# Patient Record
Sex: FEMALE | Race: WHITE | NOT HISPANIC OR LATINO | Employment: OTHER | URBAN - METROPOLITAN AREA
[De-identification: names, ages, dates, MRNs, and addresses within clinical notes are randomized per-mention and may not be internally consistent; named-entity substitution may affect disease eponyms.]

---

## 2017-01-02 ENCOUNTER — TRANSCRIBE ORDERS (OUTPATIENT)
Dept: LAB | Facility: HOSPITAL | Age: 82
End: 2017-01-02

## 2017-01-02 ENCOUNTER — GENERIC CONVERSION - ENCOUNTER (OUTPATIENT)
Dept: OTHER | Facility: OTHER | Age: 82
End: 2017-01-02

## 2017-01-02 ENCOUNTER — APPOINTMENT (OUTPATIENT)
Dept: LAB | Facility: HOSPITAL | Age: 82
End: 2017-01-02
Payer: MEDICARE

## 2017-01-02 DIAGNOSIS — I10 ESSENTIAL (PRIMARY) HYPERTENSION: ICD-10-CM

## 2017-01-02 DIAGNOSIS — G47.00 INSOMNIA: ICD-10-CM

## 2017-01-02 DIAGNOSIS — R73.01 IMPAIRED FASTING GLUCOSE: ICD-10-CM

## 2017-01-02 DIAGNOSIS — E78.5 HYPERLIPIDEMIA: ICD-10-CM

## 2017-01-02 LAB
ALBUMIN SERPL BCP-MCNC: 3.8 G/DL (ref 3.5–5)
ALP SERPL-CCNC: 75 U/L (ref 46–116)
ALT SERPL W P-5'-P-CCNC: 15 U/L (ref 12–78)
ANION GAP SERPL CALCULATED.3IONS-SCNC: 9 MMOL/L (ref 4–13)
AST SERPL W P-5'-P-CCNC: 8 U/L (ref 5–45)
BILIRUB SERPL-MCNC: 0.71 MG/DL (ref 0.2–1)
BUN SERPL-MCNC: 15 MG/DL (ref 5–25)
CALCIUM SERPL-MCNC: 8.9 MG/DL (ref 8.3–10.1)
CHLORIDE SERPL-SCNC: 106 MMOL/L (ref 100–108)
CHOLEST SERPL-MCNC: 147 MG/DL (ref 50–200)
CO2 SERPL-SCNC: 27 MMOL/L (ref 21–32)
CREAT SERPL-MCNC: 1.25 MG/DL (ref 0.6–1.3)
EST. AVERAGE GLUCOSE BLD GHB EST-MCNC: 126 MG/DL
GFR SERPL CREATININE-BSD FRML MDRD: 40.5 ML/MIN/1.73SQ M
GLUCOSE SERPL-MCNC: 102 MG/DL (ref 65–140)
HBA1C MFR BLD: 6 % (ref 4.2–6.3)
HDLC SERPL-MCNC: 63 MG/DL (ref 40–60)
LDLC SERPL CALC-MCNC: 58 MG/DL (ref 0–100)
POTASSIUM SERPL-SCNC: 3.6 MMOL/L (ref 3.5–5.3)
PROT SERPL-MCNC: 7.9 G/DL (ref 6.4–8.2)
SODIUM SERPL-SCNC: 142 MMOL/L (ref 136–145)
TRIGL SERPL-MCNC: 129 MG/DL

## 2017-01-02 PROCEDURE — 36415 COLL VENOUS BLD VENIPUNCTURE: CPT

## 2017-01-02 PROCEDURE — 83036 HEMOGLOBIN GLYCOSYLATED A1C: CPT

## 2017-01-02 PROCEDURE — 80061 LIPID PANEL: CPT

## 2017-01-02 PROCEDURE — 80053 COMPREHEN METABOLIC PANEL: CPT

## 2017-01-05 ENCOUNTER — ALLSCRIPTS OFFICE VISIT (OUTPATIENT)
Dept: OTHER | Facility: OTHER | Age: 82
End: 2017-01-05

## 2017-01-30 ENCOUNTER — ALLSCRIPTS OFFICE VISIT (OUTPATIENT)
Dept: OTHER | Facility: OTHER | Age: 82
End: 2017-01-30

## 2017-05-30 DIAGNOSIS — I65.29 OCCLUSION AND STENOSIS OF UNSPECIFIED CAROTID ARTERY: ICD-10-CM

## 2017-06-23 ENCOUNTER — TRANSCRIBE ORDERS (OUTPATIENT)
Dept: LAB | Facility: HOSPITAL | Age: 82
End: 2017-06-23

## 2017-06-25 ENCOUNTER — APPOINTMENT (OUTPATIENT)
Dept: LAB | Facility: HOSPITAL | Age: 82
End: 2017-06-25
Payer: MEDICARE

## 2017-06-25 DIAGNOSIS — E78.5 HYPERLIPIDEMIA: ICD-10-CM

## 2017-06-25 DIAGNOSIS — I10 ESSENTIAL (PRIMARY) HYPERTENSION: ICD-10-CM

## 2017-06-25 DIAGNOSIS — R73.01 IMPAIRED FASTING GLUCOSE: ICD-10-CM

## 2017-06-25 DIAGNOSIS — G47.00 INSOMNIA: ICD-10-CM

## 2017-06-25 PROCEDURE — 80061 LIPID PANEL: CPT

## 2017-06-25 PROCEDURE — 80053 COMPREHEN METABOLIC PANEL: CPT

## 2017-06-25 PROCEDURE — 85025 COMPLETE CBC W/AUTO DIFF WBC: CPT

## 2017-06-25 PROCEDURE — 36415 COLL VENOUS BLD VENIPUNCTURE: CPT

## 2017-06-25 PROCEDURE — 83036 HEMOGLOBIN GLYCOSYLATED A1C: CPT

## 2017-06-25 PROCEDURE — 84443 ASSAY THYROID STIM HORMONE: CPT

## 2017-06-26 ENCOUNTER — GENERIC CONVERSION - ENCOUNTER (OUTPATIENT)
Dept: OTHER | Facility: OTHER | Age: 82
End: 2017-06-26

## 2017-07-05 ENCOUNTER — ALLSCRIPTS OFFICE VISIT (OUTPATIENT)
Dept: OTHER | Facility: OTHER | Age: 82
End: 2017-07-05

## 2017-12-25 DIAGNOSIS — E78.5 HYPERLIPIDEMIA: ICD-10-CM

## 2017-12-25 DIAGNOSIS — R73.01 IMPAIRED FASTING GLUCOSE: ICD-10-CM

## 2017-12-25 DIAGNOSIS — I10 ESSENTIAL (PRIMARY) HYPERTENSION: ICD-10-CM

## 2017-12-25 DIAGNOSIS — G47.00 INSOMNIA: ICD-10-CM

## 2018-01-02 ENCOUNTER — TRANSCRIBE ORDERS (OUTPATIENT)
Dept: LAB | Facility: HOSPITAL | Age: 83
End: 2018-01-02

## 2018-01-02 ENCOUNTER — APPOINTMENT (OUTPATIENT)
Dept: LAB | Facility: HOSPITAL | Age: 83
End: 2018-01-02
Payer: MEDICARE

## 2018-01-02 DIAGNOSIS — E78.5 HYPERLIPIDEMIA: ICD-10-CM

## 2018-01-02 DIAGNOSIS — R73.01 IMPAIRED FASTING GLUCOSE: ICD-10-CM

## 2018-01-02 DIAGNOSIS — I10 ESSENTIAL (PRIMARY) HYPERTENSION: ICD-10-CM

## 2018-01-02 DIAGNOSIS — G47.00 INSOMNIA: ICD-10-CM

## 2018-01-02 LAB
ALBUMIN SERPL BCP-MCNC: 3.5 G/DL (ref 3.5–5)
ALP SERPL-CCNC: 72 U/L (ref 46–116)
ALT SERPL W P-5'-P-CCNC: 10 U/L (ref 12–78)
ANION GAP SERPL CALCULATED.3IONS-SCNC: 9 MMOL/L (ref 4–13)
AST SERPL W P-5'-P-CCNC: 8 U/L (ref 5–45)
BILIRUB SERPL-MCNC: 0.68 MG/DL (ref 0.2–1)
BUN SERPL-MCNC: 13 MG/DL (ref 5–25)
CALCIUM SERPL-MCNC: 9 MG/DL (ref 8.3–10.1)
CHLORIDE SERPL-SCNC: 106 MMOL/L (ref 100–108)
CO2 SERPL-SCNC: 27 MMOL/L (ref 21–32)
CREAT SERPL-MCNC: 1.16 MG/DL (ref 0.6–1.3)
EST. AVERAGE GLUCOSE BLD GHB EST-MCNC: 131 MG/DL
GFR SERPL CREATININE-BSD FRML MDRD: 42 ML/MIN/1.73SQ M
GLUCOSE P FAST SERPL-MCNC: 122 MG/DL (ref 65–99)
HBA1C MFR BLD: 6.2 % (ref 4.2–6.3)
POTASSIUM SERPL-SCNC: 3.7 MMOL/L (ref 3.5–5.3)
PROT SERPL-MCNC: 8 G/DL (ref 6.4–8.2)
SODIUM SERPL-SCNC: 142 MMOL/L (ref 136–145)

## 2018-01-02 PROCEDURE — 80053 COMPREHEN METABOLIC PANEL: CPT

## 2018-01-02 PROCEDURE — 36415 COLL VENOUS BLD VENIPUNCTURE: CPT

## 2018-01-02 PROCEDURE — 83036 HEMOGLOBIN GLYCOSYLATED A1C: CPT

## 2018-01-10 ENCOUNTER — ALLSCRIPTS OFFICE VISIT (OUTPATIENT)
Dept: OTHER | Facility: OTHER | Age: 83
End: 2018-01-10

## 2018-01-11 NOTE — RESULT NOTES
Verified Results  (1) CBC/PLT/DIFF 25Jun2017 08:24AM [facility] Order Number: [id number]     Test Name Result Flag Reference   WBC COUNT 5 84 Thousand/uL  4 31-10 16   RBC COUNT 4 56 Million/uL  3 81-5 12   HEMOGLOBIN 13 9 g/dL  11 5-15 4   HEMATOCRIT 42 1 %  34 8-46  1   MCV 92 fL  82-98   MCH 30 5 pg  26 8-34 3   MCHC 33 0 g/dL  31 4-37 4   RDW 12 6 %  11 6-15 1   MPV 9 7 fL  8 9-12 7   PLATELET COUNT 404 Thousands/uL  149-390   nRBC AUTOMATED 0 /100 WBCs     NEUTROPHILS RELATIVE PERCENT 57 %  43-75   LYMPHOCYTES RELATIVE PERCENT 28 %  14-44   MONOCYTES RELATIVE PERCENT 12 %  4-12   EOSINOPHILS RELATIVE PERCENT 2 %  0-6   BASOPHILS RELATIVE PERCENT 1 %  0-1   NEUTROPHILS ABSOLUTE COUNT 3 34 Thousands/? ??L  1 85-7 62   LYMPHOCYTES ABSOLUTE COUNT 1 63 Thousands/? ??L  0 60-4 47   MONOCYTES ABSOLUTE COUNT 0 67 Thousand/? ??L  0 17-1 22   EOSINOPHILS ABSOLUTE COUNT 0 12 Thousand/? ??L  0 00-0 61   BASOPHILS ABSOLUTE COUNT 0 05 Thousands/? ??L  0 00-0 10   - Patient Instructions: This bloodwork is non-fasting  Please drink two glasses of water morning of bloodwork       (1) COMPREHENSIVE METABOLIC PANEL [id number] 58:49NY [facility] Order Number: [id number]     Test Name Result Flag Reference   SODIUM 134 mmol/L L 136-145   POTASSIUM 4 0 mmol/L  3 5-5 3   CHLORIDE 101 mmol/L  100-108   CARBON DIOXIDE 26 mmol/L  21-32   ANION GAP (CALC) 7 mmol/L  4-13   BLOOD UREA NITROGEN 21 mg/dL  5-25   CREATININE 1 17 mg/dL  0 60-1 30   Standardized to IDMS reference method   CALCIUM 9 2 mg/dL  8 3-10 1   BILI, TOTAL 0 62 mg/dL  0 20-1 00   ALK PHOSPHATAS 69 U/L     ALT (SGPT) 12 U/L  12-78   AST(SGOT) 10 U/L  5-45   ALBUMIN 3 6 g/dL  3 5-5 0   TOTAL PROTEIN 7 5 g/dL  6 4-8 2   eGFR Non-African American 43 8 ml/min/1 73sq m     National Kidney Disease Education Program recommendations are as follows:  GFR calculation is accurate only with a steady state creatinine  Chronic Kidney disease less than 60 ml/min/1 73 sq  meters  Kidney failure less than 15 ml/min/1 73 sq  meters  GLUCOSE FASTING 102 mg/dL H 65-99     (1) HEMOGLOBIN A1C 25Jun2017 08:24AM Toribio Britton    Order Number: [id number]     Test Name Result Flag Reference   HEMOGLOBIN A1C 6 0 %  4 2-6 3   EST  AVG  GLUCOSE 126 mg/dl       (1) TSH WITH FT4 REFLEX 25Jun2017 08:24AM [facility]ib[facility] Britton    Order Number: [id number]     Test Name Result Flag Reference   TSH 1 670 uIU/mL  0 358-3 740   Patients undergoing fluorescein dye angiography may retain small amounts of fluorescein in the body for 48-72 hours post procedure  Samples containing fluorescein can produce falsely depressed TSH values  If the patient had this procedure,a specimen should be resubmitted post fluorescein clearance  The recommended reference ranges for TSH during pregnancy are as follows:  First trimester 0 1 to 2 5 uIU/mL  Second trimester  0 2 to 3 0 uIU/mL  Third trimester 0 3 to 3 0 uIU/m     (1) LIPID PANEL FASTING W DIRECT LDL REFLEX 25Jun2017 08:24AM Toribio Britton    Order Number: [id number]     Test Name Result Flag Reference   CHOLESTEROL 209 mg/dL H    LDL CHOLESTEROL CALCULATED 136 mg/dL H 0-100   - Patient Instructions:  This is a fasting blood test  Water, black tea or black coffee only after 9:00pm the night before test   Drink 2 glasses of water the morning of test       Triglyceride:         Normal              <150 mg/dl       Borderline High    150-199 mg/dl       High               200-499 mg/dl       Very High          >499 mg/dl  Cholesterol:         Desirable        <200 mg/dl      Borderline High  200-239 mg/dl      High             >239 mg/dl  HDL Cholesterol:        High    >59 mg/dL      Low     <41 mg/dL  LDL Cholesterol:        Optimal          <100 mg/dl        Near Optimal     100-129 mg/dl        Above Optimal          Borderline High   130-159 mg/dl          High              160-189 mg/dl          Very High >189 mg/dl  LDL CALCULATED:    This screening LDL is a calculated result  It does not have the accuracy of the Direct Measured LDL in the monitoring of patients with hyperlipidemia and/or statin therapy  Direct Measure LDL (BBV879) must be ordered separately in these patients  TRIGLYCERIDES 93 mg/dL  <=150   Specimen collection should occur prior to N-Acetylcysteine or Metamizole administration due to the potential for falsely depressed results  HDL,DIRECT 54 mg/dL  40-60   Specimen collection should occur prior to Metamizole administration due to the potential for falsely depressed results

## 2018-01-11 NOTE — RESULT NOTES
Message   DW pt on 1/5/2017 OV  Verified Results  (1) COMPREHENSIVE METABOLIC PANEL [id number] 65:55UK Evan Falcon   TW Order Number: [id number]     Test Name Result Flag Reference   GLUCOSE,RANDM 102 mg/dL     If the patient is fasting, the ADA then defines impaired fasting glucose as > 100 mg/dL and diabetes as > or equal to 123 mg/dL  SODIUM 142 mmol/L  136-145   POTASSIUM 3 6 mmol/L  3 5-5 3   CHLORIDE 106 mmol/L  100-108   CARBON DIOXIDE 27 mmol/L  21-32   ANION GAP (CALC) 9 mmol/L  4-13   BLOOD UREA NITROGEN 15 mg/dL  5-25   CREATININE 1 25 mg/dL  0 60-1 30   Standardized to IDMS reference method   CALCIUM 8 9 mg/dL  8 3-10 1   BILI, TOTAL 0 71 mg/dL  0 20-1 00   ALK PHOSPHATAS 75 U/L     ALT (SGPT) 15 U/L  12-78   AST(SGOT) 8 U/L  5-45   ALBUMIN 3 8 g/dL  3 5-5 0   TOTAL PROTEIN 7 9 g/dL  6 4-8 2   eGFR Non-African American 40 5 ml/min/1 73sq [facility] [employer] Disease Education Program recommendations are as follows:  GFR calculation is accurate only with a steady state creatinine  Chronic Kidney disease less than 60 ml/min/1 73 sq  meters  Kidney failure less than 15 ml/min/1 73 sq  meters  (1) HEMOGLOBIN A1C 02Jan2017 07:46AM Evan Falcon   TW Order Number: [id number]     Test Name Result Flag Reference   HEMOGLOBIN A1C 6 0 %  4 2-6 3   EST  AVG   GLUCOSE 126 mg/dl       (1) LIPID PANEL FASTING W DIRECT LDL REFLEX [id number] 07:46AM Evan Falcon   TW Order Number: [id number]     Test Name Result Flag Reference   CHOLESTEROL 147 mg/dL     LDL CHOLESTEROL CALCULATED 58 mg/dL  0-100   Triglyceride:         Normal              <150 mg/dl       Borderline High    150-199 mg/dl       High               200-499 mg/dl       Very High          >499 mg/dl  Cholesterol:         Desirable        <200 mg/dl      Borderline High  200-239 mg/dl      High             >239 mg/dl  HDL Cholesterol:        High    >59 mg/dL      Low     <41 mg/dL  LDL Cholesterol: Optimal          <100 mg/dl        Near Optimal     100-129 mg/dl        Above Optimal          Borderline High   130-159 mg/dl          High              160-189 mg/dl          Very High        >189 mg/dl  LDL CALCULATED:    This screening LDL is a calculated result  It does not have the accuracy of the Direct Measured LDL in the monitoring of patients with hyperlipidemia and/or statin therapy  Direct Measure LDL (GOV852) must be ordered separately in these patients  TRIGLYCERIDES 129 mg/dL  <=150   Specimen collection should occur prior to N-Acetylcysteine or Metamizole administration due to the potential for falsely depressed results  HDL,DIRECT 63 mg/dL H 40-60   Specimen collection should occur prior to Metamizole administration due to the potential for falsely depressed results

## 2018-01-13 VITALS
BODY MASS INDEX: 21.82 KG/M2 | WEIGHT: 111.13 LBS | DIASTOLIC BLOOD PRESSURE: 74 MMHG | RESPIRATION RATE: 18 BRPM | HEIGHT: 60 IN | SYSTOLIC BLOOD PRESSURE: 116 MMHG | HEART RATE: 82 BPM

## 2018-01-13 VITALS
TEMPERATURE: 97.8 F | SYSTOLIC BLOOD PRESSURE: 136 MMHG | HEIGHT: 60 IN | HEART RATE: 80 BPM | WEIGHT: 106.4 LBS | BODY MASS INDEX: 20.89 KG/M2 | DIASTOLIC BLOOD PRESSURE: 58 MMHG | RESPIRATION RATE: 12 BRPM

## 2018-01-14 VITALS
RESPIRATION RATE: 16 BRPM | HEART RATE: 76 BPM | SYSTOLIC BLOOD PRESSURE: 108 MMHG | TEMPERATURE: 97.8 F | BODY MASS INDEX: 21.1 KG/M2 | WEIGHT: 107.5 LBS | DIASTOLIC BLOOD PRESSURE: 60 MMHG | HEIGHT: 60 IN

## 2018-01-22 VITALS
BODY MASS INDEX: 21.6 KG/M2 | RESPIRATION RATE: 16 BRPM | HEART RATE: 60 BPM | SYSTOLIC BLOOD PRESSURE: 110 MMHG | WEIGHT: 110 LBS | DIASTOLIC BLOOD PRESSURE: 60 MMHG | TEMPERATURE: 97.5 F | HEIGHT: 60 IN

## 2018-01-29 DIAGNOSIS — G47.00 INSOMNIA, UNSPECIFIED TYPE: Primary | ICD-10-CM

## 2018-01-29 RX ORDER — TEMAZEPAM 15 MG/1
CAPSULE ORAL
Qty: 60 CAPSULE | Refills: 0 | Status: SHIPPED | OUTPATIENT
Start: 2018-01-29 | End: 2018-07-11 | Stop reason: SDUPTHER

## 2018-04-11 ENCOUNTER — TELEPHONE (OUTPATIENT)
Dept: UROLOGY | Facility: CLINIC | Age: 83
End: 2018-04-11

## 2018-04-25 NOTE — TELEPHONE ENCOUNTER
I called pt again to try and make a f/u appt per Dr Wendy Ibrahim  There was no answer so i did l/m asking pt to call our office back

## 2018-07-01 DIAGNOSIS — R73.01 IMPAIRED FASTING GLUCOSE: ICD-10-CM

## 2018-07-01 DIAGNOSIS — G47.00 INSOMNIA: ICD-10-CM

## 2018-07-01 DIAGNOSIS — I65.29 OCCLUSION AND STENOSIS OF UNSPECIFIED CAROTID ARTERY: ICD-10-CM

## 2018-07-01 DIAGNOSIS — I10 ESSENTIAL (PRIMARY) HYPERTENSION: ICD-10-CM

## 2018-07-01 DIAGNOSIS — E78.5 HYPERLIPIDEMIA: ICD-10-CM

## 2018-07-02 ENCOUNTER — APPOINTMENT (OUTPATIENT)
Dept: LAB | Facility: HOSPITAL | Age: 83
End: 2018-07-02
Payer: MEDICARE

## 2018-07-02 DIAGNOSIS — G47.00 INSOMNIA: ICD-10-CM

## 2018-07-02 DIAGNOSIS — E78.5 HYPERLIPIDEMIA: ICD-10-CM

## 2018-07-02 DIAGNOSIS — R73.01 IMPAIRED FASTING GLUCOSE: ICD-10-CM

## 2018-07-02 DIAGNOSIS — I10 ESSENTIAL (PRIMARY) HYPERTENSION: ICD-10-CM

## 2018-07-02 DIAGNOSIS — I65.29 OCCLUSION AND STENOSIS OF UNSPECIFIED CAROTID ARTERY: ICD-10-CM

## 2018-07-02 LAB
ALBUMIN SERPL BCP-MCNC: 3.6 G/DL (ref 3.5–5)
ALP SERPL-CCNC: 74 U/L (ref 46–116)
ALT SERPL W P-5'-P-CCNC: 13 U/L (ref 12–78)
ANION GAP SERPL CALCULATED.3IONS-SCNC: 7 MMOL/L (ref 4–13)
AST SERPL W P-5'-P-CCNC: 12 U/L (ref 5–45)
BASOPHILS # BLD AUTO: 0.05 THOUSANDS/ΜL (ref 0–0.1)
BASOPHILS NFR BLD AUTO: 1 % (ref 0–1)
BILIRUB SERPL-MCNC: 0.55 MG/DL (ref 0.2–1)
BUN SERPL-MCNC: 14 MG/DL (ref 5–25)
CALCIUM SERPL-MCNC: 8.9 MG/DL (ref 8.3–10.1)
CHLORIDE SERPL-SCNC: 104 MMOL/L (ref 100–108)
CHOLEST SERPL-MCNC: 218 MG/DL (ref 50–200)
CO2 SERPL-SCNC: 25 MMOL/L (ref 21–32)
CREAT SERPL-MCNC: 1.27 MG/DL (ref 0.6–1.3)
EOSINOPHIL # BLD AUTO: 0.15 THOUSAND/ΜL (ref 0–0.61)
EOSINOPHIL NFR BLD AUTO: 2 % (ref 0–6)
ERYTHROCYTE [DISTWIDTH] IN BLOOD BY AUTOMATED COUNT: 12.1 % (ref 11.6–15.1)
EST. AVERAGE GLUCOSE BLD GHB EST-MCNC: 137 MG/DL
GFR SERPL CREATININE-BSD FRML MDRD: 38 ML/MIN/1.73SQ M
GLUCOSE SERPL-MCNC: 128 MG/DL (ref 65–140)
HBA1C MFR BLD: 6.4 % (ref 4.2–6.3)
HCT VFR BLD AUTO: 44.2 % (ref 34.8–46.1)
HDLC SERPL-MCNC: 52 MG/DL (ref 40–60)
HGB BLD-MCNC: 14.5 G/DL (ref 11.5–15.4)
IMM GRANULOCYTES # BLD AUTO: 0.03 THOUSAND/UL (ref 0–0.2)
IMM GRANULOCYTES NFR BLD AUTO: 0 % (ref 0–2)
LDLC SERPL CALC-MCNC: 138 MG/DL (ref 0–100)
LYMPHOCYTES # BLD AUTO: 1.72 THOUSANDS/ΜL (ref 0.6–4.47)
LYMPHOCYTES NFR BLD AUTO: 21 % (ref 14–44)
MCH RBC QN AUTO: 30.9 PG (ref 26.8–34.3)
MCHC RBC AUTO-ENTMCNC: 32.8 G/DL (ref 31.4–37.4)
MCV RBC AUTO: 94 FL (ref 82–98)
MONOCYTES # BLD AUTO: 0.72 THOUSAND/ΜL (ref 0.17–1.22)
MONOCYTES NFR BLD AUTO: 9 % (ref 4–12)
NEUTROPHILS # BLD AUTO: 5.38 THOUSANDS/ΜL (ref 1.85–7.62)
NEUTS SEG NFR BLD AUTO: 67 % (ref 43–75)
NRBC BLD AUTO-RTO: 0 /100 WBCS
PLATELET # BLD AUTO: 341 THOUSANDS/UL (ref 149–390)
PMV BLD AUTO: 9.6 FL (ref 8.9–12.7)
POTASSIUM SERPL-SCNC: 3.6 MMOL/L (ref 3.5–5.3)
PROT SERPL-MCNC: 7.8 G/DL (ref 6.4–8.2)
RBC # BLD AUTO: 4.7 MILLION/UL (ref 3.81–5.12)
SODIUM SERPL-SCNC: 136 MMOL/L (ref 136–145)
TRIGL SERPL-MCNC: 138 MG/DL
TSH SERPL DL<=0.05 MIU/L-ACNC: 1.4 UIU/ML (ref 0.36–3.74)
WBC # BLD AUTO: 8.05 THOUSAND/UL (ref 4.31–10.16)

## 2018-07-02 PROCEDURE — 36415 COLL VENOUS BLD VENIPUNCTURE: CPT

## 2018-07-02 PROCEDURE — 84443 ASSAY THYROID STIM HORMONE: CPT

## 2018-07-02 PROCEDURE — 80053 COMPREHEN METABOLIC PANEL: CPT

## 2018-07-02 PROCEDURE — 83036 HEMOGLOBIN GLYCOSYLATED A1C: CPT

## 2018-07-02 PROCEDURE — 85025 COMPLETE CBC W/AUTO DIFF WBC: CPT

## 2018-07-02 PROCEDURE — 80061 LIPID PANEL: CPT

## 2018-07-07 RX ORDER — AMLODIPINE BESYLATE 5 MG/1
TABLET ORAL
COMMUNITY
Start: 2014-05-06 | End: 2018-07-11 | Stop reason: SDUPTHER

## 2018-07-07 RX ORDER — METOPROLOL TARTRATE 50 MG/1
TABLET, FILM COATED ORAL
COMMUNITY
Start: 2011-12-22 | End: 2018-07-11 | Stop reason: SDUPTHER

## 2018-07-11 ENCOUNTER — OFFICE VISIT (OUTPATIENT)
Dept: FAMILY MEDICINE CLINIC | Facility: CLINIC | Age: 83
End: 2018-07-11
Payer: MEDICARE

## 2018-07-11 VITALS
TEMPERATURE: 97.7 F | BODY MASS INDEX: 21.44 KG/M2 | WEIGHT: 109.2 LBS | DIASTOLIC BLOOD PRESSURE: 78 MMHG | RESPIRATION RATE: 16 BRPM | OXYGEN SATURATION: 94 % | HEART RATE: 60 BPM | HEIGHT: 60 IN | SYSTOLIC BLOOD PRESSURE: 120 MMHG

## 2018-07-11 DIAGNOSIS — R73.01 IMPAIRED FASTING GLUCOSE: ICD-10-CM

## 2018-07-11 DIAGNOSIS — I10 ESSENTIAL HYPERTENSION: ICD-10-CM

## 2018-07-11 DIAGNOSIS — Z00.00 MEDICARE ANNUAL WELLNESS VISIT, SUBSEQUENT: Primary | ICD-10-CM

## 2018-07-11 DIAGNOSIS — G47.00 INSOMNIA, UNSPECIFIED TYPE: ICD-10-CM

## 2018-07-11 DIAGNOSIS — E78.5 HYPERLIPIDEMIA, UNSPECIFIED HYPERLIPIDEMIA TYPE: ICD-10-CM

## 2018-07-11 PROCEDURE — G0439 PPPS, SUBSEQ VISIT: HCPCS | Performed by: FAMILY MEDICINE

## 2018-07-11 PROCEDURE — 99214 OFFICE O/P EST MOD 30 MIN: CPT | Performed by: FAMILY MEDICINE

## 2018-07-11 RX ORDER — METOPROLOL TARTRATE 50 MG/1
25 TABLET, FILM COATED ORAL EVERY 12 HOURS SCHEDULED
Qty: 90 TABLET | Refills: 3 | Status: SHIPPED | OUTPATIENT
Start: 2018-07-11 | End: 2018-09-19 | Stop reason: SDUPTHER

## 2018-07-11 RX ORDER — AMLODIPINE BESYLATE 5 MG/1
5 TABLET ORAL 2 TIMES DAILY
Qty: 180 TABLET | Refills: 3 | Status: SHIPPED | OUTPATIENT
Start: 2018-07-11 | End: 2018-09-19 | Stop reason: SDUPTHER

## 2018-07-11 RX ORDER — TEMAZEPAM 15 MG/1
15-30 CAPSULE ORAL
Qty: 60 CAPSULE | Refills: 2 | Status: SHIPPED | OUTPATIENT
Start: 2018-07-11 | End: 2019-01-18 | Stop reason: SDUPTHER

## 2018-07-11 NOTE — PROGRESS NOTES
Chief Complaint   Patient presents with    Medicare Wellness Visit     AWV    Follow-up     6 month follow up     Assessment/Plan:  Reviewed lab in 7/2018  Lipid 218/138/52/138 slightly elevated  CBC ok  CMP ok  hgA1C 6 4 elevated    HTN---controlled  Continue amlodipine and metoprolol  IFG---low carb diet  Hyperlipidemia---low fat diet  Insomnia---Give script of temazepam  SE educated pt  Refused flu shot  Refused pneumovax or PCV13  Refused Dexa scan  Fall precautions  RTO in 6 months with labs  Diagnoses and all orders for this visit:    Medicare annual wellness visit, subsequent    Essential hypertension  -     amLODIPine (NORVASC) 5 mg tablet; Take 1 tablet (5 mg total) by mouth 2 (two) times a day for 90 days  -     Comprehensive metabolic panel; Future  -     metoprolol tartrate (LOPRESSOR) 50 mg tablet; Take 0 5 tablets (25 mg total) by mouth every 12 (twelve) hours for 90 days    Impaired fasting glucose  -     Hemoglobin A1C; Future    Hyperlipidemia, unspecified hyperlipidemia type    Insomnia, unspecified type  -     temazepam (RESTORIL) 15 mg capsule; Take 1-2 capsules (15-30 mg total) by mouth daily at bedtime as needed for sleep          Subjective:      Patient ID: Jac Villanueva is a 80 y o  female  HPI    Pt is here by herself  Hyperlipidemia--- Pt feels leg pain got better after she stopped atorvastatin  Pt does not want to take statin any more  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  No symptoms now  She is on baby ASA daily  Sometimes she had bruise on her legs  FU vascular before  Refused to follow any more, because she does not want to do any more tests  Insomnia---on temazepam 15mg-30mg qhs which helped  Need refill today  HTN---on amlodipine 5mg QD and metoprolol 50mg QD  Does not check BP at home  Today /78 good  Pt denies dizziness, lightheaded, SOB, cP or headache       She lives in a senior apartment by herself  Does all ADL's  Still drive  She sees her son everyday who has bussiness one block away  Walks everyday  Denies recent falls  Denies depression  Has living will per pt  Almita Quinn, younger son  She prefers DNR  The following portions of the patient's history were reviewed and updated as appropriate: allergies, current medications, past family history, past medical history, past social history, past surgical history and problem list     Review of Systems   Constitutional: Negative for appetite change, chills and fever  HENT: Negative for congestion, ear pain, sinus pain and sore throat  Eyes: Negative for discharge and itching  Respiratory: Negative for apnea, cough, chest tightness, shortness of breath and wheezing  Cardiovascular: Negative for chest pain, palpitations and leg swelling  Gastrointestinal: Negative for abdominal pain, anal bleeding, constipation, diarrhea, nausea and vomiting  Endocrine: Negative for cold intolerance, heat intolerance and polyuria  Genitourinary: Negative for difficulty urinating and dysuria  Musculoskeletal: Negative for arthralgias, back pain and myalgias  Skin: Negative for rash  Neurological: Negative for dizziness and headaches  Psychiatric/Behavioral: Negative for agitation  Objective:      Vitals:    07/11/18 0857   BP: 120/78   Pulse: 60   Resp: 16   Temp: 97 7 °F (36 5 °C)   SpO2: 94%              Physical Exam   Constitutional: She appears well-developed  No distress  HENT:   Head: Normocephalic  Right Ear: External ear normal    Left Ear: External ear normal    Nose: Nose normal    Mouth/Throat: Oropharynx is clear and moist    Eyes: Conjunctivae are normal  Pupils are equal, round, and reactive to light  Right eye exhibits no discharge  Left eye exhibits no discharge  Neck: Normal range of motion  No thyromegaly present  Cardiovascular: Normal rate, regular rhythm and normal heart sounds    Exam reveals no gallop and no friction rub  No murmur heard  Pulmonary/Chest: Effort normal and breath sounds normal  No respiratory distress  She has no wheezes  She has no rales  She exhibits no tenderness  Abdominal: Soft  Bowel sounds are normal    Musculoskeletal: Normal range of motion  Lymphadenopathy:     She has no cervical adenopathy  Neurological: She is alert  Psychiatric: She has a normal mood and affect

## 2018-07-11 NOTE — PROGRESS NOTES
Chief Complaint   Patient presents with    Medicare Wellness Visit     AWV    Follow-up     6 month follow up     Assessment and Plan:  Problem List Items Addressed This Visit        Endocrine    Impaired fasting glucose    Relevant Orders    Hemoglobin A1C       Cardiovascular and Mediastinum    Hypertension    Relevant Medications    amLODIPine (NORVASC) 5 mg tablet    metoprolol tartrate (LOPRESSOR) 50 mg tablet    Other Relevant Orders    Comprehensive metabolic panel       Other    Hyperlipidemia    Insomnia    Relevant Medications    temazepam (RESTORIL) 15 mg capsule      Other Visit Diagnoses     Medicare annual wellness visit, subsequent    -  Primary        MMSE 25/30 today  POA---younger son Vinh Thomas will---DNR per pt  Health Maintenance Due   Topic Date Due    DTaP,Tdap,and Td Vaccines (1 - Tdap) 11/09/1950    Urinary Incontinence Screening  11/09/1994    GLAUCOMA SCREENING 65 + YR  11/09/1996         HPI:  Jose Wallace is a 80 y o  female here for her Subsequent Wellness Visit      Patient Active Problem List   Diagnosis    Hyperlipidemia    Hypertension    Impaired fasting glucose    Insomnia    Occlusion and stenosis of carotid artery    Peripheral vascular disease ([facility] [address]     Past Medical History:   Diagnosis Date    Allergic rhinitis     resolved 12/29/14    Bladder cancer ([facility] [address] [facility]     last assessed 6/13/13    Colon cancer ([facility] [address] )     last assessed 12/19/12    Diverticulitis     of colon    Hypertension     Lyme disease     last assessed 12/19/12    Microscopic hematuria     Osteoarthritis     resolved 12/29/14    TIA (transient ischemic attack)      Past Surgical History:   Procedure Laterality Date    CATARACT EXTRACTION      CHOLECYSTECTOMY      COLECTOMY      partial - sigmoid    CYSTOSCOPY Left     w/insertion of ureteral stent    MOUTH SURGERY      REVISION TOTAL HIP ARTHROPLASTY Left 2015    TONSILLECTOMY       Family History   Problem Relation Age of Onset    Cancer Sister     Heart disease Sister     Other Mother         respiratory failure    Gallbladder disease Father      History   Smoking Status    Former Smoker   Smokeless Tobacco    Never Used     History   Alcohol Use No      History   Drug Use No         Current Outpatient Prescriptions   Medication Sig Dispense Refill    amLODIPine (NORVASC) 5 mg tablet Take 1 tablet (5 mg total) by mouth 2 (two) times a day for 90 days 180 tablet 3    aspirin 81 MG tablet Take 1 tablet by mouth daily      metoprolol tartrate (LOPRESSOR) 50 mg tablet Take 0 5 tablets (25 mg total) by mouth every 12 (twelve) hours for 90 days 90 tablet 3    temazepam (RESTORIL) 15 mg capsule Take 1-2 capsules (15-30 mg total) by mouth daily at bedtime as needed for sleep 60 capsule 2     No current facility-administered medications for this visit        Allergies   Allergen Reactions    Codeine Sulfate Hives    Naproxen      Immunization History   Administered Date(s) Administered    Pneumococcal Polysaccharide PPV23 12/29/2014       Patient Care Team:  Fang Zaman MD as PCP - General  MD Reza Reilly MD Nathanel Couch, MD Carmina Caffey, PA-C    Medicare Screening Tests and Risk Assessments:  AWV Clinical     ISAR:   Previous hospitalizations?:  No       Once in a Lifetime Medicare Screening:   EKG performed?:  Yes        Medicare Screening Tests and Risk Assessment:   AAA Risk Assessment    None Indicated:  Yes    Osteoporosis Risk Assessment     Female:  Yes    PMHX of fractures:  Yes   HIV Risk Assessment    None indicated:  Yes        Drug and Alcohol Use:   Tobacco use    Cigarettes:  former smoker    Quit date:  1/1/01   Smokeless:  never used smokeless tobacco    Tobacco use duration    Tobacco Cessation Readiness    Alcohol use    Alcohol use:  rare use    Alcohol Treatment Readiness   Illicit Drug Use    Drug use:  never        Diet & Exercise:   Diet   How many servings a day of the following:   Fruits and Vegetables:  1-2 Meat:  1-2   Whole Grains:  0 Simple Carbs:  0   Dairy:  1 Soda:  0   Coffee:  1 Tea:  2   Exercise        Cognitive Impairment Screening:   Depression screening preformed:  Yes Depression screen score:  0    PHQ-9 Depression scale score:  1   Depression screening results:  no significant symptoms   Cognitive Impairment Screening        Functional Ability/Level of Safety:   Hearing    Hearing difficulties:  No Bilateral:  normal   Left:  normal Right:  normal   Hearing aid:  No    Hearing Impairment Assessment    Hearing status:  No impairment   Current Activities    Status:  limited ADL's, limited driving, no social activities   Help needed with the folllowing:    ADL    Fall Risk   Have you fallen in the last 12 months?:  No    How many times?:  0    Injury History       Home Safety:   Home Safety Risk Factors       Advanced Directives:   Advanced Directives     Durable POA for healthcare:  Yes   Patient's End of Life Decisions        Urinary Incontinence:       Glaucoma:            Provider Screening     Preventative Screening/Counseling:   Cardiovascular Screening/Counseling:   (Labs Q5 years, EKG optional one-time)   General:  Risks and Benefits Discussed, Screening Current           Diabetes Screening/Counseling:   (2 tests/year if Pre-Diabetes or 1 test/year if no Diabetes)   General:  Risks and Benefits Discussed, Screening Current           Colorectal Cancer Screening/Counseling:   (FOBT Q1 yr; Flex Sig Q4 yrs or Q10 yrs after Screening Colonoscopy; Screening Colonoscpy Q2 yrs High Risk or Q10 yrs Low Risk; Barium Enema Q2 yrs High Risk or Q4 yrs Low Risk)         Prostate Cancer Screening/Counseling:   (Annual)          Breast Cancer Screening/Counseling:   (Baseline Age 28 - 43; Annual Age 36+)         Cervical Cancer Screening/Counseling:   (Annual for High Risk or Childbearing Age with Abnormal Pap in Last 3 yrs;  Every 2 all others)         Osteoporosis Screening/Counseling:   (Every 2 Yrs if at risk or more if medically necessary)   General:  Risks and Benefits Discussed, Patient Declines           AAA Screening/Counseling:   (Once per Lifetime with risk factors)          Glaucoma Screening/Counseling:   (Annual)         HIV Screening/Counseling:   (Voluntary;  Once annually for high risk OR 3 times for Pregnancy at diagnosis of IUP; 3rd trimester; and at Labor         Hepatitis C Screening:             Immunizations:   Influenza (annual):  Risks & Benefits Discussed, Patient Declines   Pneumococcal (Once in a Lifetime):  Risks & Benefits Discussed, Patient Declines       Other Preventative Couseling (Non-Medicare Wellness Visit Required):       Referrals (Non-Medicare Wellness Visit Required):       Medical Equipment/Suppliers:             Physical Exam

## 2018-09-19 DIAGNOSIS — I10 ESSENTIAL HYPERTENSION: ICD-10-CM

## 2018-09-19 RX ORDER — AMLODIPINE BESYLATE 5 MG/1
TABLET ORAL
Qty: 60 TABLET | Refills: 5 | Status: SHIPPED | OUTPATIENT
Start: 2018-09-19 | End: 2019-02-20 | Stop reason: SDUPTHER

## 2018-09-19 RX ORDER — METOPROLOL TARTRATE 50 MG/1
TABLET, FILM COATED ORAL
Qty: 30 TABLET | Refills: 5 | Status: SHIPPED | OUTPATIENT
Start: 2018-09-19 | End: 2019-02-20 | Stop reason: SDUPTHER

## 2019-01-11 ENCOUNTER — APPOINTMENT (OUTPATIENT)
Dept: LAB | Facility: HOSPITAL | Age: 84
End: 2019-01-11
Payer: MEDICARE

## 2019-01-11 DIAGNOSIS — I10 ESSENTIAL HYPERTENSION: ICD-10-CM

## 2019-01-11 DIAGNOSIS — R73.01 IMPAIRED FASTING GLUCOSE: ICD-10-CM

## 2019-01-11 LAB
ALBUMIN SERPL BCP-MCNC: 3.5 G/DL (ref 3.5–5)
ALP SERPL-CCNC: 82 U/L (ref 46–116)
ALT SERPL W P-5'-P-CCNC: 11 U/L (ref 12–78)
ANION GAP SERPL CALCULATED.3IONS-SCNC: 8 MMOL/L (ref 4–13)
AST SERPL W P-5'-P-CCNC: 13 U/L (ref 5–45)
BILIRUB SERPL-MCNC: 0.51 MG/DL (ref 0.2–1)
BUN SERPL-MCNC: 17 MG/DL (ref 5–25)
CALCIUM SERPL-MCNC: 8.8 MG/DL (ref 8.3–10.1)
CHLORIDE SERPL-SCNC: 102 MMOL/L (ref 100–108)
CO2 SERPL-SCNC: 25 MMOL/L (ref 21–32)
CREAT SERPL-MCNC: 1.21 MG/DL (ref 0.6–1.3)
EST. AVERAGE GLUCOSE BLD GHB EST-MCNC: 140 MG/DL
GFR SERPL CREATININE-BSD FRML MDRD: 40 ML/MIN/1.73SQ M
GLUCOSE P FAST SERPL-MCNC: 114 MG/DL (ref 65–99)
HBA1C MFR BLD: 6.5 % (ref 4.2–6.3)
POTASSIUM SERPL-SCNC: 4 MMOL/L (ref 3.5–5.3)
PROT SERPL-MCNC: 7.7 G/DL (ref 6.4–8.2)
SODIUM SERPL-SCNC: 135 MMOL/L (ref 136–145)

## 2019-01-11 PROCEDURE — 83036 HEMOGLOBIN GLYCOSYLATED A1C: CPT

## 2019-01-11 PROCEDURE — 80053 COMPREHEN METABOLIC PANEL: CPT

## 2019-01-11 PROCEDURE — 36415 COLL VENOUS BLD VENIPUNCTURE: CPT

## 2019-01-18 DIAGNOSIS — G47.00 INSOMNIA, UNSPECIFIED TYPE: ICD-10-CM

## 2019-01-18 RX ORDER — TEMAZEPAM 15 MG/1
15-30 CAPSULE ORAL
Qty: 60 CAPSULE | Refills: 2 | Status: SHIPPED | OUTPATIENT
Start: 2019-01-18 | End: 2019-07-02 | Stop reason: SDUPTHER

## 2019-01-18 NOTE — TELEPHONE ENCOUNTER
Requesting    Temazepam 15 mg capsules, Qty 60, Refills 2        Take 1-2 capsules by mouth daily at bedtime as needed        for sleep            Send to [facility]

## 2019-02-19 NOTE — PROGRESS NOTES
Checked PDMP last filled 01/23/2019 TEMAZEPAM 15 MG CAPSULE #60 for 30 days, 01/19/2019 TEMAZEPAM 15 MG CAPSULE #60 for 30 days  Labs completed

## 2019-02-20 ENCOUNTER — OFFICE VISIT (OUTPATIENT)
Dept: FAMILY MEDICINE CLINIC | Facility: CLINIC | Age: 84
End: 2019-02-20
Payer: MEDICARE

## 2019-02-20 VITALS
HEART RATE: 72 BPM | WEIGHT: 103.6 LBS | TEMPERATURE: 97 F | OXYGEN SATURATION: 93 % | DIASTOLIC BLOOD PRESSURE: 76 MMHG | BODY MASS INDEX: 20.34 KG/M2 | RESPIRATION RATE: 16 BRPM | SYSTOLIC BLOOD PRESSURE: 132 MMHG | HEIGHT: 60 IN

## 2019-02-20 DIAGNOSIS — E78.5 HYPERLIPIDEMIA, UNSPECIFIED HYPERLIPIDEMIA TYPE: ICD-10-CM

## 2019-02-20 DIAGNOSIS — I65.22 STENOSIS OF LEFT CAROTID ARTERY: ICD-10-CM

## 2019-02-20 DIAGNOSIS — R73.01 IMPAIRED FASTING GLUCOSE: ICD-10-CM

## 2019-02-20 DIAGNOSIS — I65.23 BILATERAL CAROTID ARTERY STENOSIS: Primary | ICD-10-CM

## 2019-02-20 DIAGNOSIS — I10 ESSENTIAL HYPERTENSION: Primary | ICD-10-CM

## 2019-02-20 DIAGNOSIS — G47.00 INSOMNIA, UNSPECIFIED TYPE: ICD-10-CM

## 2019-02-20 PROCEDURE — 99214 OFFICE O/P EST MOD 30 MIN: CPT | Performed by: FAMILY MEDICINE

## 2019-02-20 RX ORDER — AMLODIPINE BESYLATE 5 MG/1
5 TABLET ORAL 2 TIMES DAILY
Qty: 60 TABLET | Refills: 5 | Status: SHIPPED | OUTPATIENT
Start: 2019-02-20 | End: 2019-08-28 | Stop reason: SDUPTHER

## 2019-02-20 RX ORDER — TEMAZEPAM 15 MG/1
15-30 CAPSULE ORAL
Qty: 60 CAPSULE | Refills: 2 | Status: CANCELLED | OUTPATIENT
Start: 2019-02-20

## 2019-02-20 RX ORDER — METOPROLOL TARTRATE 50 MG/1
25 TABLET, FILM COATED ORAL 2 TIMES DAILY
Qty: 30 TABLET | Refills: 5 | Status: SHIPPED | OUTPATIENT
Start: 2019-02-20 | End: 2019-08-28 | Stop reason: SDUPTHER

## 2019-02-20 NOTE — PROGRESS NOTES
Chief Complaint   Patient presents with    Follow-up     6 month follow up  Health Maintenance   Topic Date Due    DTaP,Tdap,and Td Vaccines (1 - Tdap) 11/09/1950    Urinary Incontinence Screening  11/09/1994    Pneumococcal PPSV23/PCV13 65+ Years / High and Highest Risk (2 of 2 - PCV13) 12/29/2015    INFLUENZA VACCINE  07/01/2018    Fall Risk  07/11/2019    Depression Screening PHQ  07/11/2019    Medicare Annual Wellness Visit (AWV)  07/11/2019    BMI: Adult  07/11/2019    HEPATITIS B VACCINES  Aged Out     Assessment/Plan:  Reviewed lab in 1/2019  hgA1C 6 5 elevated  CMP ok Cr 1 21, GFR 40 stable    HTN---controlled  Continue amlodipine and metoprolol  IFG---advised pt to follow low carb diet  Insomnia---Educated pt about side effects of temazepam  Use as needed  Pt understood  PDMP checked and she refilled temazepam on 1/19/2019 and 1/23/2019  I advised pt no refills today  Hyperlipidemia---Not on any statin  Low fat diet       Refused flu shot  Refused pneumovax or PCV13  Refused Dexa scan  Fall precautions  RTO in 6 months with labs  Diagnoses and all orders for this visit:    Essential hypertension  -     amLODIPine (NORVASC) 5 mg tablet; Take 1 tablet (5 mg total) by mouth 2 (two) times a day  -     metoprolol tartrate (LOPRESSOR) 50 mg tablet; Take 0 5 tablets (25 mg total) by mouth 2 (two) times a day  -     CBC; Future  -     Comprehensive metabolic panel; Future    Impaired fasting glucose  -     Hemoglobin A1C; Future    Insomnia, unspecified type  -     TSH, 3rd generation with Free T4 reflex; Future    Hyperlipidemia, unspecified hyperlipidemia type    Stenosis of left carotid artery    Other orders  -     Cancel: TDAP VACCINE GREATER THAN OR EQUAL TO 6YO IM; Future  -     Cancel: PREFERRED: influenza vaccine, 4228-5892, high-dose, PF 0 5 mL, for patients 65 yr+ (FLUZONE HIGH-DOSE); Future  -     Cancel: temazepam (RESTORIL) 15 mg capsule;  Take 1-2 capsules (15-30 mg total) by mouth daily at bedtime as needed for sleep          Subjective:      Patient ID: Del Abbasi is a 80 y o  female  HPI    Pt is here by herself  HTN---on amlodipine 5mg QD and metoprolol 50mg QD  Does not check BP at home  Today /76 good  Pt denies dizziness, lightheaded, SOB, cP or headache  IFG---pt states she likes candy and ice cream      Hyperlipidemia--- Pt feels leg pain got better after she stopped atorvastatin  Pt does not want to take statin any more  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  No symptoms now  She is on baby ASA daily  Sometimes she had bruise on her legs  FU vascular before  Refused to follow any more, because she does not want to do any more tests       Insomnia---on temazepam 15mg-30mg qhs which helped  Need refill today        She lives in a senior apartment by herself  Does all ADL's  Still drive  She sees her son everyday who has bussiness one block away  Walks everyday  Denies recent falls  Denies depression  Has living will per pt  Court Monk, younger son  She prefers DNR  The following portions of the patient's history were reviewed and updated as appropriate: allergies, current medications, past family history, past medical history, past social history, past surgical history and problem list     Review of Systems   Constitutional: Negative for appetite change, chills and fever  HENT: Negative for congestion, ear pain, sinus pain and sore throat  Eyes: Negative for discharge and itching  Respiratory: Negative for apnea, cough, chest tightness, shortness of breath and wheezing  Cardiovascular: Negative for chest pain, palpitations and leg swelling  Gastrointestinal: Negative for abdominal pain, anal bleeding, constipation, diarrhea, nausea and vomiting  Endocrine: Negative for cold intolerance, heat intolerance and polyuria     Genitourinary: Negative for difficulty urinating and dysuria  Musculoskeletal: Negative for arthralgias, back pain and myalgias  Skin: Negative for rash  Neurological: Negative for dizziness and headaches  Psychiatric/Behavioral: Negative for agitation  Objective:      /76 (BP Location: Left arm, Patient Position: Sitting, Cuff Size: Standard)   Pulse 72   Temp (!) 97 °F (36 1 °C) (Tympanic)   Resp 16   Ht 5' (1 524 m)   Wt 47 kg (103 lb 9 6 oz)   SpO2 93%   BMI 20 23 kg/m²          Physical Exam   Constitutional: She appears well-developed  No distress  HENT:   Head: Normocephalic  Right Ear: External ear normal    Left Ear: External ear normal    Nose: Nose normal    Mouth/Throat: Oropharynx is clear and moist    Eyes: Pupils are equal, round, and reactive to light  Conjunctivae are normal  Right eye exhibits no discharge  Left eye exhibits no discharge  Neck: Normal range of motion  No thyromegaly present  Cardiovascular: Normal rate, regular rhythm and normal heart sounds  Exam reveals no gallop and no friction rub  No murmur heard  Pulmonary/Chest: Effort normal and breath sounds normal  No respiratory distress  She has no wheezes  She has no rales  She exhibits no tenderness  Abdominal: Soft  Bowel sounds are normal    Musculoskeletal: Normal range of motion  Lymphadenopathy:     She has no cervical adenopathy  Neurological: She is alert  Psychiatric: She has a normal mood and affect

## 2019-02-22 ENCOUNTER — TELEPHONE (OUTPATIENT)
Dept: VASCULAR SURGERY | Facility: CLINIC | Age: 84
End: 2019-02-22

## 2019-02-27 NOTE — TELEPHONE ENCOUNTER
S/w patient she states she does not wish to have doppler done, she does not want a call back she will call us if she feels she needs to have a doppler

## 2019-07-02 DIAGNOSIS — G47.00 INSOMNIA, UNSPECIFIED TYPE: ICD-10-CM

## 2019-07-02 RX ORDER — TEMAZEPAM 15 MG/1
15-30 CAPSULE ORAL
Qty: 60 CAPSULE | Refills: 2 | Status: SHIPPED | OUTPATIENT
Start: 2019-07-02 | End: 2019-10-18 | Stop reason: SDUPTHER

## 2019-08-20 ENCOUNTER — APPOINTMENT (OUTPATIENT)
Dept: LAB | Facility: HOSPITAL | Age: 84
End: 2019-08-20
Payer: MEDICARE

## 2019-08-20 DIAGNOSIS — R73.01 IMPAIRED FASTING GLUCOSE: ICD-10-CM

## 2019-08-20 DIAGNOSIS — I10 ESSENTIAL HYPERTENSION: ICD-10-CM

## 2019-08-20 DIAGNOSIS — G47.00 INSOMNIA, UNSPECIFIED TYPE: ICD-10-CM

## 2019-08-20 LAB
ALBUMIN SERPL BCP-MCNC: 3.4 G/DL (ref 3.5–5)
ALP SERPL-CCNC: 70 U/L (ref 46–116)
ALT SERPL W P-5'-P-CCNC: 10 U/L (ref 12–78)
ANION GAP SERPL CALCULATED.3IONS-SCNC: 7 MMOL/L (ref 4–13)
AST SERPL W P-5'-P-CCNC: 10 U/L (ref 5–45)
BILIRUB SERPL-MCNC: 0.52 MG/DL (ref 0.2–1)
BUN SERPL-MCNC: 11 MG/DL (ref 5–25)
CALCIUM SERPL-MCNC: 8.7 MG/DL (ref 8.3–10.1)
CHLORIDE SERPL-SCNC: 104 MMOL/L (ref 100–108)
CO2 SERPL-SCNC: 27 MMOL/L (ref 21–32)
CREAT SERPL-MCNC: 1.18 MG/DL (ref 0.6–1.3)
ERYTHROCYTE [DISTWIDTH] IN BLOOD BY AUTOMATED COUNT: 12.4 % (ref 11.6–15.1)
EST. AVERAGE GLUCOSE BLD GHB EST-MCNC: 123 MG/DL
GFR SERPL CREATININE-BSD FRML MDRD: 41 ML/MIN/1.73SQ M
GLUCOSE P FAST SERPL-MCNC: 117 MG/DL (ref 65–99)
HBA1C MFR BLD: 5.9 % (ref 4.2–6.3)
HCT VFR BLD AUTO: 41.6 % (ref 34.8–46.1)
HGB BLD-MCNC: 13.5 G/DL (ref 11.5–15.4)
MCH RBC QN AUTO: 31.1 PG (ref 26.8–34.3)
MCHC RBC AUTO-ENTMCNC: 32.5 G/DL (ref 31.4–37.4)
MCV RBC AUTO: 96 FL (ref 82–98)
PLATELET # BLD AUTO: 236 THOUSANDS/UL (ref 149–390)
PMV BLD AUTO: 10 FL (ref 8.9–12.7)
POTASSIUM SERPL-SCNC: 3.7 MMOL/L (ref 3.5–5.3)
PROT SERPL-MCNC: 7.4 G/DL (ref 6.4–8.2)
RBC # BLD AUTO: 4.34 MILLION/UL (ref 3.81–5.12)
SODIUM SERPL-SCNC: 138 MMOL/L (ref 136–145)
TSH SERPL DL<=0.05 MIU/L-ACNC: 1.62 UIU/ML (ref 0.36–3.74)
WBC # BLD AUTO: 6.72 THOUSAND/UL (ref 4.31–10.16)

## 2019-08-20 PROCEDURE — 84443 ASSAY THYROID STIM HORMONE: CPT

## 2019-08-20 PROCEDURE — 36415 COLL VENOUS BLD VENIPUNCTURE: CPT

## 2019-08-20 PROCEDURE — 85027 COMPLETE CBC AUTOMATED: CPT

## 2019-08-20 PROCEDURE — 83036 HEMOGLOBIN GLYCOSYLATED A1C: CPT

## 2019-08-20 PROCEDURE — 80053 COMPREHEN METABOLIC PANEL: CPT

## 2019-08-28 ENCOUNTER — OFFICE VISIT (OUTPATIENT)
Dept: FAMILY MEDICINE CLINIC | Facility: CLINIC | Age: 84
End: 2019-08-28
Payer: MEDICARE

## 2019-08-28 VITALS
WEIGHT: 97.8 LBS | TEMPERATURE: 98.2 F | SYSTOLIC BLOOD PRESSURE: 110 MMHG | DIASTOLIC BLOOD PRESSURE: 60 MMHG | RESPIRATION RATE: 14 BRPM | HEART RATE: 60 BPM | HEIGHT: 59 IN | BODY MASS INDEX: 19.72 KG/M2

## 2019-08-28 DIAGNOSIS — G47.00 INSOMNIA, UNSPECIFIED TYPE: ICD-10-CM

## 2019-08-28 DIAGNOSIS — R73.01 IMPAIRED FASTING GLUCOSE: ICD-10-CM

## 2019-08-28 DIAGNOSIS — T14.8XXA BRUISE: ICD-10-CM

## 2019-08-28 DIAGNOSIS — Z00.00 MEDICARE ANNUAL WELLNESS VISIT, SUBSEQUENT: ICD-10-CM

## 2019-08-28 DIAGNOSIS — M79.604 PAIN IN BOTH LOWER EXTREMITIES: Primary | ICD-10-CM

## 2019-08-28 DIAGNOSIS — M79.605 PAIN IN BOTH LOWER EXTREMITIES: Primary | ICD-10-CM

## 2019-08-28 DIAGNOSIS — I10 ESSENTIAL HYPERTENSION: ICD-10-CM

## 2019-08-28 PROCEDURE — 99214 OFFICE O/P EST MOD 30 MIN: CPT | Performed by: FAMILY MEDICINE

## 2019-08-28 PROCEDURE — G0439 PPPS, SUBSEQ VISIT: HCPCS | Performed by: FAMILY MEDICINE

## 2019-08-28 RX ORDER — METOPROLOL TARTRATE 50 MG/1
25 TABLET, FILM COATED ORAL 2 TIMES DAILY
Qty: 30 TABLET | Refills: 5 | Status: SHIPPED | OUTPATIENT
Start: 2019-08-28 | End: 2020-05-26 | Stop reason: SDUPTHER

## 2019-08-28 RX ORDER — AMLODIPINE BESYLATE 5 MG/1
5 TABLET ORAL 2 TIMES DAILY
Qty: 60 TABLET | Refills: 5 | Status: SHIPPED | OUTPATIENT
Start: 2019-08-28 | End: 2020-04-17 | Stop reason: SDUPTHER

## 2019-08-28 NOTE — PROGRESS NOTES
Chief Complaint   Patient presents with    Medicare Wellness Visit     AWV & 6 month follow up      Health Maintenance   Topic Date Due    DTaP,Tdap,and Td Vaccines (1 - Tdap) 11/09/1950    Pneumococcal Vaccine: 65+ Years (2 of 2 - PCV13) 12/29/2015    INFLUENZA VACCINE  07/01/2019    Medicare Annual Wellness Visit (AWV)  07/11/2019    BMI: Adult  02/20/2020    Fall Risk  08/28/2020    Depression Screening PHQ  08/28/2020    Urinary Incontinence Screening  08/28/2020    Pneumococcal Vaccine: Pediatrics (0 to 5 Years) and At-Risk Patients (6 to 59 Years)  Aged Out    HEPATITIS B VACCINES  Aged Out     Assessment/Plan:    Reviewed lab in 8/2019  CBC ok  CMP ok  TSH normal  hgA1C 5 9 elevated     Refused flu shot  Refused pneumovax or PCV13  Refused Dexa scan  Fall precautions  RTO in 6 months with labs  Has living will per pt  Darwin Urias, younger son  She prefers DNR  Diagnoses and all orders for this visit:    Pain in both lower extremities  -     VAS lower limb venous duplex study, complete bilateral; Future    Bruise  -     US extremity soft tissue; Future    Essential hypertension  -     metoprolol tartrate (LOPRESSOR) 50 mg tablet; Take 0 5 tablets (25 mg total) by mouth 2 (two) times a day  -     amLODIPine (NORVASC) 5 mg tablet; Take 1 tablet (5 mg total) by mouth 2 (two) times a day  -     Hemoglobin A1C; Future    Impaired fasting glucose  -     Comprehensive metabolic panel; Future    Insomnia, unspecified type  Comments:  Continue temazepam, side effects educated pt  Medicare annual wellness visit, subsequent          Subjective:      Patient ID: Lashanda Guerra is a 80 y o  female  HPI    Pt is here by herself  C/o b/l leg pain and bruise for 6 months  Worse recently  Pain come and go  Worse while sleeping  Had bruise in the back of lower legs  Bottom feet feels numbness occasionally     Pt is on ASA 81mg daily for years because of hx of left carotid endarterectomy  Denies fever, Sob, CP, n/v/abd pain  HTN---on amlodipine 5mg bid and metoprolol 25mg bid  Does not check BP at home  Pt denies dizziness, lightheaded, SOB, cP or headache       IFG---pt states she likes candy and ice cream       Hyperlipidemia---low fat diet  Statin made her leg pain worse       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  No symptoms now  She is on baby ASA daily  Sometimes she had bruise on her legs  FU vascular before, not any more  Refused to do more test of carotid artery       Insomnia---on temazepam 15mg-30mg qhs which helped       She lives in a senior apartment by herself  Does all ADL's  Still drive  She sees her son everyday who has bussiness one block away  Walks everyday  Denies recent falls  Denies depression  The following portions of the patient's history were reviewed and updated as appropriate: allergies, current medications, past family history, past medical history, past social history, past surgical history and problem list     Review of Systems   Constitutional: Negative for appetite change, chills and fever  HENT: Negative for congestion, ear pain, sinus pain and sore throat  Eyes: Negative for discharge and itching  Respiratory: Negative for apnea, cough, chest tightness, shortness of breath and wheezing  Cardiovascular: Negative for chest pain, palpitations and leg swelling  Gastrointestinal: Negative for abdominal pain, anal bleeding, constipation, diarrhea, nausea and vomiting  Endocrine: Negative for cold intolerance, heat intolerance and polyuria  Genitourinary: Negative for difficulty urinating and dysuria  Musculoskeletal: Positive for myalgias  Negative for arthralgias and back pain  Skin: Negative for rash  Neurological: Negative for dizziness and headaches  Psychiatric/Behavioral: Negative for agitation           Objective:      /60   Pulse 60   Temp 98 2 °F (36 8 °C) (Tympanic)   Resp 14   Ht 4' 11" (1 499 m)   Wt 44 4 kg (97 lb 12 8 oz)   BMI 19 75 kg/m²          Physical Exam   Constitutional: She appears well-developed  No distress  HENT:   Head: Normocephalic  Right Ear: External ear normal    Left Ear: External ear normal    Nose: Nose normal    Mouth/Throat: Oropharynx is clear and moist    Eyes: Pupils are equal, round, and reactive to light  Conjunctivae are normal  Right eye exhibits no discharge  Left eye exhibits no discharge  Neck: Normal range of motion  No thyromegaly present  Cardiovascular: Normal rate, regular rhythm and normal heart sounds  Exam reveals no gallop and no friction rub  No murmur heard  Pulmonary/Chest: Effort normal and breath sounds normal  No respiratory distress  She has no wheezes  She has no rales  She exhibits no tenderness  Abdominal: Soft  Bowel sounds are normal    Musculoskeletal: Normal range of motion  She exhibits tenderness  She exhibits no edema  B/l back of lower leg bruise  Mild tenderness   Lymphadenopathy:     She has no cervical adenopathy  Neurological: She is alert  Psychiatric: She has a normal mood and affect

## 2019-08-28 NOTE — PROGRESS NOTES
Assessment and Plan:     Problem List Items Addressed This Visit        Endocrine    Impaired fasting glucose    Relevant Orders    Comprehensive metabolic panel       Cardiovascular and Mediastinum    Hypertension    Relevant Medications    metoprolol tartrate (LOPRESSOR) 50 mg tablet    amLODIPine (NORVASC) 5 mg tablet    Other Relevant Orders    Hemoglobin A1C       Other    Insomnia      Other Visit Diagnoses     Pain in both lower extremities    -  Primary    Relevant Orders    VAS lower limb venous duplex study, complete bilateral    Bruise        Relevant Orders    US extremity soft tissue    Medicare annual wellness visit, subsequent              Mini-cog 2/5 today        History of Present Illness:     Patient presents for Medicare Annual Wellness visit    Patient Care Team:  Petar Garber MD as PCP - General  MD Teddy Gaming MD [facility], MD Benjamine Parsons, PA-C     Problem List:     Patient Active Problem List   Diagnosis    Hyperlipidemia    Hypertension    Impaired fasting glucose    Insomnia    Stenosis of left carotid artery    Peripheral vascular disease [facility])      Past Medical and Surgical History:     Past Medical History:   Diagnosis Date    Allergic rhinitis     resolved 12/29/14    Bladder cancer ([facility] [address] )     last assessed 6/13/13    Colon cancer ([facility] [address] )     last assessed 12/19/12    Diverticulitis     of colon    Hypertension     Lyme disease     last assessed 12/19/12    Microscopic hematuria     Osteoarthritis     resolved 12/29/14    TIA (transient ischemic attack)      Past Surgical History:   Procedure Laterality Date    CATARACT EXTRACTION      CHOLECYSTECTOMY      COLECTOMY      partial - sigmoid    CYSTOSCOPY Left     w/insertion of ureteral stent    MOUTH SURGERY      REVISION TOTAL HIP ARTHROPLASTY Left 2015    TONSILLECTOMY        Family History:     Family History   Problem Relation Age of Onset    Cancer Sister     Heart disease Sister    Daniele Other Mother         respiratory failure    Gallbladder disease Father       Social History:     Social History     Tobacco Use   Smoking Status Former Smoker   Smokeless Tobacco Never Used     Social History     Substance and Sexual Activity   Alcohol Use No     Social History     Substance and Sexual Activity   Drug Use No      Medications and Allergies:     Current Outpatient Medications   Medication Sig Dispense Refill    amLODIPine (NORVASC) 5 mg tablet Take 1 tablet (5 mg total) by mouth 2 (two) times a day 60 tablet 5    aspirin 81 MG tablet Take 1 tablet by mouth daily      metoprolol tartrate (LOPRESSOR) 50 mg tablet Take 0 5 tablets (25 mg total) by mouth 2 (two) times a day 30 tablet 5    temazepam (RESTORIL) 15 mg capsule Take 1-2 capsules (15-30 mg total) by mouth daily at bedtime as needed for sleep 60 capsule 2     No current facility-administered medications for this visit  Allergies   Allergen Reactions    Codeine Sulfate Hives    Naproxen       Immunizations:     Immunization History   Administered Date(s) Administered    Pneumococcal Polysaccharide PPV23 12/29/2014      Medicare Screening Tests and Risk Assessments:         Health Risk Assessment:  Patient rates overall health as very good  Patient feels that their physical health rating is Same  Eyesight was rated as Same  Hearing was rated as Same  Patient feels that their emotional and mental health rating is Same  Pain experienced by patient in the last 7 days has been None  Emotional/Mental Health:  Patient has not been feeling nervous/anxious  PHQ-9 Depression Screening:    Frequency of the following problems over the past two weeks:      1  Little interest or pleasure in doing things: 0 - not at all      2  Feeling down, depressed, or hopeless: 0 - not at all  PHQ-2 Score: 0          Broken Bones/Falls:     Fall Risk Assessment:    In the past year, patient has experienced: No history of falling in past year          Bladder/Bowel:  Patient has leaked urine accidently in the last six months  Patient reports no loss of bowel control  Immunizations:  Patient has not had a flu vaccination within the last year  Patient has not received a pneumonia shot  Patient has not received a shingles shot  Patient has not received tetanus/diphtheria shot  Home Safety:  Patient does not have trouble with stairs inside or outside of their home  Patient currently reports that there are no safety hazards present in home, working smoke alarms,     Preventative Screenings:   No breast cancer screening performed, no colon cancer screen completed, cholesterol screen completed, no glaucoma eye exam completed    Nutrition:  Current diet: Regular, No Added Salt and Low Cholesterol with servings of the following:    Medications:  Patient is currently taking over-the-counter supplements  Patient is able to manage medications  Lifestyle Choices:  Patient reports no tobacco use  Patient has smoked or used tobacco in the past   Patient has stopped her tobacco use  Tobacco use quit date: 1/1/01  Patient reports no alcohol use  Patient drives a vehicle  Patient wears seat belt  Current level of exercise of physical activity described by patient as: walking daily  Activities of Daily Living:  Can get out of bed by his or her self, able to dress self, able to make own meals, able to do own shopping, able to bathe self, can do own laundry/housekeeping, can manage own money, pay bills and track expenses    Previous Hospitalizations:  No hospitalization or ED visit in past 12 months        Advanced Directives:  Patient has decided on a power of   Patient has spoken to designated power of   Patient has completed advanced directive          Preventative Screening/Counseling:      Cardiovascular:      General: Risks and Benefits Discussed      Counseling: Healthy Diet          Diabetes:      General: Risks and Benefits Discussed and Screening Current          Colorectal Cancer:      General: Risks and Benefits Discussed and Screening Not Indicated          Breast Cancer:      General: Risks and Benefits Discussed and Screening Not Indicated          Cervical Cancer:      General: Risks and Benefits Discussed and Screening Not Indicated          Osteoporosis:      General: Risks and Benefits Discussed and Patient Declines          Advanced Directives:   Patient has living will for healthcare, has durable POA for healthcare, patient has an advanced directive  End of life assessment reviewed with patient  Provider agrees with end of life decisions        Immunizations:      Influenza: Risks & Benefits Discussed and Patient Declines      Pneumococcal: Risks & Benefits Discussed and Patient Declines      Shingrix: Risks & Benefits Discussed and Patient Declines      TD: Risks & Benefits Discussed and Patient Declines

## 2019-09-04 ENCOUNTER — HOSPITAL ENCOUNTER (OUTPATIENT)
Dept: NON INVASIVE DIAGNOSTICS | Facility: HOSPITAL | Age: 84
Discharge: HOME | End: 2019-09-04
Payer: MEDICARE

## 2019-09-04 DIAGNOSIS — M79.604 PAIN IN BOTH LOWER EXTREMITIES: ICD-10-CM

## 2019-09-04 DIAGNOSIS — M79.605 PAIN IN BOTH LOWER EXTREMITIES: ICD-10-CM

## 2019-09-04 PROCEDURE — 93970 EXTREMITY STUDY: CPT

## 2019-09-04 PROCEDURE — 93970 EXTREMITY STUDY: CPT | Performed by: SURGERY

## 2019-10-18 DIAGNOSIS — G47.00 INSOMNIA, UNSPECIFIED TYPE: ICD-10-CM

## 2019-10-18 RX ORDER — TEMAZEPAM 15 MG/1
15-30 CAPSULE ORAL
Qty: 60 CAPSULE | Refills: 2 | Status: SHIPPED | OUTPATIENT
Start: 2019-10-18 | End: 2020-03-26

## 2019-10-18 NOTE — TELEPHONE ENCOUNTER
Patient is requesting a refill of temazepam (RESTORIL) 15 mg capsule, take 1-2 capsules daily, 60 capsules to [facility]  Patient can be contacted at [phone] with any questions

## 2019-10-18 NOTE — TELEPHONE ENCOUNTER
Checked PDMP last filled   Filled ID Written Drug QTY Days Prescriber Rx #   09/11/2019 1 07/02/2019 TEMAZEPAM 15 MG CAPSULE 60 0 30 FA [facility] [id number]   Pharmacy* Refills Daily Dose Pymt Type   FOUNT (0484) 1 Private Pay PA

## 2020-02-28 ENCOUNTER — APPOINTMENT (OUTPATIENT)
Dept: LAB | Facility: HOSPITAL | Age: 85
End: 2020-02-28
Payer: MEDICARE

## 2020-02-28 DIAGNOSIS — I10 ESSENTIAL HYPERTENSION: ICD-10-CM

## 2020-02-28 DIAGNOSIS — R73.01 IMPAIRED FASTING GLUCOSE: ICD-10-CM

## 2020-02-28 LAB
ALBUMIN SERPL BCP-MCNC: 3.6 G/DL (ref 3.5–5)
ALP SERPL-CCNC: 74 U/L (ref 46–116)
ALT SERPL W P-5'-P-CCNC: 12 U/L (ref 12–78)
ANION GAP SERPL CALCULATED.3IONS-SCNC: 6 MMOL/L (ref 4–13)
AST SERPL W P-5'-P-CCNC: 10 U/L (ref 5–45)
BILIRUB SERPL-MCNC: 0.6 MG/DL (ref 0.2–1)
BUN SERPL-MCNC: 17 MG/DL (ref 5–25)
CALCIUM SERPL-MCNC: 8.8 MG/DL (ref 8.3–10.1)
CHLORIDE SERPL-SCNC: 108 MMOL/L (ref 100–108)
CO2 SERPL-SCNC: 27 MMOL/L (ref 21–32)
CREAT SERPL-MCNC: 1.13 MG/DL (ref 0.6–1.3)
EST. AVERAGE GLUCOSE BLD GHB EST-MCNC: 123 MG/DL
GFR SERPL CREATININE-BSD FRML MDRD: 43 ML/MIN/1.73SQ M
GLUCOSE P FAST SERPL-MCNC: 98 MG/DL (ref 65–99)
HBA1C MFR BLD: 5.9 %
POTASSIUM SERPL-SCNC: 4 MMOL/L (ref 3.5–5.3)
PROT SERPL-MCNC: 7.7 G/DL (ref 6.4–8.2)
SODIUM SERPL-SCNC: 141 MMOL/L (ref 136–145)

## 2020-02-28 PROCEDURE — 83036 HEMOGLOBIN GLYCOSYLATED A1C: CPT

## 2020-02-28 PROCEDURE — 36415 COLL VENOUS BLD VENIPUNCTURE: CPT

## 2020-02-28 PROCEDURE — 80053 COMPREHEN METABOLIC PANEL: CPT

## 2020-03-03 ENCOUNTER — OFFICE VISIT (OUTPATIENT)
Dept: FAMILY MEDICINE CLINIC | Facility: CLINIC | Age: 85
End: 2020-03-03
Payer: MEDICARE

## 2020-03-03 VITALS
TEMPERATURE: 98.1 F | DIASTOLIC BLOOD PRESSURE: 62 MMHG | OXYGEN SATURATION: 94 % | WEIGHT: 99.4 LBS | RESPIRATION RATE: 20 BRPM | SYSTOLIC BLOOD PRESSURE: 116 MMHG | HEIGHT: 59 IN | BODY MASS INDEX: 20.04 KG/M2 | HEART RATE: 56 BPM

## 2020-03-03 DIAGNOSIS — I10 ESSENTIAL HYPERTENSION: ICD-10-CM

## 2020-03-03 DIAGNOSIS — R23.8 BRUISES EASILY: ICD-10-CM

## 2020-03-03 DIAGNOSIS — G47.00 INSOMNIA, UNSPECIFIED TYPE: Primary | ICD-10-CM

## 2020-03-03 DIAGNOSIS — R73.01 IMPAIRED FASTING GLUCOSE: ICD-10-CM

## 2020-03-03 DIAGNOSIS — I65.22 STENOSIS OF LEFT CAROTID ARTERY: ICD-10-CM

## 2020-03-03 PROBLEM — R23.3 BRUISES EASILY: Status: ACTIVE | Noted: 2020-03-03

## 2020-03-03 PROCEDURE — 1160F RVW MEDS BY RX/DR IN RCRD: CPT | Performed by: FAMILY MEDICINE

## 2020-03-03 PROCEDURE — 1036F TOBACCO NON-USER: CPT | Performed by: FAMILY MEDICINE

## 2020-03-03 PROCEDURE — 99214 OFFICE O/P EST MOD 30 MIN: CPT | Performed by: FAMILY MEDICINE

## 2020-03-03 PROCEDURE — 3074F SYST BP LT 130 MM HG: CPT | Performed by: FAMILY MEDICINE

## 2020-03-03 PROCEDURE — 4040F PNEUMOC VAC/ADMIN/RCVD: CPT | Performed by: FAMILY MEDICINE

## 2020-03-03 PROCEDURE — 3078F DIAST BP <80 MM HG: CPT | Performed by: FAMILY MEDICINE

## 2020-03-03 PROCEDURE — 3008F BODY MASS INDEX DOCD: CPT | Performed by: FAMILY MEDICINE

## 2020-03-03 NOTE — PROGRESS NOTES
Chief Complaint   Patient presents with    Follow-up     6 months and review labs  Health Maintenance   Topic Date Due    DTaP,Tdap,and Td Vaccines (1 - Tdap) 11[birth year]1940    Pneumococcal Vaccine: 65+ Years (2 of 2 - PCV13) 12/29/2015    Influenza Vaccine  07/01/2019    Depression Screening PHQ  08/28/2020    Medicare Annual Wellness Visit (AWV)  08/28/2020    Fall Risk  03/03/2021    BMI: Adult  03/03/2021    Pneumococcal Vaccine: Pediatrics (0 to 5 Years) and At-Risk Patients (6 to 59 Years)  Aged Out    HIB Vaccine  Aged Out    Hepatitis B Vaccine  Aged Out    IPV Vaccine  Aged Out    Hepatitis A Vaccine  Aged Out    Meningococcal ACWY Vaccine  Aged Out    HPV Vaccine  Aged Out     Assessment/Plan:    Impaired fasting glucose  2/2020 hgA1C 5 9 stable  Low carb diet  Hypertension  Controlled  DASH diet  Continue metoprolol 25mg bid and amlodipine 5mg bid  Stenosis of left carotid artery  Refused to follow vascular surgeon or do carotic doppler  Continue ASA 81mg QD  SE educated pt  Insomnia  Continue temazepam 15-30mg qhs  SE educated pt  PDMP checked and it was appropriate  Bruises easily  Educated pt about SE of ASA 81mg daily  Will check CBC next time  Reviewed lab in 2/2020  CMP ok  hgA1C 5 9 ok    Refused flu shot  Refused pneumovax or PCV13  Refused Dexa scan  Fall precautions  RTO in 6 months with labs         Diagnoses and all orders for this visit:    Insomnia, unspecified type  -     CBC; Future  -     Comprehensive metabolic panel; Future  -     Hemoglobin A1C With EAG; Future  -     TSH, 3rd generation with Free T4 reflex; Future    Impaired fasting glucose  -     CBC; Future  -     Comprehensive metabolic panel; Future  -     Hemoglobin A1C With EAG; Future  -     TSH, 3rd generation with Free T4 reflex; Future    Essential hypertension  -     CBC; Future  -     Comprehensive metabolic panel; Future  -     Hemoglobin A1C With EAG;  Future  -     TSH, 3rd generation with Free T4 reflex; Future    Bruises easily  -     CBC; Future  -     Comprehensive metabolic panel; Future  -     Hemoglobin A1C With EAG; Future  -     TSH, 3rd generation with Free T4 reflex; Future    Stenosis of left carotid artery          Subjective:      Patient ID: Prabhjot Long is a 80 y o  female  HPI    Pt is here by herself       HTN---on amlodipine 5mg bid and metoprolol 25mg bid  Does not check BP at home  BP is good today  Pt denies dizziness, lightheaded, SOB, cP or headache       IFG---pt states she likes candy and ice cream  2/2020 HgA1C 5 9 stable       Hyperlipidemia---low fat diet  Statin made her leg pain worse  No more statins       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  No symptoms now  She is on baby ASA daily  She had bruise on her legs, worse in right lower leg  FU vascular before, not any more  Refused to do more test of carotid artery       Insomnia---She is on temazepam 15mg qhs  If she woke up in the middle of night and cannot go back to sleep, she takes another 15mg        She lives in a senior apartment by herself  Does all ADL's  Still drive  She sees her son everyday who has bussiness one block away  Walks everyday  Denies recent falls  Denies depression  The following portions of the patient's history were reviewed and updated as appropriate: allergies, current medications, past family history, past medical history, past social history, past surgical history and problem list     Review of Systems   Constitutional: Negative for appetite change, chills and fever  HENT: Negative for congestion, ear pain, sinus pain and sore throat  Eyes: Negative for discharge and itching  Respiratory: Negative for apnea, cough, chest tightness, shortness of breath and wheezing  Cardiovascular: Negative for chest pain, palpitations and leg swelling     Gastrointestinal: Negative for abdominal pain, anal bleeding, constipation, diarrhea, nausea and vomiting  Endocrine: Negative for cold intolerance, heat intolerance and polyuria  Genitourinary: Negative for difficulty urinating and dysuria  Musculoskeletal: Negative for arthralgias, back pain and myalgias  Skin: Positive for color change  Negative for rash  Neurological: Negative for dizziness and headaches  Psychiatric/Behavioral: Negative for agitation  Objective:      /62 (BP Location: Left arm, Patient Position: Sitting, Cuff Size: Adult)   Pulse 56   Temp 98 1 °F (36 7 °C) (Tympanic)   Resp 20   Ht 4' 11" (1 499 m)   Wt 45 1 kg (99 lb 6 4 oz)   SpO2 94%   BMI 20 08 kg/m²          Physical Exam   Constitutional: She appears well-developed  No distress  HENT:   Head: Normocephalic  Neck: Normal range of motion  No thyromegaly present  Cardiovascular: Normal rate, regular rhythm and normal heart sounds  Exam reveals no gallop and no friction rub  No murmur heard  Pulmonary/Chest: Effort normal and breath sounds normal  No respiratory distress  She has no wheezes  She has no rales  She exhibits no tenderness  Abdominal: Soft  Bowel sounds are normal  She exhibits no distension and no mass  There is no tenderness  There is no rebound and no guarding  Musculoskeletal: Normal range of motion  She exhibits no edema, tenderness or deformity  Lymphadenopathy:     She has no cervical adenopathy  Neurological: She is alert  Skin:   B/l lower leg bruise, right lower leg worse  No active bleeding  Psychiatric: She has a normal mood and affect

## 2020-03-26 DIAGNOSIS — G47.00 INSOMNIA, UNSPECIFIED TYPE: ICD-10-CM

## 2020-03-26 RX ORDER — TEMAZEPAM 15 MG/1
CAPSULE ORAL
Qty: 60 CAPSULE | Refills: 0 | Status: SHIPPED | OUTPATIENT
Start: 2020-03-26 | End: 2020-04-17 | Stop reason: SDUPTHER

## 2020-04-17 DIAGNOSIS — I10 ESSENTIAL HYPERTENSION: ICD-10-CM

## 2020-04-17 DIAGNOSIS — G47.00 INSOMNIA, UNSPECIFIED TYPE: ICD-10-CM

## 2020-04-17 RX ORDER — TEMAZEPAM 15 MG/1
30 CAPSULE ORAL
Qty: 60 CAPSULE | Refills: 0 | Status: SHIPPED | OUTPATIENT
Start: 2020-04-17 | End: 2020-05-26 | Stop reason: SDUPTHER

## 2020-04-17 RX ORDER — AMLODIPINE BESYLATE 5 MG/1
5 TABLET ORAL 2 TIMES DAILY
Qty: 60 TABLET | Refills: 5 | Status: SHIPPED | OUTPATIENT
Start: 2020-04-17 | End: 2020-11-05 | Stop reason: SDUPTHER

## 2020-05-26 DIAGNOSIS — G47.00 INSOMNIA, UNSPECIFIED TYPE: ICD-10-CM

## 2020-05-26 DIAGNOSIS — I10 ESSENTIAL HYPERTENSION: ICD-10-CM

## 2020-05-26 RX ORDER — TEMAZEPAM 15 MG/1
30 CAPSULE ORAL
Qty: 60 CAPSULE | Refills: 0 | Status: SHIPPED | OUTPATIENT
Start: 2020-05-26 | End: 2020-06-29 | Stop reason: SDUPTHER

## 2020-05-26 RX ORDER — METOPROLOL TARTRATE 50 MG/1
25 TABLET, FILM COATED ORAL 2 TIMES DAILY
Qty: 30 TABLET | Refills: 5 | Status: SHIPPED | OUTPATIENT
Start: 2020-05-26 | End: 2020-06-29 | Stop reason: SDUPTHER

## 2020-06-29 DIAGNOSIS — I10 ESSENTIAL HYPERTENSION: ICD-10-CM

## 2020-06-29 DIAGNOSIS — G47.00 INSOMNIA, UNSPECIFIED TYPE: ICD-10-CM

## 2020-06-29 RX ORDER — TEMAZEPAM 15 MG/1
30 CAPSULE ORAL
Qty: 60 CAPSULE | Refills: 0 | Status: SHIPPED | OUTPATIENT
Start: 2020-06-29 | End: 2020-09-08 | Stop reason: SDUPTHER

## 2020-06-29 RX ORDER — METOPROLOL TARTRATE 50 MG/1
25 TABLET, FILM COATED ORAL 2 TIMES DAILY
Qty: 30 TABLET | Refills: 5 | Status: SHIPPED | OUTPATIENT
Start: 2020-06-29 | End: 2021-01-08 | Stop reason: SDUPTHER

## 2020-08-20 ENCOUNTER — APPOINTMENT (OUTPATIENT)
Dept: LAB | Facility: HOSPITAL | Age: 85
End: 2020-08-20
Payer: MEDICARE

## 2020-08-20 DIAGNOSIS — I10 ESSENTIAL HYPERTENSION: ICD-10-CM

## 2020-08-20 DIAGNOSIS — G47.00 INSOMNIA, UNSPECIFIED TYPE: ICD-10-CM

## 2020-08-20 DIAGNOSIS — R23.8 BRUISES EASILY: ICD-10-CM

## 2020-08-20 DIAGNOSIS — R73.01 IMPAIRED FASTING GLUCOSE: ICD-10-CM

## 2020-08-20 LAB
ALBUMIN SERPL BCP-MCNC: 3.5 G/DL (ref 3.5–5)
ALP SERPL-CCNC: 73 U/L (ref 46–116)
ALT SERPL W P-5'-P-CCNC: 12 U/L (ref 12–78)
ANION GAP SERPL CALCULATED.3IONS-SCNC: 5 MMOL/L (ref 4–13)
AST SERPL W P-5'-P-CCNC: 16 U/L (ref 5–45)
BILIRUB SERPL-MCNC: 0.53 MG/DL (ref 0.2–1)
BUN SERPL-MCNC: 16 MG/DL (ref 5–25)
CALCIUM SERPL-MCNC: 8.8 MG/DL (ref 8.3–10.1)
CHLORIDE SERPL-SCNC: 107 MMOL/L (ref 100–108)
CO2 SERPL-SCNC: 27 MMOL/L (ref 21–32)
CREAT SERPL-MCNC: 1.32 MG/DL (ref 0.6–1.3)
ERYTHROCYTE [DISTWIDTH] IN BLOOD BY AUTOMATED COUNT: 12.6 % (ref 11.6–15.1)
EST. AVERAGE GLUCOSE BLD GHB EST-MCNC: 123 MG/DL
GFR SERPL CREATININE-BSD FRML MDRD: 36 ML/MIN/1.73SQ M
GLUCOSE P FAST SERPL-MCNC: 116 MG/DL (ref 65–99)
HBA1C MFR BLD: 5.9 %
HCT VFR BLD AUTO: 43.6 % (ref 34.8–46.1)
HGB BLD-MCNC: 14.6 G/DL (ref 11.5–15.4)
MCH RBC QN AUTO: 31.9 PG (ref 26.8–34.3)
MCHC RBC AUTO-ENTMCNC: 33.5 G/DL (ref 31.4–37.4)
MCV RBC AUTO: 95 FL (ref 82–98)
PLATELET # BLD AUTO: 257 THOUSANDS/UL (ref 149–390)
PMV BLD AUTO: 9.3 FL (ref 8.9–12.7)
POTASSIUM SERPL-SCNC: 4 MMOL/L (ref 3.5–5.3)
PROT SERPL-MCNC: 7.9 G/DL (ref 6.4–8.2)
RBC # BLD AUTO: 4.57 MILLION/UL (ref 3.81–5.12)
SODIUM SERPL-SCNC: 139 MMOL/L (ref 136–145)
TSH SERPL DL<=0.05 MIU/L-ACNC: 1.91 UIU/ML (ref 0.36–3.74)
WBC # BLD AUTO: 7.25 THOUSAND/UL (ref 4.31–10.16)

## 2020-08-20 PROCEDURE — 84443 ASSAY THYROID STIM HORMONE: CPT

## 2020-08-20 PROCEDURE — 80053 COMPREHEN METABOLIC PANEL: CPT

## 2020-08-20 PROCEDURE — 36415 COLL VENOUS BLD VENIPUNCTURE: CPT

## 2020-08-20 PROCEDURE — 85027 COMPLETE CBC AUTOMATED: CPT

## 2020-08-20 PROCEDURE — 83036 HEMOGLOBIN GLYCOSYLATED A1C: CPT

## 2020-09-08 ENCOUNTER — OFFICE VISIT (OUTPATIENT)
Dept: FAMILY MEDICINE CLINIC | Facility: CLINIC | Age: 85
End: 2020-09-08
Payer: MEDICARE

## 2020-09-08 VITALS
TEMPERATURE: 98.6 F | BODY MASS INDEX: 20.16 KG/M2 | SYSTOLIC BLOOD PRESSURE: 110 MMHG | WEIGHT: 100 LBS | HEART RATE: 60 BPM | DIASTOLIC BLOOD PRESSURE: 60 MMHG | RESPIRATION RATE: 16 BRPM | HEIGHT: 59 IN

## 2020-09-08 DIAGNOSIS — Z00.00 MEDICARE ANNUAL WELLNESS VISIT, SUBSEQUENT: ICD-10-CM

## 2020-09-08 DIAGNOSIS — M79.604 PAIN IN BOTH LOWER EXTREMITIES: Primary | ICD-10-CM

## 2020-09-08 DIAGNOSIS — G47.00 INSOMNIA, UNSPECIFIED TYPE: ICD-10-CM

## 2020-09-08 DIAGNOSIS — I10 ESSENTIAL HYPERTENSION: ICD-10-CM

## 2020-09-08 DIAGNOSIS — R73.01 IMPAIRED FASTING GLUCOSE: ICD-10-CM

## 2020-09-08 DIAGNOSIS — M79.605 PAIN IN BOTH LOWER EXTREMITIES: Primary | ICD-10-CM

## 2020-09-08 PROCEDURE — G0439 PPPS, SUBSEQ VISIT: HCPCS | Performed by: FAMILY MEDICINE

## 2020-09-08 PROCEDURE — 99214 OFFICE O/P EST MOD 30 MIN: CPT | Performed by: FAMILY MEDICINE

## 2020-09-08 RX ORDER — TEMAZEPAM 15 MG/1
30 CAPSULE ORAL
Qty: 60 CAPSULE | Refills: 0 | Status: SHIPPED | OUTPATIENT
Start: 2020-09-08 | End: 2020-11-06 | Stop reason: SDUPTHER

## 2020-09-08 NOTE — PATIENT INSTRUCTIONS
Medicare Preventive Visit Patient Instructions  Thank you for completing your Welcome to Medicare Visit or Medicare Annual Wellness Visit today  Your next wellness visit will be due in one year (9/8/2021)  The screening/preventive services that you may require over the next 5-10 years are detailed below  Some tests may not apply to you based off risk factors and/or age  Screening tests ordered at today's visit but not completed yet may show as past due  Also, please note that scanned in results may not display below  Preventive Screenings:  Service Recommendations Previous Testing/Comments   Colorectal Cancer Screening  * Colonoscopy    * Fecal Occult Blood Test (FOBT)/Fecal Immunochemical Test (FIT)  * Fecal DNA/Cologuard Test  * Flexible Sigmoidoscopy Age: 54-65 years old   Colonoscopy: every 10 years (may be performed more frequently if at higher risk)  OR  FOBT/FIT: every 1 year  OR  Cologuard: every 3 years  OR  Sigmoidoscopy: every 5 years  Screening may be recommended earlier than age 48 if at higher risk for colorectal cancer  Also, an individualized decision between you and your healthcare provider will decide whether screening between the ages of 74-80 would be appropriate  Colonoscopy: Not on file  FOBT/FIT: Not on file  Cologuard: Not on file  Sigmoidoscopy: Not on file         Breast Cancer Screening Age: 36 years old  Frequency: every 1-2 years  Not required if history of left and right mastectomy Mammogram: Not on file       Cervical Cancer Screening Between the ages of 21-29, pap smear recommended once every 3 years  Between the ages of 33-67, can perform pap smear with HPV co-testing every 5 years     Recommendations may differ for women with a history of total hysterectomy, cervical cancer, or abnormal pap smears in past  Pap Smear: Not on file       Hepatitis C Screening Once for adults born between [facility]  More frequently in patients at high risk for Hepatitis C Hep C Antibody: Not on file       Diabetes Screening 1-2 times per year if you're at risk for diabetes or have pre-diabetes Fasting glucose: 116 mg/dL   A1C: 5 9 %       Cholesterol Screening Once every 5 years if you don't have a lipid disorder  May order more often based on risk factors  Lipid panel: 07/02/2018         Other Preventive Screenings Covered by Medicare:  1  Abdominal Aortic Aneurysm (AAA) Screening: covered once if your at risk  You're considered to be at risk if you have a family history of AAA  2  Lung Cancer Screening: covers low dose CT scan once per year if you meet all of the following conditions: (1) Age 50-69; (2) No signs or symptoms of lung cancer; (3) Current smoker or have quit smoking within the last 15 years; (4) You have a tobacco smoking history of at least 30 pack years (packs per day multiplied by number of years you smoked); (5) You get a written order from a healthcare provider  3  Glaucoma Screening: covered annually if you're considered high risk: (1) You have diabetes OR (2) Family history of glaucoma OR (3)  aged 48 and older OR (3)  American aged 72 and older  3  Osteoporosis Screening: covered every 2 years if you meet one of the following conditions: (1) You're estrogen deficient and at risk for osteoporosis based off medical history and other findings; (2) Have a vertebral abnormality; (3) On glucocorticoid therapy for more than 3 months; (4) Have primary hyperparathyroidism; (5) On osteoporosis medications and need to assess response to drug therapy  · Last bone density test (DXA Scan): Not on file  5  HIV Screening: covered annually if you're between the age of 12-76  Also covered annually if you are younger than 13 and older than 72 with risk factors for HIV infection  For pregnant patients, it is covered up to 3 times per pregnancy      Immunizations:  Immunization Recommendations   Influenza Vaccine Annual influenza vaccination during flu season is recommended for all persons aged >= 6 months who do not have contraindications   Pneumococcal Vaccine (Prevnar and Pneumovax)  * Prevnar = PCV13  * Pneumovax = PPSV23   Adults 25-60 years old: 1-3 doses may be recommended based on certain risk factors  Adults 72 years old: Prevnar (PCV13) vaccine recommended followed by Pneumovax (PPSV23) vaccine  If already received PPSV23 since turning 65, then PCV13 recommended at least one year after PPSV23 dose  Hepatitis B Vaccine 3 dose series if at intermediate or high risk (ex: diabetes, end stage renal disease, liver disease)   Tetanus (Td) Vaccine - COST NOT COVERED BY MEDICARE PART B Following completion of primary series, a booster dose should be given every 10 years to maintain immunity against tetanus  Td may also be given as tetanus wound prophylaxis  Tdap Vaccine - COST NOT COVERED BY MEDICARE PART B Recommended at least once for all adults  For pregnant patients, recommended with each pregnancy  Shingles Vaccine (Shingrix) - COST NOT COVERED BY MEDICARE PART B  2 shot series recommended in those aged 48 and above     Health Maintenance Due:  There are no preventive care reminders to display for this patient  Immunizations Due:      Topic Date Due    DTaP,Tdap,and Td Vaccines (1 - Tdap) 11/09/1950    Influenza Vaccine  07/01/2020     Advance Directives   What are advance directives? Advance directives are legal documents that state your wishes and plans for medical care  These plans are made ahead of time in case you lose your ability to make decisions for yourself  Advance directives can apply to any medical decision, such as the treatments you want, and if you want to donate organs  What are the types of advance directives? There are many types of advance directives, and each state has rules about how to use them  You may choose a combination of any of the following:  · Living will: This is a written record of the treatment you want   You can also choose which treatments you do not want, which to limit, and which to stop at a certain time  This includes surgery, medicine, IV fluid, and tube feedings  · Durable power of  for healthcare [facility]): This is a written record that states who you want to make healthcare choices for you when you are unable to make them for yourself  This person, called a proxy, is usually a family member or a friend  You may choose more than 1 proxy  · Do not resuscitate (DNR) order:  A DNR order is used in case your heart stops beating or you stop breathing  It is a request not to have certain forms of treatment, such as CPR  A DNR order may be included in other types of advance directives  · Medical directive: This covers the care that you want if you are in a coma, near death, or unable to make decisions for yourself  You can list the treatments you want for each condition  Treatment may include pain medicine, surgery, blood transfusions, dialysis, IV or tube feedings, and a ventilator (breathing machine)  · Values history: This document has questions about your views, beliefs, and how you feel and think about life  This information can help others choose the care that you would choose  Why are advance directives important? An advance directive helps you control your care  Although spoken wishes may be used, it is better to have your wishes written down  Spoken wishes can be misunderstood, or not followed  Treatments may be given even if you do not want them  An advance directive may make it easier for your family to make difficult choices about your care  © Copyright [employer] 2018 Information is for End User's use only and may not be sold, redistributed or otherwise used for commercial purposes   All illustrations and images included in CareNotes® are the copyrighted property of [employer]  or [employer]

## 2020-09-08 NOTE — PROGRESS NOTES
Assessment and Plan:     Problem List Items Addressed This Visit        Endocrine    Impaired fasting glucose     8/2020 hgA1C 5 9 stable  Low carb diet  Relevant Orders    Comprehensive metabolic panel    Hemoglobin A1C With EAG       Cardiovascular and Mediastinum    Hypertension     Controlled  DASH diet  Continue amlodipine 5mg QD and metoprolol 25mg bid  Relevant Orders    Comprehensive metabolic panel    Hemoglobin A1C With EAG       Other    Insomnia     Continue temazepam 15-30mg qhs  SE educated pt  PDMP checked and it was appropriate  Relevant Medications    temazepam (RESTORIL) 15 mg capsule    Pain in both lower extremities - Primary     Will do doppler to r/o DVT  Relevant Orders    VAS lower limb venous duplex study, complete bilateral      Other Visit Diagnoses     Medicare annual wellness visit, subsequent               Preventive health issues were discussed with patient, and age appropriate screening tests were ordered as noted in patient's After Visit Summary  Personalized health advice and appropriate referrals for health education or preventive services given if needed, as noted in patient's After Visit Summary       History of Present Illness:     Patient presents for Medicare Annual Wellness visit    Patient Care Team:  Hermilo Hall MD as PCP - General  MD Raquel Silva MD Dorsie Guys, MD Earlyne Pass, PA-C     Problem List:     Patient Active Problem List   Diagnosis    Hyperlipidemia    Hypertension    Impaired fasting glucose    Insomnia    Stenosis of left carotid artery    [facility]    Bruises easily    Pain in both lower extremities      Past Medical and Surgical History:     Past Medical History:   Diagnosis Date    Allergic rhinitis     resolved 12/29/14    Bladder cancer ([facility] [address] )     last assessed 6/13/13    Colon cancer ([facility] [address] )     last assessed 12/19/12    Diverticulitis     of colon    Hypertension     Lyme disease     last assessed 12/19/12    Microscopic hematuria     Osteoarthritis     resolved 12/29/14    TIA (transient ischemic attack)      Past Surgical History:   Procedure Laterality Date    CATARACT EXTRACTION      CHOLECYSTECTOMY      COLECTOMY      partial - sigmoid    CYSTOSCOPY Left     w/insertion of ureteral stent    MOUTH SURGERY      REVISION TOTAL HIP ARTHROPLASTY Left 2015    TONSILLECTOMY        Family History:     Family History   Problem Relation Age of Onset    Cancer Sister     Heart disease Sister     Other Mother         respiratory failure    Gallbladder disease Father       Social History:        Social History     Socioeconomic History    Marital status:       Spouse name: None    Number of children: None    Years of education: None    Highest education level: None   Occupational History    Occupation: Retired   Social Needs    Financial resource strain: None    Food insecurity     Worry: None     Inability: None    Transportation needs     Medical: None     Non-medical: None   Tobacco Use    Smoking status: Former Smoker    Smokeless tobacco: Never Used   Substance and Sexual Activity    Alcohol use: No    Drug use: No    Sexual activity: None   Lifestyle    Physical activity     Days per week: None     Minutes per session: None    Stress: None   Relationships    Social connections     Talks on phone: None     Gets together: None     Attends [employer] service: None     Active member of club or organization: None     Attends meetings of clubs or organizations: None     Relationship status: None    Intimate partner violence     Fear of current or ex partner: None     Emotionally abused: None     Physically abused: None     Forced sexual activity: None   Other Topics Concern    None   Social History Narrative    None      Medications and Allergies:     Current Outpatient Medications   Medication Sig Dispense Refill    amLODIPine (NORVASC) 5 mg tablet Take 1 tablet (5 mg total) by mouth 2 (two) times a day 60 tablet 5    aspirin 81 MG tablet Take 1 tablet by mouth daily      metoprolol tartrate (LOPRESSOR) 50 mg tablet Take 0 5 tablets (25 mg total) by mouth 2 (two) times a day 30 tablet 5    temazepam (RESTORIL) 15 mg capsule Take 2 capsules (30 mg total) by mouth daily at bedtime as needed for sleep 60 capsule 0     No current facility-administered medications for this visit  Allergies   Allergen Reactions    Codeine Sulfate Hives    Naproxen       Immunizations:     Immunization History   Administered Date(s) Administered    Pneumococcal Polysaccharide PPV23 12/29/2014      Health Maintenance: There are no preventive care reminders to display for this patient  Topic Date Due    DTaP,Tdap,and Td Vaccines (1 - Tdap) 11/09/1950    Influenza Vaccine  07/01/2020      Medicare Health Risk Assessment:     /60   Pulse 60   Temp 98 6 °F (37 °C) (Tympanic)   Resp 16   Ht 4' 11" (1 499 m)   Wt 45 4 kg (100 lb)   BMI 20 20 kg/m²      William Godoy is here for her Subsequent Wellness visit  Health Risk Assessment:   Patient rates overall health as good  Patient feels that their physical health rating is same  Eyesight was rated as same  Hearing was rated as same  Patient feels that their emotional and mental health rating is same  Pain experienced in the last 7 days has been none  Depression Screening:   PHQ-2 Score: 0      Fall Risk Screening: In the past year, patient has experienced: no history of falling in past year      Urinary Incontinence Screening:   Patient has leaked urine accidently in the last six months  Just started this summer, happens about 3 times a day patient is unaware of it happening at the time    Home Safety:  Patient does not have trouble with stairs inside or outside of their home  Patient has working smoke alarms and has no working carbon monoxide detector   Home safety hazards include: none      Nutrition:   Current diet is Regular and No Added Salt  Medications:   Patient is currently taking over-the-counter supplements  OTC medications include: see medication list  Patient is able to manage medications  Activities of Daily Living (ADLs)/Instrumental Activities of Daily Living (IADLs):   Walk and transfer into and out of bed and chair?: Yes  Dress and groom yourself?: Yes    Bathe or shower yourself?: Yes    Feed yourself? Yes  Do your laundry/housekeeping?: Yes  Manage your money, pay your bills and track your expenses?: Yes  Make your own meals?: Yes    Do your own shopping?: Yes    Previous Hospitalizations:   Any hospitalizations or ED visits within the last 12 months?: No      Advance Care Planning:   Living will: No    Durable POA for healthcare:  Yes    Advanced directive: No    Advanced directive counseling given: Yes    Five wishes given: Yes      Comments: Pt states son knows her wishes    Cognitive Screening:   Provider or family/friend/caregiver concerned regarding cognition?: No    PREVENTIVE SCREENINGS      Cardiovascular Screening:    General: Screening Not Indicated and History Lipid Disorder      Diabetes Screening:     General: Screening Current      Colorectal Cancer Screening:     General: Screening Not Indicated      Breast Cancer Screening:     General: Screening Not Indicated      Cervical Cancer Screening:    General: Screening Not Indicated      Osteoporosis Screening:    General: Risks and Benefits Discussed and Patient Declines      Lung Cancer Screening:     General: Screening Not Indicated      Regina Morelos MD

## 2020-09-08 NOTE — PROGRESS NOTES
Chief Complaint   Patient presents with   [facility] Wellness Visit     AWV and routine follow up, discoloration in skin on legs and sometimes it hurts     Health Maintenance   Topic Date Due    DTaP,Tdap,and Td Vaccines (1 - Tdap) 11/09/1950    Influenza Vaccine  07/01/2020    Medicare Annual Wellness Visit (AWV)  08/28/2020    Fall Risk  09/08/2021    Depression Screening PHQ  09/08/2021    BMI: Adult  09/08/2021    Pneumococcal Vaccine: 65+ Years  Completed    HIB Vaccine  Aged Out    Hepatitis B Vaccine  Aged Out    IPV Vaccine  Aged Out    Hepatitis A Vaccine  Aged Out    Meningococcal ACWY Vaccine  Aged Out    HPV Vaccine  Aged Out     Assessment/Plan:    Insomnia  Continue temazepam 15-30mg qhs  SE educated pt  PDMP checked and it was appropriate  Impaired fasting glucose  8/2020 hgA1C 5 9 stable  Low carb diet  Hypertension  Controlled  DASH diet  Continue amlodipine 5mg QD and metoprolol 25mg bid  Pain in both lower extremities  Will do doppler to r/o DVT  Reviewed lab in 8/2020  hgA1C 5 9 stable  CBC normal  CMP ok  TSH normal    Refused flu shot  Refused pneumovax or PCV13  Refused Dexa scan  Fall precautions  RTO in 6 months with labs      POA---her younger son Saniya Ibrahim  No living will per pt  She states she never thought about it  Give advance directive package  Diagnoses and all orders for this visit:    Pain in both lower extremities  -     VAS lower limb venous duplex study, complete bilateral; Future    Insomnia, unspecified type  -     temazepam (RESTORIL) 15 mg capsule; Take 2 capsules (30 mg total) by mouth daily at bedtime as needed for sleep    Impaired fasting glucose  -     Comprehensive metabolic panel; Future  -     Hemoglobin A1C With EAG; Future    Essential hypertension  -     Comprehensive metabolic panel; Future  -     Hemoglobin A1C With EAG;  Future    Medicare annual wellness visit, subsequent          Subjective:      Patient ID: Pastora Martinez Abby Du is a 80 y o  female  HPI    Pt is here by herself    C/o b/l legs pain for several weeks  Muscle pain per pt  Denies hx of DVT  C/o b/l legs color change for years  Looks darker than before  Bruise easily  She is on ASA 81mg QD       HTN---on amlodipine 5mg bid and metoprolol 25mg bid  Does not check BP at home  BP is good today  Pt denies dizziness, lightheaded, SOB, cP or headache       IFG---pt states she likes candy and ice cream  2/2020 HgA1C 5 9 stable       Hyperlipidemia---low fat diet  Statin made her leg pain worse  No more statins       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  No symptoms now  She is on baby ASA daily  She had bruise on her legs, worse in right lower leg  FU vascular before, not any more  Refused to do more test of carotid artery       Insomnia---She is on temazepam 15mg qhs  If she woke up in the middle of night and cannot go back to sleep, she takes another 15mg        She lives in a senior apartment by herself  Does all ADL's  Still drive  She sees her son everyday who has bussiness one block away  Walks everyday  Denies recent falls  Denies depression        The following portions of the patient's history were reviewed and updated as appropriate: allergies, current medications, past family history, past medical history, past social history, past surgical history and problem list     Review of Systems   Constitutional: Negative for appetite change, chills and fever  HENT: Negative for congestion, ear pain, sinus pain and sore throat  Eyes: Negative for discharge and itching  Respiratory: Negative for apnea, cough, chest tightness, shortness of breath and wheezing  Cardiovascular: Negative for chest pain, palpitations and leg swelling  Gastrointestinal: Negative for abdominal pain, anal bleeding, constipation, diarrhea, nausea and vomiting  Endocrine: Negative for cold intolerance, heat intolerance and polyuria  Genitourinary: Negative for difficulty urinating and dysuria  Musculoskeletal: Positive for myalgias  Negative for arthralgias and back pain  Skin: Positive for color change  Negative for rash  Neurological: Negative for dizziness and headaches  Psychiatric/Behavioral: Negative for agitation  Objective:      /60   Pulse 60   Temp 98 6 °F (37 °C) (Tympanic)   Resp 16   Ht 4' 11" (1 499 m)   Wt 45 4 kg (100 lb)   BMI 20 20 kg/m²          Physical Exam  Constitutional:       General: She is not in acute distress  Appearance: She is well-developed  HENT:      Head: Normocephalic  Eyes:      General:         Right eye: No discharge  Left eye: No discharge  Conjunctiva/sclera: Conjunctivae normal    Neck:      Musculoskeletal: Normal range of motion  No muscular tenderness  Thyroid: No thyromegaly  Cardiovascular:      Rate and Rhythm: Normal rate and regular rhythm  Heart sounds: Normal heart sounds  No murmur  No friction rub  No gallop  Pulmonary:      Effort: Pulmonary effort is normal  No respiratory distress  Breath sounds: Normal breath sounds  No wheezing or rales  Chest:      Chest wall: No tenderness  Abdominal:      General: Bowel sounds are normal  There is no distension  Palpations: Abdomen is soft  There is no mass  Tenderness: There is no abdominal tenderness  There is no guarding or rebound  Musculoskeletal: Normal range of motion  General: Tenderness present  No swelling or deformity  Comments: B/l front legs tenderness, venous dermatitis   Lymphadenopathy:      Cervical: No cervical adenopathy  Neurological:      Mental Status: She is alert

## 2020-11-05 DIAGNOSIS — I10 ESSENTIAL HYPERTENSION: ICD-10-CM

## 2020-11-06 DIAGNOSIS — G47.00 INSOMNIA, UNSPECIFIED TYPE: ICD-10-CM

## 2020-11-06 RX ORDER — TEMAZEPAM 15 MG/1
30 CAPSULE ORAL
Qty: 60 CAPSULE | Refills: 0 | Status: SHIPPED | OUTPATIENT
Start: 2020-11-06 | End: 2021-01-06 | Stop reason: SDUPTHER

## 2020-11-06 RX ORDER — AMLODIPINE BESYLATE 5 MG/1
5 TABLET ORAL 2 TIMES DAILY
Qty: 60 TABLET | Refills: 5 | Status: SHIPPED | OUTPATIENT
Start: 2020-11-06 | End: 2020-12-02 | Stop reason: SDUPTHER

## 2020-12-02 DIAGNOSIS — I10 ESSENTIAL HYPERTENSION: ICD-10-CM

## 2020-12-02 RX ORDER — AMLODIPINE BESYLATE 5 MG/1
5 TABLET ORAL 2 TIMES DAILY
Qty: 60 TABLET | Refills: 5 | Status: SHIPPED | OUTPATIENT
Start: 2020-12-02 | End: 2021-06-11 | Stop reason: SDUPTHER

## 2021-01-06 DIAGNOSIS — G47.00 INSOMNIA, UNSPECIFIED TYPE: ICD-10-CM

## 2021-01-07 RX ORDER — TEMAZEPAM 15 MG/1
30 CAPSULE ORAL
Qty: 60 CAPSULE | Refills: 2 | Status: SHIPPED | OUTPATIENT
Start: 2021-01-07 | End: 2021-06-11 | Stop reason: SDUPTHER

## 2021-01-08 DIAGNOSIS — I10 ESSENTIAL HYPERTENSION: ICD-10-CM

## 2021-01-08 RX ORDER — METOPROLOL TARTRATE 50 MG/1
25 TABLET, FILM COATED ORAL 2 TIMES DAILY
Qty: 30 TABLET | Refills: 5 | Status: SHIPPED | OUTPATIENT
Start: 2021-01-08 | End: 2021-07-14 | Stop reason: SDUPTHER

## 2021-02-25 ENCOUNTER — LAB (OUTPATIENT)
Dept: LAB | Facility: HOSPITAL | Age: 86
End: 2021-02-25
Payer: MEDICARE

## 2021-02-25 DIAGNOSIS — R73.01 IMPAIRED FASTING GLUCOSE: ICD-10-CM

## 2021-02-25 DIAGNOSIS — I10 ESSENTIAL HYPERTENSION: ICD-10-CM

## 2021-02-25 LAB
ALBUMIN SERPL BCP-MCNC: 3.7 G/DL (ref 3.5–5)
ALP SERPL-CCNC: 94 U/L (ref 46–116)
ALT SERPL W P-5'-P-CCNC: 12 U/L (ref 12–78)
ANION GAP SERPL CALCULATED.3IONS-SCNC: 7 MMOL/L (ref 4–13)
AST SERPL W P-5'-P-CCNC: 18 U/L (ref 5–45)
BILIRUB SERPL-MCNC: 0.53 MG/DL (ref 0.2–1)
BUN SERPL-MCNC: 19 MG/DL (ref 5–25)
CALCIUM SERPL-MCNC: 9.4 MG/DL (ref 8.3–10.1)
CHLORIDE SERPL-SCNC: 108 MMOL/L (ref 100–108)
CO2 SERPL-SCNC: 26 MMOL/L (ref 21–32)
CREAT SERPL-MCNC: 1.47 MG/DL (ref 0.6–1.3)
EST. AVERAGE GLUCOSE BLD GHB EST-MCNC: 123 MG/DL
GFR SERPL CREATININE-BSD FRML MDRD: 31 ML/MIN/1.73SQ M
GLUCOSE SERPL-MCNC: 166 MG/DL (ref 65–140)
HBA1C MFR BLD: 5.9 %
POTASSIUM SERPL-SCNC: 4 MMOL/L (ref 3.5–5.3)
PROT SERPL-MCNC: 8 G/DL (ref 6.4–8.2)
SODIUM SERPL-SCNC: 141 MMOL/L (ref 136–145)

## 2021-02-25 PROCEDURE — 83036 HEMOGLOBIN GLYCOSYLATED A1C: CPT

## 2021-02-25 PROCEDURE — 36415 COLL VENOUS BLD VENIPUNCTURE: CPT

## 2021-02-25 PROCEDURE — 80053 COMPREHEN METABOLIC PANEL: CPT

## 2021-03-10 ENCOUNTER — OFFICE VISIT (OUTPATIENT)
Dept: FAMILY MEDICINE CLINIC | Facility: CLINIC | Age: 86
End: 2021-03-10
Payer: MEDICARE

## 2021-03-10 VITALS
HEART RATE: 80 BPM | SYSTOLIC BLOOD PRESSURE: 120 MMHG | OXYGEN SATURATION: 93 % | TEMPERATURE: 98.2 F | BODY MASS INDEX: 20.16 KG/M2 | HEIGHT: 59 IN | WEIGHT: 100 LBS | DIASTOLIC BLOOD PRESSURE: 70 MMHG | RESPIRATION RATE: 16 BRPM

## 2021-03-10 DIAGNOSIS — G47.00 INSOMNIA, UNSPECIFIED TYPE: ICD-10-CM

## 2021-03-10 DIAGNOSIS — R73.01 IMPAIRED FASTING GLUCOSE: ICD-10-CM

## 2021-03-10 DIAGNOSIS — I10 ESSENTIAL HYPERTENSION: Primary | ICD-10-CM

## 2021-03-10 DIAGNOSIS — I65.22 STENOSIS OF LEFT CAROTID ARTERY: ICD-10-CM

## 2021-03-10 PROCEDURE — 99214 OFFICE O/P EST MOD 30 MIN: CPT | Performed by: FAMILY MEDICINE

## 2021-03-10 NOTE — PROGRESS NOTES
Chief Complaint   Patient presents with    Follow-up     6 month follow up     Health Maintenance   Topic Date Due    COVID-19 Vaccine (1 of 2) 11/09/1945    DTaP,Tdap,and Td Vaccines (1 - Tdap) 11[birth year]/1950    Influenza Vaccine (1) 09/01/2020    Fall Risk  09/08/2021    Medicare Annual Wellness Visit (AWV)  09/08/2021    Depression Screening PHQ  03/10/2022    BMI: Adult  03/10/2022    Pneumococcal Vaccine: 65+ Years  Completed    HIB Vaccine  Aged Out    Hepatitis B Vaccine  Aged Out    IPV Vaccine  Aged Out    Hepatitis A Vaccine  Aged Out    Meningococcal ACWY Vaccine  Aged Out    HPV Vaccine  Aged Out              Assessment/Plan:    Hypertension  Controlled  DASH diet  Continue amlodipine 5mg bid and metoprolol 25mg bid  Impaired fasting glucose  2/2021 hgA1C 5 9 stable  Low carb diet  Stenosis of left carotid artery  Continue ASA 81mg QD  Insomnia  Continue temazepam 30mg qhs  SE educated pt  Reviewed lab in 2/2021  hgA1C 5 9 stable  CMP ok    Refused flu shot  Refused pneumovax or PCV13  Will get Covid19 vaccine  Fall precautions  RTO in 6 months with labs         Diagnoses and all orders for this visit:    Essential hypertension  -     CBC; Future  -     Comprehensive metabolic panel; Future  -     Hemoglobin A1C; Future  -     TSH, 3rd generation with Free T4 reflex; Future    Impaired fasting glucose  -     CBC; Future  -     Comprehensive metabolic panel; Future  -     Hemoglobin A1C; Future  -     TSH, 3rd generation with Free T4 reflex; Future    Stenosis of left carotid artery  -     CBC; Future  -     Comprehensive metabolic panel; Future  -     Hemoglobin A1C; Future  -     TSH, 3rd generation with Free T4 reflex; Future    Insomnia, unspecified type          Subjective:      Patient ID: Emelyn Serna is a 80 y o  female  HPI    Pt is here by herself       HTN---on amlodipine 5mg bid and metoprolol 25mg bid   Does not check BP at home  BP is good today  Pt denies dizziness, lightheaded, SOB, cP or headache       IFG---pt states she likes ice cream       Hyperlipidemia---low fat diet  Statin made her leg pain worse  No more statins       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  No symptoms now  She is on baby ASA daily  She had bruise on her legs, worse in right lower leg  FU vascular before, not any more  Refused to do more test of carotid artery       Insomnia---She is on temazepam 15mg qhs  If she woke up in the middle of night and cannot go back to sleep, she takes another 15mg        She lives in a senior apartment by herself  Does all ADL's  Still drive  She sees her younger son regularly  Walks everyday  Denies recent falls  Denies depression           The following portions of the patient's history were reviewed and updated as appropriate: allergies, current medications, past family history, past medical history, past social history, past surgical history and problem list     Review of Systems   Constitutional: Negative for appetite change, chills and fever  HENT: Negative for congestion, ear pain, sinus pain and sore throat  Eyes: Negative for discharge and itching  Respiratory: Negative for apnea, cough, chest tightness, shortness of breath and wheezing  Cardiovascular: Negative for chest pain, palpitations and leg swelling  Gastrointestinal: Negative for abdominal pain, anal bleeding, constipation, diarrhea, nausea and vomiting  Endocrine: Negative for cold intolerance, heat intolerance and polyuria  Genitourinary: Negative for difficulty urinating and dysuria  Musculoskeletal: Negative for arthralgias, back pain and myalgias  Skin: Negative for rash  Neurological: Negative for dizziness and headaches  Psychiatric/Behavioral: Negative for agitation           Objective:      /70 (BP Location: Left arm, Patient Position: Sitting, Cuff Size: Adult)   Pulse 80   Temp 98 2 °F (36 8 °C) (Tympanic)   Resp 16   Ht 4' 11" (1 499 m)   Wt 45 4 kg (100 lb)   SpO2 93%   BMI 20 20 kg/m²          Physical Exam  Constitutional:       General: She is not in acute distress  Appearance: She is well-developed  HENT:      Head: Normocephalic  Eyes:      General:         Right eye: No discharge  Left eye: No discharge  Conjunctiva/sclera: Conjunctivae normal    Neck:      Musculoskeletal: Normal range of motion  Thyroid: No thyromegaly  Cardiovascular:      Rate and Rhythm: Normal rate and regular rhythm  Heart sounds: Normal heart sounds  No murmur  No friction rub  No gallop  Pulmonary:      Effort: Pulmonary effort is normal  No respiratory distress  Breath sounds: Normal breath sounds  No wheezing or rales  Chest:      Chest wall: No tenderness  Abdominal:      General: Bowel sounds are normal  There is no distension  Palpations: Abdomen is soft  There is no mass  Tenderness: There is no abdominal tenderness  There is no guarding or rebound  Musculoskeletal: Normal range of motion  General: No tenderness or deformity  Lymphadenopathy:      Cervical: No cervical adenopathy  Skin:     Findings: Bruising present  Comments: Old bruising on legs   Neurological:      Mental Status: She is alert

## 2021-05-04 ENCOUNTER — IMMUNIZATIONS (OUTPATIENT)
Dept: FAMILY MEDICINE CLINIC | Facility: HOSPITAL | Age: 86
End: 2021-05-04

## 2021-05-04 DIAGNOSIS — Z23 ENCOUNTER FOR IMMUNIZATION: Primary | ICD-10-CM

## 2021-05-04 PROCEDURE — 0001A SARS-COV-2 / COVID-19 MRNA VACCINE (PFIZER-BIONTECH) 30 MCG: CPT

## 2021-05-04 PROCEDURE — 91300 SARS-COV-2 / COVID-19 MRNA VACCINE (PFIZER-BIONTECH) 30 MCG: CPT

## 2021-05-11 ENCOUNTER — TELEPHONE (OUTPATIENT)
Dept: FAMILY MEDICINE CLINIC | Facility: CLINIC | Age: 86
End: 2021-05-11

## 2021-05-11 NOTE — TELEPHONE ENCOUNTER
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

## 2021-05-23 ENCOUNTER — IMMUNIZATIONS (OUTPATIENT)
Dept: FAMILY MEDICINE CLINIC | Facility: HOSPITAL | Age: 86
End: 2021-05-23

## 2021-05-23 DIAGNOSIS — Z23 ENCOUNTER FOR IMMUNIZATION: Primary | ICD-10-CM

## 2021-05-23 PROCEDURE — 91300 SARS-COV-2 / COVID-19 MRNA VACCINE (PFIZER-BIONTECH) 30 MCG: CPT

## 2021-05-23 PROCEDURE — 0002A SARS-COV-2 / COVID-19 MRNA VACCINE (PFIZER-BIONTECH) 30 MCG: CPT

## 2021-06-01 ENCOUNTER — TELEPHONE (OUTPATIENT)
Dept: FAMILY MEDICINE CLINIC | Facility: CLINIC | Age: 86
End: 2021-06-01

## 2021-06-01 ENCOUNTER — OFFICE VISIT (OUTPATIENT)
Dept: FAMILY MEDICINE CLINIC | Facility: CLINIC | Age: 86
End: 2021-06-01
Payer: MEDICARE

## 2021-06-01 VITALS
SYSTOLIC BLOOD PRESSURE: 126 MMHG | HEIGHT: 59 IN | BODY MASS INDEX: 19.6 KG/M2 | WEIGHT: 97.2 LBS | RESPIRATION RATE: 12 BRPM | TEMPERATURE: 97 F | OXYGEN SATURATION: 93 % | HEART RATE: 56 BPM | DIASTOLIC BLOOD PRESSURE: 74 MMHG

## 2021-06-01 DIAGNOSIS — Z01.818 PREOP EXAMINATION: Primary | ICD-10-CM

## 2021-06-01 DIAGNOSIS — H26.9 CATARACT OF RIGHT EYE, UNSPECIFIED CATARACT TYPE: ICD-10-CM

## 2021-06-01 DIAGNOSIS — I10 ESSENTIAL HYPERTENSION: ICD-10-CM

## 2021-06-01 PROCEDURE — 99214 OFFICE O/P EST MOD 30 MIN: CPT | Performed by: FAMILY MEDICINE

## 2021-06-01 RX ORDER — LOTEPREDNOL ETABONATE 3.8 MG/G
GEL OPHTHALMIC
COMMUNITY
Start: 2021-05-28 | End: 2021-09-14 | Stop reason: ALTCHOICE

## 2021-06-01 RX ORDER — BESIFLOXACIN 6 MG/ML
SUSPENSION OPHTHALMIC
COMMUNITY
Start: 2021-05-28 | End: 2021-09-14 | Stop reason: ALTCHOICE

## 2021-06-01 NOTE — TELEPHONE ENCOUNTER
[employer] phoned to request we fax the completed H&P form after patient's appointment today-also they will need office notes stating patient is cleared for surgery  Fax number is [phone]

## 2021-06-01 NOTE — PROGRESS NOTES
Chief Complaint   Patient presents with    Pre-op Clearance     Cataract surgery right eye scheduled on 06/03/21 with Dr Nate Rockwell at [facility]  Subjective: Phyllis Jones is a 80 y o  female who presents to the office today for a preoperative consultation at the request of surgeon Dr Nate Rockwell who plans on performing right cataract on 6/3/2021  This consultation is requested for the specific conditions prompting preoperative evaluation (i e  because of potential affect on operative risk): preop  Planned anesthesia: monitor  The patient has the following known anesthesia issues: no problem  Patients bleeding risk: no recent abnormal bleeding  Patient does not have objections to receiving blood products if needed  HTN---on amlodipine 5mg bid and metoprolol 25mg bid  Does not check BP at home  BP is good today  Pt denies dizziness, lightheaded, SOB, cP or headache       IFG---pt states she likes ice cream  2/2021 hgA1C 5 9     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  No symptoms now  She is on baby ASA daily  She had bruise on her legs, worse in right lower leg  FU vascular before, not any more  Refused to do more test of carotid artery       Insomnia---She is on temazepam 15mg qhs  If she woke up in the middle of night and cannot go back to sleep, she takes another 15mg        The following portions of the patient's history were reviewed and updated as appropriate: allergies, current medications, past family history, past medical history, past social history, past surgical history and problem list     Review of Systems  Pertinent items are noted in HPI       Objective:     /74 (BP Location: Left arm, Patient Position: Sitting, Cuff Size: Adult)   Pulse 56   Temp (!) 97 °F (36 1 °C) (Temporal)   Resp 12   Ht 4' 10 5" (1 486 m)   Wt 44 1 kg (97 lb 3 2 oz)   SpO2 93%   BMI 19 97 kg/m²     General Appearance:    Alert, cooperative, no distress, appears stated age   Head:    Normocephalic, without obvious abnormality, atraumatic   Eyes:    PERRL, conjunctiva/corneas clear               Neck:   Supple, symmetrical, trachea midline, no adenopathy;     thyroid:  no enlargement/tenderness/nodules; no carotid    bruit or JVD       Lungs:     Clear to auscultation bilaterally, respirations unlabored        Heart:    Regular rate and rhythm, S1 and S2 normal, no murmur, rub   or gallop       Abdomen:     Soft, non-tender, bowel sounds active all four quadrants,     no masses, no organomegaly           Extremities:   Extremities normal, atraumatic, no cyanosis or edema                       Predictors of intubation difficulty:   Morbid obesity? no       Assessment:     80 y o  female with planned surgery as above  Known risk factors for perioperative complications: None    Difficulty with intubation is not anticipated  Cardiac Risk Estimation: Low    Current medications which may produce withdrawal symptoms if withheld perioperatively: no    Plan:     1  Preoperative workup as follows none  2  Change in medication regimen before surgery: Stop ASA now  3  Prophylaxis for cardiac events with perioperative beta-blockers: not indicated  4  Invasive hemodynamic monitoring perioperatively: not indicated    5  Deep vein thrombosis prophylaxis postoperatively:regimen to be chosen by surgical team

## 2021-06-01 NOTE — LETTER
June 1, 2021     Jac Pro MD  [address]  [address] [place], [place] [id number]    Patient: Greg Arredondo   YOB: 1929   Date of Visit: 6/1/2021       Dear Dr Amarjit Singh:    Thank you for referring Hayley Burris to me for evaluation  Below are my notes for this consultation  If you have questions, please do not hesitate to call me  I look forward to following your patient along with you  Sincerely,        Jose Antonio Lagos MD        CC: No Recipients  Jose Antonio Lagos MD  6/1/2021  4:18 PM  Sign when Signing Visit  Chief Complaint   Patient presents with    Pre-op Clearance     Cataract surgery right eye scheduled on 06/03/21 with Dr Amarjit Singh at [facility]  Subjective: Greg Arredondo is a 80 y o  female who presents to the office today for a preoperative consultation at the request of surgeon Dr Amarjit Singh who plans on performing right cataract on 6/3/2021  This consultation is requested for the specific conditions prompting preoperative evaluation (i e  because of potential affect on operative risk): preop  Planned anesthesia: monitor  The patient has the following known anesthesia issues: no problem  Patients bleeding risk: no recent abnormal bleeding  Patient does not have objections to receiving blood products if needed  HTN---on amlodipine 5mg bid and metoprolol 25mg bid  Does not check BP at home  BP is good today  Pt denies dizziness, lightheaded, SOB, cP or headache       IFG---pt states she likes ice cream  2/2021 hgA1C 5 9     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  No symptoms now  She is on baby ASA daily  She had bruise on her legs, worse in right lower leg  FU vascular before, not any more  Refused to do more test of carotid artery       Insomnia---She is on temazepam 15mg qhs   If she woke up in the middle of night and cannot go back to sleep, she takes another 15mg        The following portions of the patient's history were reviewed and updated as appropriate: allergies, current medications, past family history, past medical history, past social history, past surgical history and problem list     Review of Systems  Pertinent items are noted in HPI  Objective:     /74 (BP Location: Left arm, Patient Position: Sitting, Cuff Size: Adult)   Pulse 56   Temp (!) 97 °F (36 1 °C) (Temporal)   Resp 12   Ht 4' 10 5" (1 486 m)   Wt 44 1 kg (97 lb 3 2 oz)   SpO2 93%   BMI 19 97 kg/m²     General Appearance:    Alert, cooperative, no distress, appears stated age   Head:    Normocephalic, without obvious abnormality, atraumatic   Eyes:    PERRL, conjunctiva/corneas clear               Neck:   Supple, symmetrical, trachea midline, no adenopathy;     thyroid:  no enlargement/tenderness/nodules; no carotid    bruit or JVD       Lungs:     Clear to auscultation bilaterally, respirations unlabored        Heart:    Regular rate and rhythm, S1 and S2 normal, no murmur, rub   or gallop       Abdomen:     Soft, non-tender, bowel sounds active all four quadrants,     no masses, no organomegaly           Extremities:   Extremities normal, atraumatic, no cyanosis or edema                       Predictors of intubation difficulty:   Morbid obesity? no       Assessment:     80 y o  female with planned surgery as above  Known risk factors for perioperative complications: None    Difficulty with intubation is not anticipated  Cardiac Risk Estimation: Low    Current medications which may produce withdrawal symptoms if withheld perioperatively: no    Plan:     1  Preoperative workup as follows none  2  Change in medication regimen before surgery: Stop ASA now  3  Prophylaxis for cardiac events with perioperative beta-blockers: not indicated  4  Invasive hemodynamic monitoring perioperatively: not indicated    5  Deep vein thrombosis prophylaxis postoperatively:regimen to be chosen by surgical team

## 2021-06-11 DIAGNOSIS — I10 ESSENTIAL HYPERTENSION: ICD-10-CM

## 2021-06-11 DIAGNOSIS — G47.00 INSOMNIA, UNSPECIFIED TYPE: ICD-10-CM

## 2021-06-11 RX ORDER — TEMAZEPAM 15 MG/1
30 CAPSULE ORAL
Qty: 60 CAPSULE | Refills: 2 | Status: SHIPPED | OUTPATIENT
Start: 2021-06-11 | End: 2021-09-14 | Stop reason: SDUPTHER

## 2021-06-11 RX ORDER — AMLODIPINE BESYLATE 5 MG/1
5 TABLET ORAL 2 TIMES DAILY
Qty: 60 TABLET | Refills: 5 | Status: SHIPPED | OUTPATIENT
Start: 2021-06-11 | End: 2021-12-14

## 2021-07-14 DIAGNOSIS — I10 ESSENTIAL HYPERTENSION: ICD-10-CM

## 2021-07-14 RX ORDER — METOPROLOL TARTRATE 50 MG/1
25 TABLET, FILM COATED ORAL 2 TIMES DAILY
Qty: 30 TABLET | Refills: 5 | Status: SHIPPED | OUTPATIENT
Start: 2021-07-14 | End: 2022-01-14 | Stop reason: SDUPTHER

## 2021-08-27 ENCOUNTER — APPOINTMENT (OUTPATIENT)
Dept: LAB | Facility: HOSPITAL | Age: 86
End: 2021-08-27
Payer: MEDICARE

## 2021-08-27 DIAGNOSIS — I10 ESSENTIAL HYPERTENSION: ICD-10-CM

## 2021-08-27 DIAGNOSIS — I65.22 STENOSIS OF LEFT CAROTID ARTERY: ICD-10-CM

## 2021-08-27 DIAGNOSIS — R73.01 IMPAIRED FASTING GLUCOSE: ICD-10-CM

## 2021-08-27 LAB
ALBUMIN SERPL BCP-MCNC: 3.2 G/DL (ref 3.5–5)
ALP SERPL-CCNC: 73 U/L (ref 46–116)
ALT SERPL W P-5'-P-CCNC: 11 U/L (ref 12–78)
ANION GAP SERPL CALCULATED.3IONS-SCNC: 5 MMOL/L (ref 4–13)
AST SERPL W P-5'-P-CCNC: 16 U/L (ref 5–45)
BILIRUB SERPL-MCNC: 0.76 MG/DL (ref 0.2–1)
BUN SERPL-MCNC: 17 MG/DL (ref 5–25)
CALCIUM ALBUM COR SERPL-MCNC: 9.5 MG/DL (ref 8.3–10.1)
CALCIUM SERPL-MCNC: 8.9 MG/DL (ref 8.3–10.1)
CHLORIDE SERPL-SCNC: 106 MMOL/L (ref 100–108)
CO2 SERPL-SCNC: 26 MMOL/L (ref 21–32)
CREAT SERPL-MCNC: 1.37 MG/DL (ref 0.6–1.3)
ERYTHROCYTE [DISTWIDTH] IN BLOOD BY AUTOMATED COUNT: 12.4 % (ref 11.6–15.1)
EST. AVERAGE GLUCOSE BLD GHB EST-MCNC: 128 MG/DL
GFR SERPL CREATININE-BSD FRML MDRD: 34 ML/MIN/1.73SQ M
GLUCOSE SERPL-MCNC: 113 MG/DL (ref 65–140)
HBA1C MFR BLD: 6.1 %
HCT VFR BLD AUTO: 41.6 % (ref 34.8–46.1)
HGB BLD-MCNC: 13.9 G/DL (ref 11.5–15.4)
MCH RBC QN AUTO: 31.7 PG (ref 26.8–34.3)
MCHC RBC AUTO-ENTMCNC: 33.4 G/DL (ref 31.4–37.4)
MCV RBC AUTO: 95 FL (ref 82–98)
PLATELET # BLD AUTO: 246 THOUSANDS/UL (ref 149–390)
PMV BLD AUTO: 9.4 FL (ref 8.9–12.7)
POTASSIUM SERPL-SCNC: 4.3 MMOL/L (ref 3.5–5.3)
PROT SERPL-MCNC: 7.4 G/DL (ref 6.4–8.2)
RBC # BLD AUTO: 4.38 MILLION/UL (ref 3.81–5.12)
SODIUM SERPL-SCNC: 137 MMOL/L (ref 136–145)
TSH SERPL DL<=0.05 MIU/L-ACNC: 1.59 UIU/ML (ref 0.36–3.74)
WBC # BLD AUTO: 6.26 THOUSAND/UL (ref 4.31–10.16)

## 2021-08-27 PROCEDURE — 84443 ASSAY THYROID STIM HORMONE: CPT

## 2021-08-27 PROCEDURE — 83036 HEMOGLOBIN GLYCOSYLATED A1C: CPT

## 2021-08-27 PROCEDURE — 80053 COMPREHEN METABOLIC PANEL: CPT

## 2021-08-27 PROCEDURE — 85027 COMPLETE CBC AUTOMATED: CPT

## 2021-08-27 PROCEDURE — 36415 COLL VENOUS BLD VENIPUNCTURE: CPT

## 2021-09-14 ENCOUNTER — OFFICE VISIT (OUTPATIENT)
Dept: FAMILY MEDICINE CLINIC | Facility: CLINIC | Age: 86
End: 2021-09-14
Payer: MEDICARE

## 2021-09-14 VITALS
DIASTOLIC BLOOD PRESSURE: 58 MMHG | SYSTOLIC BLOOD PRESSURE: 126 MMHG | WEIGHT: 93.8 LBS | TEMPERATURE: 98.5 F | HEART RATE: 64 BPM | BODY MASS INDEX: 18.91 KG/M2 | RESPIRATION RATE: 20 BRPM | OXYGEN SATURATION: 96 % | HEIGHT: 59 IN

## 2021-09-14 DIAGNOSIS — R73.01 IMPAIRED FASTING GLUCOSE: ICD-10-CM

## 2021-09-14 DIAGNOSIS — I10 ESSENTIAL HYPERTENSION: Primary | ICD-10-CM

## 2021-09-14 DIAGNOSIS — I65.22 STENOSIS OF LEFT CAROTID ARTERY: ICD-10-CM

## 2021-09-14 DIAGNOSIS — Z00.00 MEDICARE ANNUAL WELLNESS VISIT, SUBSEQUENT: ICD-10-CM

## 2021-09-14 DIAGNOSIS — G47.00 INSOMNIA, UNSPECIFIED TYPE: ICD-10-CM

## 2021-09-14 DIAGNOSIS — E78.5 HYPERLIPIDEMIA, UNSPECIFIED HYPERLIPIDEMIA TYPE: ICD-10-CM

## 2021-09-14 PROCEDURE — G0439 PPPS, SUBSEQ VISIT: HCPCS | Performed by: FAMILY MEDICINE

## 2021-09-14 PROCEDURE — 99214 OFFICE O/P EST MOD 30 MIN: CPT | Performed by: FAMILY MEDICINE

## 2021-09-14 PROCEDURE — 1123F ACP DISCUSS/DSCN MKR DOCD: CPT | Performed by: FAMILY MEDICINE

## 2021-09-14 RX ORDER — TEMAZEPAM 15 MG/1
30 CAPSULE ORAL
Qty: 60 CAPSULE | Refills: 2 | Status: SHIPPED | OUTPATIENT
Start: 2021-09-14 | End: 2022-03-15 | Stop reason: SDUPTHER

## 2021-09-14 NOTE — PROGRESS NOTES
Chief Complaint   Patient presents with   [facility] Wellness Visit     Subsequent   Follow-up     6 months and review labs  Health Maintenance   Topic Date Due    DTaP,Tdap,and Td Vaccines (1 - Tdap) Never done    Influenza Vaccine (1) 09/01/2021    Medicare Annual Wellness Visit (AWV)  09/08/2021    Fall Risk  03/10/2022    BMI: Adult  06/01/2022    Depression Screening  09/14/2022    Pneumococcal Vaccine: 65+ Years  Completed    COVID-19 Vaccine  Completed    HIB Vaccine  Aged Out    Hepatitis B Vaccine  Aged Out    IPV Vaccine  Aged Out    Hepatitis A Vaccine  Aged Out    Meningococcal ACWY Vaccine  Aged Out    HPV Vaccine  Aged Out      Assessment and Plan:     Problem List Items Addressed This Visit        Endocrine    Impaired fasting glucose     8/2021 hgA1C 6 1 stable  Low carb diet  Relevant Orders    Comprehensive metabolic panel    Hemoglobin A1C       Cardiovascular and Mediastinum    Hypertension - Primary     Controlled  DASH diet  Continue amlodipine 5mg bid and metoprolol 25mg bid  Relevant Orders    Comprehensive metabolic panel    Hemoglobin A1C    Stenosis of left carotid artery     Continue ASA 81mg QD  Other    Hyperlipidemia     Low fat diet  Insomnia     Continue temazepam 30mg qhs prn  SE educated pt  Relevant Medications    temazepam (RESTORIL) 15 mg capsule      Other Visit Diagnoses     Medicare annual wellness visit, subsequent              Depression Screening and Follow-up Plan:   Patient was screened for depression during today's encounter  They screened negative with a PHQ-2 score of 0  Preventive health issues were discussed with patient, and age appropriate screening tests were ordered as noted in patient's After Visit Summary  Personalized health advice and appropriate referrals for health education or preventive services given if needed, as noted in patient's After Visit Summary       History of Present Illness:     Patient presents for Medicare Annual Wellness visit    Patient Care Team:  Holley Chiang MD as PCP - General  MD Tiago Case MD (Inactive)  MD Selena Rivas PA-C     Problem List:     Patient Active Problem List   Diagnosis    Hyperlipidemia    Hypertension    Impaired fasting glucose    Insomnia    Stenosis of left carotid artery    Peripheral vascular disease ([facility] )    Bruises easily      Past Medical and Surgical History:     Past Medical History:   Diagnosis Date    Allergic rhinitis     resolved 12/29/14    Bladder cancer ([facility] )     last assessed 6/13/13    Colon cancer ([facility] )     last assessed 12/19/12    Diverticulitis     of colon    Hypertension     Lyme disease     last assessed 12/19/12    Microscopic hematuria     Osteoarthritis     resolved 12/29/14    TIA (transient ischemic attack)      Past Surgical History:   Procedure Laterality Date    CATARACT EXTRACTION      CHOLECYSTECTOMY      COLECTOMY      partial - sigmoid    CYSTOSCOPY Left     w/insertion of ureteral stent    MOUTH SURGERY      REVISION TOTAL HIP ARTHROPLASTY Left 2015    TONSILLECTOMY        Family History:     Family History   Problem Relation Age of Onset    Cancer Sister     Heart disease Sister     Other Mother         respiratory failure    Gallbladder disease Father       Social History:     Social History     Socioeconomic History    Marital status:       Spouse name: None    Number of children: None    Years of education: None    Highest education level: None   Occupational History    Occupation: Retired   Tobacco Use    Smoking status: Former Smoker    Smokeless tobacco: Never Used   Substance and Sexual Activity    Alcohol use: No    Drug use: No    Sexual activity: None   Other Topics Concern    None   Social History Narrative    None     Social Determinants of Health     Financial Resource Strain:     Difficulty of Paying Living Expenses:    Food Insecurity:     Worried About Running Out of Food in the Last Year:     [address] in the Last Year:    Transportation Needs:     Lack of Transportation (Medical):  Lack of Transportation (Non-Medical):    Physical Activity:     Days of Exercise per Week:     Minutes of Exercise per Session:    Stress:     Feeling of Stress :    Social Connections:     Frequency of Communication with Friends and Family:     Frequency of Social Gatherings with Friends and Family:     Attends Mosque Services:     Active Member of Clubs or Organizations:     Attends Club or Organization Meetings:     Marital Status:    Intimate Partner Violence:     Fear of Current or Ex-Partner:     Emotionally Abused:     Physically Abused:     Sexually Abused:       Medications and Allergies:     Current Outpatient Medications   Medication Sig Dispense Refill    amLODIPine (NORVASC) 5 mg tablet Take 1 tablet (5 mg total) by mouth 2 (two) times a day 60 tablet 5    aspirin 81 MG tablet Take 1 tablet by mouth daily      metoprolol tartrate (LOPRESSOR) 50 mg tablet Take 0 5 tablets (25 mg total) by mouth 2 (two) times a day 30 tablet 5    temazepam (RESTORIL) 15 mg capsule Take 2 capsules (30 mg total) by mouth daily at bedtime as needed for sleep 60 capsule 2     No current facility-administered medications for this visit  Allergies   Allergen Reactions    Codeine Sulfate Hives    Naproxen       Immunizations:     Immunization History   Administered Date(s) Administered    Pneumococcal Polysaccharide PPV23 12/29/2014    SARS-CoV-2 / COVID-19 mRNA IM (Pfizer-BioNTech) 05/04/2021, 05/23/2021      Health Maintenance: There are no preventive care reminders to display for this patient        Topic Date Due    DTaP,Tdap,and Td Vaccines (1 - Tdap) Never done    Influenza Vaccine (1) 09/01/2021      Medicare Health Risk Assessment:     /58 (BP Location: Left arm, Patient Position: Sitting, Cuff Size: Adult)   Pulse 64   Temp 98 5 °F (36 9 °C) (Tympanic)   Resp 20   Ht 4' 11" (1 499 m)   Wt 42 5 kg (93 lb 12 8 oz)   SpO2 96%   BMI 18 95 kg/m²      Moose Colbert is here for her Subsequent Wellness visit  Health Risk Assessment:   Patient rates overall health as very good  Patient feels that their physical health rating is same  Patient is satisfied with their life  Eyesight was rated as same  Hearing was rated as same  Patient feels that their emotional and mental health rating is same  Patients states they are never, rarely angry  Patient states they are sometimes unusually tired/fatigued  Patient states that she has experienced no weight loss or gain in last 6 months  Depression Screening:   PHQ-2 Score: 0      Fall Risk Screening: In the past year, patient has experienced: no history of falling in past year      Urinary Incontinence Screening:   Patient has leaked urine accidently in the last six months  Home Safety:  Patient does not have trouble with stairs inside or outside of their home  Patient has no working smoke alarms and has no working carbon monoxide detector  Home safety hazards include: none  Medications:   Patient is not currently taking any over-the-counter supplements  Patient is able to manage medications  Activities of Daily Living (ADLs)/Instrumental Activities of Daily Living (IADLs):   Walk and transfer into and out of bed and chair?: Yes  Dress and groom yourself?: Yes    Bathe or shower yourself?: Yes    Feed yourself? Yes  Do your laundry/housekeeping?: Yes  Manage your money, pay your bills and track your expenses?: Yes  Make your own meals?: Yes    Do your own shopping?: Yes    Previous Hospitalizations:   Any hospitalizations or ED visits within the last 12 months?: No      Advance Care Planning:   Living will: Yes    Durable POA for healthcare:  Yes    Advanced directive: Yes    End of Life Decisions reviewed with patient: Yes    Provider agrees with end of life decisions: Yes      Cognitive Screening:   Provider or family/friend/caregiver concerned regarding cognition?: No    PREVENTIVE SCREENINGS      Cardiovascular Screening:    General: Screening Not Indicated and History Lipid Disorder      Diabetes Screening:     General: Screening Current      Colorectal Cancer Screening:     General: Screening Not Indicated      Breast Cancer Screening:     General: Screening Not Indicated      Cervical Cancer Screening:    General: Screening Not Indicated      Lung Cancer Screening:     General: Screening Not Indicated    Screening, Brief Intervention, and Referral to Treatment (SBIRT)    Screening  Typical number of drinks in a day: 0    Single Item Drug Screening:  How often have you used an illegal drug (including marijuana) or a prescription medication for non-medical reasons in the past year? never    Single Item Drug Screen Score: 0  Interpretation: Negative screen for possible drug use disorder      Juan R Livingston MD

## 2021-09-14 NOTE — PROGRESS NOTES
Assessment/Plan:    Hypertension  Controlled  DASH diet  Continue amlodipine 5mg bid and metoprolol 25mg bid  Insomnia  Continue temazepam 30mg qhs prn  SE educated pt  Impaired fasting glucose  8/2021 hgA1C 6 1 stable  Low carb diet  Hyperlipidemia  Low fat diet  Stenosis of left carotid artery  Continue ASA 81mg QD  Reviewed lab in 8/2021  TSH normal  CBC normal  CMP ok  HgA1C 6 1 stable    Refused flu shot  Refused pneumovax or PCV13  Got Covid19 vaccines  Denies SE  Fall precautions  RTO in 6 months with labs      Mini-Cog 4/5 today  POA----sons  Living will---DNR per pt            Diagnoses and all orders for this visit:    Essential hypertension  -     Comprehensive metabolic panel; Future  -     Hemoglobin A1C; Future    Insomnia, unspecified type    Impaired fasting glucose  -     Comprehensive metabolic panel; Future  -     Hemoglobin A1C; Future    Hyperlipidemia, unspecified hyperlipidemia type    Stenosis of left carotid artery    Medicare annual wellness visit, subsequent          Subjective:      Patient ID: Denise Martinez is a 80 y o  female  HPI    Pt is here by herself       HTN---She is on amlodipine 5mg bid and metoprolol 25mg bid  Does not check BP at home  BP is good today  Pt denies dizziness, lightheaded, SOB, cP or headache       IFG---pt states she likes ice cream       Hyperlipidemia---low fat diet  Statin made her leg pain worse  No more statins       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  No symptoms now  She is on baby ASA daily  She had bruise on her legs  FU vascular before, not any more  Refused to do more test of carotid artery       Insomnia---She is on temazepam 15mg qhs  If she woke up in the middle of night and cannot go back to sleep, she takes another 15mg        She lives in a senior apartment by herself  Does all ADL's  Still drive  She sees her younger son Emily Louis almost everyday  Denies recent falls     Denies depression        The following portions of the patient's history were reviewed and updated as appropriate: allergies, current medications, past family history, past medical history, past social history, past surgical history and problem list     Review of Systems   Constitutional: Negative for appetite change, chills and fever  HENT: Negative for congestion, ear pain, sinus pain and sore throat  Eyes: Negative for discharge and itching  Respiratory: Negative for apnea, cough, chest tightness, shortness of breath and wheezing  Cardiovascular: Negative for chest pain, palpitations and leg swelling  Gastrointestinal: Negative for abdominal pain, anal bleeding, constipation, diarrhea, nausea and vomiting  Endocrine: Negative for cold intolerance, heat intolerance and polyuria  Genitourinary: Negative for difficulty urinating and dysuria  Musculoskeletal: Negative for arthralgias, back pain and myalgias  Skin: Negative for rash  Neurological: Negative for dizziness and headaches  Psychiatric/Behavioral: Negative for agitation  Objective:      /58 (BP Location: Left arm, Patient Position: Sitting, Cuff Size: Adult)   Pulse 64   Temp 98 5 °F (36 9 °C) (Tympanic)   Resp 20   Ht 4' 11" (1 499 m)   Wt 42 5 kg (93 lb 12 8 oz)   SpO2 96%   BMI 18 95 kg/m²          Physical Exam  Constitutional:       General: She is not in acute distress  Appearance: She is well-developed  HENT:      Head: Normocephalic  Eyes:      General:         Right eye: No discharge  Left eye: No discharge  Conjunctiva/sclera: Conjunctivae normal    Neck:      Thyroid: No thyromegaly  Cardiovascular:      Rate and Rhythm: Normal rate and regular rhythm  Heart sounds: Normal heart sounds  No murmur heard  No friction rub  No gallop  Pulmonary:      Effort: Pulmonary effort is normal  No respiratory distress  Breath sounds: Normal breath sounds  No wheezing or rales  Chest:      Chest wall: No tenderness  Abdominal:      General: Bowel sounds are normal  There is no distension  Palpations: Abdomen is soft  There is no mass  Tenderness: There is no abdominal tenderness  There is no guarding or rebound  Musculoskeletal:         General: No tenderness or deformity  Normal range of motion  Cervical back: Normal range of motion  Lymphadenopathy:      Cervical: No cervical adenopathy  Neurological:      Mental Status: She is alert

## 2021-09-14 NOTE — PATIENT INSTRUCTIONS
Medicare Preventive Visit Patient Instructions  Thank you for completing your Welcome to Medicare Visit or Medicare Annual Wellness Visit today  Your next wellness visit will be due in one year (9/15/2022)  The screening/preventive services that you may require over the next 5-10 years are detailed below  Some tests may not apply to you based off risk factors and/or age  Screening tests ordered at today's visit but not completed yet may show as past due  Also, please note that scanned in results may not display below  Preventive Screenings:  Service Recommendations Previous Testing/Comments   Colorectal Cancer Screening  * Colonoscopy    * Fecal Occult Blood Test (FOBT)/Fecal Immunochemical Test (FIT)  * Fecal DNA/Cologuard Test  * Flexible Sigmoidoscopy Age: 54-65 years old   Colonoscopy: every 10 years (may be performed more frequently if at higher risk)  OR  FOBT/FIT: every 1 year  OR  Cologuard: every 3 years  OR  Sigmoidoscopy: every 5 years  Screening may be recommended earlier than age 48 if at higher risk for colorectal cancer  Also, an individualized decision between you and your healthcare provider will decide whether screening between the ages of 74-80 would be appropriate  Colonoscopy: Not on file  FOBT/FIT: Not on file  Cologuard: Not on file  Sigmoidoscopy: Not on file          Breast Cancer Screening Age: 36 years old  Frequency: every 1-2 years  Not required if history of left and right mastectomy Mammogram: Not on file        Cervical Cancer Screening Between the ages of 21-29, pap smear recommended once every 3 years  Between the ages of 33-67, can perform pap smear with HPV co-testing every 5 years     Recommendations may differ for women with a history of total hysterectomy, cervical cancer, or abnormal pap smears in past  Pap Smear: Not on file        Hepatitis C Screening Once for adults born between [facility]  More frequently in patients at high risk for Hepatitis C Hep C Antibody: Please let mom know that though sl elevated would not intervene as light level now would be 17+  Will still plan on f/u on Thursday Not on file        Diabetes Screening 1-2 times per year if you're at risk for diabetes or have pre-diabetes Fasting glucose: 116 mg/dL   A1C: 6 1 %        Cholesterol Screening Once every 5 years if you don't have a lipid disorder  May order more often based on risk factors  Lipid panel: 07/02/2018          Other Preventive Screenings Covered by Medicare:  1  Abdominal Aortic Aneurysm (AAA) Screening: covered once if your at risk  You're considered to be at risk if you have a family history of AAA  2  Lung Cancer Screening: covers low dose CT scan once per year if you meet all of the following conditions: (1) Age 50-69; (2) No signs or symptoms of lung cancer; (3) Current smoker or have quit smoking within the last 15 years; (4) You have a tobacco smoking history of at least 30 pack years (packs per day multiplied by number of years you smoked); (5) You get a written order from a healthcare provider  3  Glaucoma Screening: covered annually if you're considered high risk: (1) You have diabetes OR (2) Family history of glaucoma OR (3)  aged 48 and older OR (3)  American aged 72 and older  3  Osteoporosis Screening: covered every 2 years if you meet one of the following conditions: (1) You're estrogen deficient and at risk for osteoporosis based off medical history and other findings; (2) Have a vertebral abnormality; (3) On glucocorticoid therapy for more than 3 months; (4) Have primary hyperparathyroidism; (5) On osteoporosis medications and need to assess response to drug therapy  · Last bone density test (DXA Scan): Not on file  5  HIV Screening: covered annually if you're between the age of 12-76  Also covered annually if you are younger than 13 and older than 72 with risk factors for HIV infection  For pregnant patients, it is covered up to 3 times per pregnancy      Immunizations:  Immunization Recommendations   Influenza Vaccine Annual influenza vaccination during flu season is recommended for all persons aged >= 6 months who do not have contraindications   Pneumococcal Vaccine (Prevnar and Pneumovax)  * Prevnar = PCV13  * Pneumovax = PPSV23   Adults 25-60 years old: 1-3 doses may be recommended based on certain risk factors  Adults 72 years old: Prevnar (PCV13) vaccine recommended followed by Pneumovax (PPSV23) vaccine  If already received PPSV23 since turning 65, then PCV13 recommended at least one year after PPSV23 dose  Hepatitis B Vaccine 3 dose series if at intermediate or high risk (ex: diabetes, end stage renal disease, liver disease)   Tetanus (Td) Vaccine - COST NOT COVERED BY MEDICARE PART B Following completion of primary series, a booster dose should be given every 10 years to maintain immunity against tetanus  Td may also be given as tetanus wound prophylaxis  Tdap Vaccine - COST NOT COVERED BY MEDICARE PART B Recommended at least once for all adults  For pregnant patients, recommended with each pregnancy  Shingles Vaccine (Shingrix) - COST NOT COVERED BY MEDICARE PART B  2 shot series recommended in those aged 48 and above     Health Maintenance Due:  There are no preventive care reminders to display for this patient  Immunizations Due:      Topic Date Due    DTaP,Tdap,and Td Vaccines (1 - Tdap) Never done    Influenza Vaccine (1) 09/01/2021     Advance Directives   What are advance directives? Advance directives are legal documents that state your wishes and plans for medical care  These plans are made ahead of time in case you lose your ability to make decisions for yourself  Advance directives can apply to any medical decision, such as the treatments you want, and if you want to donate organs  What are the types of advance directives? There are many types of advance directives, and each state has rules about how to use them  You may choose a combination of any of the following:  · Living will: This is a written record of the treatment you want   You can also choose which treatments you do not want, which to limit, and which to stop at a certain time  This includes surgery, medicine, IV fluid, and tube feedings  · Durable power of  for healthcare [facility]): This is a written record that states who you want to make healthcare choices for you when you are unable to make them for yourself  This person, called a proxy, is usually a family member or a friend  You may choose more than 1 proxy  · Do not resuscitate (DNR) order:  A DNR order is used in case your heart stops beating or you stop breathing  It is a request not to have certain forms of treatment, such as CPR  A DNR order may be included in other types of advance directives  · Medical directive: This covers the care that you want if you are in a coma, near death, or unable to make decisions for yourself  You can list the treatments you want for each condition  Treatment may include pain medicine, surgery, blood transfusions, dialysis, IV or tube feedings, and a ventilator (breathing machine)  · Values history: This document has questions about your views, beliefs, and how you feel and think about life  This information can help others choose the care that you would choose  Why are advance directives important? An advance directive helps you control your care  Although spoken wishes may be used, it is better to have your wishes written down  Spoken wishes can be misunderstood, or not followed  Treatments may be given even if you do not want them  An advance directive may make it easier for your family to make difficult choices about your care  © Copyright [employer] 2018 Information is for End User's use only and may not be sold, redistributed or otherwise used for commercial purposes   All illustrations and images included in CareNotes® are the copyrighted property of [employer]  or [employer]

## 2021-10-12 ENCOUNTER — PREPPED CHART (OUTPATIENT)
Dept: URBAN - METROPOLITAN AREA CLINIC 6 | Facility: CLINIC | Age: 86
End: 2021-10-12

## 2021-10-13 ENCOUNTER — 3 MONTH FOLLOW UP (OUTPATIENT)
Dept: URBAN - METROPOLITAN AREA CLINIC 6 | Facility: CLINIC | Age: 86
End: 2021-10-13

## 2021-10-13 DIAGNOSIS — H35.3131: ICD-10-CM

## 2021-10-13 DIAGNOSIS — Z96.1: ICD-10-CM

## 2021-10-13 PROCEDURE — G8428 CUR MEDS NOT DOCUMENT: HCPCS

## 2021-10-13 PROCEDURE — 92014 COMPRE OPH EXAM EST PT 1/>: CPT

## 2021-10-13 PROCEDURE — 2019F DILATED MACUL EXAM DONE: CPT

## 2021-10-13 ASSESSMENT — TONOMETRY
OD_IOP_MMHG: 9
OS_IOP_MMHG: 11

## 2021-10-13 ASSESSMENT — VISUAL ACUITY
OS_CC: 20/70
OD_CC: 20/70

## 2021-12-21 DIAGNOSIS — I10 ESSENTIAL HYPERTENSION: ICD-10-CM

## 2021-12-21 RX ORDER — AMLODIPINE BESYLATE 5 MG/1
5 TABLET ORAL 2 TIMES DAILY
Qty: 60 TABLET | Refills: 2 | Status: SHIPPED | OUTPATIENT
Start: 2021-12-21 | End: 2022-03-15 | Stop reason: SDUPTHER

## 2022-01-14 DIAGNOSIS — I10 ESSENTIAL HYPERTENSION: ICD-10-CM

## 2022-01-14 RX ORDER — METOPROLOL TARTRATE 50 MG/1
25 TABLET, FILM COATED ORAL 2 TIMES DAILY
Qty: 30 TABLET | Refills: 5 | Status: SHIPPED | OUTPATIENT
Start: 2022-01-14 | End: 2022-03-15 | Stop reason: SDUPTHER

## 2022-03-03 ENCOUNTER — APPOINTMENT (OUTPATIENT)
Dept: LAB | Facility: HOSPITAL | Age: 87
End: 2022-03-03
Payer: COMMERCIAL

## 2022-03-03 DIAGNOSIS — I10 ESSENTIAL HYPERTENSION: ICD-10-CM

## 2022-03-03 DIAGNOSIS — R73.01 IMPAIRED FASTING GLUCOSE: ICD-10-CM

## 2022-03-03 LAB
ALBUMIN SERPL BCP-MCNC: 3.6 G/DL (ref 3.5–5)
ALP SERPL-CCNC: 89 U/L (ref 46–116)
ALT SERPL W P-5'-P-CCNC: 12 U/L (ref 12–78)
ANION GAP SERPL CALCULATED.3IONS-SCNC: 6 MMOL/L (ref 4–13)
AST SERPL W P-5'-P-CCNC: 16 U/L (ref 5–45)
BILIRUB SERPL-MCNC: 0.58 MG/DL (ref 0.2–1)
BUN SERPL-MCNC: 15 MG/DL (ref 5–25)
CALCIUM SERPL-MCNC: 9 MG/DL (ref 8.3–10.1)
CHLORIDE SERPL-SCNC: 106 MMOL/L (ref 100–108)
CO2 SERPL-SCNC: 25 MMOL/L (ref 21–32)
CREAT SERPL-MCNC: 1.34 MG/DL (ref 0.6–1.3)
EST. AVERAGE GLUCOSE BLD GHB EST-MCNC: 123 MG/DL
GFR SERPL CREATININE-BSD FRML MDRD: 34 ML/MIN/1.73SQ M
GLUCOSE P FAST SERPL-MCNC: 119 MG/DL (ref 65–99)
HBA1C MFR BLD: 5.9 %
POTASSIUM SERPL-SCNC: 3.9 MMOL/L (ref 3.5–5.3)
PROT SERPL-MCNC: 8.5 G/DL (ref 6.4–8.2)
SODIUM SERPL-SCNC: 137 MMOL/L (ref 136–145)

## 2022-03-03 PROCEDURE — 83036 HEMOGLOBIN GLYCOSYLATED A1C: CPT

## 2022-03-03 PROCEDURE — 80053 COMPREHEN METABOLIC PANEL: CPT

## 2022-03-03 PROCEDURE — 36415 COLL VENOUS BLD VENIPUNCTURE: CPT

## 2022-03-15 ENCOUNTER — OFFICE VISIT (OUTPATIENT)
Dept: FAMILY MEDICINE CLINIC | Facility: CLINIC | Age: 87
End: 2022-03-15
Payer: COMMERCIAL

## 2022-03-15 VITALS
DIASTOLIC BLOOD PRESSURE: 80 MMHG | TEMPERATURE: 97.6 F | SYSTOLIC BLOOD PRESSURE: 142 MMHG | RESPIRATION RATE: 20 BRPM | WEIGHT: 94.6 LBS | HEIGHT: 59 IN | HEART RATE: 72 BPM | OXYGEN SATURATION: 96 % | BODY MASS INDEX: 19.07 KG/M2

## 2022-03-15 DIAGNOSIS — G47.00 INSOMNIA, UNSPECIFIED TYPE: ICD-10-CM

## 2022-03-15 DIAGNOSIS — R73.01 IMPAIRED FASTING GLUCOSE: ICD-10-CM

## 2022-03-15 DIAGNOSIS — R68.89 COLD INTOLERANCE: ICD-10-CM

## 2022-03-15 DIAGNOSIS — I65.22 STENOSIS OF LEFT CAROTID ARTERY: ICD-10-CM

## 2022-03-15 DIAGNOSIS — I10 ESSENTIAL HYPERTENSION: ICD-10-CM

## 2022-03-15 DIAGNOSIS — I10 PRIMARY HYPERTENSION: Primary | ICD-10-CM

## 2022-03-15 DIAGNOSIS — N18.32 STAGE 3B CHRONIC KIDNEY DISEASE (HCC): ICD-10-CM

## 2022-03-15 PROCEDURE — 99214 OFFICE O/P EST MOD 30 MIN: CPT | Performed by: FAMILY MEDICINE

## 2022-03-15 RX ORDER — AMLODIPINE BESYLATE 5 MG/1
5 TABLET ORAL 2 TIMES DAILY
Qty: 60 TABLET | Refills: 6 | Status: SHIPPED | OUTPATIENT
Start: 2022-03-15

## 2022-03-15 RX ORDER — TEMAZEPAM 15 MG/1
30 CAPSULE ORAL
Qty: 60 CAPSULE | Refills: 2 | Status: SHIPPED | OUTPATIENT
Start: 2022-03-15

## 2022-03-15 RX ORDER — METOPROLOL TARTRATE 50 MG/1
25 TABLET, FILM COATED ORAL 2 TIMES DAILY
Qty: 30 TABLET | Refills: 6 | Status: SHIPPED | OUTPATIENT
Start: 2022-03-15

## 2022-03-15 NOTE — PROGRESS NOTES
Chief Complaint   Patient presents with    Follow-up     6 months and review labs  Health Maintenance   Topic Date Due    DTaP,Tdap,and Td Vaccines (1 - Tdap) Never done    Influenza Vaccine (1) Never done    COVID-19 Vaccine (3 - Booster for Pfizer series) 10/23/2021    Depression Screening  09/14/2022    Fall Risk  09/14/2022    Medicare Annual Wellness Visit (AWV)  09/14/2022    BMI: Adult  03/15/2023    Pneumococcal Vaccine: 65+ Years  Completed    HIB Vaccine  Aged Out    Hepatitis B Vaccine  Aged Out    IPV Vaccine  Aged Out    Hepatitis A Vaccine  Aged Out    Meningococcal ACWY Vaccine  Aged Out    HPV Vaccine  Aged Out           Assessment/Plan:    Hypertension  Controlled  DASH diet  Continue amlodipine 5mg bid and metoprolol 25mg bid  Impaired fasting glucose  3/2022 hgA1C 5 9 stable  Low carb diet  Stage 3b chronic kidney disease [facility])  Lab Results   Component Value Date    EGFR 34 03/03/2022    EGFR 34 08/27/2021    EGFR 31 02/25/2021    CREATININE 1 34 (H) 03/03/2022    CREATININE 1 37 (H) 08/27/2021    CREATININE 1 47 (H) 02/25/2021     Stable  Keep hydrated  Avoid motrin/ibuprofen/aleve NSAIDs nephrotoxic agents  Stenosis of left carotid artery  Continue ASA 81mg daily  SE educated pt  Insomnia  Continue temazepam 15-30mg qhs prn  SE educated pt  Reviewed lab in 3/2022  hgA1C 5 9 stable  CMP ok    Refused flu shot  Got covid19 shots  Need booster  Refused pneumovax or PCV13    Fall precautions  RTO in 6 months with labs         Diagnoses and all orders for this visit:    Primary hypertension  -     CBC; Future  -     Comprehensive metabolic panel; Future  -     Hemoglobin A1C; Future  -     TSH, 3rd generation with Free T4 reflex; Future    Essential hypertension  -     amLODIPine (NORVASC) 5 mg tablet; Take 1 tablet (5 mg total) by mouth 2 (two) times a day  -     metoprolol tartrate (LOPRESSOR) 50 mg tablet;  Take 0 5 tablets (25 mg total) by mouth 2 (two) times a day    Insomnia, unspecified type  -     CBC; Future  -     Comprehensive metabolic panel; Future  -     Hemoglobin A1C; Future  -     TSH, 3rd generation with Free T4 reflex; Future  -     temazepam (RESTORIL) 15 mg capsule; Take 2 capsules (30 mg total) by mouth daily at bedtime as needed for sleep    Impaired fasting glucose  -     CBC; Future  -     Comprehensive metabolic panel; Future  -     Hemoglobin A1C; Future  -     TSH, 3rd generation with Free T4 reflex; Future    Stage 3b chronic kidney disease (HCC)    Cold intolerance  -     CBC; Future  -     Comprehensive metabolic panel; Future  -     Hemoglobin A1C; Future  -     TSH, 3rd generation with Free T4 reflex; Future    Stenosis of left carotid artery          Subjective:      Patient ID: Elyse Schlatter is a 80 y o  female  HPI    Pt is here by herself       HTN---She is on amlodipine 5mg bid and metoprolol 25mg bid  Does not check BP at home  BP is good today  Pt denies dizziness, lightheaded, SOB, cP or headache       Insomnia---She is on temazepam 15mg qhs  She went to bed at 8pm  If she woke up in the middle of night and cannot go back to sleep, she takes another 15mg  She got up at 630am       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  No symptoms now  She is on baby ASA daily  She had bruise on her legs  FU vascular before, not any more  Refused to do more test of carotid artery      Hyperlipidemia---low fat diet  Statin made her leg pain worse  No more statins      IFG---She likes sweets  Always feels cold         She lives in a senior apartment by herself  Does all ADL's  Clean and laundry by herself  Still drive  She sees her younger son Srini every week  Denies recent falls     Denies depression        The following portions of the patient's history were reviewed and updated as appropriate: allergies, current medications, past family history, past medical history, past social history, past surgical history and problem list     Review of Systems   Constitutional: Negative for appetite change, chills and fever  HENT: Negative for congestion, ear pain, sinus pain and sore throat  Eyes: Negative for discharge and itching  Respiratory: Negative for apnea, cough, chest tightness, shortness of breath and wheezing  Cardiovascular: Negative for chest pain, palpitations and leg swelling  Gastrointestinal: Negative for abdominal pain, anal bleeding, constipation, diarrhea, nausea and vomiting  Endocrine: Negative for cold intolerance, heat intolerance and polyuria  Genitourinary: Negative for difficulty urinating and dysuria  Musculoskeletal: Negative for arthralgias, back pain and myalgias  Skin: Negative for rash  Neurological: Negative for dizziness and headaches  Psychiatric/Behavioral: Negative for agitation  Objective:      /80 (BP Location: Left arm, Patient Position: Sitting, Cuff Size: Adult)   Pulse 72   Temp 97 6 °F (36 4 °C) (Tympanic)   Resp 20   Ht 4' 10 5" (1 486 m)   Wt 42 9 kg (94 lb 9 6 oz)   SpO2 96%   BMI 19 43 kg/m²          Physical Exam  Constitutional:       General: She is not in acute distress  Appearance: She is well-developed  HENT:      Head: Normocephalic  Eyes:      General:         Right eye: No discharge  Left eye: No discharge  Conjunctiva/sclera: Conjunctivae normal    Neck:      Thyroid: No thyromegaly  Cardiovascular:      Rate and Rhythm: Normal rate and regular rhythm  Heart sounds: Normal heart sounds  No murmur heard  No friction rub  No gallop  Pulmonary:      Effort: Pulmonary effort is normal  No respiratory distress  Breath sounds: Normal breath sounds  No wheezing or rales  Chest:      Chest wall: No tenderness  Abdominal:      General: Bowel sounds are normal  There is no distension  Palpations: Abdomen is soft  There is no mass  Tenderness:  There is no abdominal tenderness  There is no guarding or rebound  Musculoskeletal:         General: Normal range of motion  Cervical back: Normal range of motion  Right lower leg: No edema  Left lower leg: No edema  Lymphadenopathy:      Cervical: No cervical adenopathy  Skin:     Findings: Bruising present  Neurological:      Mental Status: She is alert

## 2022-03-15 NOTE — ASSESSMENT & PLAN NOTE
Lab Results   Component Value Date    EGFR 34 03/03/2022    EGFR 34 08/27/2021    EGFR 31 02/25/2021    CREATININE 1 34 (H) 03/03/2022    CREATININE 1 37 (H) 08/27/2021    CREATININE 1 47 (H) 02/25/2021     Stable  Keep hydrated  Avoid motrin/ibuprofen/aleve NSAIDs nephrotoxic agents

## 2022-08-17 DIAGNOSIS — G47.00 INSOMNIA, UNSPECIFIED TYPE: ICD-10-CM

## 2022-08-17 RX ORDER — TEMAZEPAM 15 MG/1
30 CAPSULE ORAL
Qty: 60 CAPSULE | Refills: 2 | Status: SHIPPED | OUTPATIENT
Start: 2022-08-17 | End: 2022-09-21 | Stop reason: SDUPTHER

## 2022-09-15 ENCOUNTER — APPOINTMENT (OUTPATIENT)
Dept: LAB | Facility: CLINIC | Age: 87
End: 2022-09-15
Payer: COMMERCIAL

## 2022-09-15 DIAGNOSIS — R68.89 COLD INTOLERANCE: ICD-10-CM

## 2022-09-15 DIAGNOSIS — G47.00 INSOMNIA, UNSPECIFIED TYPE: ICD-10-CM

## 2022-09-15 DIAGNOSIS — R73.01 IMPAIRED FASTING GLUCOSE: ICD-10-CM

## 2022-09-15 DIAGNOSIS — I10 PRIMARY HYPERTENSION: ICD-10-CM

## 2022-09-15 LAB
ALBUMIN SERPL BCP-MCNC: 3.1 G/DL (ref 3.5–5)
ALP SERPL-CCNC: 76 U/L (ref 46–116)
ALT SERPL W P-5'-P-CCNC: 12 U/L (ref 12–78)
ANION GAP SERPL CALCULATED.3IONS-SCNC: 5 MMOL/L (ref 4–13)
AST SERPL W P-5'-P-CCNC: 14 U/L (ref 5–45)
BILIRUB SERPL-MCNC: 0.52 MG/DL (ref 0.2–1)
BUN SERPL-MCNC: 17 MG/DL (ref 5–25)
CALCIUM ALBUM COR SERPL-MCNC: 9.4 MG/DL (ref 8.3–10.1)
CALCIUM SERPL-MCNC: 8.7 MG/DL (ref 8.3–10.1)
CHLORIDE SERPL-SCNC: 104 MMOL/L (ref 96–108)
CO2 SERPL-SCNC: 25 MMOL/L (ref 21–32)
CREAT SERPL-MCNC: 1.45 MG/DL (ref 0.6–1.3)
ERYTHROCYTE [DISTWIDTH] IN BLOOD BY AUTOMATED COUNT: 12.3 % (ref 11.6–15.1)
EST. AVERAGE GLUCOSE BLD GHB EST-MCNC: 126 MG/DL
GFR SERPL CREATININE-BSD FRML MDRD: 31 ML/MIN/1.73SQ M
GLUCOSE P FAST SERPL-MCNC: 169 MG/DL (ref 65–99)
HBA1C MFR BLD: 6 %
HCT VFR BLD AUTO: 42 % (ref 34.8–46.1)
HGB BLD-MCNC: 13.4 G/DL (ref 11.5–15.4)
MCH RBC QN AUTO: 30.6 PG (ref 26.8–34.3)
MCHC RBC AUTO-ENTMCNC: 31.9 G/DL (ref 31.4–37.4)
MCV RBC AUTO: 96 FL (ref 82–98)
PLATELET # BLD AUTO: 326 THOUSANDS/UL (ref 149–390)
PMV BLD AUTO: 9 FL (ref 8.9–12.7)
POTASSIUM SERPL-SCNC: 3.9 MMOL/L (ref 3.5–5.3)
PROT SERPL-MCNC: 7.5 G/DL (ref 6.4–8.4)
RBC # BLD AUTO: 4.38 MILLION/UL (ref 3.81–5.12)
SODIUM SERPL-SCNC: 134 MMOL/L (ref 135–147)
TSH SERPL DL<=0.05 MIU/L-ACNC: 1.45 UIU/ML (ref 0.45–4.5)
WBC # BLD AUTO: 7.81 THOUSAND/UL (ref 4.31–10.16)

## 2022-09-15 PROCEDURE — 80053 COMPREHEN METABOLIC PANEL: CPT

## 2022-09-15 PROCEDURE — 36415 COLL VENOUS BLD VENIPUNCTURE: CPT

## 2022-09-15 PROCEDURE — 83036 HEMOGLOBIN GLYCOSYLATED A1C: CPT

## 2022-09-15 PROCEDURE — 84443 ASSAY THYROID STIM HORMONE: CPT

## 2022-09-15 PROCEDURE — 85027 COMPLETE CBC AUTOMATED: CPT

## 2022-09-21 ENCOUNTER — OFFICE VISIT (OUTPATIENT)
Dept: FAMILY MEDICINE CLINIC | Facility: CLINIC | Age: 87
End: 2022-09-21
Payer: COMMERCIAL

## 2022-09-21 VITALS
DIASTOLIC BLOOD PRESSURE: 62 MMHG | HEIGHT: 59 IN | BODY MASS INDEX: 18.95 KG/M2 | SYSTOLIC BLOOD PRESSURE: 110 MMHG | TEMPERATURE: 99 F | RESPIRATION RATE: 16 BRPM | WEIGHT: 94 LBS | HEART RATE: 60 BPM

## 2022-09-21 DIAGNOSIS — I65.22 STENOSIS OF LEFT CAROTID ARTERY: ICD-10-CM

## 2022-09-21 DIAGNOSIS — Z00.00 MEDICARE ANNUAL WELLNESS VISIT, SUBSEQUENT: ICD-10-CM

## 2022-09-21 DIAGNOSIS — N18.32 STAGE 3B CHRONIC KIDNEY DISEASE (HCC): ICD-10-CM

## 2022-09-21 DIAGNOSIS — G47.00 INSOMNIA, UNSPECIFIED TYPE: ICD-10-CM

## 2022-09-21 DIAGNOSIS — I10 ESSENTIAL HYPERTENSION: Primary | ICD-10-CM

## 2022-09-21 DIAGNOSIS — R73.01 IMPAIRED FASTING GLUCOSE: ICD-10-CM

## 2022-09-21 PROCEDURE — G0439 PPPS, SUBSEQ VISIT: HCPCS | Performed by: FAMILY MEDICINE

## 2022-09-21 PROCEDURE — 99214 OFFICE O/P EST MOD 30 MIN: CPT | Performed by: FAMILY MEDICINE

## 2022-09-21 RX ORDER — METOPROLOL TARTRATE 50 MG/1
25 TABLET, FILM COATED ORAL 2 TIMES DAILY
Qty: 90 TABLET | Refills: 1 | Status: SHIPPED | OUTPATIENT
Start: 2022-09-21

## 2022-09-21 RX ORDER — AMLODIPINE BESYLATE 5 MG/1
5 TABLET ORAL 2 TIMES DAILY
Qty: 180 TABLET | Refills: 1 | Status: SHIPPED | OUTPATIENT
Start: 2022-09-21

## 2022-09-21 RX ORDER — TEMAZEPAM 15 MG/1
30 CAPSULE ORAL
Qty: 60 CAPSULE | Refills: 2 | Status: SHIPPED | OUTPATIENT
Start: 2022-09-21

## 2022-09-21 NOTE — PATIENT INSTRUCTIONS
Medicare Preventive Visit Patient Instructions  Thank you for completing your Welcome to Medicare Visit or Medicare Annual Wellness Visit today  Your next wellness visit will be due in one year (9/22/2023)  The screening/preventive services that you may require over the next 5-10 years are detailed below  Some tests may not apply to you based off risk factors and/or age  Screening tests ordered at today's visit but not completed yet may show as past due  Also, please note that scanned in results may not display below  Preventive Screenings:  Service Recommendations Previous Testing/Comments   Colorectal Cancer Screening  * Colonoscopy    * Fecal Occult Blood Test (FOBT)/Fecal Immunochemical Test (FIT)  * Fecal DNA/Cologuard Test  * Flexible Sigmoidoscopy Age: 39-70 years old   Colonoscopy: every 10 years (may be performed more frequently if at higher risk)  OR  FOBT/FIT: every 1 year  OR  Cologuard: every 3 years  OR  Sigmoidoscopy: every 5 years  Screening may be recommended earlier than age 39 if at higher risk for colorectal cancer  Also, an individualized decision between you and your healthcare provider will decide whether screening between the ages of 74-80 would be appropriate  Colonoscopy: Not on file  FOBT/FIT: Not on file  Cologuard: Not on file  Sigmoidoscopy: Not on file          Breast Cancer Screening Age: 36 years old  Frequency: every 1-2 years  Not required if history of left and right mastectomy Mammogram: Not on file        Cervical Cancer Screening Between the ages of 21-29, pap smear recommended once every 3 years  Between the ages of 33-67, can perform pap smear with HPV co-testing every 5 years     Recommendations may differ for women with a history of total hysterectomy, cervical cancer, or abnormal pap smears in past  Pap Smear: Not on file        Hepatitis C Screening Once for adults born between [facility]  More frequently in patients at high risk for Hepatitis C Hep C Antibody: Not on file        Diabetes Screening 1-2 times per year if you're at risk for diabetes or have pre-diabetes Fasting glucose: 169 mg/dL (9/15/2022)  A1C: 6 0 % (9/15/2022)      Cholesterol Screening Once every 5 years if you don't have a lipid disorder  May order more often based on risk factors  Lipid panel: 07/02/2018          Other Preventive Screenings Covered by Medicare:  1  Abdominal Aortic Aneurysm (AAA) Screening: covered once if your at risk  You're considered to be at risk if you have a family history of AAA  2  Lung Cancer Screening: covers low dose CT scan once per year if you meet all of the following conditions: (1) Age 50-69; (2) No signs or symptoms of lung cancer; (3) Current smoker or have quit smoking within the last 15 years; (4) You have a tobacco smoking history of at least 20 pack years (packs per day multiplied by number of years you smoked); (5) You get a written order from a healthcare provider  3  Glaucoma Screening: covered annually if you're considered high risk: (1) You have diabetes OR (2) Family history of glaucoma OR (3)  aged 48 and older OR (3)  American aged 72 and older  3  Osteoporosis Screening: covered every 2 years if you meet one of the following conditions: (1) You're estrogen deficient and at risk for osteoporosis based off medical history and other findings; (2) Have a vertebral abnormality; (3) On glucocorticoid therapy for more than 3 months; (4) Have primary hyperparathyroidism; (5) On osteoporosis medications and need to assess response to drug therapy  · Last bone density test (DXA Scan): Not on file  5  HIV Screening: covered annually if you're between the age of 12-76  Also covered annually if you are younger than 13 and older than 72 with risk factors for HIV infection  For pregnant patients, it is covered up to 3 times per pregnancy      Immunizations:  Immunization Recommendations   Influenza Vaccine Annual influenza vaccination during flu season is recommended for all persons aged >= 6 months who do not have contraindications   Pneumococcal Vaccine   * Pneumococcal conjugate vaccine = PCV13 (Prevnar 13), PCV15 (Vaxneuvance), PCV20 (Prevnar 20)  * Pneumococcal polysaccharide vaccine = PPSV23 (Pneumovax) Adults 25-60 years old: 1-3 doses may be recommended based on certain risk factors  Adults 72 years old: 1-2 doses may be recommended based off what pneumonia vaccine you previously received   Hepatitis B Vaccine 3 dose series if at intermediate or high risk (ex: diabetes, end stage renal disease, liver disease)   Tetanus (Td) Vaccine - COST NOT COVERED BY MEDICARE PART B Following completion of primary series, a booster dose should be given every 10 years to maintain immunity against tetanus  Td may also be given as tetanus wound prophylaxis  Tdap Vaccine - COST NOT COVERED BY MEDICARE PART B Recommended at least once for all adults  For pregnant patients, recommended with each pregnancy  Shingles Vaccine (Shingrix) - COST NOT COVERED BY MEDICARE PART B  2 shot series recommended in those aged 48 and above     Health Maintenance Due:  There are no preventive care reminders to display for this patient  Immunizations Due:      Topic Date Due    Pneumococcal Vaccine: 65+ Years (2 - PCV) 12/29/2015    COVID-19 Vaccine (3 - Booster for Pfizer series) 10/23/2021    Influenza Vaccine (1) 09/01/2022     Advance Directives   What are advance directives? Advance directives are legal documents that state your wishes and plans for medical care  These plans are made ahead of time in case you lose your ability to make decisions for yourself  Advance directives can apply to any medical decision, such as the treatments you want, and if you want to donate organs  What are the types of advance directives? There are many types of advance directives, and each state has rules about how to use them   You may choose a combination of any of the following:  · Living will: This is a written record of the treatment you want  You can also choose which treatments you do not want, which to limit, and which to stop at a certain time  This includes surgery, medicine, IV fluid, and tube feedings  · Durable power of  for healthcare [facility]): This is a written record that states who you want to make healthcare choices for you when you are unable to make them for yourself  This person, called a proxy, is usually a family member or a friend  You may choose more than 1 proxy  · Do not resuscitate (DNR) order:  A DNR order is used in case your heart stops beating or you stop breathing  It is a request not to have certain forms of treatment, such as CPR  A DNR order may be included in other types of advance directives  · Medical directive: This covers the care that you want if you are in a coma, near death, or unable to make decisions for yourself  You can list the treatments you want for each condition  Treatment may include pain medicine, surgery, blood transfusions, dialysis, IV or tube feedings, and a ventilator (breathing machine)  · Values history: This document has questions about your views, beliefs, and how you feel and think about life  This information can help others choose the care that you would choose  Why are advance directives important? An advance directive helps you control your care  Although spoken wishes may be used, it is better to have your wishes written down  Spoken wishes can be misunderstood, or not followed  Treatments may be given even if you do not want them  An advance directive may make it easier for your family to make difficult choices about your care  © Copyright [employer] 2018 Information is for End User's use only and may not be sold, redistributed or otherwise used for commercial purposes   All illustrations and images included in CareNotes® are the copyrighted property of A [employer] M [employer]  or [employer]

## 2022-09-21 NOTE — PROGRESS NOTES
Chief Complaint   Patient presents with   Isaac Andersen Wellness Visit     Health Maintenance   Topic Date Due    Pneumococcal Vaccine: 65+ Years (2 - PCV) 12/29/2015    COVID-19 Vaccine (3 - Booster for Liu Hussein series) 10/23/2021    Medicare Annual Wellness Visit (AWV)  09/14/2022    Influenza Vaccine (1) 09/01/2022    Fall Risk  09/21/2023    Depression Screening  09/21/2023    Urinary Incontinence Screening  09/21/2023    BMI: Adult  09/21/2023    HIB Vaccine  Aged Out    Hepatitis B Vaccine  Aged Out    IPV Vaccine  Aged Out    Hepatitis A Vaccine  Aged Out    Meningococcal ACWY Vaccine  Aged Out    HPV Vaccine  Aged Out        Assessment and Plan:     Problem List Items Addressed This Visit        Endocrine    Impaired fasting glucose     9/2022 hgA1C 6 0 stable  Low carb diet  Relevant Orders    Comprehensive metabolic panel    Hemoglobin A1C       Cardiovascular and Mediastinum    Stenosis of left carotid artery     Continue ASA 81mg daily  Genitourinary    Stage 3b chronic kidney disease [facility])     Lab Results   Component Value Date    EGFR 31 09/15/2022    EGFR 34 03/03/2022    EGFR 34 08/27/2021    CREATININE 1 45 (H) 09/15/2022    CREATININE 1 34 (H) 03/03/2022    CREATININE 1 37 (H) 08/27/2021     Keep hydrated  Avoid motrin/ibuprofen/aleve NSAIDs nephrotoxic agents  Relevant Orders    Comprehensive metabolic panel    Hemoglobin A1C       Other    Insomnia     Continue temazepam 15-30mg qhs prn  SE educated pt  PDMP checked and it was appropriate            Relevant Medications    temazepam (RESTORIL) 15 mg capsule      Other Visit Diagnoses     Essential hypertension    -  Primary    Relevant Medications    amLODIPine (NORVASC) 5 mg tablet    metoprolol tartrate (LOPRESSOR) 50 mg tablet    Other Relevant Orders    Comprehensive metabolic panel    Hemoglobin A1C    Medicare annual wellness visit, subsequent              Depression Screening and Follow-up Plan: Patient was screened for depression during today's encounter  They screened negative with a PHQ-2 score of 0  Falls Plan of Care: balance, strength, and gait training instructions were provided  Reviewed lab in 9/2022  TSH normal  CBC normal  CMP ok  HgA1C 6 0 stable    Refused flu shot  Got covid19 shots  No booster  Refused PCV20  Fall precautions  RTO in 6 months with labs      Mini-cog 4/5 today  POA---son Srini  Living will---DNR if end of life  Preventive health issues were discussed with patient, and age appropriate screening tests were ordered as noted in patient's After Visit Summary  Personalized health advice and appropriate referrals for health education or preventive services given if needed, as noted in patient's After Visit Summary  History of Present Illness:     Patient presents for a Medicare Wellness Visit    HPI     Pt is here by herself       HTN---She is on amlodipine 5mg bid and metoprolol 25mg bid  Does not check BP at home  BP is good today  Pt denies dizziness, lightheaded, SOB, cP or headache       Insomnia---She is on temazepam 15mg qhs  She went to bed at 8pm  If she woke up in the middle of night and cannot go back to sleep, she takes another 15mg  She got up at 630am       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  No symptoms now  She is on baby ASA daily  She had bruise on her legs  [facility] vascular before, not any more  Refused to do more test of carotid artery       Hyperlipidemia---low fat diet  Statin made her leg pain worse  No more statins      IFG---She likes sweets, dessert with lunch everyday         She lives in a senior apartment by herself  Does all ADL's  Clean and laundry by herself  Still drive  Her younger son Srini saw her several times per week  Denies recent falls     Denies depression        Patient Care Team:  Darylene Doles, MD as PCP - Yovani Carrington MD as PCP - [address] [facility] ([facility])  [facility] MD Patricio Shin MD (Inactive)  MD Andre Cole PA-C     Review of Systems:     Review of Systems   Constitutional: Negative for appetite change, chills and fever  HENT: Negative for congestion, ear pain, sinus pain and sore throat  Eyes: Negative for discharge and itching  Respiratory: Negative for apnea, cough, chest tightness, shortness of breath and wheezing  Cardiovascular: Negative for chest pain, palpitations and leg swelling  Gastrointestinal: Negative for abdominal pain, anal bleeding, constipation, diarrhea, nausea and vomiting  Endocrine: Negative for cold intolerance, heat intolerance and polyuria  Genitourinary: Negative for difficulty urinating and dysuria  Musculoskeletal: Negative for arthralgias, back pain and myalgias  Skin: Negative for rash  Neurological: Negative for dizziness and headaches  Psychiatric/Behavioral: Positive for sleep disturbance  Negative for agitation, self-injury and suicidal ideas  The patient is not nervous/anxious           Problem List:     Patient Active Problem List   Diagnosis    Hyperlipidemia    Hypertension    Impaired fasting glucose    Insomnia    Stenosis of left carotid artery    Peripheral vascular disease [facility]    Bruises easily    Stage 3b chronic kidney disease ([facility] )      Past Medical and Surgical History:     Past Medical History:   Diagnosis Date    Allergic rhinitis     resolved 12/29/14    Bladder cancer ([facility] )     last assessed 6/13/13    Colon cancer ([facility] )     last assessed 12/19/12    Diverticulitis     of colon    Hypertension     Lyme disease     last assessed 12/19/12    Microscopic hematuria     Osteoarthritis     resolved 12/29/14    TIA (transient ischemic attack)      Past Surgical History:   Procedure Laterality Date    CATARACT EXTRACTION      CHOLECYSTECTOMY      COLECTOMY      partial - sigmoid    CYSTOSCOPY Left     w/insertion of ureteral stent    MOUTH SURGERY      REVISION TOTAL HIP ARTHROPLASTY Left 2015    TONSILLECTOMY        Family History:     Family History   Problem Relation Age of Onset    Cancer Sister     Heart disease Sister     Other Mother         respiratory failure    Gallbladder disease Father       Social History:     Social History     Socioeconomic History    Marital status:      Spouse name: None    Number of children: None    Years of education: None    Highest education level: None   Occupational History    Occupation: Retired   Tobacco Use    Smoking status: Former Smoker    Smokeless tobacco: Never Used   Substance and Sexual Activity    Alcohol use: No    Drug use: No    Sexual activity: None   Other Topics Concern    None   Social History Narrative    None     Social Determinants of Health     Financial Resource Strain: Low Risk     Difficulty of Paying Living Expenses: Not hard at all   Food Insecurity: Not on file   Transportation Needs: No Transportation Needs    Lack of Transportation (Medical): No    Lack of Transportation (Non-Medical): No   Physical Activity: Not on file   Stress: Not on file   Social Connections: Not on file   Intimate Partner Violence: Not on file   Housing Stability: Not on file      Medications and Allergies:     Current Outpatient Medications   Medication Sig Dispense Refill    amLODIPine (NORVASC) 5 mg tablet Take 1 tablet (5 mg total) by mouth 2 (two) times a day 180 tablet 1    aspirin 81 MG tablet Take 1 tablet by mouth daily      metoprolol tartrate (LOPRESSOR) 50 mg tablet Take 0 5 tablets (25 mg total) by mouth 2 (two) times a day 90 tablet 1    temazepam (RESTORIL) 15 mg capsule Take 2 capsules (30 mg total) by mouth daily at bedtime as needed for sleep 60 capsule 2     No current facility-administered medications for this visit       Allergies   Allergen Reactions    Codeine Sulfate Hives    Naproxen       Immunizations:     Immunization History   Administered Date(s) Administered  COVID-19 PFIZER VACCINE 0 3 ML IM 05/04/2021, 05/23/2021    Pneumococcal Polysaccharide PPV23 12/29/2014      Health Maintenance: There are no preventive care reminders to display for this patient  Topic Date Due    Pneumococcal Vaccine: 65+ Years (2 - PCV) 12/29/2015    COVID-19 Vaccine (3 - Booster for Pfizer series) 10/23/2021    Influenza Vaccine (1) 09/01/2022      Medicare Screening Tests and Risk Assessments: Nadege Pathak is here for her Subsequent Wellness visit  Health Risk Assessment:   Patient rates overall health as good  Patient feels that their physical health rating is same  Patient is satisfied with their life  Eyesight was rated as slightly worse  Hearing was rated as same  Patient feels that their emotional and mental health rating is same  Patients states they are never, rarely angry  Patient states they are often unusually tired/fatigued  Pain experienced in the last 7 days has been none  Patient states that she has experienced no weight loss or gain in last 6 months  Depression Screening:   PHQ-2 Score: 0      Fall Risk Screening: In the past year, patient has experienced: history of falling in past year    Number of falls: 1  Injured during fall?: Yes    Feels unsteady when standing or walking?: Yes    Worried about falling?: Yes      Urinary Incontinence Screening:   Patient has not leaked urine accidently in the last six months  Home Safety:  Patient does not have trouble with stairs inside or outside of their home  Patient has no working smoke alarms and has no working carbon monoxide detector  Home safety hazards include: none  Nutrition:   Current diet is Regular  Medications:   Patient is currently taking over-the-counter supplements  OTC medications include: see medication list  Patient is able to manage medications       Activities of Daily Living (ADLs)/Instrumental Activities of Daily Living (IADLs):   Walk and transfer into and out of bed and chair?: Yes  Dress and groom yourself?: Yes    Bathe or shower yourself?: Yes    Feed yourself? Yes  Do your laundry/housekeeping?: Yes  Manage your money, pay your bills and track your expenses?: Yes  Make your own meals?: Yes    Do your own shopping?: Yes    Previous Hospitalizations:   Any hospitalizations or ED visits within the last 12 months?: No      Advance Care Planning:   Living will: Yes    Durable POA for healthcare:  Yes    Advanced directive: Yes    End of Life Decisions reviewed with patient: Yes    Provider agrees with end of life decisions: Yes      Cognitive Screening:   Provider or family/friend/caregiver concerned regarding cognition?: No    PREVENTIVE SCREENINGS      Cardiovascular Screening:    General: Screening Not Indicated and History Lipid Disorder      Diabetes Screening:     General: Screening Current      Colorectal Cancer Screening:     General: Screening Not Indicated      Breast Cancer Screening:     General: Screening Not Indicated      Cervical Cancer Screening:    General: Screening Not Indicated      Lung Cancer Screening:     General: Screening Not Indicated    Screening, Brief Intervention, and Referral to Treatment (SBIRT)    Screening      AUDIT-C Screenin) How often did you have a drink containing alcohol in the past year? never  2) How many drinks did you have on a typical day when you were drinking in the past year? 0  3) How often did you have 6 or more drinks on one occasion in the past year? never    AUDIT-C Score: 0  Interpretation: Score 0-2 (female): Negative screen for alcohol misuse    Single Item Drug Screening:  How often have you used an illegal drug (including marijuana) or a prescription medication for non-medical reasons in the past year? never    Single Item Drug Screen Score: 0  Interpretation: Negative screen for possible drug use disorder    No exam data present     Physical Exam:     /62   Pulse 60   Temp 99 °F (37 2 °C) (Tympanic)   Resp 16   Ht 4' 10 5" (1 486 m)   Wt 42 6 kg (94 lb)   BMI 19 31 kg/m²     Physical Exam  Vitals reviewed  Constitutional:       Appearance: Normal appearance  HENT:      Head: Normocephalic and atraumatic  Eyes:      General:         Right eye: No discharge  Left eye: No discharge  Conjunctiva/sclera: Conjunctivae normal    Neck:      Vascular: No carotid bruit  Cardiovascular:      Rate and Rhythm: Normal rate and regular rhythm  Heart sounds: Normal heart sounds  No murmur heard  No friction rub  No gallop  Pulmonary:      Effort: Pulmonary effort is normal  No respiratory distress  Breath sounds: Normal breath sounds  No wheezing or rales  Abdominal:      General: Bowel sounds are normal  There is no distension  Palpations: Abdomen is soft  Tenderness: There is no abdominal tenderness  Musculoskeletal:         General: No swelling, tenderness or deformity  Normal range of motion  Cervical back: Normal range of motion and neck supple  No muscular tenderness  Right lower leg: No edema  Left lower leg: No edema  Lymphadenopathy:      Cervical: No cervical adenopathy  Skin:     Findings: Bruising present  Neurological:      Mental Status: She is alert     Psychiatric:         Mood and Affect: Mood normal           Kiki Cruz MD

## 2022-09-21 NOTE — ASSESSMENT & PLAN NOTE
Lab Results   Component Value Date    EGFR 31 09/15/2022    EGFR 34 03/03/2022    EGFR 34 08/27/2021    CREATININE 1 45 (H) 09/15/2022    CREATININE 1 34 (H) 03/03/2022    CREATININE 1 37 (H) 08/27/2021     Keep hydrated  Avoid motrin/ibuprofen/aleve NSAIDs nephrotoxic agents

## 2022-10-25 ENCOUNTER — ESTABLISHED COMPREHENSIVE EXAM (OUTPATIENT)
Dept: URBAN - METROPOLITAN AREA CLINIC 6 | Facility: CLINIC | Age: 87
End: 2022-10-25

## 2022-10-25 DIAGNOSIS — Z96.1: ICD-10-CM

## 2022-10-25 DIAGNOSIS — H35.3131: ICD-10-CM

## 2022-10-25 PROCEDURE — 92014 COMPRE OPH EXAM EST PT 1/>: CPT

## 2022-10-25 ASSESSMENT — TONOMETRY
OS_IOP_MMHG: 13
OD_IOP_MMHG: 10

## 2022-10-25 ASSESSMENT — VISUAL ACUITY
OD_CC: 20/80
OS_CC: 20/80

## 2022-11-15 DIAGNOSIS — I10 ESSENTIAL HYPERTENSION: ICD-10-CM

## 2022-11-15 RX ORDER — AMLODIPINE BESYLATE 5 MG/1
5 TABLET ORAL 2 TIMES DAILY
Qty: 180 TABLET | Refills: 1 | Status: SHIPPED | OUTPATIENT
Start: 2022-11-15

## 2022-11-15 RX ORDER — METOPROLOL TARTRATE 50 MG/1
25 TABLET, FILM COATED ORAL 2 TIMES DAILY
Qty: 90 TABLET | Refills: 1 | Status: SHIPPED | OUTPATIENT
Start: 2022-11-15

## 2022-12-28 DIAGNOSIS — G47.00 INSOMNIA, UNSPECIFIED TYPE: ICD-10-CM

## 2022-12-28 RX ORDER — TEMAZEPAM 15 MG/1
30 CAPSULE ORAL
Qty: 60 CAPSULE | Refills: 2 | Status: SHIPPED | OUTPATIENT
Start: 2022-12-28

## 2023-01-13 DIAGNOSIS — G47.00 INSOMNIA, UNSPECIFIED TYPE: ICD-10-CM

## 2023-01-13 RX ORDER — TEMAZEPAM 15 MG/1
30 CAPSULE ORAL
Qty: 60 CAPSULE | Refills: 2 | OUTPATIENT
Start: 2023-01-13

## 2023-03-10 ENCOUNTER — LAB (OUTPATIENT)
Dept: LAB | Facility: CLINIC | Age: 88
End: 2023-03-10

## 2023-03-10 DIAGNOSIS — R73.01 IMPAIRED FASTING GLUCOSE: ICD-10-CM

## 2023-03-10 DIAGNOSIS — I10 ESSENTIAL HYPERTENSION: ICD-10-CM

## 2023-03-10 DIAGNOSIS — N18.32 STAGE 3B CHRONIC KIDNEY DISEASE (HCC): ICD-10-CM

## 2023-03-10 LAB
ALBUMIN SERPL BCP-MCNC: 3.6 G/DL (ref 3.5–5)
ALP SERPL-CCNC: 85 U/L (ref 46–116)
ALT SERPL W P-5'-P-CCNC: 13 U/L (ref 12–78)
ANION GAP SERPL CALCULATED.3IONS-SCNC: 7 MMOL/L (ref 4–13)
AST SERPL W P-5'-P-CCNC: 25 U/L (ref 5–45)
BILIRUB SERPL-MCNC: 0.47 MG/DL (ref 0.2–1)
BUN SERPL-MCNC: 18 MG/DL (ref 5–25)
CALCIUM SERPL-MCNC: 9.5 MG/DL (ref 8.3–10.1)
CHLORIDE SERPL-SCNC: 107 MMOL/L (ref 96–108)
CO2 SERPL-SCNC: 26 MMOL/L (ref 21–32)
CREAT SERPL-MCNC: 1.32 MG/DL (ref 0.6–1.3)
EST. AVERAGE GLUCOSE BLD GHB EST-MCNC: 126 MG/DL
GFR SERPL CREATININE-BSD FRML MDRD: 34 ML/MIN/1.73SQ M
GLUCOSE P FAST SERPL-MCNC: 112 MG/DL (ref 65–99)
HBA1C MFR BLD: 6 %
POTASSIUM SERPL-SCNC: 3.9 MMOL/L (ref 3.5–5.3)
PROT SERPL-MCNC: 7.9 G/DL (ref 6.4–8.4)
SODIUM SERPL-SCNC: 140 MMOL/L (ref 135–147)

## 2023-03-16 ENCOUNTER — RA CDI HCC (OUTPATIENT)
Dept: OTHER | Facility: HOSPITAL | Age: 88
End: 2023-03-16

## 2023-03-16 NOTE — PROGRESS NOTES
[facility]  coding opportunities       Chart reviewed, no opportunity found:   [address]        Patients Insurance     Medicare Insurance: [employer] Advantage

## 2023-03-22 ENCOUNTER — OFFICE VISIT (OUTPATIENT)
Dept: FAMILY MEDICINE CLINIC | Facility: CLINIC | Age: 88
End: 2023-03-22

## 2023-03-22 VITALS
WEIGHT: 95.13 LBS | HEIGHT: 58 IN | HEART RATE: 66 BPM | DIASTOLIC BLOOD PRESSURE: 55 MMHG | SYSTOLIC BLOOD PRESSURE: 124 MMHG | RESPIRATION RATE: 18 BRPM | OXYGEN SATURATION: 95 % | TEMPERATURE: 98.8 F | BODY MASS INDEX: 19.97 KG/M2

## 2023-03-22 DIAGNOSIS — I73.9 PERIPHERAL VASCULAR DISEASE (HCC): ICD-10-CM

## 2023-03-22 DIAGNOSIS — G47.00 INSOMNIA, UNSPECIFIED TYPE: ICD-10-CM

## 2023-03-22 DIAGNOSIS — I10 ESSENTIAL HYPERTENSION: ICD-10-CM

## 2023-03-22 DIAGNOSIS — N18.32 STAGE 3B CHRONIC KIDNEY DISEASE (HCC): ICD-10-CM

## 2023-03-22 DIAGNOSIS — R73.01 IMPAIRED FASTING GLUCOSE: ICD-10-CM

## 2023-03-22 DIAGNOSIS — I10 PRIMARY HYPERTENSION: Primary | ICD-10-CM

## 2023-03-22 RX ORDER — AMLODIPINE BESYLATE 5 MG/1
5 TABLET ORAL 2 TIMES DAILY
Qty: 180 TABLET | Refills: 1 | Status: SHIPPED | OUTPATIENT
Start: 2023-03-22

## 2023-03-22 RX ORDER — METOPROLOL TARTRATE 50 MG/1
25 TABLET, FILM COATED ORAL 2 TIMES DAILY
Qty: 90 TABLET | Refills: 1 | Status: SHIPPED | OUTPATIENT
Start: 2023-03-22

## 2023-03-22 NOTE — PROGRESS NOTES
Name: Josefa Mckee      : 1929      MRN: [id number]  Encounter Provider: Mordechai Nyhan, MD  Encounter Date: 3/22/2023   Encounter department: [address] [place] [address]  Primary hypertension  Assessment & Plan:  Controlled  DASH diet  Continue amlodipine 5mg bid and metoprolol 25mg bid  Orders:  -     CBC; Future; Expected date: 2023  -     Comprehensive metabolic panel; Future; Expected date: 2023  -     Hemoglobin A1C; Future; Expected date: 2023    2  Essential hypertension  -     amLODIPine (NORVASC) 5 mg tablet; Take 1 tablet (5 mg total) by mouth 2 (two) times a day  -     metoprolol tartrate (LOPRESSOR) 50 mg tablet; Take 0 5 tablets (25 mg total) by mouth 2 (two) times a day    3  Insomnia, unspecified type  Assessment & Plan:  Continue temazepam 15-30mg qhs prn  SE educated pt  Orders:  -     CBC; Future; Expected date: 2023  -     Comprehensive metabolic panel; Future; Expected date: 2023  -     Hemoglobin A1C; Future; Expected date: 2023    4  Impaired fasting glucose  Assessment & Plan:  3/2023 hgA1C 6 0 stable  Low carb diet  Orders:  -     CBC; Future; Expected date: 2023  -     Comprehensive metabolic panel; Future; Expected date: 2023  -     Hemoglobin A1C; Future; Expected date: 2023    5  Stage 3b chronic kidney disease [facility])  Assessment & Plan:  Lab Results   Component Value Date    EGFR 34 03/10/2023    EGFR 31 09/15/2022    EGFR 34 2022    CREATININE 1 32 (H) 03/10/2023    CREATININE 1 45 (H) 09/15/2022    CREATININE 1 34 (H) 2022     Stable  Keep hydrated  Avoid motrin/ibuprofen/aleve NSAIDs nephrotoxic agents  Orders:  -     CBC; Future; Expected date: 2023  -     Comprehensive metabolic panel; Future; Expected date: 2023  -     Hemoglobin A1C; Future; Expected date: 2023    6  Peripheral vascular disease (HCC)  Assessment & Plan:  Continue ASA 81mg QD  Depression Screening and Follow-up Plan: Patient was screened for depression during today's encounter  They screened negative with a PHQ-2 score of 0  Reviewed lab in 3/2023  HgA1C 6 0  CMP ok, GFR 34 stable    Refused flu shot    Got covid19 shots and booster    Refused PCV20  Fall precautions  RTO in 6 months with labs        Subjective      HPI     Pt is here by herself       HTN---She is on amlodipine 5mg bid and metoprolol 25mg bid  Does not check BP at home  BP is good today    Pt denies dizziness, lightheaded, SOB, cP or headache       Insomnia---She is on temazepam 15mg qhs  She went to bed at 6pm watching TV, went to sleep about 8pm  If she woke up in the middle of night and cannot go back to sleep, she takes another 15mg  She got up at 6am       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  No symptoms now  She is on baby ASA daily  Pt states her bruise on her legs is much better now, but her legs skin are dark  FU vascular before, not any more       Hyperlipidemia---low fat diet  Statin made her leg pain worse  No more statins      IFG---She likes sweets and potatos         She lives in a senior apartment by herself  Does all ADL's  Clean and laundry by herself  Microwave food  Still drive  FU eye doc yearly  Her younger son Srini saw her several times per week    Denies recent falls  Denies depression         Review of Systems   Constitutional: Negative for appetite change, chills and fever  HENT: Negative for congestion, ear pain, sinus pain and sore throat  Eyes: Negative for discharge and itching  Respiratory: Negative for apnea, cough, chest tightness, shortness of breath and wheezing  Cardiovascular: Negative for chest pain, palpitations and leg swelling  Gastrointestinal: Negative for abdominal pain, anal bleeding, constipation, diarrhea, nausea and vomiting  Endocrine: Negative for cold intolerance, heat intolerance and polyuria  Genitourinary: Negative for difficulty urinating and dysuria  Musculoskeletal: Negative for arthralgias, back pain and myalgias  Skin: Negative for rash  Neurological: Negative for dizziness and headaches  Psychiatric/Behavioral: Negative for agitation  Current Outpatient Medications on File Prior to Visit   Medication Sig   • aspirin 81 MG tablet Take 1 tablet by mouth daily   • temazepam (RESTORIL) 15 mg capsule Take 2 capsules (30 mg total) by mouth daily at bedtime as needed for sleep   • [DISCONTINUED] amLODIPine (NORVASC) 5 mg tablet Take 1 tablet (5 mg total) by mouth 2 (two) times a day   • [DISCONTINUED] metoprolol tartrate (LOPRESSOR) 50 mg tablet Take 0 5 tablets (25 mg total) by mouth 2 (two) times a day       Objective     /55   Pulse 66   Temp 98 8 °F (37 1 °C)   Resp 18   Ht 4' 10" (1 473 m)   Wt 43 1 kg (95 lb 2 oz)   SpO2 95%   BMI 19 88 kg/m²     Physical Exam  Constitutional:       General: She is not in acute distress  Appearance: She is well-developed  HENT:      Head: Normocephalic  Eyes:      General:         Right eye: No discharge  Left eye: No discharge  Conjunctiva/sclera: Conjunctivae normal    Neck:      Thyroid: No thyromegaly  Cardiovascular:      Rate and Rhythm: Normal rate and regular rhythm  Heart sounds: Normal heart sounds  No murmur heard  No friction rub  No gallop  Pulmonary:      Effort: Pulmonary effort is normal  No respiratory distress  Breath sounds: Normal breath sounds  No wheezing or rales  Chest:      Chest wall: No tenderness  Abdominal:      General: Bowel sounds are normal  There is no distension  Palpations: Abdomen is soft  There is no mass  Tenderness: There is no abdominal tenderness  There is no guarding or rebound  Musculoskeletal:         General: No tenderness or deformity  Normal range of motion  Cervical back: Normal range of motion     Lymphadenopathy:      Cervical: No cervical adenopathy  Skin:     Comments: Dark skin on legs   Neurological:      Mental Status: She is alert         Raffaele Gale MD

## 2023-03-22 NOTE — ASSESSMENT & PLAN NOTE
Lab Results   Component Value Date    EGFR 34 03/10/2023    EGFR 31 09/15/2022    EGFR 34 03/03/2022    CREATININE 1 32 (H) 03/10/2023    CREATININE 1 45 (H) 09/15/2022    CREATININE 1 34 (H) 03/03/2022     Stable  Keep hydrated  Avoid motrin/ibuprofen/aleve NSAIDs nephrotoxic agents

## 2023-05-16 DIAGNOSIS — G47.00 INSOMNIA, UNSPECIFIED TYPE: ICD-10-CM

## 2023-05-16 DIAGNOSIS — I10 ESSENTIAL HYPERTENSION: ICD-10-CM

## 2023-05-16 RX ORDER — AMLODIPINE BESYLATE 5 MG/1
5 TABLET ORAL 2 TIMES DAILY
Qty: 180 TABLET | Refills: 1 | Status: SHIPPED | OUTPATIENT
Start: 2023-05-16

## 2023-05-16 RX ORDER — METOPROLOL TARTRATE 50 MG/1
TABLET, FILM COATED ORAL
Qty: 30 TABLET | Refills: 0 | Status: SHIPPED | OUTPATIENT
Start: 2023-05-16

## 2023-05-17 RX ORDER — TEMAZEPAM 15 MG/1
30 CAPSULE ORAL
Qty: 60 CAPSULE | Refills: 0 | Status: SHIPPED | OUTPATIENT
Start: 2023-05-17

## 2023-05-17 NOTE — TELEPHONE ENCOUNTER
Medication:Restoril 15mgs  Medication failed [facility] protocol  Please forward to your office staff for further review as this medication was reviewed by a [facility] RN

## 2023-05-22 DIAGNOSIS — I10 ESSENTIAL HYPERTENSION: ICD-10-CM

## 2023-05-23 RX ORDER — METOPROLOL TARTRATE 50 MG/1
25 TABLET, FILM COATED ORAL 2 TIMES DAILY
Qty: 30 TABLET | Refills: 0 | OUTPATIENT
Start: 2023-05-23

## 2023-05-23 RX ORDER — AMLODIPINE BESYLATE 5 MG/1
5 TABLET ORAL 2 TIMES DAILY
Qty: 180 TABLET | Refills: 0 | OUTPATIENT
Start: 2023-05-23

## 2023-07-18 DIAGNOSIS — I10 ESSENTIAL HYPERTENSION: ICD-10-CM

## 2023-07-18 DIAGNOSIS — G47.00 INSOMNIA, UNSPECIFIED TYPE: ICD-10-CM

## 2023-07-18 RX ORDER — TEMAZEPAM 15 MG/1
30 CAPSULE ORAL
Qty: 60 CAPSULE | Refills: 0 | Status: SHIPPED | OUTPATIENT
Start: 2023-07-18

## 2023-07-18 RX ORDER — AMLODIPINE BESYLATE 5 MG/1
5 TABLET ORAL 2 TIMES DAILY
Qty: 180 TABLET | Refills: 1 | Status: SHIPPED | OUTPATIENT
Start: 2023-07-18

## 2023-07-18 RX ORDER — METOPROLOL TARTRATE 50 MG/1
25 TABLET, FILM COATED ORAL 2 TIMES DAILY
Qty: 30 TABLET | Refills: 0 | Status: SHIPPED | OUTPATIENT
Start: 2023-07-18

## 2023-09-08 ENCOUNTER — LAB (OUTPATIENT)
Dept: LAB | Facility: CLINIC | Age: 88
End: 2023-09-08
Payer: COMMERCIAL

## 2023-09-08 DIAGNOSIS — I10 PRIMARY HYPERTENSION: ICD-10-CM

## 2023-09-08 DIAGNOSIS — R73.01 IMPAIRED FASTING GLUCOSE: ICD-10-CM

## 2023-09-08 DIAGNOSIS — N18.32 STAGE 3B CHRONIC KIDNEY DISEASE (HCC): ICD-10-CM

## 2023-09-08 DIAGNOSIS — G47.00 INSOMNIA, UNSPECIFIED TYPE: ICD-10-CM

## 2023-09-08 LAB
ALBUMIN SERPL BCP-MCNC: 3.8 G/DL (ref 3.5–5)
ALP SERPL-CCNC: 62 U/L (ref 34–104)
ALT SERPL W P-5'-P-CCNC: 7 U/L (ref 7–52)
ANION GAP SERPL CALCULATED.3IONS-SCNC: 7 MMOL/L
AST SERPL W P-5'-P-CCNC: 34 U/L (ref 13–39)
BILIRUB SERPL-MCNC: 0.6 MG/DL (ref 0.2–1)
BUN SERPL-MCNC: 18 MG/DL (ref 5–25)
CALCIUM SERPL-MCNC: 8.8 MG/DL (ref 8.4–10.2)
CHLORIDE SERPL-SCNC: 106 MMOL/L (ref 96–108)
CO2 SERPL-SCNC: 26 MMOL/L (ref 21–32)
CREAT SERPL-MCNC: 1.39 MG/DL (ref 0.6–1.3)
ERYTHROCYTE [DISTWIDTH] IN BLOOD BY AUTOMATED COUNT: 12.4 % (ref 11.6–15.1)
EST. AVERAGE GLUCOSE BLD GHB EST-MCNC: 134 MG/DL
GFR SERPL CREATININE-BSD FRML MDRD: 32 ML/MIN/1.73SQ M
GLUCOSE P FAST SERPL-MCNC: 106 MG/DL (ref 65–99)
HBA1C MFR BLD: 6.3 %
HCT VFR BLD AUTO: 43.5 % (ref 34.8–46.1)
HGB BLD-MCNC: 14.3 G/DL (ref 11.5–15.4)
MCH RBC QN AUTO: 31.6 PG (ref 26.8–34.3)
MCHC RBC AUTO-ENTMCNC: 32.9 G/DL (ref 31.4–37.4)
MCV RBC AUTO: 96 FL (ref 82–98)
PLATELET # BLD AUTO: 267 THOUSANDS/UL (ref 149–390)
PMV BLD AUTO: 9.8 FL (ref 8.9–12.7)
POTASSIUM SERPL-SCNC: 3.9 MMOL/L (ref 3.5–5.3)
PROT SERPL-MCNC: 7.3 G/DL (ref 6.4–8.4)
RBC # BLD AUTO: 4.52 MILLION/UL (ref 3.81–5.12)
SODIUM SERPL-SCNC: 139 MMOL/L (ref 135–147)
WBC # BLD AUTO: 9.69 THOUSAND/UL (ref 4.31–10.16)

## 2023-09-08 PROCEDURE — 80053 COMPREHEN METABOLIC PANEL: CPT

## 2023-09-08 PROCEDURE — 85027 COMPLETE CBC AUTOMATED: CPT

## 2023-09-08 PROCEDURE — 83036 HEMOGLOBIN GLYCOSYLATED A1C: CPT

## 2023-09-08 PROCEDURE — 36415 COLL VENOUS BLD VENIPUNCTURE: CPT

## 2023-09-21 ENCOUNTER — RA CDI HCC (OUTPATIENT)
Dept: OTHER | Facility: HOSPITAL | Age: 88
End: 2023-09-21

## 2023-09-21 NOTE — PROGRESS NOTES
[facility] coding opportunities       Chart reviewed, no opportunity found: CHART REVIEWED, NO OPPORTUNITY FOUND        Patients Insurance     [facility]ic[facility]

## 2023-09-27 ENCOUNTER — OFFICE VISIT (OUTPATIENT)
Dept: FAMILY MEDICINE CLINIC | Facility: CLINIC | Age: 88
End: 2023-09-27
Payer: COMMERCIAL

## 2023-09-27 VITALS
WEIGHT: 93.4 LBS | TEMPERATURE: 97.2 F | RESPIRATION RATE: 16 BRPM | BODY MASS INDEX: 19.61 KG/M2 | SYSTOLIC BLOOD PRESSURE: 108 MMHG | HEIGHT: 58 IN | HEART RATE: 59 BPM | OXYGEN SATURATION: 92 % | DIASTOLIC BLOOD PRESSURE: 60 MMHG

## 2023-09-27 DIAGNOSIS — G47.00 INSOMNIA, UNSPECIFIED TYPE: ICD-10-CM

## 2023-09-27 DIAGNOSIS — R73.01 IMPAIRED FASTING GLUCOSE: ICD-10-CM

## 2023-09-27 DIAGNOSIS — N18.32 STAGE 3B CHRONIC KIDNEY DISEASE (HCC): ICD-10-CM

## 2023-09-27 DIAGNOSIS — I65.22 STENOSIS OF LEFT CAROTID ARTERY: ICD-10-CM

## 2023-09-27 DIAGNOSIS — Z00.00 MEDICARE ANNUAL WELLNESS VISIT, SUBSEQUENT: ICD-10-CM

## 2023-09-27 DIAGNOSIS — I10 PRIMARY HYPERTENSION: Primary | ICD-10-CM

## 2023-09-27 PROCEDURE — 99214 OFFICE O/P EST MOD 30 MIN: CPT | Performed by: FAMILY MEDICINE

## 2023-09-27 PROCEDURE — G0439 PPPS, SUBSEQ VISIT: HCPCS | Performed by: FAMILY MEDICINE

## 2023-09-27 NOTE — PROGRESS NOTES
Assessment and Plan:     Problem List Items Addressed This Visit        Endocrine    Impaired fasting glucose     9/2023 hgA1C 6.3 advised pt to follow low carb diet. Relevant Orders    Comprehensive metabolic panel    Hemoglobin A1C       Cardiovascular and Mediastinum    Hypertension - Primary     Controlled. Continue metoprolol 25mg bid and amlodipine 5mg bid. Relevant Orders    Comprehensive metabolic panel    Hemoglobin A1C    Stenosis of left carotid artery     Continue ASA 81mg QD. Genitourinary    Stage 3b chronic kidney disease [facility])     Lab Results   Component Value Date    EGFR 32 09/08/2023    EGFR 34 03/10/2023    EGFR 31 09/15/2022    CREATININE 1.39 (H) 09/08/2023    CREATININE 1.32 (H) 03/10/2023    CREATININE 1.45 (H) 09/15/2022     Keep hydrated. Avoid motrin/ibuprofen/aleve NSAIDs nephrotoxic agents. Relevant Orders    Comprehensive metabolic panel    Hemoglobin A1C       Other    Insomnia     Continue temazepam 15-30mg qhs prn. SE educated pt. Other Visit Diagnoses     Medicare annual wellness visit, subsequent              Depression Screening and Follow-up Plan: Patient was screened for depression during today's encounter. They screened negative with a PHQ-2 score of 0. Falls Plan of Care: balance, strength, and gait training instructions were provided. Urinary Incontinence Plan of Care: counseling topics discussed: practice Kegel (pelvic floor strengthening) exercises, use restroom every 2 hours, limit alcohol, caffeine, spicy foods, and acidic foods, keeping a bladder diary, limiting fluid intake 3-4 hours before bed and preventing constipation. Reviewed lab in 9/2023  hgA1C 6.3 ok  CBC normal  CMP ok Cr 1.39 and GFR 32 stable    Refused flu shot.   Got covid19 shots and booster. Refused more. Refused PCV20.   Fall precautions. RTO in 6 months with labs.     POA---son Srini  Living will---DNR per pt. Mini-cog 4/5 today. Preventive health issues were discussed with patient, and age appropriate screening tests were ordered as noted in patient's After Visit Summary. Personalized health advice and appropriate referrals for health education or preventive services given if needed, as noted in patient's After Visit Summary. History of Present Illness:     Patient presents for a Medicare Wellness Visit    HPI     Pt is here by herself.      HTN---She is on amlodipine 5mg bid and metoprolol 25mg bid. Does not check BP at home. BP is good today.   Pt denies dizziness, lightheaded, SOB, CP or headache.      Insomnia---She is on temazepam 15mg qhs. She takes another 15mg if she could not sleep well for 2-3 nights.      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 No symptoms now. She is on baby ASA daily.      IFG---She likes ice cream, potatoes and meat.        She lives in a senior apartment by herself. Does all ADL's. Clean and laundry by herself. Katerin Lipps for herself, microwave food mostly. Still drive. FU eye doc yearly. Her younger son Srini saw her several times per week.   Gunjan Gray twice this year, no injuries per pt. Denies depression.         Patient Care Team:  Candy Manzano MD as PCP - Vannesa Santiago MD as PCP - [address] [place] [address] (RTE)  MD Michelle Wheeler MD (Inactive)  MD Lex Tapia PA-C     Review of Systems:     Review of Systems   Constitutional: Negative for appetite change, chills and fever. HENT: Negative for congestion, ear pain, sinus pain and sore throat. Eyes: Negative for discharge and itching. Respiratory: Negative for apnea, cough, chest tightness, shortness of breath and wheezing. Cardiovascular: Negative for chest pain, palpitations and leg swelling. Gastrointestinal: Negative for abdominal pain, anal bleeding, constipation, diarrhea, nausea and vomiting.    Endocrine: Negative for cold intolerance, heat intolerance and polyuria. Genitourinary: Negative for difficulty urinating and dysuria. Musculoskeletal: Negative for arthralgias, back pain and myalgias. Skin: Negative for rash. Neurological: Negative for dizziness and headaches. Psychiatric/Behavioral: Negative for agitation.         Problem List:     Patient Active Problem List   Diagnosis   • Hyperlipidemia   • Hypertension   • Impaired fasting glucose   • Insomnia   • Stenosis of left carotid artery   • Peripheral vascular disease (HCC)   • Bruises easily   • Stage 3b chronic kidney disease ([address])      Past Medical and Surgical History:     Past Medical History:   Diagnosis Date   • Allergic rhinitis     resolved 12/29/14   • Bladder cancer ([address])     last assessed 6/13/13   • Colon cancer ([address])     last assessed 12/19/12   • Diverticulitis     of colon   • Hypertension    • Lyme disease     last assessed 12/19/12   • Microscopic hematuria    • Osteoarthritis     resolved 12/29/14   • TIA (transient ischemic attack)      Past Surgical History:   Procedure Laterality Date   • CATARACT EXTRACTION     • CHOLECYSTECTOMY     • COLECTOMY      partial - sigmoid   • CYSTOSCOPY Left     w/insertion of ureteral stent   • MOUTH SURGERY     • REVISION TOTAL HIP ARTHROPLASTY Left 2015   • TONSILLECTOMY        Family History:     Family History   Problem Relation Age of Onset   • Cancer Sister    • Heart disease Sister    • Other Mother         respiratory failure   • Gallbladder disease Father       Social History:     Social History     Socioeconomic History   • Marital status: Single     Spouse name: None   • Number of children: None   • Years of education: None   • Highest education level: None   Occupational History   • Occupation: Retired   Tobacco Use   • Smoking status: Former   • Smokeless tobacco: Never   Vaping Use   • Vaping Use: Never used   Substance and Sexual Activity   • Alcohol use: No   • Drug use: No   • Sexual activity: Not Currently   Other Topics Concern   • None   Social History Narrative   • None     Social Determinants of Health     Financial Resource Strain: Low Risk  (9/21/2022)    Overall Financial Resource Strain (CARDIA)    • Difficulty of Paying Living Expenses: Not hard at all   Food Insecurity: Not on file   Transportation Needs: No Transportation Needs (9/21/2022)    PRAPARE - Transportation    • Lack of Transportation (Medical): No    • Lack of Transportation (Non-Medical): No   Physical Activity: Not on file   Stress: Not on file   Social Connections: Not on file   Intimate Partner Violence: Not on file   Housing Stability: Not on file      Medications and Allergies:     Current Outpatient Medications   Medication Sig Dispense Refill   • amLODIPine (NORVASC) 5 mg tablet Take 1 tablet (5 mg total) by mouth 2 (two) times a day 180 tablet 1   • aspirin 81 MG tablet Take 1 tablet by mouth daily     • metoprolol tartrate (LOPRESSOR) 50 mg tablet Take 0.5 tablets (25 mg total) by mouth 2 (two) times a day 30 tablet 0   • temazepam (RESTORIL) 15 mg capsule Take 2 capsules (30 mg total) by mouth daily at bedtime as needed for sleep 60 capsule 0     No current facility-administered medications for this visit. Allergies   Allergen Reactions   • Codeine Sulfate Hives   • Naproxen       Immunizations:     Immunization History   Administered Date(s) Administered   • COVID-19 PFIZER VACCINE 0.3 ML IM 05/04/2021, 05/23/2021   • Pneumococcal Polysaccharide PPV23 12/29/2014      Health Maintenance: There are no preventive care reminders to display for this patient. Topic Date Due   • Pneumococcal Vaccine: 65+ Years (2 - PCV) 12/29/2015   • COVID-19 Vaccine (3 - Pfizer series) 07/18/2021   • Influenza Vaccine (1) 09/01/2023      Medicare Screening Tests and Risk Assessments: Yun Francis is here for her Subsequent Wellness visit. Health Risk Assessment:   Patient rates overall health as very good. Patient feels that their physical health rating is same.  Patient is satisfied with their life. Eyesight was rated as same. Hearing was rated as same. Patient feels that their emotional and mental health rating is same. Patients states they are never, rarely angry. Patient states they are often unusually tired/fatigued. Pain experienced in the last 7 days has been none. Patient states that she has experienced no weight loss or gain in last 6 months. Depression Screening:   PHQ-2 Score: 0      Fall Risk Screening: In the past year, patient has experienced: history of falling in past year    Number of falls: 2 or more  Injured during fall?: Yes    Feels unsteady when standing or walking?: Yes    Worried about falling?: Yes      Urinary Incontinence Screening:   Patient has leaked urine accidently in the last six months. Home Safety:  Patient does not have trouble with stairs inside or outside of their home. Patient has no working smoke alarms and has no working carbon monoxide detector. Home safety hazards include: none. Nutrition:   Current diet is Regular. Medications:   Patient is currently taking over-the-counter supplements. OTC medications include: see medication list. Patient is able to manage medications. Activities of Daily Living (ADLs)/Instrumental Activities of Daily Living (IADLs):   Walk and transfer into and out of bed and chair?: Yes  Dress and groom yourself?: Yes    Bathe or shower yourself?: Yes    Feed yourself? Yes  Do your laundry/housekeeping?: Yes  Manage your money, pay your bills and track your expenses?: No  Make your own meals?: Yes    Do your own shopping?: Yes    ADL comments: Son helps her manage money, pay bills    Previous Hospitalizations:   Any hospitalizations or ED visits within the last 12 months?: No      Advance Care Planning:   Living will: Yes    Durable POA for healthcare:  Yes    Advanced directive: Yes    End of Life Decisions reviewed with patient: Yes    Provider agrees with end of life decisions: Yes      Cognitive Screening:   Provider or family/friend/caregiver concerned regarding cognition?: No    PREVENTIVE SCREENINGS      Cardiovascular Screening:    General: History Lipid Disorder and Screening Current      Diabetes Screening:     General: Screening Current      Colorectal Cancer Screening:     General: Screening Not Indicated      Breast Cancer Screening:     General: Screening Not Indicated      Cervical Cancer Screening:    General: Screening Not Indicated      Lung Cancer Screening:     General: Screening Not Indicated    Screening, Brief Intervention, and Referral to Treatment (SBIRT)    Screening      AUDIT-C Screenin) How often did you have a drink containing alcohol in the past year? never  2) How many drinks did you have on a typical day when you were drinking in the past year? 0  3) How often did you have 6 or more drinks on one occasion in the past year? never    AUDIT-C Score: 0  Interpretation: Score 0-2 (female): Negative screen for alcohol misuse    Single Item Drug Screening:  How often have you used an illegal drug (including marijuana) or a prescription medication for non-medical reasons in the past year? never    Single Item Drug Screen Score: 0  Interpretation: Negative screen for possible drug use disorder    No results found. Physical Exam:     /60   Pulse 59   Temp (!) 97.2 °F (36.2 °C) (Temporal)   Resp 16   Ht 4' 10" (1.473 m)   Wt 42.4 kg (93 lb 6.4 oz)   SpO2 92%   BMI 19.52 kg/m²     Physical Exam  Vitals reviewed. Constitutional:       Appearance: Normal appearance. HENT:      Head: Normocephalic and atraumatic. Eyes:      General:         Right eye: No discharge. Left eye: No discharge. Conjunctiva/sclera: Conjunctivae normal.   Neck:      Vascular: No carotid bruit. Cardiovascular:      Rate and Rhythm: Normal rate and regular rhythm. Heart sounds: Normal heart sounds. No murmur heard. No friction rub. No gallop.    Pulmonary:      Effort: Pulmonary effort is normal. No respiratory distress. Breath sounds: Normal breath sounds. No wheezing or rales. Abdominal:      General: Bowel sounds are normal. There is no distension. Palpations: Abdomen is soft. Tenderness: There is no abdominal tenderness. Musculoskeletal:         General: No swelling, tenderness or deformity. Normal range of motion. Cervical back: Normal range of motion and neck supple. No muscular tenderness. Lymphadenopathy:      Cervical: No cervical adenopathy. Skin:     Findings: No bruising. Neurological:      Mental Status: She is alert.    Psychiatric:         Mood and Affect: Mood normal.          Juanita Maurice MD

## 2023-09-27 NOTE — ASSESSMENT & PLAN NOTE
Lab Results   Component Value Date    EGFR 32 09/08/2023    EGFR 34 03/10/2023    EGFR 31 09/15/2022    CREATININE 1.39 (H) 09/08/2023    CREATININE 1.32 (H) 03/10/2023    CREATININE 1.45 (H) 09/15/2022     Keep hydrated. Avoid motrin/ibuprofen/aleve NSAIDs nephrotoxic agents.

## 2023-09-27 NOTE — PATIENT INSTRUCTIONS
Medicare Preventive Visit Patient Instructions  Thank you for completing your Welcome to Medicare Visit or Medicare Annual Wellness Visit today. Your next wellness visit will be due in one year (9/27/2024). The screening/preventive services that you may require over the next 5-10 years are detailed below. Some tests may not apply to you based off risk factors and/or age. Screening tests ordered at today's visit but not completed yet may show as past due. Also, please note that scanned in results may not display below. Preventive Screenings:  Service Recommendations Previous Testing/Comments   Colorectal Cancer Screening  * Colonoscopy    * Fecal Occult Blood Test (FOBT)/Fecal Immunochemical Test (FIT)  * Fecal DNA/Cologuard Test  * Flexible Sigmoidoscopy Age: 43-73 years old   Colonoscopy: every 10 years (may be performed more frequently if at higher risk)  OR  FOBT/FIT: every 1 year  OR  Cologuard: every 3 years  OR  Sigmoidoscopy: every 5 years  Screening may be recommended earlier than age 39 if at higher risk for colorectal cancer. Also, an individualized decision between you and your healthcare provider will decide whether screening between the ages of 77-80 would be appropriate. Colonoscopy: Not on file  FOBT/FIT: Not on file  Cologuard: Not on file  Sigmoidoscopy: Not on file    Screening Not Indicated     Breast Cancer Screening Age: 36 years old  Frequency: every 1-2 years  Not required if history of left and right mastectomy Mammogram: Not on file    Screening Not Indicated   Cervical Cancer Screening Between the ages of 21-29, pap smear recommended once every 3 years. Between the ages of 32-69, can perform pap smear with HPV co-testing every 5 years.    Recommendations may differ for women with a history of total hysterectomy, cervical cancer, or abnormal pap smears in past. Pap Smear: Not on file    Screening Not Indicated   Hepatitis C Screening Once for adults born between [address] [place] [address] frequently in patients at high risk for Hepatitis C Hep C Antibody: Not on file        Diabetes Screening 1-2 times per year if you're at risk for diabetes or have pre-diabetes Fasting glucose: 106 mg/dL (9/8/2023)  A1C: 6.3 % (9/8/2023)  Screening Current   Cholesterol Screening Once every 5 years if you don't have a lipid disorder. May order more often based on risk factors. Lipid panel: Not on file    Screening Not Indicated  History Lipid Disorder     Other Preventive Screenings Covered by Medicare:  1. Abdominal Aortic Aneurysm (AAA) Screening: covered once if your at risk. You're considered to be at risk if you have a family history of AAA. 2. Lung Cancer Screening: covers low dose CT scan once per year if you meet all of the following conditions: (1) Age 48-67; (2) No signs or symptoms of lung cancer; (3) Current smoker or have quit smoking within the last 15 years; (4) You have a tobacco smoking history of at least 20 pack years (packs per day multiplied by number of years you smoked); (5) You get a written order from a healthcare provider. 3. Glaucoma Screening: covered annually if you're considered high risk: (1) You have diabetes OR (2) Family history of glaucoma OR (3)  aged 48 and older OR (3)  American aged 72 and older  3. Osteoporosis Screening: covered every 2 years if you meet one of the following conditions: (1) You're estrogen deficient and at risk for osteoporosis based off medical history and other findings; (2) Have a vertebral abnormality; (3) On glucocorticoid therapy for more than 3 months; (4) Have primary hyperparathyroidism; (5) On osteoporosis medications and need to assess response to drug therapy. · Last bone density test (DXA Scan): Not on file. 5. HIV Screening: covered annually if you're between the age of 14-79. Also covered annually if you are younger than 13 and older than 72 with risk factors for HIV infection.  For pregnant patients, it is covered up to 3 times per pregnancy. Immunizations:  Immunization Recommendations   Influenza Vaccine Annual influenza vaccination during flu season is recommended for all persons aged >= 6 months who do not have contraindications   Pneumococcal Vaccine   * Pneumococcal conjugate vaccine = PCV13 (Prevnar 13), PCV15 (Vaxneuvance), PCV20 (Prevnar 20)  * Pneumococcal polysaccharide vaccine = PPSV23 (Pneumovax) Adults 20-63 years old: 1-3 doses may be recommended based on certain risk factors  Adults 72 years old: 1-2 doses may be recommended based off what pneumonia vaccine you previously received   Hepatitis B Vaccine 3 dose series if at intermediate or high risk (ex: diabetes, end stage renal disease, liver disease)   Tetanus (Td) Vaccine - COST NOT COVERED BY MEDICARE PART B Following completion of primary series, a booster dose should be given every 10 years to maintain immunity against tetanus. Td may also be given as tetanus wound prophylaxis. Tdap Vaccine - COST NOT COVERED BY MEDICARE PART B Recommended at least once for all adults. For pregnant patients, recommended with each pregnancy. Shingles Vaccine (Shingrix) - COST NOT COVERED BY MEDICARE PART B  2 shot series recommended in those aged 48 and above     Health Maintenance Due:  There are no preventive care reminders to display for this patient. Immunizations Due:      Topic Date Due   • Pneumococcal Vaccine: 65+ Years (2 - PCV) 12/29/2015   • COVID-19 Vaccine (3 - Pfizer series) 07/18/2021   • Influenza Vaccine (1) 09/01/2023     Advance Directives   What are advance directives? Advance directives are legal documents that state your wishes and plans for medical care. These plans are made ahead of time in case you lose your ability to make decisions for yourself. Advance directives can apply to any medical decision, such as the treatments you want, and if you want to donate organs. What are the types of advance directives?   There are many types of advance directives, and each state has rules about how to use them. You may choose a combination of any of the following:  · Living will: This is a written record of the treatment you want. You can also choose which treatments you do not want, which to limit, and which to stop at a certain time. This includes surgery, medicine, IV fluid, and tube feedings. · Durable power of  for healthcare [facility]): This is a written record that states who you want to make healthcare choices for you when you are unable to make them for yourself. This person, called a proxy, is usually a family member or a friend. You may choose more than 1 proxy. · Do not resuscitate (DNR) order:  A DNR order is used in case your heart stops beating or you stop breathing. It is a request not to have certain forms of treatment, such as CPR. A DNR order may be included in other types of advance directives. · Medical directive: This covers the care that you want if you are in a coma, near death, or unable to make decisions for yourself. You can list the treatments you want for each condition. Treatment may include pain medicine, surgery, blood transfusions, dialysis, IV or tube feedings, and a ventilator (breathing machine). · Values history: This document has questions about your views, beliefs, and how you feel and think about life. This information can help others choose the care that you would choose. Why are advance directives important? An advance directive helps you control your care. Although spoken wishes may be used, it is better to have your wishes written down. Spoken wishes can be misunderstood, or not followed. Treatments may be given even if you do not want them. An advance directive may make it easier for your family to make difficult choices about your care. Fall Prevention    Fall prevention  includes ways to make your home and other areas safer. It also includes ways you can move more carefully to prevent a fall. Health conditions that cause changes in your blood pressure, vision, or muscle strength and coordination may increase your risk for falls. Medicines may also increase your risk for falls if they make you dizzy, weak, or sleepy. Fall prevention tips:   · Stand or sit up slowly. · Use assistive devices as directed. · Wear shoes that fit well and have soles that . · Wear a personal alarm. · Stay active. · Manage your medical conditions. Home Safety Tips:  · Add items to prevent falls in the bathroom. · Keep paths clear. · Install bright lights in your home. · Keep items you use often on shelves within reach. · Paint or place reflective tape on the edges of your stairs. Urinary Incontinence   Urinary incontinence (UI)  is when you lose control of your bladder. UI develops because your bladder cannot store or empty urine properly. The 3 most common types of UI are stress incontinence, urge incontinence, or both. Medicines:   · May be given to help strengthen your bladder control. Report any side effects of medication to your healthcare provider. Do pelvic muscle exercises often:  Your pelvic muscles help you stop urinating. Squeeze these muscles tight for 5 seconds, then relax for 5 seconds. Gradually work up to squeezing for 10 seconds. Do 3 sets of 15 repetitions a day, or as directed. This will help strengthen your pelvic muscles and improve bladder control. Train your bladder:  Go to the bathroom at set times, such as every 2 hours, even if you do not feel the urge to go. You can also try to hold your urine when you feel the urge to go. For example, hold your urine for 5 minutes when you feel the urge to go. As that becomes easier, hold your urine for 10 minutes. Self-care:   · Keep a UI record. Write down how often you leak urine and how much you leak. Make a note of what you were doing when you leaked urine. · Drink liquids as directed.  You may need to limit the amount of liquid you drink to help control your urine leakage. Do not drink any liquid right before you go to bed. Limit or do not have drinks that contain caffeine or alcohol. · Prevent constipation. Eat a variety of high-fiber foods. Good examples are high-fiber cereals, beans, vegetables, and whole-grain breads. Walking is the best way to trigger your intestines to have a bowel movement. · Exercise regularly and maintain a healthy weight. Weight loss and exercise will decrease pressure on your bladder and help you control your leakage. · Use a catheter as directed  to help empty your bladder. A catheter is a tiny, plastic tube that is put into your bladder to drain your urine. · Go to behavior therapy as directed. Behavior therapy may be used to help you learn to control your urge to urinate. © Copyright [employer] 2018 Information is for End User's use only and may not be sold, redistributed or otherwise used for commercial purposes.  All illustrations and images included in CareNotes® are the copyrighted property of [employer].A[employer]M., Inc. or  [employer]

## 2023-11-20 DIAGNOSIS — G47.00 INSOMNIA, UNSPECIFIED TYPE: ICD-10-CM

## 2023-11-20 RX ORDER — TEMAZEPAM 15 MG/1
30 CAPSULE ORAL
Qty: 60 CAPSULE | Refills: 0 | Status: SHIPPED | OUTPATIENT
Start: 2023-11-20

## 2024-01-10 DIAGNOSIS — G47.00 INSOMNIA, UNSPECIFIED TYPE: ICD-10-CM

## 2024-01-10 DIAGNOSIS — I10 ESSENTIAL HYPERTENSION: ICD-10-CM

## 2024-01-10 RX ORDER — METOPROLOL TARTRATE 50 MG/1
25 TABLET, FILM COATED ORAL 2 TIMES DAILY
Qty: 90 TABLET | Refills: 1 | Status: SHIPPED | OUTPATIENT
Start: 2024-01-10

## 2024-01-10 RX ORDER — TEMAZEPAM 15 MG/1
30 CAPSULE ORAL
Qty: 60 CAPSULE | Refills: 0 | Status: SHIPPED | OUTPATIENT
Start: 2024-01-10

## 2024-01-10 RX ORDER — AMLODIPINE BESYLATE 5 MG/1
5 TABLET ORAL 2 TIMES DAILY
Qty: 180 TABLET | Refills: 1 | Status: SHIPPED | OUTPATIENT
Start: 2024-01-10

## 2024-01-20 ENCOUNTER — TELEPHONE (OUTPATIENT)
Dept: OTHER | Facility: OTHER | Age: 89
End: 2024-01-20

## 2024-01-20 NOTE — TELEPHONE ENCOUNTER
Medication Refill Request     Name   temazepam (RESTORIL) 15 mg capsule      Dose/Frequency  Take 2 capsules (30 mg total) by mouth daily at bedtime as needed for sleep     Quantity 60    Does patient have enough for the next 3 days? Yes [x] No []

## 2024-01-22 NOTE — TELEPHONE ENCOUNTER
Pharmacy has script for Temazepam. Pt aware that script was sent to Pharmacy on 1/10/24. Pt will go get med from pharmacy

## 2024-02-13 DIAGNOSIS — I10 ESSENTIAL HYPERTENSION: ICD-10-CM

## 2024-02-13 DIAGNOSIS — G47.00 INSOMNIA, UNSPECIFIED TYPE: ICD-10-CM

## 2024-02-13 NOTE — TELEPHONE ENCOUNTER
Chanel Mcgrath, I need to show the following prescription design need. I'm going to spell them METODROLOL TEM AZEPAN and the other one is AM RICKI PERSAUD and they called her the [facility].

## 2024-02-14 RX ORDER — AMLODIPINE BESYLATE 5 MG/1
5 TABLET ORAL 2 TIMES DAILY
Qty: 180 TABLET | Refills: 1 | Status: SHIPPED | OUTPATIENT
Start: 2024-02-14

## 2024-02-14 RX ORDER — TEMAZEPAM 15 MG/1
30 CAPSULE ORAL
Qty: 60 CAPSULE | Refills: 0 | Status: SHIPPED | OUTPATIENT
Start: 2024-02-14

## 2024-02-14 RX ORDER — METOPROLOL TARTRATE 50 MG/1
25 TABLET, FILM COATED ORAL 2 TIMES DAILY
Qty: 90 TABLET | Refills: 1 | Status: SHIPPED | OUTPATIENT
Start: 2024-02-14

## 2024-03-15 ENCOUNTER — APPOINTMENT (OUTPATIENT)
Dept: LAB | Facility: CLINIC | Age: 89
End: 2024-03-15
Payer: COMMERCIAL

## 2024-03-15 DIAGNOSIS — N18.32 STAGE 3B CHRONIC KIDNEY DISEASE (HCC): ICD-10-CM

## 2024-03-15 DIAGNOSIS — I10 PRIMARY HYPERTENSION: ICD-10-CM

## 2024-03-15 DIAGNOSIS — R73.01 IMPAIRED FASTING GLUCOSE: ICD-10-CM

## 2024-03-15 LAB
ALBUMIN SERPL BCP-MCNC: 3.9 G/DL (ref 3.5–5)
ALP SERPL-CCNC: 63 U/L (ref 34–104)
ALT SERPL W P-5'-P-CCNC: 7 U/L (ref 7–52)
ANION GAP SERPL CALCULATED.3IONS-SCNC: 14 MMOL/L (ref 4–13)
AST SERPL W P-5'-P-CCNC: 35 U/L (ref 13–39)
BILIRUB SERPL-MCNC: 0.71 MG/DL (ref 0.2–1)
BUN SERPL-MCNC: 18 MG/DL (ref 5–25)
CALCIUM SERPL-MCNC: 9.2 MG/DL (ref 8.4–10.2)
CHLORIDE SERPL-SCNC: 103 MMOL/L (ref 96–108)
CO2 SERPL-SCNC: 22 MMOL/L (ref 21–32)
CREAT SERPL-MCNC: 1.33 MG/DL (ref 0.6–1.3)
EST. AVERAGE GLUCOSE BLD GHB EST-MCNC: 123 MG/DL
GFR SERPL CREATININE-BSD FRML MDRD: 34 ML/MIN/1.73SQ M
GLUCOSE P FAST SERPL-MCNC: 109 MG/DL (ref 65–99)
HBA1C MFR BLD: 5.9 %
POTASSIUM SERPL-SCNC: 3.8 MMOL/L (ref 3.5–5.3)
PROT SERPL-MCNC: 7.5 G/DL (ref 6.4–8.4)
SODIUM SERPL-SCNC: 139 MMOL/L (ref 135–147)

## 2024-03-15 PROCEDURE — 80053 COMPREHEN METABOLIC PANEL: CPT

## 2024-03-15 PROCEDURE — 83036 HEMOGLOBIN GLYCOSYLATED A1C: CPT

## 2024-03-15 PROCEDURE — 36415 COLL VENOUS BLD VENIPUNCTURE: CPT

## 2024-03-18 DIAGNOSIS — G47.00 INSOMNIA, UNSPECIFIED TYPE: ICD-10-CM

## 2024-03-18 RX ORDER — TEMAZEPAM 15 MG/1
30 CAPSULE ORAL
Qty: 60 CAPSULE | Refills: 0 | Status: SHIPPED | OUTPATIENT
Start: 2024-03-18

## 2024-03-27 ENCOUNTER — OFFICE VISIT (OUTPATIENT)
Dept: FAMILY MEDICINE CLINIC | Facility: CLINIC | Age: 89
End: 2024-03-27
Payer: COMMERCIAL

## 2024-03-27 VITALS
HEART RATE: 56 BPM | HEIGHT: 58 IN | SYSTOLIC BLOOD PRESSURE: 110 MMHG | WEIGHT: 90 LBS | DIASTOLIC BLOOD PRESSURE: 72 MMHG | RESPIRATION RATE: 16 BRPM | OXYGEN SATURATION: 95 % | BODY MASS INDEX: 18.89 KG/M2 | TEMPERATURE: 96 F

## 2024-03-27 DIAGNOSIS — R23.3 BRUISES EASILY: ICD-10-CM

## 2024-03-27 DIAGNOSIS — N18.32 STAGE 3B CHRONIC KIDNEY DISEASE (HCC): ICD-10-CM

## 2024-03-27 DIAGNOSIS — I87.2 VENOUS STASIS DERMATITIS OF BOTH LOWER EXTREMITIES: ICD-10-CM

## 2024-03-27 DIAGNOSIS — G47.00 INSOMNIA, UNSPECIFIED TYPE: ICD-10-CM

## 2024-03-27 DIAGNOSIS — R73.01 IMPAIRED FASTING GLUCOSE: ICD-10-CM

## 2024-03-27 DIAGNOSIS — I10 PRIMARY HYPERTENSION: Primary | ICD-10-CM

## 2024-03-27 PROCEDURE — 99214 OFFICE O/P EST MOD 30 MIN: CPT | Performed by: FAMILY MEDICINE

## 2024-03-27 PROCEDURE — G2211 COMPLEX E/M VISIT ADD ON: HCPCS | Performed by: FAMILY MEDICINE

## 2024-03-27 NOTE — ASSESSMENT & PLAN NOTE
3/2024 hgA1C 5.9 stable. Low carb diet.   
Advised pt to try ASA 81mg every other day.   
Continue temazepam 15-30mg qhs. SE educated pt.   
Controlled. DASH diet. Continue metoprolol 25mg bid and amlodipine 5mg bid.   
Lab Results   Component Value Date    EGFR 34 03/15/2024    EGFR 32 09/08/2023    EGFR 34 03/10/2023    CREATININE 1.33 (H) 03/15/2024    CREATININE 1.39 (H) 09/08/2023    CREATININE 1.32 (H) 03/10/2023     Keep hydrated. Avoid motrin/ibuprofen/aleve NSAIDs nephrotoxic agents.     
Detail Level: Detailed

## 2024-03-27 NOTE — PROGRESS NOTES
Name: Remedios Mcgrath      : 1929      MRN: [id number]  Encounter Provider: Dion Tsang MD  Encounter Date: 3/27/2024   Encounter department: [facility]  Chief Complaint   Patient presents with    Follow-up     6 month f/u      Health Maintenance   Topic Date Due    Zoster Vaccine (1 of 2) Never done    Pneumococcal Vaccine: 65+ Years (2 of 2 - PCV) 2015    Influenza Vaccine (1) Never done    COVID-19 Vaccine (3 - -24 season) 2023    Fall Risk  2024    Urinary Incontinence Screening  2024    Medicare Annual Wellness Visit (AWV)  2024    Falls: Plan of Care  2024    Depression Screening  2025    HIB Vaccine  Aged Out    IPV Vaccine  Aged Out    Hepatitis A Vaccine  Aged Out    Meningococcal ACWY Vaccine  Aged Out    HPV Vaccine  Aged Out     Assessment & Plan     1. Primary hypertension  Assessment & Plan:  Controlled. DASH diet. Continue metoprolol 25mg bid and amlodipine 5mg bid.     Orders:  -     CBC; Future; Expected date: 2024  -     Comprehensive metabolic panel; Future; Expected date: 2024  -     Hemoglobin A1C; Future; Expected date: 2024    2. Insomnia, unspecified type  Assessment & Plan:  Continue temazepam 15-30mg qhs. SE educated pt.     Orders:  -     CBC; Future; Expected date: 2024  -     Comprehensive metabolic panel; Future; Expected date: 2024  -     Hemoglobin A1C; Future; Expected date: 2024    3. Impaired fasting glucose  Assessment & Plan:  3/2024 hgA1C 5.9 stable. Low carb diet.     Orders:  -     CBC; Future; Expected date: 2024  -     Comprehensive metabolic panel; Future; Expected date: 2024  -     Hemoglobin A1C; Future; Expected date: 2024    4. Stage 3b chronic kidney disease (HCC)  Assessment & Plan:  Lab Results   Component Value Date    EGFR 34 03/15/2024    EGFR 32 2023    EGFR 34 03/10/2023    CREATININE 1.33 (H) 03/15/2024    CREATININE 1.39 (H)  09/08/2023    CREATININE 1.32 (H) 03/10/2023     Keep hydrated. Avoid motrin/ibuprofen/aleve NSAIDs nephrotoxic agents.       Orders:  -     CBC; Future; Expected date: 09/13/2024  -     Comprehensive metabolic panel; Future; Expected date: 09/13/2024  -     Hemoglobin A1C; Future; Expected date: 09/13/2024    5. Venous stasis dermatitis of both lower extremities    6. Bruises easily  Assessment & Plan:  Advised pt to try ASA 81mg every other day.           Depression Screening and Follow-up Plan: Patient was screened for depression during today's encounter. They screened negative with a PHQ-2 score of 0.    Falls Plan of Care: balance, strength, and gait training instructions were provided.         Reviewed lab in 3/2024  CMP ok, Cr 1.33 and GFR 34 stable  hgA1C 5.9 stable    Refused flu shot.   Got covid19 shots and booster. Refused more.  Refused PCV20.   Fall precautions.   RTO in 6 months with labs.          Subjective      HPI    Pt is here by herself.      HTN---She is on amlodipine 5mg bid and metoprolol 25mg bid. Does not check BP at home. BP is good today.   Pt denies dizziness, lightheaded, SOB, CP or headache.      Insomnia---She is on temazepam 15mg qhs. She went to bed at 8-9pm, get up at 7am. She takes another 15mg if she woke up in the middle of night.      CKD3---stable.      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 No symptoms now. She is on baby ASA daily. Pt states she got bruise easily, especially her legs.      IFG---She likes ice cream, potatoes and meat.        She lives in a senior apartment by herself. Does all ADL's. Clean and laundry by herself. Cook for herself, microwave food mostly.   Still drive. FU eye doc yearly.   Her younger son Srini saw her several times per week.   Fell once this year. No injury per pt.   Denies depression.       Review of Systems   Constitutional:  Negative for appetite change, chills and fever.   HENT:  Negative for congestion,  "ear pain, sinus pain and sore throat.    Eyes:  Negative for discharge and itching.   Respiratory:  Negative for apnea, cough, chest tightness, shortness of breath and wheezing.    Cardiovascular:  Negative for chest pain, palpitations and leg swelling.   Gastrointestinal:  Negative for abdominal pain, anal bleeding, constipation, diarrhea, nausea and vomiting.   Endocrine: Negative for cold intolerance, heat intolerance and polyuria.   Genitourinary:  Negative for difficulty urinating and dysuria.   Musculoskeletal:  Negative for arthralgias, back pain and myalgias.   Skin:  Positive for color change. Negative for rash.   Neurological:  Negative for dizziness and headaches.   Psychiatric/Behavioral:  Negative for agitation.        Current Outpatient Medications on File Prior to Visit   Medication Sig    amLODIPine (NORVASC) 5 mg tablet Take 1 tablet (5 mg total) by mouth 2 (two) times a day    aspirin 81 MG tablet Take 1 tablet by mouth daily    metoprolol tartrate (LOPRESSOR) 50 mg tablet Take 0.5 tablets (25 mg total) by mouth 2 (two) times a day    temazepam (RESTORIL) 15 mg capsule TAKE 2 CAPSULES (30 MG TOTAL) BY MOUTH DAILY AT BEDTIME AS NEEDED FOR SLEEP       Objective     /72   Pulse 56   Temp (!) 96 °F (35.6 °C) (Tympanic)   Resp 16   Ht 4' 10\" (1.473 m)   Wt 40.8 kg (90 lb)   SpO2 95%   BMI 18.81 kg/m²     Physical Exam  Constitutional:       General: She is not in acute distress.     Appearance: She is well-developed.   HENT:      Head: Normocephalic.   Eyes:      General:         Right eye: No discharge.         Left eye: No discharge.      Conjunctiva/sclera: Conjunctivae normal.   Neck:      Thyroid: No thyromegaly.   Cardiovascular:      Rate and Rhythm: Normal rate and regular rhythm.      Heart sounds: Normal heart sounds. No murmur heard.     No friction rub. No gallop.   Pulmonary:      Effort: Pulmonary effort is normal. No respiratory distress.      Breath sounds: Normal breath " sounds. No wheezing or rales.   Chest:      Chest wall: No tenderness.   Abdominal:      General: Bowel sounds are normal. There is no distension.      Palpations: Abdomen is soft. There is no mass.      Tenderness: There is no abdominal tenderness. There is no guarding or rebound.   Musculoskeletal:         General: No tenderness or deformity. Normal range of motion.      Cervical back: Normal range of motion.   Lymphadenopathy:      Cervical: No cervical adenopathy.   Skin:     Findings: Bruising present.      Comments: Dark color skin of lower legs   Neurological:      Mental Status: She is alert.       Dion Tsang MD

## 2024-05-10 DIAGNOSIS — G47.00 INSOMNIA, UNSPECIFIED TYPE: ICD-10-CM

## 2024-05-10 RX ORDER — TEMAZEPAM 15 MG/1
30 CAPSULE ORAL
Qty: 60 CAPSULE | Refills: 0 | Status: SHIPPED | OUTPATIENT
Start: 2024-05-10

## 2024-05-10 NOTE — TELEPHONE ENCOUNTER
Reason for call:   [x] Refill   [] Prior Auth  [] Other:     Office:   [x] PCP/Provider - Dion Tsang MD   [] Specialty/Provider -     Medication: temazepam (RESTORIL) 15 mg capsule     Dose/Frequency: 30 mg, Oral, Daily at bedtime PRN     Quantity:  60 capsule     Pharmacy: [facility] - [place], [place] - [id number]     Does the patient have enough for 3 days?   [] Yes   [x] No - Send as HP to POD

## 2024-05-28 ENCOUNTER — TELEPHONE (OUTPATIENT)
Age: 89
End: 2024-05-28

## 2024-05-28 NOTE — TELEPHONE ENCOUNTER
Patient called in and asked for a refill of temazepam (RESTORIL) 15 mg to help her sleep.  It is not on her current med list, but she has been prescribed it for many years.  She would like call back at [phone].  Please advise.

## 2024-06-19 DIAGNOSIS — G47.00 INSOMNIA, UNSPECIFIED TYPE: ICD-10-CM

## 2024-06-19 RX ORDER — TEMAZEPAM 15 MG/1
30 CAPSULE ORAL
Qty: 60 CAPSULE | Refills: 0 | Status: SHIPPED | OUTPATIENT
Start: 2024-06-19

## 2024-08-08 DIAGNOSIS — I10 ESSENTIAL HYPERTENSION: ICD-10-CM

## 2024-08-08 DIAGNOSIS — G47.00 INSOMNIA, UNSPECIFIED TYPE: ICD-10-CM

## 2024-08-08 RX ORDER — TEMAZEPAM 15 MG/1
30 CAPSULE ORAL
Qty: 60 CAPSULE | Refills: 0 | Status: SHIPPED | OUTPATIENT
Start: 2024-08-08

## 2024-08-08 RX ORDER — METOPROLOL TARTRATE 50 MG/1
25 TABLET, FILM COATED ORAL 2 TIMES DAILY
Qty: 100 TABLET | Refills: 0 | Status: SHIPPED | OUTPATIENT
Start: 2024-08-08

## 2024-08-08 RX ORDER — AMLODIPINE BESYLATE 5 MG/1
5 TABLET ORAL 2 TIMES DAILY
Qty: 200 TABLET | Refills: 0 | Status: SHIPPED | OUTPATIENT
Start: 2024-08-08

## 2024-08-08 NOTE — TELEPHONE ENCOUNTER
Reason for call:   [x] Refill   [] Prior Auth  [] Other:     Office: [facility]  [x] PCP/Provider - Dion Tsang   [] Specialty/Provider -     Medication: amlodipine, metoprolol tartrate, temazepam     Dose/Frequency: 5 mg, 50 mg, 15 mg/ twice daily, Take 0.5 tablets (25 mg total) by mouth 2 (two) times a day, 2 caps daily PRN     Quantity: 100 day supply, 100 day supply, 30 day supply     Pharmacy: [facility]     Does the patient have enough for 3 days?   [x] Yes   [] No - Send as HP to POD

## 2024-09-16 DIAGNOSIS — G47.00 INSOMNIA, UNSPECIFIED TYPE: ICD-10-CM

## 2024-09-16 RX ORDER — TEMAZEPAM 15 MG/1
30 CAPSULE ORAL
Qty: 60 CAPSULE | Refills: 0 | Status: SHIPPED | OUTPATIENT
Start: 2024-09-16

## 2024-09-16 NOTE — TELEPHONE ENCOUNTER
Reason for call:   [x] Refill   [] Prior Auth  [] Other:     Office:   [x] PCP/Provider - S Charan THOMAS / Sharan    Medication: temazepam    Dose/Frequency: 15mg (2 capsules qhs prn)    Quantity: 60    Pharmacy: [facility] - [place], [place] - [id number]     Does the patient have enough for 3 days?   [] Yes   [x] No - Send as HP to POD

## 2024-09-23 ENCOUNTER — LAB (OUTPATIENT)
Dept: LAB | Facility: CLINIC | Age: 89
End: 2024-09-23
Payer: COMMERCIAL

## 2024-09-23 DIAGNOSIS — G47.00 INSOMNIA, UNSPECIFIED TYPE: ICD-10-CM

## 2024-09-23 DIAGNOSIS — N18.32 STAGE 3B CHRONIC KIDNEY DISEASE (HCC): ICD-10-CM

## 2024-09-23 DIAGNOSIS — R73.01 IMPAIRED FASTING GLUCOSE: ICD-10-CM

## 2024-09-23 DIAGNOSIS — I10 PRIMARY HYPERTENSION: ICD-10-CM

## 2024-09-23 LAB
ALBUMIN SERPL BCG-MCNC: 3.9 G/DL (ref 3.5–5)
ALP SERPL-CCNC: 91 U/L (ref 34–104)
ALT SERPL W P-5'-P-CCNC: 5 U/L (ref 7–52)
ANION GAP SERPL CALCULATED.3IONS-SCNC: 10 MMOL/L (ref 4–13)
AST SERPL W P-5'-P-CCNC: 27 U/L (ref 13–39)
BILIRUB SERPL-MCNC: 0.6 MG/DL (ref 0.2–1)
BUN SERPL-MCNC: 17 MG/DL (ref 5–25)
CALCIUM SERPL-MCNC: 9.1 MG/DL (ref 8.4–10.2)
CHLORIDE SERPL-SCNC: 103 MMOL/L (ref 96–108)
CO2 SERPL-SCNC: 24 MMOL/L (ref 21–32)
CREAT SERPL-MCNC: 1.39 MG/DL (ref 0.6–1.3)
ERYTHROCYTE [DISTWIDTH] IN BLOOD BY AUTOMATED COUNT: 12.5 % (ref 11.6–15.1)
EST. AVERAGE GLUCOSE BLD GHB EST-MCNC: 123 MG/DL
GFR SERPL CREATININE-BSD FRML MDRD: 32 ML/MIN/1.73SQ M
GLUCOSE P FAST SERPL-MCNC: 118 MG/DL (ref 65–99)
HBA1C MFR BLD: 5.9 %
HCT VFR BLD AUTO: 41.3 % (ref 34.8–46.1)
HGB BLD-MCNC: 13.5 G/DL (ref 11.5–15.4)
MCH RBC QN AUTO: 31.8 PG (ref 26.8–34.3)
MCHC RBC AUTO-ENTMCNC: 32.7 G/DL (ref 31.4–37.4)
MCV RBC AUTO: 97 FL (ref 82–98)
PLATELET # BLD AUTO: 396 THOUSANDS/UL (ref 149–390)
PMV BLD AUTO: 9.2 FL (ref 8.9–12.7)
POTASSIUM SERPL-SCNC: 3.9 MMOL/L (ref 3.5–5.3)
PROT SERPL-MCNC: 7.8 G/DL (ref 6.4–8.4)
RBC # BLD AUTO: 4.24 MILLION/UL (ref 3.81–5.12)
SODIUM SERPL-SCNC: 137 MMOL/L (ref 135–147)
WBC # BLD AUTO: 9.19 THOUSAND/UL (ref 4.31–10.16)

## 2024-09-23 PROCEDURE — 36415 COLL VENOUS BLD VENIPUNCTURE: CPT

## 2024-09-23 PROCEDURE — 83036 HEMOGLOBIN GLYCOSYLATED A1C: CPT

## 2024-09-23 PROCEDURE — 85027 COMPLETE CBC AUTOMATED: CPT

## 2024-09-23 PROCEDURE — 80053 COMPREHEN METABOLIC PANEL: CPT

## 2024-09-27 ENCOUNTER — RA CDI HCC (OUTPATIENT)
Dept: OTHER | Facility: HOSPITAL | Age: 89
End: 2024-09-27

## 2024-10-04 ENCOUNTER — OFFICE VISIT (OUTPATIENT)
Dept: FAMILY MEDICINE CLINIC | Facility: CLINIC | Age: 89
End: 2024-10-04
Payer: COMMERCIAL

## 2024-10-04 VITALS
HEART RATE: 71 BPM | TEMPERATURE: 97.2 F | BODY MASS INDEX: 18.89 KG/M2 | WEIGHT: 90 LBS | RESPIRATION RATE: 18 BRPM | SYSTOLIC BLOOD PRESSURE: 110 MMHG | DIASTOLIC BLOOD PRESSURE: 62 MMHG | HEIGHT: 58 IN | OXYGEN SATURATION: 95 %

## 2024-10-04 DIAGNOSIS — R73.01 IMPAIRED FASTING GLUCOSE: ICD-10-CM

## 2024-10-04 DIAGNOSIS — I73.9 PERIPHERAL VASCULAR DISEASE (HCC): ICD-10-CM

## 2024-10-04 DIAGNOSIS — Z00.00 MEDICARE ANNUAL WELLNESS VISIT, SUBSEQUENT: ICD-10-CM

## 2024-10-04 DIAGNOSIS — I10 PRIMARY HYPERTENSION: ICD-10-CM

## 2024-10-04 DIAGNOSIS — G47.00 INSOMNIA, UNSPECIFIED TYPE: ICD-10-CM

## 2024-10-04 DIAGNOSIS — E44.1 MILD PROTEIN-CALORIE MALNUTRITION (HCC): ICD-10-CM

## 2024-10-04 DIAGNOSIS — N18.32 STAGE 3B CHRONIC KIDNEY DISEASE (HCC): ICD-10-CM

## 2024-10-04 DIAGNOSIS — K29.00 ACUTE GASTRITIS WITHOUT HEMORRHAGE, UNSPECIFIED GASTRITIS TYPE: Primary | ICD-10-CM

## 2024-10-04 PROCEDURE — 99214 OFFICE O/P EST MOD 30 MIN: CPT | Performed by: FAMILY MEDICINE

## 2024-10-04 PROCEDURE — G0439 PPPS, SUBSEQ VISIT: HCPCS | Performed by: FAMILY MEDICINE

## 2024-10-04 NOTE — ASSESSMENT & PLAN NOTE
Continue temazepam 15-30mg qhs. SE educated pt.   Orders:    Comprehensive metabolic panel; Future    Hemoglobin A1C; Future

## 2024-10-04 NOTE — ASSESSMENT & PLAN NOTE
Lab Results   Component Value Date    EGFR 32 09/23/2024    EGFR 34 03/15/2024    EGFR 32 09/08/2023    CREATININE 1.39 (H) 09/23/2024    CREATININE 1.33 (H) 03/15/2024    CREATININE 1.39 (H) 09/08/2023     Keep hydrated. Avoid motrin/ibuprofen/aleve NSAIDs nephrotoxic agents.     Orders:    Comprehensive metabolic panel; Future    Hemoglobin A1C; Future

## 2024-10-04 NOTE — PROGRESS NOTES
Ambulatory Visit  Name: Remedios Mcgrath      : 1929      MRN: [id number]  Encounter Provider: Dion Tsang MD  Encounter Date: 10/4/2024   Encounter department: [facility]  Chief Complaint   Patient presents with    Medicare Wellness Visit     Subsequent Visit     Follow-up     6 month f/u     Vomiting     Health Maintenance   Topic Date Due    Zoster Vaccine (1 of 2) Never done    RSV Vaccine Age 60+ Years (1 - 1-dose 60+ series) Never done    Pneumococcal Vaccine: 65+ Years (2 of 2 - PCV) 2015    Influenza Vaccine (1) Never done    COVID-19 Vaccine (3 -  season) 2024    Medicare Annual Wellness Visit (AWV)  2024    Fall Risk  10/04/2025    Depression Screening  10/04/2025    Urinary Incontinence Screening  10/04/2025    RSV Vaccine age 0-20 Months  Aged Out    HIB Vaccine  Aged Out    IPV Vaccine  Aged Out    Hepatitis A Vaccine  Aged Out    Meningococcal ACWY Vaccine  Aged Out    HPV Vaccine  Aged Out     Assessment & Plan  Acute gastritis without hemorrhage, unspecified gastritis type  Vomit once this morning after breakfast. Pt states she does not feel sick. Monitor. Call office if symptoms no improving.        Primary hypertension  Controlled. Continue metoprolol 25mg bid and amlodipine 5mg bid.   Orders:    Comprehensive metabolic panel; Future    Hemoglobin A1C; Future    Insomnia, unspecified type  Continue temazepam 15-30mg qhs. SE educated pt.   Orders:    Comprehensive metabolic panel; Future    Hemoglobin A1C; Future    Stage 3b chronic kidney disease (HCC)  Lab Results   Component Value Date    EGFR 32 2024    EGFR 34 03/15/2024    EGFR 32 2023    CREATININE 1.39 (H) 2024    CREATININE 1.33 (H) 03/15/2024    CREATININE 1.39 (H) 2023     Keep hydrated. Avoid motrin/ibuprofen/aleve NSAIDs nephrotoxic agents.     Orders:    Comprehensive metabolic panel; Future    Hemoglobin A1C; Future    Impaired fasting glucose  Stable.    Orders:    Comprehensive metabolic panel; Future    Hemoglobin A1C; Future    Mild protein-calorie malnutrition (HCC)  Advised pt to use Ensure.        Peripheral vascular disease (HCC)  Continue ASA 81mg daily with food. SE educated pt.        Medicare annual wellness visit, subsequent           Depression Screening and Follow-up Plan: Patient was screened for depression during today's encounter. They screened negative with a PHQ-2 score of 0.    Reviewed lab in 9/2024  hgA1C 5.9  CBC ok  CMP stable GFR 32    Refused flu shot.   Got covid19 shots and booster. Refused more.  Refused PCV20.   Fall precautions.   RTO in 6 months with labs.     POA---younger son Srini  Living will---DNR per pt.       Preventive health issues were discussed with patient, and age appropriate screening tests were ordered as noted in patient's After Visit Summary. Personalized health advice and appropriate referrals for health education or preventive services given if needed, as noted in patient's After Visit Summary.    History of Present Illness     Vomiting   Pertinent negatives include no abdominal pain, arthralgias, chills, diarrhea, fever, headaches or myalgias.      Pt is here by herself.      Pt states she vomit once this morning after breakfast.   Denies fever, SOB, CP, abdominal pain or diarrhea.   Pt states she does not feel sick.     HTN---She is on amlodipine 5mg bid and metoprolol 25mg bid. Does not check BP at home. BP is good today.   Pt denies dizziness, lightheaded, SOB, CP or headache.      Insomnia---She is on temazepam 15mg qhs. She takes another 15mg if she woke up in the middle of night.       CKD3---stable.      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 No symptoms now. She is on baby ASA daily. Pt states her legs skin color is dark.      IFG---She likes ice cream, potatoes and meat.     FU ophthalmology regularly. Left eyesight is worse. Right eyesight is good per pt.        She lives  in a senior apartment by herself. Does all ADL's. Clean and laundry by herself.   Her younger son Srini saw her several times per week.   Denies recent falls.   Denies depression.       Patient Care Team:  Dion Tsang MD as PCP - General  Dion Tsang MD as PCP - PCP-[facility] (RTE)  MD Brooks Dickinson MD (Inactive)  MD Randi Powers PA-C    Review of Systems   Constitutional:  Negative for appetite change, chills and fever.   Eyes:  Negative for discharge and itching.   Gastrointestinal:  Positive for vomiting. Negative for abdominal pain, anal bleeding, constipation, diarrhea and nausea.   Endocrine: Negative for cold intolerance, heat intolerance and polyuria.   Genitourinary:  Negative for difficulty urinating and dysuria.   Musculoskeletal:  Negative for arthralgias, back pain and myalgias.   Skin:  Negative for rash.   Neurological:  Negative for headaches.   Psychiatric/Behavioral:  Negative for agitation.      Medical History Reviewed by provider this encounter:  Problems  Med Hx  Surg Hx       Annual Wellness Visit Questionnaire   Remedios is here for her Subsequent Wellness visit.     Health Risk Assessment:   Patient rates overall health as very good. Patient feels that their physical health rating is same. Patient is satisfied with their life. Eyesight was rated as same. Hearing was rated as same. Patient feels that their emotional and mental health rating is same. Patients states they are sometimes angry. Pain experienced in the last 7 days has been none. Patient states that she has experienced no weight loss or gain in last 6 months.     Depression Screening:   PHQ-2 Score: 0      Fall Risk Screening:   In the past year, patient has experienced: no history of falling in past year      Urinary Incontinence Screening:   Patient has not leaked urine accidently in the last six months.     Home Safety:  Patient does not have trouble with stairs inside or outside  of their home. Patient has working smoke alarms and has working carbon monoxide detector. Home safety hazards include: none.     Nutrition:   Current diet is Regular.     Medications:   Patient is not currently taking any over-the-counter supplements. Patient is able to manage medications.     Activities of Daily Living (ADLs)/Instrumental Activities of Daily Living (IADLs):   Walk and transfer into and out of bed and chair?: Yes  Dress and groom yourself?: Yes    Bathe or shower yourself?: Yes    Feed yourself? Yes  Do your laundry/housekeeping?: Yes  Manage your money, pay your bills and track your expenses?: Yes  Make your own meals?: Yes    Do your own shopping?: Yes    Previous Hospitalizations:   Any hospitalizations or ED visits within the last 12 months?: No      Advance Care Planning:   Living will: Yes    Durable POA for healthcare: Yes    Advanced directive: Yes    Provider agrees with end of life decisions: Yes      Cognitive Screening:   Provider or family/friend/caregiver concerned regarding cognition?: No    PREVENTIVE SCREENINGS      Cardiovascular Screening:    General: Screening Not Indicated and History Lipid Disorder      Diabetes Screening:     General: Screening Current      Colorectal Cancer Screening:     General: Screening Not Indicated      Breast Cancer Screening:     General: Screening Not Indicated      Cervical Cancer Screening:    General: Screening Not Indicated      Osteoporosis Screening:    General: Patient Declines      Lung Cancer Screening:     General: Screening Not Indicated    Screening, Brief Intervention, and Referral to Treatment (SBIRT)    Screening  Typical number of drinks in a day: 0  Typical number of drinks in a week: 0  Interpretation: Low risk drinking behavior.    Single Item Drug Screening:  How often have you used an illegal drug (including marijuana) or a prescription medication for non-medical reasons in the past year? never    Single Item Drug Screen Score:  "0  Interpretation: Negative screen for possible drug use disorder    Social Determinants of Health     Financial Resource Strain: Low Risk  (9/21/2022)    Overall Financial Resource Strain (CARDIA)     Difficulty of Paying Living Expenses: Not hard at all   Food Insecurity: No Food Insecurity (10/4/2024)    Hunger Vital Sign     Worried About Running Out of Food in the Last Year: Never true     Ran Out of Food in the Last Year: Never true   Transportation Needs: No Transportation Needs (10/4/2024)    PRAPARE - Transportation     Lack of Transportation (Medical): No     Lack of Transportation (Non-Medical): No   Housing Stability: Low Risk  (10/4/2024)    Housing Stability Vital Sign     Unable to Pay for Housing in the Last Year: No     Number of Times Moved in the Last Year: 0     Homeless in the Last Year: No   Utilities: Not At Risk (10/4/2024)    [facility] Utilities     Threatened with loss of utilities: No     No results found.    Objective     /62   Pulse 71   Temp (!) 97.2 °F (36.2 °C) (Tympanic)   Resp 18   Ht 4' 10\" (1.473 m)   Wt 40.8 kg (90 lb)   SpO2 95%   BMI 18.81 kg/m²     Physical Exam  Constitutional:       General: She is not in acute distress.     Appearance: She is well-developed.   HENT:      Head: Normocephalic.   Eyes:      General:         Right eye: No discharge.         Left eye: No discharge.      Conjunctiva/sclera: Conjunctivae normal.   Neck:      Thyroid: No thyromegaly.   Cardiovascular:      Rate and Rhythm: Normal rate and regular rhythm.      Heart sounds: Normal heart sounds. No murmur heard.     No friction rub. No gallop.   Pulmonary:      Effort: Pulmonary effort is normal. No respiratory distress.      Breath sounds: Normal breath sounds. No wheezing or rales.   Chest:      Chest wall: No tenderness.   Abdominal:      General: Bowel sounds are normal. There is no distension.      Palpations: Abdomen is soft. There is no mass.      Tenderness: There is no abdominal " tenderness. There is no guarding or rebound.   Musculoskeletal:         General: No tenderness or deformity. Normal range of motion.      Cervical back: Normal range of motion.      Right lower leg: No edema.      Left lower leg: No edema.   Lymphadenopathy:      Cervical: No cervical adenopathy.   Neurological:      Mental Status: She is alert.

## 2024-10-04 NOTE — ASSESSMENT & PLAN NOTE
Controlled. Continue metoprolol 25mg bid and amlodipine 5mg bid.   Orders:    Comprehensive metabolic panel; Future    Hemoglobin A1C; Future

## 2024-10-04 NOTE — PATIENT INSTRUCTIONS
Medicare Preventive Visit Patient Instructions  Thank you for completing your Welcome to Medicare Visit or Medicare Annual Wellness Visit today. Your next wellness visit will be due in one year (10/5/2025).  The screening/preventive services that you may require over the next 5-10 years are detailed below. Some tests may not apply to you based off risk factors and/or age. Screening tests ordered at today's visit but not completed yet may show as past due. Also, please note that scanned in results may not display below.  Preventive Screenings:  Service Recommendations Previous Testing/Comments   Colorectal Cancer Screening  * Colonoscopy    * Fecal Occult Blood Test (FOBT)/Fecal Immunochemical Test (FIT)  * Fecal DNA/Cologuard Test  * Flexible Sigmoidoscopy Age: 45-75 years old   Colonoscopy: every 10 years (may be performed more frequently if at higher risk)  OR  FOBT/FIT: every 1 year  OR  Cologuard: every 3 years  OR  Sigmoidoscopy: every 5 years  Screening may be recommended earlier than age 45 if at higher risk for colorectal cancer. Also, an individualized decision between you and your healthcare provider will decide whether screening between the ages of 76-85 would be appropriate. Colonoscopy: Not on file  FOBT/FIT: Not on file  Cologuard: Not on file  Sigmoidoscopy: Not on file          Breast Cancer Screening Age: 40+ years old  Frequency: every 1-2 years  Not required if history of left and right mastectomy Mammogram: Not on file        Cervical Cancer Screening Between the ages of 21-29, pap smear recommended once every 3 years.   Between the ages of 30-65, can perform pap smear with HPV co-testing every 5 years.   Recommendations may differ for women with a history of total hysterectomy, cervical cancer, or abnormal pap smears in past. Pap Smear: Not on file        Hepatitis C Screening Once for adults born between 1945 and 1965  More frequently in patients at high risk for Hepatitis C Hep C Antibody: Not  on file        Diabetes Screening 1-2 times per year if you're at risk for diabetes or have pre-diabetes Fasting glucose: 118 mg/dL (9/23/2024)  A1C: 5.9 % (9/23/2024)      Cholesterol Screening Once every 5 years if you don't have a lipid disorder. May order more often based on risk factors. Lipid panel: Not on file          Other Preventive Screenings Covered by Medicare:  Abdominal Aortic Aneurysm (AAA) Screening: covered once if your at risk. You're considered to be at risk if you have a family history of AAA.  Lung Cancer Screening: covers low dose CT scan once per year if you meet all of the following conditions: (1) Age 55-77; (2) No signs or symptoms of lung cancer; (3) Current smoker or have quit smoking within the last 15 years; (4) You have a tobacco smoking history of at least 20 pack years (packs per day multiplied by number of years you smoked); (5) You get a written order from a healthcare provider.  Glaucoma Screening: covered annually if you're considered high risk: (1) You have diabetes OR (2) Family history of glaucoma OR (3)  aged 50 and older OR (4)  American aged 65 and older  Osteoporosis Screening: covered every 2 years if you meet one of the following conditions: (1) You're estrogen deficient and at risk for osteoporosis based off medical history and other findings; (2) Have a vertebral abnormality; (3) On glucocorticoid therapy for more than 3 months; (4) Have primary hyperparathyroidism; (5) On osteoporosis medications and need to assess response to drug therapy.   Last bone density test (DXA Scan): Not on file.  HIV Screening: covered annually if you're between the age of 15-65. Also covered annually if you are younger than 15 and older than 65 with risk factors for HIV infection. For pregnant patients, it is covered up to 3 times per pregnancy.    Immunizations:  Immunization Recommendations   Influenza Vaccine Annual influenza vaccination during flu season is  recommended for all persons aged >= 6 months who do not have contraindications   Pneumococcal Vaccine   * Pneumococcal conjugate vaccine = PCV13 (Prevnar 13), PCV15 (Vaxneuvance), PCV20 (Prevnar 20)  * Pneumococcal polysaccharide vaccine = PPSV23 (Pneumovax) Adults 19-65 yo with certain risk factors or if 65+ yo  If never received any pneumonia vaccine: recommend Prevnar 20 (PCV20)  Give PCV20 if previously received 1 dose of PCV13 or PPSV23   Hepatitis B Vaccine 3 dose series if at intermediate or high risk (ex: diabetes, end stage renal disease, liver disease)   Respiratory syncytial virus (RSV) Vaccine - COVERED BY MEDICARE PART D  * RSVPreF3 (Arexvy) CDC recommends that adults 60 years of age and older may receive a single dose of RSV vaccine using shared clinical decision-making (SCDM)   Tetanus (Td) Vaccine - COST NOT COVERED BY MEDICARE PART B Following completion of primary series, a booster dose should be given every 10 years to maintain immunity against tetanus. Td may also be given as tetanus wound prophylaxis.   Tdap Vaccine - COST NOT COVERED BY MEDICARE PART B Recommended at least once for all adults. For pregnant patients, recommended with each pregnancy.   Shingles Vaccine (Shingrix) - COST NOT COVERED BY MEDICARE PART B  2 shot series recommended in those 19 years and older who have or will have weakened immune systems or those 50 years and older     Health Maintenance Due:  There are no preventive care reminders to display for this patient.  Immunizations Due:      Topic Date Due   • Pneumococcal Vaccine: 65+ Years (2 of 2 - PCV) 12/29/2015   • Influenza Vaccine (1) Never done   • COVID-19 Vaccine (3 - 2023-24 season) 09/01/2024     Advance Directives   What are advance directives?  Advance directives are legal documents that state your wishes and plans for medical care. These plans are made ahead of time in case you lose your ability to make decisions for yourself. Advance directives can apply to  any medical decision, such as the treatments you want, and if you want to donate organs.   What are the types of advance directives?  There are many types of advance directives, and each state has rules about how to use them. You may choose a combination of any of the following:  Living will:  This is a written record of the treatment you want. You can also choose which treatments you do not want, which to limit, and which to stop at a certain time. This includes surgery, medicine, IV fluid, and tube feedings.   Durable power of  for healthcare (DPAHC):  This is a written record that states who you want to make healthcare choices for you when you are unable to make them for yourself. This person, called a proxy, is usually a family member or a friend. You may choose more than 1 proxy.  Do not resuscitate (DNR) order:  A DNR order is used in case your heart stops beating or you stop breathing. It is a request not to have certain forms of treatment, such as CPR. A DNR order may be included in other types of advance directives.  Medical directive:  This covers the care that you want if you are in a coma, near death, or unable to make decisions for yourself. You can list the treatments you want for each condition. Treatment may include pain medicine, surgery, blood transfusions, dialysis, IV or tube feedings, and a ventilator (breathing machine).  Values history:  This document has questions about your views, beliefs, and how you feel and think about life. This information can help others choose the care that you would choose.  Why are advance directives important?  An advance directive helps you control your care. Although spoken wishes may be used, it is better to have your wishes written down. Spoken wishes can be misunderstood, or not followed. Treatments may be given even if you do not want them. An advance directive may make it easier for your family to make difficult choices about your care.       ©  Copyright [employer] 2018 Information is for End User's use only and may not be sold, redistributed or otherwise used for commercial purposes. All illustrations and images included in CareNotes® are the copyrighted property of A.D.A.M., Inc. or [employer]

## 2024-10-17 DIAGNOSIS — G47.00 INSOMNIA, UNSPECIFIED TYPE: ICD-10-CM

## 2024-10-17 RX ORDER — TEMAZEPAM 15 MG/1
30 CAPSULE ORAL
Qty: 60 CAPSULE | Refills: 0 | Status: SHIPPED | OUTPATIENT
Start: 2024-10-17

## 2024-11-06 DIAGNOSIS — I10 ESSENTIAL HYPERTENSION: ICD-10-CM

## 2024-11-06 DIAGNOSIS — G47.00 INSOMNIA, UNSPECIFIED TYPE: ICD-10-CM

## 2024-11-06 RX ORDER — AMLODIPINE BESYLATE 5 MG/1
5 TABLET ORAL 2 TIMES DAILY
Qty: 180 TABLET | Refills: 1 | Status: SHIPPED | OUTPATIENT
Start: 2024-11-06

## 2024-11-06 RX ORDER — TEMAZEPAM 15 MG/1
30 CAPSULE ORAL
Qty: 60 CAPSULE | Refills: 0 | Status: SHIPPED | OUTPATIENT
Start: 2024-11-06

## 2024-11-06 RX ORDER — METOPROLOL TARTRATE 50 MG
25 TABLET ORAL 2 TIMES DAILY
Qty: 90 TABLET | Refills: 1 | Status: SHIPPED | OUTPATIENT
Start: 2024-11-06

## 2024-11-06 NOTE — TELEPHONE ENCOUNTER
Reason for call:   [x] Refill   [] Prior Auth  [] Other:     Office:   [x] PCP/Provider - Dion Tsang/[facility]  [] Specialty/Provider -     Medication: Norvasc    Dose/Frequency: 5 mg     Quantity: #200    Pharmacy: [facility]    Does the patient have enough for 3 days?   [] Yes   [x] No - Send as HP to POD                Reason for call:   [x] Refill   [] Prior Auth  [] Other:     Office:   [x] PCP/Provider - Doin Tsang/[facility]  [] Specialty/Provider -     Medication: Lopressor    Dose/Frequency: 50 mg     Quantity: #100    Pharmacy: [facility]    Does the patient have enough for 3 days?   [] Yes   [x] No - Send as HP to POD                    Reason for call:   [x] Refill   [] Prior Auth  [] Other:     Office:   [x] PCP/Provider - Dion Tsang/[facility]  [] Specialty/Provider -     Medication: Restoril    Dose/Frequency: 15 mg     Quantity: $60     Pharmacy: [facility]    Does the patient have enough for 3 days?   [] Yes   [x] No - Send as HP to POD

## 2024-12-31 ENCOUNTER — HOSPITAL ENCOUNTER (INPATIENT)
Facility: HOSPITAL | Age: 89
LOS: 15 days | Discharge: NURSING, REHAB OR LONG-TERM CARE | DRG: 871 | End: 2025-01-15
Attending: EMERGENCY MEDICINE | Admitting: INTERNAL MEDICINE
Payer: COMMERCIAL

## 2024-12-31 ENCOUNTER — APPOINTMENT (EMERGENCY)
Dept: RADIOLOGY | Facility: HOSPITAL | Age: 89
DRG: 871 | End: 2024-12-31
Payer: COMMERCIAL

## 2024-12-31 DIAGNOSIS — J90 PLEURAL EFFUSION, RIGHT: ICD-10-CM

## 2024-12-31 DIAGNOSIS — R79.89 ELEVATED TROPONIN: ICD-10-CM

## 2024-12-31 DIAGNOSIS — R29.6 UNWITNESSED FALL: ICD-10-CM

## 2024-12-31 DIAGNOSIS — R78.81 BACTEREMIA: ICD-10-CM

## 2024-12-31 DIAGNOSIS — R65.21 SEPTIC SHOCK (HCC): Primary | ICD-10-CM

## 2024-12-31 DIAGNOSIS — I48.91 NEW ONSET ATRIAL FIBRILLATION (HCC): ICD-10-CM

## 2024-12-31 DIAGNOSIS — J18.9 PNEUMONIA: ICD-10-CM

## 2024-12-31 DIAGNOSIS — G47.00 INSOMNIA, UNSPECIFIED TYPE: ICD-10-CM

## 2024-12-31 DIAGNOSIS — R73.03 PREDIABETES: ICD-10-CM

## 2024-12-31 DIAGNOSIS — N17.9 ACUTE KIDNEY FAILURE (HCC): ICD-10-CM

## 2024-12-31 DIAGNOSIS — R13.10 DYSPHAGIA, UNSPECIFIED TYPE: ICD-10-CM

## 2024-12-31 DIAGNOSIS — E87.20 LACTIC ACIDOSIS: ICD-10-CM

## 2024-12-31 DIAGNOSIS — J96.91 HYPOXIC RESPIRATORY FAILURE (HCC): ICD-10-CM

## 2024-12-31 DIAGNOSIS — I48.91 ATRIAL FIBRILLATION, UNSPECIFIED TYPE (HCC): ICD-10-CM

## 2024-12-31 DIAGNOSIS — J43.9 PULMONARY EMPHYSEMA, UNSPECIFIED EMPHYSEMA TYPE (HCC): ICD-10-CM

## 2024-12-31 DIAGNOSIS — A41.9 SEPTIC SHOCK (HCC): Primary | ICD-10-CM

## 2024-12-31 LAB
ALBUMIN SERPL BCG-MCNC: 3.7 G/DL (ref 3.5–5)
ALP SERPL-CCNC: 71 U/L (ref 34–104)
ALT SERPL W P-5'-P-CCNC: 37 U/L (ref 7–52)
ANION GAP SERPL CALCULATED.3IONS-SCNC: 25 MMOL/L (ref 4–13)
ANISOCYTOSIS BLD QL SMEAR: PRESENT
APTT PPP: 28 SECONDS (ref 23–34)
ARTERIAL PATENCY WRIST A: YES
AST SERPL W P-5'-P-CCNC: 109 U/L (ref 13–39)
ATRIAL RATE: 326 BPM
BACTERIA UR QL AUTO: ABNORMAL /HPF
BASE EX.OXY STD BLDV CALC-SCNC: 37.6 % (ref 60–80)
BASE EXCESS BLDA CALC-SCNC: -11.1 MMOL/L
BASE EXCESS BLDV CALC-SCNC: -9.8 MMOL/L
BASOPHILS # BLD MANUAL: 0 THOUSAND/UL (ref 0–0.1)
BASOPHILS NFR MAR MANUAL: 0 % (ref 0–1)
BILIRUB SERPL-MCNC: 1.67 MG/DL (ref 0.2–1)
BILIRUB UR QL STRIP: NEGATIVE
BNP SERPL-MCNC: 859 PG/ML (ref 0–100)
BUN SERPL-MCNC: 82 MG/DL (ref 5–25)
CALCIUM SERPL-MCNC: 9.3 MG/DL (ref 8.4–10.2)
CARDIAC TROPONIN I PNL SERPL HS: 187 NG/L (ref ?–50)
CHLORIDE SERPL-SCNC: 115 MMOL/L (ref 96–108)
CK SERPL-CCNC: 859 U/L (ref 26–192)
CLARITY UR: ABNORMAL
CO2 SERPL-SCNC: 14 MMOL/L (ref 21–32)
COLOR UR: YELLOW
CREAT SERPL-MCNC: 3.18 MG/DL (ref 0.6–1.3)
EOSINOPHIL # BLD MANUAL: 0 THOUSAND/UL (ref 0–0.4)
EOSINOPHIL NFR BLD MANUAL: 0 % (ref 0–6)
ERYTHROCYTE [DISTWIDTH] IN BLOOD BY AUTOMATED COUNT: 13.3 % (ref 11.6–15.1)
FLUAV RNA RESP QL NAA+PROBE: NEGATIVE
FLUBV RNA RESP QL NAA+PROBE: NEGATIVE
GFR SERPL CREATININE-BSD FRML MDRD: 11 ML/MIN/1.73SQ M
GLUCOSE SERPL-MCNC: 121 MG/DL (ref 65–140)
GLUCOSE UR STRIP-MCNC: NEGATIVE MG/DL
HCO3 BLDA-SCNC: 13.2 MMOL/L (ref 22–28)
HCO3 BLDV-SCNC: 15.5 MMOL/L (ref 24–30)
HCT VFR BLD AUTO: 44.7 % (ref 34.8–46.1)
HGB BLD-MCNC: 14.4 G/DL (ref 11.5–15.4)
HGB UR QL STRIP.AUTO: ABNORMAL
HYALINE CASTS #/AREA URNS LPF: ABNORMAL /LPF
INR PPP: 1.5 (ref 0.85–1.19)
IPAP: 12
KETONES UR STRIP-MCNC: NEGATIVE MG/DL
LACTATE SERPL-SCNC: 4.7 MMOL/L (ref 0.5–2)
LACTATE SERPL-SCNC: 8.6 MMOL/L (ref 0.5–2)
LEUKOCYTE ESTERASE UR QL STRIP: NEGATIVE
LYMPHOCYTES # BLD AUTO: 0.61 THOUSAND/UL (ref 0.6–4.47)
LYMPHOCYTES # BLD AUTO: 2 % (ref 14–44)
MAGNESIUM SERPL-MCNC: 2.7 MG/DL (ref 1.9–2.7)
MCH RBC QN AUTO: 31.9 PG (ref 26.8–34.3)
MCHC RBC AUTO-ENTMCNC: 32.2 G/DL (ref 31.4–37.4)
MCV RBC AUTO: 99 FL (ref 82–98)
MONOCYTES # BLD AUTO: 1.22 THOUSAND/UL (ref 0–1.22)
MONOCYTES NFR BLD: 6 % (ref 4–12)
NEUTROPHILS # BLD MANUAL: 18.56 THOUSAND/UL (ref 1.85–7.62)
NEUTS BAND NFR BLD MANUAL: 1 % (ref 0–8)
NEUTS SEG NFR BLD AUTO: 90 % (ref 43–75)
NITRITE UR QL STRIP: NEGATIVE
NON VENT- BIPAP: ABNORMAL
NON-SQ EPI CELLS URNS QL MICRO: ABNORMAL /HPF
O2 CT BLDA-SCNC: 16 ML/DL (ref 16–23)
O2 CT BLDV-SCNC: 7.6 ML/DL
OVALOCYTES BLD QL SMEAR: PRESENT
OXYHGB MFR BLDA: 96.3 % (ref 94–97)
P AXIS: 95 DEGREES
PCO2 BLDA: 25.4 MM HG (ref 36–44)
PCO2 BLDV: 32.7 MM HG (ref 42–50)
PEEP MAX SETTING VENT: 6 CM[H2O]
PH BLDA: 7.33 [PH] (ref 7.35–7.45)
PH BLDV: 7.29 [PH] (ref 7.3–7.4)
PH UR STRIP.AUTO: 5.5 [PH]
PLATELET # BLD AUTO: 245 THOUSANDS/UL (ref 149–390)
PLATELET BLD QL SMEAR: ADEQUATE
PMV BLD AUTO: 11.3 FL (ref 8.9–12.7)
PO2 BLDA: 103 MM HG (ref 75–129)
PO2 BLDV: 26.4 MM HG (ref 35–45)
POIKILOCYTOSIS BLD QL SMEAR: PRESENT
POLYCHROMASIA BLD QL SMEAR: PRESENT
POTASSIUM SERPL-SCNC: 3.5 MMOL/L (ref 3.5–5.3)
PROCALCITONIN SERPL-MCNC: 51.53 NG/ML
PROT SERPL-MCNC: 7.6 G/DL (ref 6.4–8.4)
PROT UR STRIP-MCNC: ABNORMAL MG/DL
PROTHROMBIN TIME: 18.3 SECONDS (ref 12.3–15)
QRS AXIS: 86 DEGREES
QRSD INTERVAL: 66 MS
QT INTERVAL: 326 MS
QTC INTERVAL: 444 MS
RBC # BLD AUTO: 4.52 MILLION/UL (ref 3.81–5.12)
RBC #/AREA URNS AUTO: ABNORMAL /HPF
RBC MORPH BLD: PRESENT
RSV RNA RESP QL NAA+PROBE: NEGATIVE
SARS-COV-2 RNA RESP QL NAA+PROBE: NEGATIVE
SODIUM SERPL-SCNC: 154 MMOL/L (ref 135–147)
SP GR UR STRIP.AUTO: 1.02 (ref 1–1.03)
SPECIMEN SOURCE: ABNORMAL
T WAVE AXIS: -63 DEGREES
UROBILINOGEN UR STRIP-ACNC: <2 MG/DL
VARIANT LYMPHS # BLD AUTO: 1 %
VENT BIPAP FIO2: 50 %
VENTRICULAR RATE: 112 BPM
WBC # BLD AUTO: 20.4 THOUSAND/UL (ref 4.31–10.16)
WBC #/AREA URNS AUTO: ABNORMAL /HPF

## 2024-12-31 PROCEDURE — 83605 ASSAY OF LACTIC ACID: CPT

## 2024-12-31 PROCEDURE — 71250 CT THORAX DX C-: CPT

## 2024-12-31 PROCEDURE — 85007 BL SMEAR W/DIFF WBC COUNT: CPT

## 2024-12-31 PROCEDURE — 83735 ASSAY OF MAGNESIUM: CPT

## 2024-12-31 PROCEDURE — 36415 COLL VENOUS BLD VENIPUNCTURE: CPT

## 2024-12-31 PROCEDURE — 87040 BLOOD CULTURE FOR BACTERIA: CPT

## 2024-12-31 PROCEDURE — 82805 BLOOD GASES W/O2 SATURATION: CPT

## 2024-12-31 PROCEDURE — 87181 SC STD AGAR DILUTION PER AGT: CPT

## 2024-12-31 PROCEDURE — 84100 ASSAY OF PHOSPHORUS: CPT | Performed by: STUDENT IN AN ORGANIZED HEALTH CARE EDUCATION/TRAINING PROGRAM

## 2024-12-31 PROCEDURE — 80053 COMPREHEN METABOLIC PANEL: CPT

## 2024-12-31 PROCEDURE — 87077 CULTURE AEROBIC IDENTIFY: CPT

## 2024-12-31 PROCEDURE — 83735 ASSAY OF MAGNESIUM: CPT | Performed by: STUDENT IN AN ORGANIZED HEALTH CARE EDUCATION/TRAINING PROGRAM

## 2024-12-31 PROCEDURE — 94760 N-INVAS EAR/PLS OXIMETRY 1: CPT

## 2024-12-31 PROCEDURE — 87154 CUL TYP ID BLD PTHGN 6+ TRGT: CPT

## 2024-12-31 PROCEDURE — 72125 CT NECK SPINE W/O DYE: CPT

## 2024-12-31 PROCEDURE — 84145 PROCALCITONIN (PCT): CPT

## 2024-12-31 PROCEDURE — 36600 WITHDRAWAL OF ARTERIAL BLOOD: CPT

## 2024-12-31 PROCEDURE — 96365 THER/PROPH/DIAG IV INF INIT: CPT

## 2024-12-31 PROCEDURE — 0241U HB NFCT DS VIR RESP RNA 4 TRGT: CPT

## 2024-12-31 PROCEDURE — 96375 TX/PRO/DX INJ NEW DRUG ADDON: CPT

## 2024-12-31 PROCEDURE — 74176 CT ABD & PELVIS W/O CONTRAST: CPT

## 2024-12-31 PROCEDURE — 96376 TX/PRO/DX INJ SAME DRUG ADON: CPT

## 2024-12-31 PROCEDURE — 99291 CRITICAL CARE FIRST HOUR: CPT

## 2024-12-31 PROCEDURE — 85027 COMPLETE CBC AUTOMATED: CPT

## 2024-12-31 PROCEDURE — 81001 URINALYSIS AUTO W/SCOPE: CPT

## 2024-12-31 PROCEDURE — 93005 ELECTROCARDIOGRAM TRACING: CPT

## 2024-12-31 PROCEDURE — 84484 ASSAY OF TROPONIN QUANT: CPT | Performed by: STUDENT IN AN ORGANIZED HEALTH CARE EDUCATION/TRAINING PROGRAM

## 2024-12-31 PROCEDURE — 96361 HYDRATE IV INFUSION ADD-ON: CPT

## 2024-12-31 PROCEDURE — 83880 ASSAY OF NATRIURETIC PEPTIDE: CPT

## 2024-12-31 PROCEDURE — 85610 PROTHROMBIN TIME: CPT

## 2024-12-31 PROCEDURE — 93010 ELECTROCARDIOGRAM REPORT: CPT | Performed by: INTERNAL MEDICINE

## 2024-12-31 PROCEDURE — 99291 CRITICAL CARE FIRST HOUR: CPT | Performed by: INTERNAL MEDICINE

## 2024-12-31 PROCEDURE — 99291 CRITICAL CARE FIRST HOUR: CPT | Performed by: EMERGENCY MEDICINE

## 2024-12-31 PROCEDURE — 82550 ASSAY OF CK (CPK): CPT

## 2024-12-31 PROCEDURE — 70450 CT HEAD/BRAIN W/O DYE: CPT

## 2024-12-31 PROCEDURE — 82805 BLOOD GASES W/O2 SATURATION: CPT | Performed by: STUDENT IN AN ORGANIZED HEALTH CARE EDUCATION/TRAINING PROGRAM

## 2024-12-31 PROCEDURE — 94002 VENT MGMT INPAT INIT DAY: CPT

## 2024-12-31 PROCEDURE — 85730 THROMBOPLASTIN TIME PARTIAL: CPT

## 2024-12-31 PROCEDURE — 84484 ASSAY OF TROPONIN QUANT: CPT

## 2024-12-31 RX ORDER — AMLODIPINE BESYLATE 5 MG/1
5 TABLET ORAL 2 TIMES DAILY
Status: DISCONTINUED | OUTPATIENT
Start: 2025-01-01 | End: 2024-12-31

## 2024-12-31 RX ORDER — SODIUM CHLORIDE, SODIUM LACTATE, POTASSIUM CHLORIDE, CALCIUM CHLORIDE 600; 310; 30; 20 MG/100ML; MG/100ML; MG/100ML; MG/100ML
75 INJECTION, SOLUTION INTRAVENOUS CONTINUOUS
Status: DISCONTINUED | OUTPATIENT
Start: 2024-12-31 | End: 2025-01-01

## 2024-12-31 RX ORDER — SODIUM CHLORIDE, SODIUM GLUCONATE, SODIUM ACETATE, POTASSIUM CHLORIDE, MAGNESIUM CHLORIDE, SODIUM PHOSPHATE, DIBASIC, AND POTASSIUM PHOSPHATE .53; .5; .37; .037; .03; .012; .00082 G/100ML; G/100ML; G/100ML; G/100ML; G/100ML; G/100ML; G/100ML
1000 INJECTION, SOLUTION INTRAVENOUS ONCE
Status: COMPLETED | OUTPATIENT
Start: 2024-12-31 | End: 2024-12-31

## 2024-12-31 RX ORDER — HEPARIN SODIUM 5000 [USP'U]/ML
5000 INJECTION, SOLUTION INTRAVENOUS; SUBCUTANEOUS EVERY 8 HOURS SCHEDULED
Status: DISCONTINUED | OUTPATIENT
Start: 2024-12-31 | End: 2025-01-01

## 2024-12-31 RX ORDER — METOPROLOL TARTRATE 25 MG/1
25 TABLET, FILM COATED ORAL 2 TIMES DAILY
Status: DISCONTINUED | OUTPATIENT
Start: 2025-01-01 | End: 2024-12-31

## 2024-12-31 RX ORDER — CHLORHEXIDINE GLUCONATE ORAL RINSE 1.2 MG/ML
15 SOLUTION DENTAL EVERY 12 HOURS SCHEDULED
Status: DISCONTINUED | OUTPATIENT
Start: 2024-12-31 | End: 2025-01-15 | Stop reason: HOSPADM

## 2024-12-31 RX ORDER — VANCOMYCIN HYDROCHLORIDE 1 G/200ML
25 INJECTION, SOLUTION INTRAVENOUS ONCE
Status: COMPLETED | OUTPATIENT
Start: 2024-12-31 | End: 2025-01-01

## 2024-12-31 RX ORDER — DILTIAZEM HYDROCHLORIDE 5 MG/ML
10 INJECTION INTRAVENOUS ONCE
Status: COMPLETED | OUTPATIENT
Start: 2024-12-31 | End: 2024-12-31

## 2024-12-31 RX ORDER — ASPIRIN 81 MG/1
81 TABLET ORAL DAILY
Status: DISCONTINUED | OUTPATIENT
Start: 2025-01-01 | End: 2025-01-02

## 2024-12-31 RX ORDER — MAGNESIUM SULFATE HEPTAHYDRATE 40 MG/ML
2 INJECTION, SOLUTION INTRAVENOUS ONCE
Status: COMPLETED | OUTPATIENT
Start: 2024-12-31 | End: 2025-01-01

## 2024-12-31 RX ADMIN — AMIODARONE HYDROCHLORIDE 1 MG/MIN: 50 INJECTION, SOLUTION INTRAVENOUS at 22:53

## 2024-12-31 RX ADMIN — SODIUM CHLORIDE, SODIUM GLUCONATE, SODIUM ACETATE, POTASSIUM CHLORIDE, MAGNESIUM CHLORIDE, SODIUM PHOSPHATE, DIBASIC, AND POTASSIUM PHOSPHATE 1000 ML: .53; .5; .37; .037; .03; .012; .00082 INJECTION, SOLUTION INTRAVENOUS at 22:49

## 2024-12-31 RX ADMIN — DILTIAZEM HYDROCHLORIDE 10 MG: 5 INJECTION, SOLUTION INTRAVENOUS at 21:38

## 2024-12-31 RX ADMIN — SODIUM CHLORIDE 1000 ML: 0.9 INJECTION, SOLUTION INTRAVENOUS at 20:33

## 2024-12-31 RX ADMIN — SODIUM CHLORIDE 500 ML: 0.9 INJECTION, SOLUTION INTRAVENOUS at 21:32

## 2024-12-31 RX ADMIN — CEFEPIME 1000 MG: 1 INJECTION, POWDER, FOR SOLUTION INTRAMUSCULAR; INTRAVENOUS at 22:18

## 2024-12-31 RX ADMIN — DEXTROSE 150 MG: 50 INJECTION, SOLUTION INTRAVENOUS at 22:33

## 2024-12-31 RX ADMIN — VANCOMYCIN HYDROCHLORIDE 1000 MG: 1 INJECTION, SOLUTION INTRAVENOUS at 22:45

## 2025-01-01 ENCOUNTER — APPOINTMENT (INPATIENT)
Dept: NON INVASIVE DIAGNOSTICS | Facility: HOSPITAL | Age: OVER 89
DRG: 871 | End: 2025-01-01
Payer: COMMERCIAL

## 2025-01-01 PROBLEM — E44.0 MODERATE PROTEIN-CALORIE MALNUTRITION (HCC): Status: ACTIVE | Noted: 2025-01-01

## 2025-01-01 LAB
2HR DELTA HS TROPONIN: -8 NG/L
4HR DELTA HS TROPONIN: 1 NG/L
ALBUMIN SERPL BCG-MCNC: 2.6 G/DL (ref 3.5–5)
ALP SERPL-CCNC: 52 U/L (ref 34–104)
ALT SERPL W P-5'-P-CCNC: 36 U/L (ref 7–52)
ANION GAP SERPL CALCULATED.3IONS-SCNC: 10 MMOL/L (ref 4–13)
ANION GAP SERPL CALCULATED.3IONS-SCNC: 16 MMOL/L (ref 4–13)
AORTIC ROOT: 2.9 CM
AORTIC VALVE MEAN VELOCITY: 12.3 M/S
APTT PPP: 51 SECONDS (ref 23–34)
APTT PPP: 59 SECONDS (ref 23–34)
AST SERPL W P-5'-P-CCNC: 83 U/L (ref 13–39)
AV AREA BY CONTINUOUS VTI: 1.9 CM2
AV AREA PEAK VELOCITY: 1.8 CM2
AV LVOT MEAN GRADIENT: 4 MMHG
AV LVOT PEAK GRADIENT: 9 MMHG
AV MEAN PRESS GRAD SYS DOP V1V2: 7 MMHG
AV ORIFICE AREA US: 1.95 CM2
AV PEAK GRADIENT: 13 MMHG
AV VELOCITY RATIO: 0.81
AV VMAX SYS DOP: 1.83 M/S
BASE EXCESS BLDA CALC-SCNC: -9.7 MMOL/L
BILIRUB SERPL-MCNC: 1.2 MG/DL (ref 0.2–1)
BSA FOR ECHO PROCEDURE: 1.29 M2
BUN SERPL-MCNC: 71 MG/DL (ref 5–25)
BUN SERPL-MCNC: 73 MG/DL (ref 5–25)
CA-I BLD-SCNC: 1.04 MMOL/L (ref 1.12–1.32)
CALCIUM ALBUM COR SERPL-MCNC: 8.8 MG/DL (ref 8.3–10.1)
CALCIUM SERPL-MCNC: 7.7 MG/DL (ref 8.4–10.2)
CALCIUM SERPL-MCNC: 8.3 MG/DL (ref 8.4–10.2)
CARDIAC TROPONIN I PNL SERPL HS: 179 NG/L (ref ?–50)
CARDIAC TROPONIN I PNL SERPL HS: 188 NG/L (ref ?–50)
CHLORIDE SERPL-SCNC: 115 MMOL/L (ref 96–108)
CHLORIDE SERPL-SCNC: 117 MMOL/L (ref 96–108)
CK SERPL-CCNC: 685 U/L (ref 26–192)
CK SERPL-CCNC: 722 U/L (ref 26–192)
CK SERPL-CCNC: 778 U/L (ref 26–192)
CO2 SERPL-SCNC: 19 MMOL/L (ref 21–32)
CO2 SERPL-SCNC: 21 MMOL/L (ref 21–32)
CREAT SERPL-MCNC: 2.09 MG/DL (ref 0.6–1.3)
CREAT SERPL-MCNC: 2.47 MG/DL (ref 0.6–1.3)
DOP CALC AO VTI: 30.71 CM
DOP CALC LVOT AREA: 2.27 CM2
DOP CALC LVOT CARDIAC INDEX: 4.35 L/MIN/M2
DOP CALC LVOT CARDIAC OUTPUT: 5.61 L/MIN
DOP CALC LVOT DIAMETER: 1.7 CM
DOP CALC LVOT PEAK VEL VTI: 26.37 CM
DOP CALC LVOT PEAK VEL: 1.48 M/S
DOP CALC LVOT STROKE INDEX: 44.2 ML/M2
DOP CALC LVOT STROKE VOLUME: 59.82
ERYTHROCYTE [DISTWIDTH] IN BLOOD BY AUTOMATED COUNT: 13.5 % (ref 11.6–15.1)
FRACTIONAL SHORTENING: 36 (ref 28–44)
GFR SERPL CREATININE-BSD FRML MDRD: 16 ML/MIN/1.73SQ M
GFR SERPL CREATININE-BSD FRML MDRD: 19 ML/MIN/1.73SQ M
GLUCOSE SERPL-MCNC: 212 MG/DL (ref 65–140)
GLUCOSE SERPL-MCNC: 238 MG/DL (ref 65–140)
HCO3 BLDA-SCNC: 15 MMOL/L (ref 22–28)
HCT VFR BLD AUTO: 39 % (ref 34.8–46.1)
HGB BLD-MCNC: 12.5 G/DL (ref 11.5–15.4)
INR PPP: 1.52 (ref 0.85–1.19)
INTERVENTRICULAR SEPTUM IN DIASTOLE (PARASTERNAL SHORT AXIS VIEW): 1.2 CM
INTERVENTRICULAR SEPTUM: 1.2 CM (ref 0.6–1.1)
L PNEUMO1 AG UR QL IA.RAPID: NEGATIVE
LAAS-AP2: 16.3 CM2
LAAS-AP4: 14.4 CM2
LACTATE SERPL-SCNC: 2.8 MMOL/L (ref 0.5–2)
LACTATE SERPL-SCNC: 3.9 MMOL/L (ref 0.5–2)
LEFT ATRIUM SIZE: 2.8 CM
LEFT ATRIUM VOLUME (MOD BIPLANE): 39 ML
LEFT ATRIUM VOLUME INDEX (MOD BIPLANE): 30.2 ML/M2
LEFT INTERNAL DIMENSION IN SYSTOLE: 1.4 CM (ref 2.1–4)
LEFT VENTRICULAR INTERNAL DIMENSION IN DIASTOLE: 2.2 CM (ref 3.5–6)
LEFT VENTRICULAR POSTERIOR WALL IN END DIASTOLE: 1.2 CM
LEFT VENTRICULAR STROKE VOLUME: 10 ML
LV EF US.2D.A4C+ESTIMATED: 71 %
LVSV (TEICH): 10 ML
MAGNESIUM SERPL-MCNC: 2.3 MG/DL (ref 1.9–2.7)
MAGNESIUM SERPL-MCNC: 3.6 MG/DL (ref 1.9–2.7)
MCH RBC QN AUTO: 31.2 PG (ref 26.8–34.3)
MCHC RBC AUTO-ENTMCNC: 32.1 G/DL (ref 31.4–37.4)
MCV RBC AUTO: 97 FL (ref 82–98)
NASAL CANNULA: 3
NRBC BLD AUTO-RTO: 0 /100 WBCS
O2 CT BLDA-SCNC: 16 ML/DL (ref 16–23)
OXYHGB MFR BLDA: 88.8 % (ref 94–97)
PCO2 BLDA: 29.5 MM HG (ref 36–44)
PH BLDA: 7.32 [PH] (ref 7.35–7.45)
PHOSPHATE SERPL-MCNC: 3.7 MG/DL (ref 2.3–4.1)
PHOSPHATE SERPL-MCNC: 4.5 MG/DL (ref 2.3–4.1)
PLATELET # BLD AUTO: 192 THOUSANDS/UL (ref 149–390)
PMV BLD AUTO: 11.1 FL (ref 8.9–12.7)
PO2 BLDA: 64.2 MM HG (ref 75–129)
POTASSIUM SERPL-SCNC: 3.3 MMOL/L (ref 3.5–5.3)
POTASSIUM SERPL-SCNC: 3.5 MMOL/L (ref 3.5–5.3)
PREALB SERPL-MCNC: <3 MG/DL (ref 17–34)
PROT SERPL-MCNC: 5.1 G/DL (ref 6.4–8.4)
PROTHROMBIN TIME: 18.5 SECONDS (ref 12.3–15)
RA PRESSURE ESTIMATED: 3 MMHG
RBC # BLD AUTO: 4.01 MILLION/UL (ref 3.81–5.12)
RIGHT ATRIUM AREA SYSTOLE A4C: 12.8 CM2
RIGHT VENTRICLE ID DIMENSION: 3.1 CM
RV PSP: 99 MMHG
S PNEUM AG UR QL: POSITIVE
SINOTUBULAR JUNCTION: 2.5 CM
SL CV LEFT ATRIUM LENGTH A2C: 5.4 CM
SL CV LV EF: 70
SL CV PED ECHO LEFT VENTRICLE DIASTOLIC VOLUME (MOD BIPLANE) 2D: 15 ML
SL CV PED ECHO LEFT VENTRICLE SYSTOLIC VOLUME (MOD BIPLANE) 2D: 5 ML
SL CV SINUS OF VALSALVA 2D: 3 CM
SODIUM SERPL-SCNC: 148 MMOL/L (ref 135–147)
SODIUM SERPL-SCNC: 150 MMOL/L (ref 135–147)
SPECIMEN SOURCE: ABNORMAL
STJ: 2.5 CM
TR MAX PG: 96 MMHG
TR PEAK VELOCITY: 4.9 M/S
TRICUSPID ANNULAR PLANE SYSTOLIC EXCURSION: 1.6 CM
TRICUSPID VALVE PEAK REGURGITATION VELOCITY: 4.9 M/S
WBC # BLD AUTO: 14.26 THOUSAND/UL (ref 4.31–10.16)

## 2025-01-01 PROCEDURE — 85027 COMPLETE CBC AUTOMATED: CPT | Performed by: STUDENT IN AN ORGANIZED HEALTH CARE EDUCATION/TRAINING PROGRAM

## 2025-01-01 PROCEDURE — 94664 DEMO&/EVAL PT USE INHALER: CPT

## 2025-01-01 PROCEDURE — 83605 ASSAY OF LACTIC ACID: CPT | Performed by: STUDENT IN AN ORGANIZED HEALTH CARE EDUCATION/TRAINING PROGRAM

## 2025-01-01 PROCEDURE — 80053 COMPREHEN METABOLIC PANEL: CPT | Performed by: STUDENT IN AN ORGANIZED HEALTH CARE EDUCATION/TRAINING PROGRAM

## 2025-01-01 PROCEDURE — 82805 BLOOD GASES W/O2 SATURATION: CPT | Performed by: STUDENT IN AN ORGANIZED HEALTH CARE EDUCATION/TRAINING PROGRAM

## 2025-01-01 PROCEDURE — 84484 ASSAY OF TROPONIN QUANT: CPT | Performed by: STUDENT IN AN ORGANIZED HEALTH CARE EDUCATION/TRAINING PROGRAM

## 2025-01-01 PROCEDURE — 82330 ASSAY OF CALCIUM: CPT | Performed by: STUDENT IN AN ORGANIZED HEALTH CARE EDUCATION/TRAINING PROGRAM

## 2025-01-01 PROCEDURE — 87493 C DIFF AMPLIFIED PROBE: CPT | Performed by: STUDENT IN AN ORGANIZED HEALTH CARE EDUCATION/TRAINING PROGRAM

## 2025-01-01 PROCEDURE — 84134 ASSAY OF PREALBUMIN: CPT | Performed by: STUDENT IN AN ORGANIZED HEALTH CARE EDUCATION/TRAINING PROGRAM

## 2025-01-01 PROCEDURE — 80048 BASIC METABOLIC PNL TOTAL CA: CPT

## 2025-01-01 PROCEDURE — 87081 CULTURE SCREEN ONLY: CPT

## 2025-01-01 PROCEDURE — 94640 AIRWAY INHALATION TREATMENT: CPT

## 2025-01-01 PROCEDURE — 82550 ASSAY OF CK (CPK): CPT | Performed by: STUDENT IN AN ORGANIZED HEALTH CARE EDUCATION/TRAINING PROGRAM

## 2025-01-01 PROCEDURE — 87449 NOS EACH ORGANISM AG IA: CPT | Performed by: STUDENT IN AN ORGANIZED HEALTH CARE EDUCATION/TRAINING PROGRAM

## 2025-01-01 PROCEDURE — 92610 EVALUATE SWALLOWING FUNCTION: CPT

## 2025-01-01 PROCEDURE — 84443 ASSAY THYROID STIM HORMONE: CPT | Performed by: STUDENT IN AN ORGANIZED HEALTH CARE EDUCATION/TRAINING PROGRAM

## 2025-01-01 PROCEDURE — 99233 SBSQ HOSP IP/OBS HIGH 50: CPT | Performed by: INTERNAL MEDICINE

## 2025-01-01 PROCEDURE — 84100 ASSAY OF PHOSPHORUS: CPT | Performed by: STUDENT IN AN ORGANIZED HEALTH CARE EDUCATION/TRAINING PROGRAM

## 2025-01-01 PROCEDURE — 93306 TTE W/DOPPLER COMPLETE: CPT

## 2025-01-01 PROCEDURE — 83735 ASSAY OF MAGNESIUM: CPT | Performed by: STUDENT IN AN ORGANIZED HEALTH CARE EDUCATION/TRAINING PROGRAM

## 2025-01-01 PROCEDURE — 93306 TTE W/DOPPLER COMPLETE: CPT | Performed by: INTERNAL MEDICINE

## 2025-01-01 PROCEDURE — 94760 N-INVAS EAR/PLS OXIMETRY 1: CPT

## 2025-01-01 PROCEDURE — 85730 THROMBOPLASTIN TIME PARTIAL: CPT | Performed by: INTERNAL MEDICINE

## 2025-01-01 PROCEDURE — 85610 PROTHROMBIN TIME: CPT

## 2025-01-01 PROCEDURE — 85730 THROMBOPLASTIN TIME PARTIAL: CPT

## 2025-01-01 RX ORDER — HEPARIN SODIUM 10000 [USP'U]/100ML
3-20 INJECTION, SOLUTION INTRAVENOUS
Status: DISCONTINUED | OUTPATIENT
Start: 2025-01-01 | End: 2025-01-02

## 2025-01-01 RX ORDER — LEVALBUTEROL INHALATION SOLUTION 1.25 MG/3ML
1.25 SOLUTION RESPIRATORY (INHALATION)
Status: DISCONTINUED | OUTPATIENT
Start: 2025-01-01 | End: 2025-01-04

## 2025-01-01 RX ORDER — DEXTROSE MONOHYDRATE 50 MG/ML
75 INJECTION, SOLUTION INTRAVENOUS CONTINUOUS
Status: DISCONTINUED | OUTPATIENT
Start: 2025-01-01 | End: 2025-01-03

## 2025-01-01 RX ORDER — POTASSIUM CHLORIDE 14.9 MG/ML
20 INJECTION INTRAVENOUS
Status: DISPENSED | OUTPATIENT
Start: 2025-01-01 | End: 2025-01-01

## 2025-01-01 RX ORDER — METOPROLOL TARTRATE 1 MG/ML
5 INJECTION, SOLUTION INTRAVENOUS ONCE
Status: COMPLETED | OUTPATIENT
Start: 2025-01-01 | End: 2025-01-01

## 2025-01-01 RX ORDER — METHYLPREDNISOLONE SODIUM SUCCINATE 40 MG/ML
40 INJECTION, POWDER, LYOPHILIZED, FOR SOLUTION INTRAMUSCULAR; INTRAVENOUS EVERY 12 HOURS SCHEDULED
Status: DISCONTINUED | OUTPATIENT
Start: 2025-01-01 | End: 2025-01-02

## 2025-01-01 RX ADMIN — POTASSIUM CHLORIDE 20 MEQ: 14.9 INJECTION, SOLUTION INTRAVENOUS at 15:12

## 2025-01-01 RX ADMIN — METOROPROLOL TARTRATE 5 MG: 5 INJECTION, SOLUTION INTRAVENOUS at 12:15

## 2025-01-01 RX ADMIN — DEXTROSE AND SODIUM CHLORIDE 500 ML: 5; .45 INJECTION, SOLUTION INTRAVENOUS at 15:20

## 2025-01-01 RX ADMIN — HEPARIN SODIUM 5000 UNITS: 5000 INJECTION, SOLUTION INTRAVENOUS; SUBCUTANEOUS at 00:52

## 2025-01-01 RX ADMIN — METHYLPREDNISOLONE SODIUM SUCCINATE 40 MG: 40 INJECTION, POWDER, FOR SOLUTION INTRAMUSCULAR; INTRAVENOUS at 15:11

## 2025-01-01 RX ADMIN — CHLORHEXIDINE GLUCONATE 0.12% ORAL RINSE 15 ML: 1.2 LIQUID ORAL at 15:11

## 2025-01-01 RX ADMIN — HEPARIN SODIUM 5000 UNITS: 5000 INJECTION, SOLUTION INTRAVENOUS; SUBCUTANEOUS at 05:51

## 2025-01-01 RX ADMIN — CEFTRIAXONE SODIUM 1000 MG: 10 INJECTION, POWDER, FOR SOLUTION INTRAVENOUS at 15:35

## 2025-01-01 RX ADMIN — LEVALBUTEROL HYDROCHLORIDE 1.25 MG: 1.25 SOLUTION RESPIRATORY (INHALATION) at 20:14

## 2025-01-01 RX ADMIN — IPRATROPIUM BROMIDE 0.5 MG: 0.5 SOLUTION RESPIRATORY (INHALATION) at 20:14

## 2025-01-01 RX ADMIN — AMIODARONE HYDROCHLORIDE 0.5 MG/MIN: 50 INJECTION, SOLUTION INTRAVENOUS at 05:07

## 2025-01-01 RX ADMIN — LEVALBUTEROL HYDROCHLORIDE 1.25 MG: 1.25 SOLUTION RESPIRATORY (INHALATION) at 08:10

## 2025-01-01 RX ADMIN — AZITHROMYCIN MONOHYDRATE 500 MG: 500 INJECTION, POWDER, LYOPHILIZED, FOR SOLUTION INTRAVENOUS at 05:51

## 2025-01-01 RX ADMIN — CHLORHEXIDINE GLUCONATE 0.12% ORAL RINSE 15 ML: 1.2 LIQUID ORAL at 00:52

## 2025-01-01 RX ADMIN — HEPARIN SODIUM 12 UNITS/KG/HR: 10000 INJECTION, SOLUTION INTRAVENOUS at 15:23

## 2025-01-01 RX ADMIN — MAGNESIUM SULFATE HEPTAHYDRATE 2 G: 40 INJECTION, SOLUTION INTRAVENOUS at 00:51

## 2025-01-01 RX ADMIN — IPRATROPIUM BROMIDE 0.5 MG: 0.5 SOLUTION RESPIRATORY (INHALATION) at 11:57

## 2025-01-01 RX ADMIN — IPRATROPIUM BROMIDE 0.5 MG: 0.5 SOLUTION RESPIRATORY (INHALATION) at 08:09

## 2025-01-01 RX ADMIN — CHLORHEXIDINE GLUCONATE 0.12% ORAL RINSE 15 ML: 1.2 LIQUID ORAL at 21:34

## 2025-01-01 NOTE — MALNUTRITION/BMI
This medical record reflects one or more clinical indicators suggestive of malnutrition.    Malnutrition Findings:   Adult Malnutrition type: Chronic illness  Adult Degree of Malnutrition: Malnutrition of moderate degree  Malnutrition Characteristics: Inadequate energy, Muscle loss                360 Statement: Chronic/moderate malnutrition r/t inadequate PO intake, as evidenced by intake meeting <75% of estimated needs x > 1 month, and muscle mass depletion (temporal). Treatment: pending ability to advance to PO diet - liberal diet as able and trial oral nutrition supplements pending SLP recommendations       Body mass index is 18.6 kg/m².     See Nutrition note dated 1/1/25 for additional details.  Completed nutrition assessment is viewable in the nutrition documentation.

## 2025-01-01 NOTE — CASE MANAGEMENT
Case Management Assessment    Patient name Remedios Mcgrath  Location MICU 11[facility]U 11 MRN [id number]  : 1929 Date 2025       Current Admission Date: 2024  Current Admission Diagnosis:Lactic acidosis, Acute kidney failure (HCC), Pneumonia, Elevated troponin, Septic shock (HCC), Unspecified multiple injuries, initial encounter, Hypoxic respiratory failure (HCC)   Patient Active Problem List    Diagnosis Date Noted Date Diagnosed    Mild protein-calorie malnutrition (HCC) 10/04/2024     Venous stasis dermatitis of both lower extremities 2024     Stage 3b chronic kidney disease (HCC) 03/15/2022     Bruises easily 2020     Impaired fasting glucose 2012     Stenosis of left carotid artery 2012     Peripheral vascular disease (HCC) 2012     Hyperlipidemia 2012     Hypertension 2012     Insomnia 2012       LOS (days): 1  Geometric Mean LOS (GMLOS) (days):   Days to GMLOS:     OBJECTIVE:    Risk of Unplanned Readmission Score: 15.29         Current admission status: Inpatient       Preferred Pharmacy:   [facility] [id number] [address]  Phone: [phone] Fax: [phone]    Primary Care Provider: Dion Tsang MD    Primary Insurance: [employer] RE[employer]  Secondary Insurance:     ASSESSMENT:  Active Health Care Proxies    There are no active Health Care Proxies on file.       Patient Information  Mental Status: Sedated    This CM introduced self and role to sonSrini.  Formal CM assessment will need to be completed when prognosis is known.  (Likely 24-48 hours, per son's conversation with MD).  This CM provided emotional support and offered assistance with any needs that may arise

## 2025-01-01 NOTE — PROGRESS NOTES
Progress Note - Critical Care/ICU   Name: Remedios Mcgrath 89 y.o. female I MRN: [id number]  Unit/Bed#: MICU 11 I Date of Admission: 12/31/2024   Date of Service: 1/1/2025 I Hospital Day: 1       Assessment & Plan   Neuro:   Concern for AMS  -Oriented on exam. Continue to monitor. Hold home restoril for now.      CV:   New onset atrial fibrillation with rapid ventricular response  -Suspect due to acutely ill state, volume depletion  -Received Cardizem bolus in ED  -Received amio bolus, started on amiodarone drip in ED. Continue amiodarone.  -Optimize electrolytes, volume status and sepsis mgmt  -Echo ordered     Lactic acidosis  -Secondary to sepsis/septic shock  -Initial LA 8.6, trending down to 2.8.     Troponinemia  -Suspect from demand state with dehydration, A fib RVR  -Troponin 187 -> 179 -> 188.     HTN  -Currently SBP's in 120s-130s, will hold home amlodipine and metoprolol for now     Pulm:  Hypoxia with tachypnea, hypocarbia  -Suspect tachypnea due to underlying sepsis/pneumonia  -Desats to 82 in ED, transiently on BiPAP but worsening hyppocarbia, take off BiPAP in ICU, repeat gas in 30 min, seems comfortable w normal sats on 6L NC.   -Remains stable currently on mid flow NC.  -Wean as tolerated.     Emphysema  -No chronic meds. Can start nebs if needed.     Pneumonia  -Suspect aspiration given RML appearance, recent hx  -CT CAP formal read pending.  -Tx broadly w abx as below     GI:   Diarrhea with incontinence  -Will send C diff now  -consider rectal tube placement     Elevated transaminases  -, will follow, maybe due to recent hypoperfusion     Concern for oropharyngeal dysphagia  -Plan for swallow pt with speech tomorrow, per family has not been eating well     :   Acute kidney injury  -Creatinine 3.18 today, BUN 82--trend w labs  -, trend w labs  -Due to hypovolemia while down, possibly also renal injury from prolonged downtime  -Improved this AM 3.18 -> 2.47.     Chronic kidney disease,  stage III  -Baseline creatinine appears to be around 1.1-1.3  -Fluid replacement -- sepsis boluses ordered and given in ED, received total ofg 2.5L, would be careful w volume status and further resuscitation at this time given 40kg.  -Trend BMP and UOP; urinary retention protocol     F/E/N:   Fluids  -Will plan to run Dextrose 5% at 75/hr after next set of labs, but received 2.5L and would avoid volume overload     Lytes  -Replete as needed.     Nutrition  -Cachectic appearing  -Prealbumin pending  -Nutrition consult after Speech/swallow eval     Heme/Onc:   DVT ppx  -HSQ and SCDs     Endo:   -Trend glucose, titrate regimen as needed.     ID:   Pneumonia, suspect aspiration etiology  -Meeting SIRS criteria on admission.  -Consider septic etiology - pneumonia vs urine vs other.  -BCX pending.  -Strep pneumo and legionella urine antigens  -Continue Cefepime and Vanc.  -Consider aspiration pneumonia.     MSK/Skin:   Skin tears  -Dress wounds w mepilex, consider wound care consult.    Disposition: Critical care    ICU Core Measures     A: Assess, Prevent, and Manage Pain Has pain been assessed? Yes  Need for changes to pain regimen? No   B: Both SAT/SAT  N/A   C: Choice of Sedation RASS Goal: 0 Alert and Calm  Need for changes to sedation or analgesia regimen? No   D: Delirium CAM-ICU: Negative   E: Early Mobility  Plan for early mobility? Yes   F: Family Engagement Plan for family engagement today? Yes       Antibiotic Review: Patient on appropriate coverage based on culture data.       Prophylaxis:  VTE VTE covered by:  heparin (porcine), Subcutaneous, 5,000 Units at 01/01/25 0551       Stress Ulcer  not ordered         24 Hour Events : Patient admitted overnight. This morning, patient seen and examined. She reports that she feels well with no acute symptoms or concerns, including no pain, fevers, chills, N/V.    Subjective       Objective :                   Vitals I/O      Most Recent Min/Max in 24hrs   Temp 97.6 °F  (36.4 °C) Temp  Min: 97.6 °F (36.4 °C)  Max: 98.6 °F (37 °C)   Pulse 98 Pulse  Min: 93  Max: 198   Resp (!) 29 Resp  Min: 15  Max: 44   BP (!) 107/49 BP  Min: 101/53  Max: 140/64   O2 Sat 94 % SpO2  Min: 82 %  Max: 98 %      Intake/Output Summary (Last 24 hours) at 1/1/2025 0634  Last data filed at 1/1/2025 0400  Gross per 24 hour   Intake 150 ml   Output --   Net 150 ml       Diet NPO    Invasive Monitoring           Physical Exam   Physical Exam  Vitals reviewed.   Eyes:      Extraocular Movements: Extraocular movements intact.      Pupils: Pupils are equal, round, and reactive to light.   Skin:     General: Skin is warm.      Capillary Refill: Capillary refill takes less than 2 seconds.   HENT:      Head: Normocephalic.      Mouth/Throat:      Mouth: Mucous membranes are moist.   Neck:   no midline tenderness Cardiovascular:      Rate and Rhythm: Normal rate and regular rhythm.      Pulses: Normal pulses.   Musculoskeletal:         General: No swelling.      Right lower leg: No edema.      Left lower leg: No edema.   Abdominal:      Palpations: Abdomen is soft.   Constitutional:       Appearance: She is well-developed and well-nourished.   Pulmonary:      Effort: Pulmonary effort is normal.      Breath sounds: Normal breath sounds.   Neurological:      General: No focal deficit present.      Mental Status: She is alert and oriented to person, place and time.          Diagnostic Studies        Lab Results: I have reviewed the following results:     Medications:  Scheduled PRN   [Held by provider] aspirin, 81 mg, Daily  azithromycin, 500 mg, Q24H  chlorhexidine, 15 mL, Q12H KATT  heparin (porcine), 5,000 Units, Q8H KATT          Continuous    amiodarone (CORDARONE) 900 mg in dextrose 5 % 500 mL infusion, 0.5 mg/min, Last Rate: 0.5 mg/min (01/01/25 0507)  dextrose, 75 mL/hr  norepinephrine, 1-30 mcg/min, Last Rate: Stopped (12/31/24 2230)         Labs:   CBC    Recent Labs     12/31/24 2024 01/01/25  0448   WBC  20.40* 14.26*   HGB 14.4 12.5   HCT 44.7 39.0    192   BANDSPCT 1  --      BMP    Recent Labs     12/31/24 2024 01/01/25 0448   SODIUM 154* 150*   K 3.5 3.5   * 115*   CO2 14* 19*   AGAP 25* 16*   BUN 82* 71*   CREATININE 3.18* 2.47*   CALCIUM 9.3 7.7*       Coags    Recent Labs     12/31/24 2024   INR 1.50*   PTT 28        Additional Electrolytes  Recent Labs     12/31/24  2343 01/01/25 0448   MG 2.3 3.6*   PHOS 3.7 4.5*   CAIONIZED  --  1.04*          Blood Gas    Recent Labs     01/01/25  0031   PHART 7.323*   PWY1CZL 29.5*   PO2ART 64.2*   NNU1HXH 15.0*   BEART -9.7   SOURCE Artery     Recent Labs     12/31/24 2024 12/31/24  2302 01/01/25 0031   PHVEN 7.294*  --   --    VWD9OQY 32.7*  --   --    PO2VEN 26.4*  --   --    HKE8JBH 15.5*  --   --    BEVEN -9.8  --   --    H9WGEAT 37.6*  --   --    SOURCE  --    < > Artery    < > = values in this interval not displayed.    LFTs  Recent Labs     12/31/24 2024 01/01/25 0448   ALT 37 36   * 83*   ALKPHOS 71 52   ALB 3.7 2.6*   TBILI 1.67* 1.20*       Infectious  Recent Labs     12/31/24 2024   PROCALCITONI 51.53*     Glucose  Recent Labs     12/31/24 2024 01/01/25 0448   GLUC 121 212*

## 2025-01-01 NOTE — PLAN OF CARE
Problem: Prexisting or High Potential for Compromised Skin Integrity  Goal: Skin integrity is maintained or improved  Description: INTERVENTIONS:  - Identify patients at risk for skin breakdown  - Assess and monitor skin integrity  - Assess and monitor nutrition and hydration status  - Monitor labs   - Assess for incontinence   - Turn and reposition patient  - Assist with mobility/ambulation  - Relieve pressure over bony prominences  - Avoid friction and shearing  - Provide appropriate hygiene as needed including keeping skin clean and dry  - Evaluate need for skin moisturizer/barrier cream  - Collaborate with interdisciplinary team   - Patient/family teaching  - Consider wound care consult   Outcome: Not Progressing     Problem: RESPIRATORY - ADULT  Goal: Achieves optimal ventilation and oxygenation  Description: INTERVENTIONS:  - Assess for changes in respiratory status  - Assess for changes in mentation and behavior  - Position to facilitate oxygenation and minimize respiratory effort  - Oxygen administered by appropriate delivery if ordered  - Initiate smoking cessation education as indicated  - Encourage broncho-pulmonary hygiene including cough, deep breathe, Incentive Spirometry  - Assess the need for suctioning and aspirate as needed  - Assess and instruct to report SOB or any respiratory difficulty  - Respiratory Therapy support as indicated  Outcome: Not Progressing     Problem: Nutrition/Hydration-ADULT  Goal: Nutrient/Hydration intake appropriate for improving, restoring or maintaining nutritional needs  Description: Monitor and assess patient's nutrition/hydration status for malnutrition. Collaborate with interdisciplinary team and initiate plan and interventions as ordered.  Monitor patient's weight and dietary intake as ordered or per policy. Utilize nutrition screening tool and intervene as necessary. Determine patient's food preferences and provide high-protein, high-caloric foods as appropriate.      INTERVENTIONS:  - Monitor oral intake, urinary output, labs, and treatment plans  - Assess nutrition and hydration status and recommend course of action  - Evaluate amount of meals eaten  - Assist patient with eating if necessary   - Allow adequate time for meals  - Recommend/ encourage appropriate diets, oral nutritional supplements, and vitamin/mineral supplements  - Order, calculate, and assess calorie counts as needed  - Recommend, monitor, and adjust tube feedings and TPN/PPN based on assessed needs  - Assess need for intravenous fluids  - Provide specific nutrition/hydration education as appropriate  - Include patient/family/caregiver in decisions related to nutrition  Outcome: Not Progressing

## 2025-01-01 NOTE — ED PROVIDER NOTES
Time reflects when diagnosis was documented in both MDM as applicable and the Disposition within this note       Time User Action Codes Description Comment    12/31/2024 10:57 PM Wilner Tony Add [A41.9,  R65.21] Septic shock ([facility])     12/31/2024 10:57 PM ChavoWilner Add [J18.9] Pneumonia     12/31/2024 10:57 PM Wilner Tony Add [E87.20] Lactic acidosis     12/31/2024 10:57 PM ChavoЕленаke Add [N17.9] JOSE ROBERTO (acute kidney injury) ([facility])     12/31/2024 10:57 PM ChavoWilner Add [J96.91] Hypoxic respiratory failure ([facility])     12/31/2024 10:57 PM Wilner Tony Add [N17.9] Acute kidney failure ([facility])     12/31/2024 10:57 PM ChavoWilner Remove [N17.9] JOSE ROBERTO (acute kidney injury) ([facility])     12/31/2024 10:58 PM Wilner Tony Add [R79.89] Elevated troponin           ED Disposition       ED Disposition   Admit    Condition   Stable    Date/Time   Tue Dec 31, 2024 10:57 PM    Comment   Case was discussed with Critical Care and the patient's admission status was agreed to be Admission Status: inpatient status to the service of Dr. Moeller.               Assessment & Plan       Medical Decision Making  Patient is a 89 y.o. female with PMH of hyperlipidemia, hypertension, peripheral vascular disease, stage III CKD, who presents to the ED with complaint of fall at home    On arrival patient is saturating 82% on room air, requiring 4 L of oxygen via nasal cannula in order to saturate 90%, she is in atrial fibrillation with a rapid response/rate, rate is approximately 112-130, patient is cyanotic at the lips, taking shallow tachypneic respirations, auscultation without evidence of congestion, crackles, rhonchi, or wheezing, see physical exam for additional    History and physical exam most consistent with sepsis, unknown source, likely pneumonia versus urosepsis, however, differential diagnosis included but not limited to arrhythmia, ACS, electrolyte abnormality, rhabdomyolysis, intracranial abnormality including but not limited to  hemorrhage, plan septic laboratory evaluation, CK, CT head and CT cervical spine noncontrast, CT chest on pelvis with contrast, troponins, volume resuscitation with a liter of saline, continued oxygen via nasal cannula for hypoxia,    View ED course above for further discussion on patient workup.     Patient's laboratory evaluation demonstrates multiple critical values, VBG 7.294 for pH, PT/INR elevated, PTT within normal use, CMP demonstrating elevated sodium 154, anion gap of 25 with a base deficit of -9.8, T. bili is elevated, transaminases are not elevated, new onset JOSE ROBERTO creatinine 3.18, magnesium level within normal limits, CK is elevated a 59, lactic acid elevated to 8.6, BNP elevated 859, procalcitonin elevated to 51.53, initial troponin of 187, white blood cell count initial of 20 COVID/flu negative urinalysis with moderate blood, no bacterial seen via straight cath urine sample    All labs reviewed and utilized in the medical decision making process  All radiology studies independently viewed by me and interpreted by the radiologist.  I reviewed all testing with the patient.     Upon re-evaluation, Patient continues remain hemodynamically stable while on BiPAP, is having intermittent runs of atrial fibrillation which are now less frequent, with elevated rates, status post administration of diltiazem as well as amiodarone drip and bolus patient's A-fib with RVR is controlled, still remains in atrial fibrillation, is tolerating the BiPAP, remains alert with mentation, CT imaging demonstrates CT spine cervical with no cervical spine fracture or traumatic malalignment, CT head noncontrast without evidence of acute intracranial abnormality    Formal read for CT chest abdomen pelvis is pending, but there is evidence of a right middle lobe pneumonia, appearance of an air bronchogram, likely aspiration, patient had a sepsis alert called, received antibiotics as well as septic fluid bolus    Critical care was  consulted, contacted, made recommendations, agreed to evaluation of the patient, evaluation of the patient performed with admission of the patient to the MICU recommended    During this interval the patient's family smoking, patient was made DNR/DNI CODE STATUS level 3, the son was present for this conversation, agreeable to this conversation, additionally the family members present with the patient as well as patient is over spoken to, all in agreement with no intubation, no CPR    Patient admitted with oxygen via BiPAP for work of breathing, amiodarone drip, antibiotics and fluid bolus, has received antiarrhythmic medications as well as 2 g of magnesium, patient's family planing on visiting tomorrow morning, patient made aware.        Amount and/or Complexity of Data Reviewed  Labs: ordered. Decision-making details documented in ED Course.  Radiology: ordered.    Risk  Prescription drug management.  Decision regarding hospitalization.        ED Course as of 01/01/25 0030   Tue Dec 31, 2024   2047 WBC(!): 20.40   2047 Base Excess, Magdaleno: -9.8   2047 pH, Magdaleno(!): 7.294   2108 LACTIC ACID(!!): 8.6  Sepsis alert called, patient meeting volume resuscitation via 30 cc/kg of actual body weight, antibiotics added to cefepime and vancomycin   2109 Procalcitonin(!): 51.53  CT scan looking for evidence of lung disease/pneumonia   2109 BNP(!): 859   2109 Total CK(!): 859  Unknown downtime, received volume resuscitation at 30 cc/kg   2109 hs TnI 0hr(!): 187   2110 hs TnI 0hr(!): 187  Demand ischemia secondary to A-fib likely secondary to sepsis, will pursue delta troponin   2110 Sodium(!): 154  Received volume resuscitation with 30 cc/kg, new onset JOSE ROBERTO likely secondary to sepsis, endorgan failure/dysfunction   2110 Creatinine(!): 3.18   2110 ANION GAP(!): 25   2143 On return from CT scan, patient noted to be having elevated heart rates, on the monitor, appears to be in atrial fibrillation with a rate elevated to as high as 210,  rate sustained for only 5 to 10 seconds at a time, will return to a rate of about 110, patient is awake, alert, mentating   2143 Patient placed on BiPAP by RT   2144 Continuing with resuscitation with 500 L of saline, adding on 10 of diltiazem for several runs of A-fib with RVR elevated to rate of approximately 200, additionally will add on 2 g of magnesium   2144 Patient placed on cardiac pads, 2 large-bore IV placed, is pending a delta troponin, is receiving IV antibiotics, and has received volume resuscitation per weight-based dosage   2145 CT scan demonstrates evidence of right middle and right lower lobe pneumonia, possible aspiration pneumonia based on appearance, air bronchograms noted, patient does have a known history of fall with unknown downtime   2154 Spoke w/ Critical Care, recs for Amio 150 + amio drip, adjustment of volume to 2500cc maximum, will give x1 additional liter, will speak w/ Son to further rectify code status       Medications   magnesium sulfate 2 g/50 mL IVPB (premix) 2 g (has no administration in time range)   norepinephrine (LEVOPHED) 4 mg (STANDARD CONCENTRATION) IV in sodium chloride 0.9% 250 mL (0 mcg/min Intravenous Hold 12/31/24 2230)   amiodarone (CORDARONE) 900 mg in dextrose 5 % 500 mL infusion (1 mg/min Intravenous New Bag 12/31/24 2253)     Followed by   amiodarone (CORDARONE) 900 mg in dextrose 5 % 500 mL infusion (has no administration in time range)   aspirin (ECOTRIN LOW STRENGTH) EC tablet 81 mg ( Oral Held by provider 12/31/24 2326)   chlorhexidine (PERIDEX) 0.12 % oral rinse 15 mL (has no administration in time range)   lactated ringers infusion (has no administration in time range)   heparin (porcine) subcutaneous injection 5,000 Units (has no administration in time range)   sodium chloride 0.9 % bolus 1,000 mL (0 mL Intravenous Stopped 12/31/24 2249)   vancomycin (VANCOCIN) IVPB (premix in dextrose) 1,000 mg 200 mL (1,000 mg Intravenous New Bag 12/31/24 2245)    cefepime (MAXIPIME) 1,000 mg in dextrose 5 % 50 mL IVPB (0 mg Intravenous Stopped 12/31/24 2249)   sodium chloride 0.9 % bolus 500 mL (0 mL Intravenous Stopped 12/31/24 2237)   diltiazem (CARDIZEM) injection 10 mg (10 mg Intravenous Given 12/31/24 2138)   amiodarone 150 mg in dextrose 5 % 100 mL IV bolus (0 mg Intravenous Stopped 12/31/24 2253)   multi-electrolyte (ISOLYTE-S PH 7.4) bolus 1,000 mL (0 mL Intravenous Stopped 12/31/24 2340)       ED Risk Strat Scores                                              History of Present Illness       Chief Complaint   Patient presents with    Fall     Pt here because she was found down after a welfare check and couldn't get up. Pt states she lowered herself to the floor. No HS. Pt alert and oriented and only complaining of back pain.        Past Medical History:   Diagnosis Date    Allergic rhinitis     resolved 12/29/14    Bladder cancer (HCC)     last assessed 6/13/13    Colon cancer (HCC)     last assessed 12/19/12    Diverticulitis     of colon    Hypertension     Lyme disease     last assessed 12/19/12    Microscopic hematuria     Osteoarthritis     resolved 12/29/14    TIA (transient ischemic attack)       Past Surgical History:   Procedure Laterality Date    CATARACT EXTRACTION      CHOLECYSTECTOMY      COLECTOMY      partial - sigmoid    CYSTOSCOPY Left     w/insertion of ureteral stent    MOUTH SURGERY      REVISION TOTAL HIP ARTHROPLASTY Left 2015    TONSILLECTOMY        Family History   Problem Relation Age of Onset    Cancer Sister     Heart disease Sister     Other Mother         respiratory failure    Gallbladder disease Father       Social History     Tobacco Use    Smoking status: Former     Passive exposure: Past    Smokeless tobacco: Never   Vaping Use    Vaping status: Never Used   Substance Use Topics    Alcohol use: No    Drug use: No      E-Cigarette/Vaping    E-Cigarette Use Never User       E-Cigarette/Vaping Substances    Nicotine No     THC No        I have reviewed and agree with the history as documented.     Patient found down by sonsanthosh was called EMS for patient nonresponsiveness, ordinarily patient is driving herself around, ambulates without difficulty, takes care of herself and all of her own ADLs, she has no recent complaint of sore throat cough or congestion, no complaint of fevers, last time son spoke to her and states her normal baseline was observed was on Sunday, she has a remote history of cancer but has not had anything actively treated in many years, she herself complains only of some decreased urine output, abdominal discomfort in the lower abdomen, no known history of cardiac disease, no history of atrial fibrillation        Review of Systems        Objective       ED Triage Vitals   Temperature Pulse Blood Pressure Respirations SpO2 Patient Position - Orthostatic VS   12/31/24 1932 12/31/24 1932 12/31/24 1932 12/31/24 1932 12/31/24 1932 --   97.6 °F (36.4 °C) 94 131/57 16 (!) 82 %       Temp Source Heart Rate Source BP Location FiO2 (%) Pain Score    12/31/24 1932 12/31/24 1932 01/01/25 0017 -- --    Tympanic Monitor Left arm        Vitals      Date and Time Temp Pulse SpO2 Resp BP Pain Score FACES Pain Rating User   01/01/25 0017 98.6 °F (37 °C) 106 -- 22 132/79 -- -- LA   12/31/24 2330 -- 100 98 % 30 119/60 -- -- MB   12/31/24 2315 -- 100 95 % 36 128/59 -- -- MB   12/31/24 2300 -- 100 98 % 37 119/54 -- -- MB   12/31/24 2245 -- 110 98 % 38 119/54 -- -- MB   12/31/24 2230 -- 116 98 % 37 -- -- -- MB   12/31/24 2215 -- 118 97 % 44 102/60 -- -- MB   12/31/24 2207 -- -- 95 % -- -- -- -- CS   12/31/24 2200 -- 108 96 % 42 124/58 -- -- MB   12/31/24 2145 -- 100 95 % 21 140/64 -- -- MB   12/31/24 2130 -- 198 Dr. ruiz at bedside and pt placed on pads 92 % 40 103/56 -- -- MB   12/31/24 2125 -- 117 90 % 15 126/66 -- -- MB   12/31/24 2030 -- 128 90 % 40 127/55 -- -- MB   12/31/24 1943 -- -- 90 % -- -- -- -- MB   12/31/24 1932 97.6 °F (36.4 °C) 94  82 % 16 131/57 -- -- MB            Physical Exam  Vitals and nursing note reviewed.   Constitutional:       General: She is not in acute distress.     Appearance: She is well-developed. She is ill-appearing and toxic-appearing.      Comments: Patient is cyanotic, cyanotic lips, tachypneic, taking shallow respirations, alert, mentating appropriately, knows her name/date of birth/location today, unsure of the events regarding her fall    Lungs are clear to auscultation but the patient is taking very depressed respirations, unable to auscultate fully    Patient is very frail appearing, cachectic appearing   HENT:      Head: Normocephalic and atraumatic.      Nose: No congestion or rhinorrhea.      Mouth/Throat:      Pharynx: No oropharyngeal exudate or posterior oropharyngeal erythema.   Eyes:      Extraocular Movements: Extraocular movements intact.      Conjunctiva/sclera: Conjunctivae normal.      Pupils: Pupils are equal, round, and reactive to light.   Cardiovascular:      Rate and Rhythm: Normal rate and regular rhythm.      Heart sounds: No murmur heard.  Pulmonary:      Effort: Respiratory distress present.      Breath sounds: Normal breath sounds. No wheezing, rhonchi or rales.   Chest:      Chest wall: No tenderness.   Abdominal:      Palpations: Abdomen is soft.      Tenderness: There is abdominal tenderness. There is no guarding or rebound.      Comments: Patient has lower abdominal tenderness   Musculoskeletal:         General: No swelling.      Cervical back: Neck supple.   Skin:     General: Skin is warm and dry.      Capillary Refill: Capillary refill takes less than 2 seconds.      Coloration: Skin is pale. Skin is not jaundiced.      Findings: No erythema.   Neurological:      Mental Status: She is alert.   Psychiatric:         Mood and Affect: Mood normal.         Results Reviewed       Procedure Component Value Units Date/Time    HS Troponin I 2hr [[id number]]  (Abnormal) Collected: 12/31/24 1709     Lab Status: Final result Specimen: Blood from Arm, Right Updated: 01/01/25 0016     hs TnI 2hr 179 ng/L      Delta 2hr hsTnI -8 ng/L     CK [[id number]]     Lab Status: No result Specimen: Blood     Lactic acid 2 Hours [[id number]]  (Abnormal) Collected: 12/31/24 2237    Lab Status: Final result Specimen: Blood from Arm, Right Updated: 12/31/24 2327     LACTIC ACID 4.7 mmol/L     Narrative:      Result may be elevated if tourniquet was used during collection.    Blood gas, arterial [[id number]]     Lab Status: No result Specimen: Blood, Arterial     Lactic acid, plasma (w/reflex if result > 2.0) [[id number]]     Lab Status: No result Specimen: Blood     Magnesium [[id number]]     Lab Status: No result Specimen: Blood     Phosphorus [[id number]]     Lab Status: No result Specimen: Blood     Legionella antigen, urine [[id number]]     Lab Status: No result Specimen: Urine, Catheter     Strep Pneumoniae, Urine [[id number]]     Lab Status: No result Specimen: Urine, Catheter     Blood gas, arterial [[id number]]  (Abnormal) Collected: 12/31/24 2302    Lab Status: Final result Specimen: Blood, Arterial from Radial, Left Updated: 12/31/24 2314     pH, Arterial 7.332     pCO2, Arterial 25.4 mm Hg      pO2, Arterial 103.0 mm Hg      HCO3, Arterial 13.2 mmol/L      Base Excess, Arterial -11.1 mmol/L      O2 Content, Arterial 16.0 mL/dL      O2 HGB,Arterial  96.3 %      SOURCE Radial, Left     REE TEST Yes     Non Vent type BIPAP BIPAP     IPAP 12     EPAP 6     BIPAP fio2 50 %     Urine Microscopic [[id number]]  (Abnormal) Collected: 12/31/24 2215    Lab Status: Final result Specimen: Urine, Straight Cath Updated: 12/31/24 2313     RBC, UA 1-2 /hpf      WBC, UA None Seen /hpf      Epithelial Cells Occasional /hpf      Bacteria, UA None Seen /hpf      Hyaline Casts, UA 5-10 /lpf     UA w Reflex to Microscopic w Reflex to Culture [[id number]]  (Abnormal) Collected: 12/31/24 2215    Lab Status: Final result Specimen: Urine, Straight Cath  Updated: 12/31/24 2311     Color, UA Yellow     Clarity, UA Turbid     Specific Gravity, UA 1.016     pH, UA 5.5     Leukocytes, UA Negative     Nitrite, UA Negative     Protein,  (2+) mg/dl      Glucose, UA Negative mg/dl      Ketones, UA Negative mg/dl      Urobilinogen, UA <2.0 mg/dl      Bilirubin, UA Negative     Occult Blood, UA Moderate    HS Troponin I 4hr [[id number]]     Lab Status: No result Specimen: Blood     RBC Morphology Reflex Test [[id number]] Collected: 12/31/24 2024    Lab Status: Final result Specimen: Blood from Arm, Right Updated: 12/31/24 2201    FLU/RSV/COVID - if FLU/RSV clinically relevant [[id number]]  (Normal) Collected: 12/31/24 2032    Lab Status: Final result Specimen: Nares from Nose Updated: 12/31/24 2121     SARS-CoV-2 Negative     INFLUENZA A PCR Negative     INFLUENZA B PCR Negative     RSV PCR Negative    Narrative:      This test has been performed using the CoV-2/Flu/RSV plus assay on the [employer] platform. This test has been validated by the  and verified by the performing laboratory.     This test is designed to amplify and detect the following: nucleocapsid (N), envelope (E), and RNA-dependent RNA polymerase (RdRP) genes of the SARS-CoV-2 genome; matrix (M), basic polymerase (PB2), and acidic protein (PA) segments of the influenza A genome; matrix (M) and non-structural protein (NS) segments of the influenza B genome, and the nucleocapsid genes of RSV A and RSV B.     Positive results are indicative of the presence of Flu A, Flu B, RSV, and/or SARS-CoV-2 RNA. Positive results for SARS-CoV-2 or suspected novel influenza should be reported to state, local, or federal health departments according to local reporting requirements.      All results should be assessed in conjunction with clinical presentation and other laboratory markers for clinical management.     FOR PEDIATRIC PATIENTS - copy/paste COVID Guidelines URL to browser:  [email or web address]       Manual Differential(PHLEBS Do Not Order) [[id number]]  (Abnormal) Collected: 12/31/24 2024    Lab Status: Final result Specimen: Blood from Arm, Right Updated: 12/31/24 2115     Segmented % 90 %      Bands % 1 %      Lymphocytes % 2 %      Monocytes % 6 %      Eosinophils % 0 %      Basophils % 0 %      Atypical Lymphocytes % 1 %      Absolute Neutrophils 18.56 Thousand/uL      Absolute Lymphocytes 0.61 Thousand/uL      Absolute Monocytes 1.22 Thousand/uL      Absolute Eosinophils 0.00 Thousand/uL      Absolute Basophils 0.00 Thousand/uL      Total Counted --     RBC Morphology Present     Platelet Estimate Adequate     Anisocytosis Present     Ovalocytes Present     Poikilocytes Present     Polychromasia Present    CBC and differential [[id number]]  (Abnormal) Collected: 12/31/24 2024    Lab Status: Final result Specimen: Blood from Arm, Right Updated: 12/31/24 2115     WBC 20.40 Thousand/uL      RBC 4.52 Million/uL      Hemoglobin 14.4 g/dL      Hematocrit 44.7 %      MCV 99 fL      MCH 31.9 pg      MCHC 32.2 g/dL      RDW 13.3 %      MPV 11.3 fL      Platelets 245 Thousands/uL     Narrative:      This is an appended report.  These results have been appended to a previously verified report.    Procalcitonin [[id number]]  (Abnormal) Collected: 12/31/24 2024    Lab Status: Final result Specimen: Blood from Arm, Right Updated: 12/31/24 2106     Procalcitonin 51.53 ng/ml     HS Troponin 0hr (reflex protocol) [[id number]]  (Abnormal) Collected: 12/31/24 2024    Lab Status: Final result Specimen: Blood from Arm, Right Updated: 12/31/24 2106     hs TnI 0hr 187 ng/L     B-Type Natriuretic Peptide(BNP) [[id number]]  (Abnormal) Collected: 12/31/24 2024    Lab Status: Final result Specimen: Blood from Arm, Right Updated: 12/31/24 2105      pg/mL     Lactic acid [[id number]]  (Abnormal) Collected: 12/31/24 2024    Lab Status: Final result  Specimen: Blood from Arm, Right Updated: 12/31/24 2102     LACTIC ACID 8.6 mmol/L     Narrative:      Result may be elevated if tourniquet was used during collection.    Comprehensive metabolic panel [[id number]]  (Abnormal) Collected: 12/31/24 2024    Lab Status: Final result Specimen: Blood from Arm, Right Updated: 12/31/24 2056     Sodium 154 mmol/L      Potassium 3.5 mmol/L      Chloride 115 mmol/L      CO2 14 mmol/L      ANION GAP 25 mmol/L      BUN 82 mg/dL      Creatinine 3.18 mg/dL      Glucose 121 mg/dL      Calcium 9.3 mg/dL       U/L      ALT 37 U/L      Alkaline Phosphatase 71 U/L      Total Protein 7.6 g/dL      Albumin 3.7 g/dL      Total Bilirubin 1.67 mg/dL      eGFR 11 ml/min/1.73sq m     Narrative:      National Kid[employer] guidelines for Chronic Kidney Disease (CKD):     Stage 1 with normal or high GFR (GFR > 90 mL/min/1.73 square meters)    Stage 2 Mild CKD (GFR = 60-89 mL/min/1.73 square meters)    Stage 3A Moderate CKD (GFR = 45-59 mL/min/1.73 square meters)    Stage 3B Moderate CKD (GFR = 30-44 mL/min/1.73 square meters)    Stage 4 Severe CKD (GFR = 15-29 mL/min/1.73 square meters)    Stage 5 End Stage CKD (GFR <15 mL/min/1.73 square meters)  Note: GFR calculation is accurate only with a steady state creatinine    Magnesium [[id number]]  (Normal) Collected: 12/31/24 2024    Lab Status: Final result Specimen: Blood from Arm, Right Updated: 12/31/24 2056     Magnesium 2.7 mg/dL     CK [[id number]]  (Abnormal) Collected: 12/31/24 2024    Lab Status: Final result Specimen: Blood from Arm, Right Updated: 12/31/24 2056     Total  U/L     Protime-INR [[id number]]  (Abnormal) Collected: 12/31/24 2024    Lab Status: Final result Specimen: Blood from Arm, Right Updated: 12/31/24 2053     Protime 18.3 seconds      INR 1.50    Narrative:      INR Therapeutic Range    Indication                                             INR Range      Atrial Fibrillation                                                2.0-3.0  Hypercoagulable State                                    2.0.2.3  Left Ventricular Asist Device                            2.0-3.0  Mechanical Heart Valve                                  -    Aortic(with afib, MI, embolism, HF, LA enlargement,    and/or coagulopathy)                                     2.0-3.0 (2.5-3.5)     Mitral                                                             2.5-3.5  Prosthetic/Bioprosthetic Heart Valve               2.0-3.0  Venous thromboembolism (VTE: VT, PE        2.0-3.0    APTT [[id number]]  (Normal) Collected: 12/31/24 2024    Lab Status: Final result Specimen: Blood from Arm, Right Updated: 12/31/24 2053     PTT 28 seconds     Blood gas, Venous [[id number]]  (Abnormal) Collected: 12/31/24 2024    Lab Status: Final result Specimen: Blood from Arm, Right Updated: 12/31/24 2040     pH, Magdaleno 7.294     pCO2, Magdaleno 32.7 mm Hg      pO2, Magdaleno 26.4 mm Hg      HCO3, Magdaleno 15.5 mmol/L      Base Excess, Magdaleno -9.8 mmol/L      O2 Content, Magdaleno 7.6 ml/dL      O2 HGB, VENOUS 37.6 %     Blood culture #2 [[id number]] Collected: 12/31/24 2027    Lab Status: In process Specimen: Blood from Arm, Left Updated: 12/31/24 2038    Blood culture #1 [[id number]] Collected: 12/31/24 2024    Lab Status: In process Specimen: Blood from Arm, Right Updated: 12/31/24 2034            CT head without contrast   Final Interpretation by Ayan Aguirre MD (12/31 2240)      No acute intracranial abnormality.                  Workstation performed: [id number]         CT spine cervical without contrast   Final Interpretation by Ayan Aguirre MD (12/31 2243)      No cervical spine fracture or traumatic malalignment.                  Workstation performed: [id number]         CT chest abdomen pelvis wo contrast    (Results Pending)       Procedures    ED Medication and Procedure Management   Prior to Admission Medications   Prescriptions Last Dose Informant Patient Reported? Taking?   amLODIPine (NORVASC) 5  mg tablet   No No   Sig: Take 1 tablet (5 mg total) by mouth 2 (two) times a day   aspirin 81 MG tablet  Self Yes No   Sig: Take 1 tablet by mouth daily   metoprolol tartrate (LOPRESSOR) 50 mg tablet   No No   Sig: Take 0.5 tablets (25 mg total) by mouth 2 (two) times a day   temazepam (RESTORIL) 15 mg capsule   No No   Sig: Take 2 capsules (30 mg total) by mouth daily at bedtime as needed for sleep      Facility-Administered Medications: None     Current Discharge Medication List        CONTINUE these medications which have NOT CHANGED    Details   amLODIPine (NORVASC) 5 mg tablet Take 1 tablet (5 mg total) by mouth 2 (two) times a day  Qty: 180 tablet, Refills: 1    Associated Diagnoses: Essential hypertension      aspirin 81 MG tablet Take 1 tablet by mouth daily      metoprolol tartrate (LOPRESSOR) 50 mg tablet Take 0.5 tablets (25 mg total) by mouth 2 (two) times a day  Qty: 90 tablet, Refills: 1    Associated Diagnoses: Essential hypertension      temazepam (RESTORIL) 15 mg capsule Take 2 capsules (30 mg total) by mouth daily at bedtime as needed for sleep  Qty: 60 capsule, Refills: 0    Associated Diagnoses: Insomnia, unspecified type           No discharge procedures on file.  ED SEPSIS DOCUMENTATION   Time reflects when diagnosis was documented in both MDM as applicable and the Disposition within this note       Time User Action Codes Description Comment    12/31/2024 10:57 PM Wilner Tony Add [A41.9,  R65.21] Septic shock ([facility])     12/31/2024 10:57 PM Wilner Tony Add [J18.9] Pneumonia     12/31/2024 10:57 PM Wilner Tony Add [E87.20] Lactic acidosis     12/31/2024 10:57 PM Wilner Tony Add [N17.9] JOSE ROBERTO (acute kidney injury) ([facility])     12/31/2024 10:57 PM Wilner Tony Add [J96.91] Hypoxic respiratory failure ([facility])     12/31/2024 10:57 PM Wilner Tony Add [N17.9] Acute kidney failure ([facility])     12/31/2024 10:57 PM Wilner Tony Remove [N17.9] JOSE ROBERTO (acute kidney injury) ([facility])     12/31/2024 10:58 PM Wilner Tony  "Add [R79.89] Elevated troponin            Initial Sepsis Screening       Row Name 12/31/24 2328 12/31/24 2101             Is the patient's history suggestive of a new or worsening infection? -- Yes (Proceed)  -LC       Suspected source of infection pneumonia  -LZ pneumonia  -LC       Indicate SIRS criteria Tachypnea > 20 resp per min;Leukocytosis (WBC > 66491 IJL) OR Leukopenia (WBC <4000 IJL) OR Bandemia (WBC >10% bands);Tachycardia > 90 bpm  -LZ Tachycardia > 90 bpm;Tachypnea > 20 resp per min;Leukocytosis (WBC > 73343 IJL) OR Leukopenia (WBC <4000 IJL) OR Bandemia (WBC >10% bands)  -LC       Are two or more of the above signs & symptoms of infection both present and new to the patient? Yes (Proceed)  -LZ Yes (Proceed)  -       Assess for evidence of organ dysfunction: Are any of the below criteria present within 6 hours of suspected infection and SIRS criteria that are NOT considered to be chronic conditions? Creatinine > 2.0 AND > 0.5 above baseline;MAP < 65;SBP < 90;Lactate >/equal 4.0;Urine output < 0.5 mL/kg/hour for 2 hours  -LZ Lactate > 2.0;Lactate >/equal 4.0;Creatinine > 2.0  -       Date of presentation of severe sepsis 12/31/24  - 12/31/24  -       Time of presentation of severe sepsis 2329 -LZ 2104  -       Date of presentation of septic shock 12/31/24  - 12/31/24  -       Time of presentation of septic shock 2329 - 2105  -       Fluid Resuscitation: 30 ml/kg IV fluid bolus will be given based on actual body weight  - 30 ml/kg IV fluid bolus will be given based on actual body weight  -       Is the patient is persistently hypotensive in the hour after fluid bolus administration? If yes, patient meets criteria for vasopressor use. NO  -LZ NO  -LC       Sepsis Note: Click \"NEXT\" below (NOT \"close\") to generate sepsis note based on above information. YES (proceed by clicking \"NEXT\")  -LZ YES (proceed by clicking \"NEXT\")  -                 User Key  (r) = Recorded By, (t) = Taken " "By, (c) = Cosigned By      Initials Name Provider Type    JUAN Tony DO Resident    SIN Cha MD Resident                  Default Flowsheet Data (Last 720 Hours)       Sepsis Reassess       Row Name 01/01/25 0027                   Repeat Volume Status and Tissue Perfusion Assessment Performed    Date of Reassessment: 12/31/24  -        Time of Reassessment: 2130  -        Sepsis Reassessment Note: Click \"NEXT\" below (NOT \"close\") to generate sepsis reassessment note. YES (proceed by clicking \"NEXT\")  -        Repeat Volume Status and Tissue Perfusion Assessment Performed --                  User Key  (r) = Recorded By, (t) = Taken By, (c) = Cosigned By      Initials Name Provider Type    JUAN Tony DO Resident                     Wilner Tony DO  01/01/25 0030    "

## 2025-01-01 NOTE — SEPSIS NOTE
"  Sepsis Note   Remedios Mcgrath 89 y.o. female MRN: [id number]  Unit/Bed#: ED 24 Encounter: [id number]       Initial Sepsis Screening       Row Name 12/31/24 2101                Is the patient's history suggestive of a new or worsening infection? Yes (Proceed)  -LC        Suspected source of infection pneumonia  -LC        Indicate SIRS criteria Tachycardia > 90 bpm;Tachypnea > 20 resp per min;Leukocytosis (WBC > 02022 IJL) OR Leukopenia (WBC <4000 IJL) OR Bandemia (WBC >10% bands)  -LC        Are two or more of the above signs & symptoms of infection both present and new to the patient? Yes (Proceed)  -        Assess for evidence of organ dysfunction: Are any of the below criteria present within 6 hours of suspected infection and SIRS criteria that are NOT considered to be chronic conditions? Lactate > 2.0;Lactate >/equal 4.0;Creatinine > 2.0  -        Date of presentation of severe sepsis 12/31/24  -        Time of presentation of severe sepsis 2104  -        Date of presentation of septic shock 12/31/24  -        Time of presentation of septic shock 2105  -        Fluid Resuscitation: 30 ml/kg IV fluid bolus will be given based on actual body weight  -        Is the patient is persistently hypotensive in the hour after fluid bolus administration? If yes, patient meets criteria for vasopressor use. NO  -LC        Sepsis Note: Click \"NEXT\" below (NOT \"close\") to generate sepsis note based on above information. YES (proceed by clicking \"NEXT\")  -                  User Key  (r) = Recorded By, (t) = Taken By, (c) = Cosigned By      Initials Name Provider Type    DO Deborah Davis                  Sepsis fluid bolus to be given for actual body weight      There is no height or weight on file to calculate BMI.  Wt Readings from Last 1 Encounters:   10/04/24 40.8 kg (90 lb)        Ideal body weight: 47.1 kg (103 lb 14.5 oz)    "

## 2025-01-01 NOTE — H&P
H&P - Critical Care/ICU   Name: Remedios Mcgrath 89 y.o. female I MRN: [id number]  Unit/Bed#: MICU 11 I Date of Admission: 12/31/2024   Date of Service: 1/1/2025 I Hospital Day: 1       Assessment & Plan   Neuro:   #Concern for AMS  -Oriented on exam in ED. Continue to monitor. Hold home restoril for now.     CV:   #New onset atrial fibrillation with rapid ventricular response  -Suspect due to acutely ill state, volume depletion  -Received Cardizem bolus in ED  -Received amio bolus, started on amiodarone drip in ED. Continue amiodarone.  -Optimize electrolytes, volume status and sepsis mgmt  -Echo ordered    #Lactic acidosis  -Secondary to sepsis/septic shock  -Initial LA 8.6, repeat 4.7, will continue to trend      #Troponinemia  -Suspect from demand state with dehydration, A fib RVR  -Continue to follow until peaked    #HTN  -Currently SBP's in 120s-130s, will hold home amlodipine and metoprolol for now    Pulm:  # Hypoxia with tachypnea, hypocarbia  -Suspect tachypnea due to underlying sepsis/pneumonia  -Desats to 82 in ED, transiently on BiPAP but worsening hyppocarbia, take off BiPAP in ICU, repeat gas in 30 min, seems comfortable w normal sats on 6L NC.     #Emphysema  -No chronic meds. Can start nebs if needed.    #Pneumonia  -Suspect aspiration given RML appearance, recent hx  -Tx broadly w abx as below    GI:   #Diarrhea with incontinence  -Will send C diff now  -consider rectal tube placement    #Elevated transaminases  -, will follow, maybe due to recent hypoperfusion    #Concern for oropharyngeal dysphagia  -Plan for swallow pt with speech tomorrow, per family has not been eating well    :   # Acute kidney injury  -Creatinine 3.18 today, BUN 82--trend w labs  -, trend w labs  -Due to hypovolemia while down, possibly also renal injury from prolonged downtime    # Chronic kidney disease, stage III  -Baseline creatinine appears to be around 1.1-1.3  -Fluid replacement -- sepsis boluses ordered  "and given in ED, received total ofg 2.5L, would be careful w volume status and further resuscitation at this time given 40kg.  -Trend BMP and UOP; urinary retention protocol    F/E/N:   #Fluids  -Will plan to run MIVF at 75/hr after next set of labs, but received 2.5L and would avoid volume overload    #Lytes  -Give Mag ordered during A fib RVR event  -Add Mag and Phos to ED labs  -Repeat lytes, will replace    #Nutrition  -Cachectic appearing  -Prealbumin pending  -Nutrition consult after Speech/swallow eval    Heme/Onc:   #DVT ppx  -HSQ and SCDs    Endo:   -currently intrinsically controlled    ID:   #Pneumonia, suspect aspiration etiology  -Bcx, Ucx ordered  -Strep pneumo and legionella urine antigens  -Consider RP2 panel   -Empiric vanc/cefepime started in ED, will continue both    MSK/Skin:   #Skin tears  -Dress wounds w mepilex, consider wound care consult    Disposition: Critical care    History of Present Illness   Remedios Mcgrath is a 89 y.o. F w/hx HTN, colon CA, prior TIA, OA, and diverticulitis BIBA after being found down at home on welfare check. Son/family last saw her 3 days ago, she was in normal state of health, no complaints, no cough or SOB. Per family and pt this AM she fell around 0800 and was unable to get up.     While in the ED she was hypoxic to 82% on room air, was placed on nasal cannula but remained hypoxic and was placed on BiPAP. She also developed atrial fibrillation with RVR with rates in 110s to 140s, improved following initial Cardizem bolus; to amiodarone bolus/drip. Family reports she has no known prior history of atrial fibrillation. Per family patient is otherwise healthy 89F typically independent, ambulatory and maintains own ADLs.    Per ED  \"The patient's son went to check on her today and found her lying on the floor of her home. She says she felt very tired and lowered herself to the floor but then could not get up. No report of head strike or LOC. Son says the patient " "\"looks bad\". He last saw her on Sunday when she was in her usual state of health. She does not know how long she was on the floor. The patient is only complaining of some poorly localized back pain, decreased urine output, and suprapubic discomfort at present. No chest pain, shortness of breath, nausea, vomiting, fevers, or chills. Son says she seems more confused and lethargic than usual. No other specific complaints. \"    History obtained from chart review and the patient.  Review of Systems: See HPI for Review of Systems    Historical Information   Past Medical History:  No date: Allergic rhinitis      Comment:  resolved 12/29/14  No date: Bladder cancer (HCC)      Comment:  last assessed 6/13/13  No date: Colon cancer ([facility])      Comment:  last assessed 12/19/12  No date: Diverticulitis      Comment:  of colon  No date: Hypertension  No date: Lyme disease      Comment:  last assessed 12/19/12  No date: Microscopic hematuria  No date: Osteoarthritis      Comment:  resolved 12/29/14  No date: TIA (transient ischemic attack) Past Surgical History:  No date: CATARACT EXTRACTION  No date: CHOLECYSTECTOMY  No date: COLECTOMY      Comment:  partial - sigmoid  No date: CYSTOSCOPY; Left      Comment:  w/insertion of ureteral stent  No date: MOUTH SURGERY  2015: REVISION TOTAL HIP ARTHROPLASTY; Left  No date: TONSILLECTOMY   Current Outpatient Medications   Medication Instructions    amLODIPine (NORVASC) 5 mg, Oral, 2 times daily    aspirin 81 MG tablet 1 tablet, Oral, Daily    metoprolol tartrate (LOPRESSOR) 25 mg, Oral, 2 times daily    temazepam (RESTORIL) 30 mg, Oral, Daily at bedtime PRN    Allergies   Allergen Reactions    Codeine Sulfate Hives    Naproxen       Social History     Tobacco Use    Smoking status: Former     Passive exposure: Past    Smokeless tobacco: Never   Vaping Use    Vaping status: Never Used   Substance Use Topics    Alcohol use: No    Drug use: No    Family History   Problem Relation Age of " Onset    Cancer Sister     Heart disease Sister     Other Mother         respiratory failure    Gallbladder disease Father           Objective :                   Vitals I/O      Most Recent Min/Max in 24hrs   Temp 98.6 °F (37 °C) Temp  Min: 97.6 °F (36.4 °C)  Max: 98.6 °F (37 °C)   Pulse (!) 106 Pulse  Min: 94  Max: 198   Resp 22 Resp  Min: 15  Max: 44   /79 BP  Min: 102/60  Max: 140/64   O2 Sat 98 % SpO2  Min: 82 %  Max: 98 %    No intake or output data in the 24 hours ending 01/01/25 0120    Diet NPO    Invasive Monitoring           Physical Exam   Physical Exam  Eyes:      Extraocular Movements: Extraocular movements intact.   Skin:     Coloration: Skin is not pale.      Findings: Wound present.      Comments: Large L lower leg skin tear   HENT:      Head: Normocephalic.      Mouth/Throat:      Mouth: Mucous membranes are dry.   Cardiovascular:      Rate and Rhythm: Tachycardia present. Rhythm irregular.      Pulses: Normal pulses.   Musculoskeletal:         General: Normal range of motion.   Abdominal:      Palpations: Abdomen is soft.   Constitutional:       General: She is in acute distress.      Appearance: She is ill-appearing.      Comments: Cachectic and frail appearing   Pulmonary:      Effort: Accessory muscle usage and accessory muscle usage present.      Breath sounds: Rhonchi present.   Psychiatric:         Speech: Speech is not no receptive aphasia or no expressive aphasia.   Neurological:      General: No focal deficit present.      Mental Status: She is calm.      Cranial Nerves: No facial asymmetry.          Diagnostic Studies        Lab Results: I have reviewed the following results:     Medications:  Scheduled PRN   [Held by provider] aspirin, 81 mg, Daily  chlorhexidine, 15 mL, Q12H KATT  heparin (porcine), 5,000 Units, Q8H KATT  magnesium sulfate, 2 g, Once          Continuous    amiodarone (CORDARONE) 900 mg in dextrose 5 % 500 mL infusion, 1 mg/min, Last Rate: 1 mg/min (12/31/24 8612)    Followed by  amiodarone (CORDARONE) 900 mg in dextrose 5 % 500 mL infusion, 0.5 mg/min  norepinephrine, 1-30 mcg/min, Last Rate: Stopped (12/31/24 2230)         Labs:   CBC    Recent Labs     12/31/24 2024   WBC 20.40*   HGB 14.4   HCT 44.7      BANDSPCT 1     BMP    Recent Labs     12/31/24 2024   SODIUM 154*   K 3.5   *   CO2 14*   AGAP 25*   BUN 82*   CREATININE 3.18*   CALCIUM 9.3       Coags    Recent Labs     12/31/24 2024   INR 1.50*   PTT 28        Additional Electrolytes  Recent Labs     12/31/24 2024   MG 2.7          Blood Gas    Recent Labs     01/01/25  0031   PHART 7.323*   CZT9URN 29.5*   PO2ART 64.2*   DCM3ORR 15.0*   BEART -9.7   SOURCE Artery     Recent Labs     12/31/24 2024 12/31/24  2302 01/01/25  0031   PHVEN 7.294*  --   --    BMC3GGM 32.7*  --   --    PO2VEN 26.4*  --   --    EOE0HJZ 15.5*  --   --    BEVEN -9.8  --   --    M6PYXOE 37.6*  --   --    SOURCE  --    < > Artery    < > = values in this interval not displayed.    LFTs  Recent Labs     12/31/24 2024   ALT 37   *   ALKPHOS 71   ALB 3.7   TBILI 1.67*       Infectious  Recent Labs     12/31/24 2024   PROCALCITONI 51.53*     Glucose  Recent Labs     12/31/24 2024   GLUC 121

## 2025-01-01 NOTE — SPEECH THERAPY NOTE
Speech-Language Pathology Bedside Swallow Evaluation      Patient Name: Remedios Mcgrath    Today's Date: 1/1/2025     Problem List  Active Problems:    Moderate protein-calorie malnutrition (HCC)    Past Medical History  Past Medical History:   Diagnosis Date    Allergic rhinitis     resolved 12/29/14    Bladder cancer (HCC)     last assessed 6/13/13    Colon cancer (HCC)     last assessed 12/19/12    Diverticulitis     of colon    Hypertension     Lyme disease     last assessed 12/19/12    Microscopic hematuria     Osteoarthritis     resolved 12/29/14    TIA (transient ischemic attack)      Past Surgical History  Past Surgical History:   Procedure Laterality Date    CATARACT EXTRACTION      CHOLECYSTECTOMY      COLECTOMY      partial - sigmoid    CYSTOSCOPY Left     w/insertion of ureteral stent    MOUTH SURGERY      REVISION TOTAL HIP ARTHROPLASTY Left 2015    TONSILLECTOMY         Summary  Pt presented with s/s suggestive of moderate oropharyngeal dysphagia.  Symptoms or concerns included  decreased bolus manipulation and control, slow/weak AP transfer,  suspected pharyngeal swallow delay and suspected decreased hyolaryngeal elevation upon palpation. Mild/weak coughing occurred with trials of thin liquid. No s/s aspiration with puree and HTL trials. Begin conservative diet of puree with HTL, ST f/u for potential to advance.    Risk/s for Aspiration: mod    Recommended Diet: puree/level 1 diet and honey thick liquids   Recommended Form of Meds: crushed with puree   Aspiration precautions and swallowing strategies: upright posture  Other Recommendations: Continue frequent oral care      Current Medical Status per H&P 12/31/24  Remedios Mcgrath is a 89 y.o. F w/hx HTN, colon CA, prior TIA, OA, and diverticulitis BIBA after being found down at home on welfare check. Son/family last saw her 3 days ago, she was in normal state of health, no complaints, no cough or SOB. Per family and pt this AM she fell around 0800 and  "was unable to get up.   While in the ED she was hypoxic to 82% on room air, was placed on nasal cannula but remained hypoxic and was placed on BiPAP. She also developed atrial fibrillation with RVR with rates in 110s to 140s, improved following initial Cardizem bolus; to amiodarone bolus/drip. Family reports she has no known prior history of atrial fibrillation. Per family patient is otherwise healthy 89F typically independent, ambulatory and maintains own ADLs.  Per ED  \"The patient's son went to check on her today and found her lying on the floor of her home. She says she felt very tired and lowered herself to the floor but then could not get up. No report of head strike or LOC. Son says the patient \"looks bad\". He last saw her on Sunday when she was in her usual state of health. She does not know how long she was on the floor. The patient is only complaining of some poorly localized back pain, decreased urine output, and suprapubic discomfort at present. No chest pain, shortness of breath, nausea, vomiting, fevers, or chills. Son says she seems more confused and lethargic than usual. No other specific complaints. \"      Special Studies:  CT chest 12/31/24 LUNGS: Severe emphysema. Consolidations in the right middle and lower lobe. Tubular appearing opacity in the right middle lobe suggesting dilated and impacted airways (13/78).  9 mm x 7 mm solid, noncalcified pulmonary nodule in the left lower lobe.  No endobronchial lesions. No interstitial lung abnormalities.      Swallow Information   Current Risks for Dysphagia & Aspiration: AMS  Current Symptoms/Concerns: coughing with po  Current Diet: NPO   Baseline Diet: regular diet and thin liquids      Baseline Assessment   Behavior/Cognition: alert  Speech/Language Status: able to participate in basic conversation and able to follow commands inconsistently  Patient Positioning: upright in bed  Pain Status/Interventions/Response to Interventions: No report of or " nonverbal indications of pain.       Swallow Mechanism Exam  Facial: symmetrical  Labial: decreased coordination  Lingual: bilateral decreased strength and decreased coordination  Velum: symmetrical  Mandible: adequate ROM  Dentition: full dentures  Vocal quality:weak   Volitional Cough: weak       Consistencies Assessed and Performance   Consistencies Administered: ice chips, thin liquids, honey thick, and puree    Oral Stage: moderate, decreased bolus propulsion, decreased bolus formation, and suspected decreased control of thin liquids     Pharyngeal Stage: moderate, suspected pharyngeal swallow delay, and suspected decreased hyolaryngeal elevation upon palpation    Esophageal Concerns: none reported      Summary and Recommendations (see above)    Results Reviewed with: patient, RN, MD, and family     Treatment Recommended: yes     Frequency of treatment: 3-5x/week      Dysphagia LTG  -Patient will demonstrate safe and effective oral intake (without overt s/s significant oral/pharyngeal dysphagia including s/s penetration or aspiration) for the highest appropriate diet level.     Short Term Goals:  -Pt will tolerate Dysphagia 1/pureed diet and honey thick liquid with no significant s/s oral or pharyngeal dysphagia across 1-3 diagnostic sessions.    -Patient will tolerate trials of upgraded food and/or liquid texture with no significant s/s of oral or pharyngeal dysphagia including aspiration across 1-3 diagnostic sessions.    -If indicated, patient will comply with a Video/Modified Barium Swallow study for more complete assessment of swallowing anatomy/physiology/aspiration risk and to assess efficacy of treatment techniques so as to best guide treatment plan

## 2025-01-01 NOTE — ED ATTENDING ATTESTATION
"12/31/2024  I, Arsalan Delgado MD, saw and evaluated the patient. I have discussed the patient with the resident and agree with the resident's findings, Plan of Care, and MDM as documented in the resident's note, except where noted. All available labs and Radiology studies were reviewed.  I was present for key portions of any procedure(s) performed by the resident and I was immediately available to provide assistance.    At this point I agree with the current assessment done in the Emergency Department.  I have conducted an independent evaluation of this patient a history and physical is as follows:    89 yo female with a history of HTN, colon cancer, prior TIA, osteoarthritis, and diverticulitis brought to the ED by EMS for evaluation of a mental status change. The patient's son went to check on her today and found her lying on the floor of her home. She says she felt very tired and lowered herself to the floor but then could not get up. No report of head strike or LOC. Son says the patient \"looks bad\". He last saw her on Sunday when she was in her usual state of health. She does not know how long she was on the floor. The patient is only complaining of some poorly localized back pain, decreased urine output, and suprapubic discomfort at present. No chest pain, shortness of breath, nausea, vomiting, fevers, or chills. Son says she seems more confused and lethargic than usual. No other specific complaints.    ROS: per resident physician note    Gen: cachectic, ill-appearing  HEENT: PERRL, EOMI, no hemotympanum, (+) dry mm  Neck: supple  CV: (+) tachycardic, (+) irregularly irregular  Lungs: (+) tachypnea  Abdomen: soft, NT/ND  Ext: no swelling or deformity  Neuro: Jasmyn  Skin: no rash    ED Course  The patient is very ill-appearing on arrival to the ED. She is tachypneic, tachycardic, and saturating 82% on room air. Son says she does not look well. Unclear etiology of her presentation. Septic shock vs pneumonia vs " aspiration event vs UTI vs dehydration vs electrolyte disturbance vs ICH vs ACS vs rhabdomyolysis? Will check EKG, basic labs, troponin, CK, BNP, UA, lactate, and CT head/c-spine/thorax/A/P.  IVFs and broad spectrum antibiotics ordered. Will continue to monitor closely in the ED. Disposition per workup and reassessment. The patient will likely require admission to an ICU setting.      Critical Care Time  CriticalCare Time    Date/Time: 12/31/2024 9:17 PM    Performed by: Arsalan Delgado MD  Authorized by: Arsalan Delgado MD    Critical care provider statement:     Critical care time (minutes):  60    Critical care start time:  12/31/2024 8:00 PM    Critical care end time:  12/31/2024 9:00 PM    Critical care time was exclusive of:  Separately billable procedures and treating other patients and teaching time    Critical care was necessary to treat or prevent imminent or life-threatening deterioration of the following conditions:  Sepsis, shock, CNS failure or compromise, renal failure, respiratory failure, circulatory failure, cardiac failure and dehydration    Critical care was time spent personally by me on the following activities:  Obtaining history from patient or surrogate, development of treatment plan with patient or surrogate, discussions with consultants, discussions with primary provider, evaluation of patient's response to treatment, examination of patient, interpretation of cardiac output measurements, ordering and performing treatments and interventions, ordering and review of laboratory studies, ordering and review of radiographic studies, re-evaluation of patient's condition and review of old charts    I assumed direction of critical care for this patient from another provider in my specialty: no    Comments:      Patient arrived in respiratory distress/septic shock requiring emergent fluid resuscitation, supplemental oxygen, broad spectrum antibiotics, and BiPAP.

## 2025-01-01 NOTE — SEPSIS NOTE
"  Sepsis Note   Remedios Mcgrath 89 y.o. female MRN: [id number]  Unit/Bed#: ED 24 Encounter: [id number]       Initial Sepsis Screening       Row Name 12/31/24 2328 12/31/24 2101             Is the patient's history suggestive of a new or worsening infection? -- Yes (Proceed)  -LC       Suspected source of infection pneumonia  -LZ pneumonia  -LC       Indicate SIRS criteria Tachypnea > 20 resp per min;Leukocytosis (WBC > 62113 IJL) OR Leukopenia (WBC <4000 IJL) OR Bandemia (WBC >10% bands);Tachycardia > 90 bpm  -LZ Tachycardia > 90 bpm;Tachypnea > 20 resp per min;Leukocytosis (WBC > 53034 IJL) OR Leukopenia (WBC <4000 IJL) OR Bandemia (WBC >10% bands)  -LC       Are two or more of the above signs & symptoms of infection both present and new to the patient? Yes (Proceed)  -LZ Yes (Proceed)  -LC       Assess for evidence of organ dysfunction: Are any of the below criteria present within 6 hours of suspected infection and SIRS criteria that are NOT considered to be chronic conditions? Creatinine > 2.0 AND > 0.5 above baseline;MAP < 65;SBP < 90;Lactate >/equal 4.0;Urine output < 0.5 mL/kg/hour for 2 hours  -LZ Lactate > 2.0;Lactate >/equal 4.0;Creatinine > 2.0  -       Date of presentation of severe sepsis 12/31/24  - 12/31/24  -       Time of presentation of severe sepsis 2329 -LZ 2104 -       Date of presentation of septic shock 12/31/24  -LZ 12/31/24  -       Time of presentation of septic shock 2329  - 2105  -       Fluid Resuscitation: 30 ml/kg IV fluid bolus will be given based on actual body weight  - 30 ml/kg IV fluid bolus will be given based on actual body weight  -       Is the patient is persistently hypotensive in the hour after fluid bolus administration? If yes, patient meets criteria for vasopressor use. NO  -LZ NO  -LC       Sepsis Note: Click \"NEXT\" below (NOT \"close\") to generate sepsis note based on above information. YES (proceed by clicking \"NEXT\")  -LZ YES (proceed by clicking " "\"NEXT\")  -LC                 User Key  (r) = Recorded By, (t) = Taken By, (c) = Cosigned By      Initials Name Provider Type    LC Wilner Toyn DO Resident    SIN Cha MD Resident                        Body mass index is 18.89 kg/m².  Wt Readings from Last 1 Encounters:   12/31/24 41 kg (90 lb 6.2 oz)     IBW (Ideal Body Weight): 40.9 kg    Ideal body weight: 47.1 kg (103 lb 14.5 oz)    "

## 2025-01-02 PROBLEM — J18.9 PNEUMONIA: Status: ACTIVE | Noted: 2025-01-02

## 2025-01-02 LAB
ALBUMIN SERPL BCG-MCNC: 2.7 G/DL (ref 3.5–5)
ALP SERPL-CCNC: 52 U/L (ref 34–104)
ALT SERPL W P-5'-P-CCNC: 30 U/L (ref 7–52)
ANION GAP SERPL CALCULATED.3IONS-SCNC: 12 MMOL/L (ref 4–13)
APTT PPP: 59 SECONDS (ref 23–34)
APTT PPP: 63 SECONDS (ref 23–34)
AST SERPL W P-5'-P-CCNC: 50 U/L (ref 13–39)
BILIRUB SERPL-MCNC: 0.63 MG/DL (ref 0.2–1)
BUN SERPL-MCNC: 70 MG/DL (ref 5–25)
C DIFF TOX GENS STL QL NAA+PROBE: NEGATIVE
CALCIUM ALBUM COR SERPL-MCNC: 9.2 MG/DL (ref 8.3–10.1)
CALCIUM SERPL-MCNC: 8.2 MG/DL (ref 8.4–10.2)
CHLORIDE SERPL-SCNC: 116 MMOL/L (ref 96–108)
CK SERPL-CCNC: 449 U/L (ref 26–192)
CK SERPL-CCNC: 533 U/L (ref 26–192)
CK SERPL-CCNC: 659 U/L (ref 26–192)
CK SERPL-CCNC: 711 U/L (ref 26–192)
CO2 SERPL-SCNC: 21 MMOL/L (ref 21–32)
CREAT SERPL-MCNC: 1.99 MG/DL (ref 0.6–1.3)
ERYTHROCYTE [DISTWIDTH] IN BLOOD BY AUTOMATED COUNT: 13.6 % (ref 11.6–15.1)
GFR SERPL CREATININE-BSD FRML MDRD: 20 ML/MIN/1.73SQ M
GLUCOSE SERPL-MCNC: 197 MG/DL (ref 65–140)
HCT VFR BLD AUTO: 35.2 % (ref 34.8–46.1)
HGB BLD-MCNC: 11.1 G/DL (ref 11.5–15.4)
MAGNESIUM SERPL-MCNC: 2.9 MG/DL (ref 1.9–2.7)
MCH RBC QN AUTO: 31 PG (ref 26.8–34.3)
MCHC RBC AUTO-ENTMCNC: 31.5 G/DL (ref 31.4–37.4)
MCV RBC AUTO: 98 FL (ref 82–98)
PHOSPHATE SERPL-MCNC: 3.8 MG/DL (ref 2.3–4.1)
PLATELET # BLD AUTO: 193 THOUSANDS/UL (ref 149–390)
PMV BLD AUTO: 11.1 FL (ref 8.9–12.7)
POTASSIUM SERPL-SCNC: 3.3 MMOL/L (ref 3.5–5.3)
PROT SERPL-MCNC: 6.1 G/DL (ref 6.4–8.4)
RBC # BLD AUTO: 3.58 MILLION/UL (ref 3.81–5.12)
SODIUM SERPL-SCNC: 149 MMOL/L (ref 135–147)
TSH SERPL DL<=0.05 MIU/L-ACNC: 1.23 UIU/ML (ref 0.45–4.5)
WBC # BLD AUTO: 11.17 THOUSAND/UL (ref 4.31–10.16)

## 2025-01-02 PROCEDURE — 97167 OT EVAL HIGH COMPLEX 60 MIN: CPT

## 2025-01-02 PROCEDURE — 92526 ORAL FUNCTION THERAPY: CPT

## 2025-01-02 PROCEDURE — 85730 THROMBOPLASTIN TIME PARTIAL: CPT | Performed by: INTERNAL MEDICINE

## 2025-01-02 PROCEDURE — 82550 ASSAY OF CK (CPK): CPT | Performed by: STUDENT IN AN ORGANIZED HEALTH CARE EDUCATION/TRAINING PROGRAM

## 2025-01-02 PROCEDURE — 94640 AIRWAY INHALATION TREATMENT: CPT

## 2025-01-02 PROCEDURE — 94760 N-INVAS EAR/PLS OXIMETRY 1: CPT

## 2025-01-02 PROCEDURE — 97163 PT EVAL HIGH COMPLEX 45 MIN: CPT

## 2025-01-02 PROCEDURE — 94664 DEMO&/EVAL PT USE INHALER: CPT

## 2025-01-02 PROCEDURE — 85027 COMPLETE CBC AUTOMATED: CPT | Performed by: STUDENT IN AN ORGANIZED HEALTH CARE EDUCATION/TRAINING PROGRAM

## 2025-01-02 PROCEDURE — 84100 ASSAY OF PHOSPHORUS: CPT | Performed by: STUDENT IN AN ORGANIZED HEALTH CARE EDUCATION/TRAINING PROGRAM

## 2025-01-02 PROCEDURE — 87040 BLOOD CULTURE FOR BACTERIA: CPT

## 2025-01-02 PROCEDURE — 82550 ASSAY OF CK (CPK): CPT

## 2025-01-02 PROCEDURE — NC001 PR NO CHARGE: Performed by: INTERNAL MEDICINE

## 2025-01-02 PROCEDURE — 99233 SBSQ HOSP IP/OBS HIGH 50: CPT | Performed by: INTERNAL MEDICINE

## 2025-01-02 PROCEDURE — 80053 COMPREHEN METABOLIC PANEL: CPT | Performed by: STUDENT IN AN ORGANIZED HEALTH CARE EDUCATION/TRAINING PROGRAM

## 2025-01-02 PROCEDURE — 83735 ASSAY OF MAGNESIUM: CPT | Performed by: STUDENT IN AN ORGANIZED HEALTH CARE EDUCATION/TRAINING PROGRAM

## 2025-01-02 RX ORDER — PREDNISONE 20 MG/1
40 TABLET ORAL DAILY
Status: COMPLETED | OUTPATIENT
Start: 2025-01-03 | End: 2025-01-05

## 2025-01-02 RX ORDER — METOPROLOL TARTRATE 1 MG/ML
2.5 INJECTION, SOLUTION INTRAVENOUS ONCE
Status: COMPLETED | OUTPATIENT
Start: 2025-01-02 | End: 2025-01-02

## 2025-01-02 RX ORDER — METOPROLOL TARTRATE 1 MG/ML
5 INJECTION, SOLUTION INTRAVENOUS ONCE
Status: COMPLETED | OUTPATIENT
Start: 2025-01-02 | End: 2025-01-02

## 2025-01-02 RX ORDER — POTASSIUM CHLORIDE 14.9 MG/ML
20 INJECTION INTRAVENOUS
Status: DISPENSED | OUTPATIENT
Start: 2025-01-02 | End: 2025-01-02

## 2025-01-02 RX ORDER — METHYLPREDNISOLONE SODIUM SUCCINATE 40 MG/ML
40 INJECTION, POWDER, LYOPHILIZED, FOR SOLUTION INTRAMUSCULAR; INTRAVENOUS EVERY 12 HOURS SCHEDULED
Status: COMPLETED | OUTPATIENT
Start: 2025-01-02 | End: 2025-01-02

## 2025-01-02 RX ORDER — METOPROLOL TARTRATE 25 MG/1
25 TABLET, FILM COATED ORAL EVERY 12 HOURS SCHEDULED
Status: DISCONTINUED | OUTPATIENT
Start: 2025-01-02 | End: 2025-01-15 | Stop reason: HOSPADM

## 2025-01-02 RX ADMIN — CHLORHEXIDINE GLUCONATE 0.12% ORAL RINSE 15 ML: 1.2 LIQUID ORAL at 10:53

## 2025-01-02 RX ADMIN — METHYLPREDNISOLONE SODIUM SUCCINATE 40 MG: 40 INJECTION, POWDER, FOR SOLUTION INTRAMUSCULAR; INTRAVENOUS at 04:04

## 2025-01-02 RX ADMIN — APIXABAN 2.5 MG: 2.5 TABLET, FILM COATED ORAL at 17:15

## 2025-01-02 RX ADMIN — IPRATROPIUM BROMIDE 0.5 MG: 0.5 SOLUTION RESPIRATORY (INHALATION) at 13:08

## 2025-01-02 RX ADMIN — CEFTRIAXONE SODIUM 2000 MG: 10 INJECTION, POWDER, FOR SOLUTION INTRAVENOUS at 13:34

## 2025-01-02 RX ADMIN — METOPROLOL TARTRATE 5 MG: 1 INJECTION, SOLUTION INTRAVENOUS at 09:06

## 2025-01-02 RX ADMIN — METHYLPREDNISOLONE SODIUM SUCCINATE 40 MG: 40 INJECTION, POWDER, FOR SOLUTION INTRAMUSCULAR; INTRAVENOUS at 14:47

## 2025-01-02 RX ADMIN — METOPROLOL TARTRATE 25 MG: 25 TABLET, FILM COATED ORAL at 14:47

## 2025-01-02 RX ADMIN — DEXTROSE 75 ML/HR: 5 SOLUTION INTRAVENOUS at 13:36

## 2025-01-02 RX ADMIN — LEVALBUTEROL HYDROCHLORIDE 1.25 MG: 1.25 SOLUTION RESPIRATORY (INHALATION) at 19:37

## 2025-01-02 RX ADMIN — IPRATROPIUM BROMIDE 0.5 MG: 0.5 SOLUTION RESPIRATORY (INHALATION) at 19:37

## 2025-01-02 RX ADMIN — METOPROLOL TARTRATE 5 MG: 1 INJECTION, SOLUTION INTRAVENOUS at 14:03

## 2025-01-02 RX ADMIN — POTASSIUM CHLORIDE 20 MEQ: 14.9 INJECTION, SOLUTION INTRAVENOUS at 10:54

## 2025-01-02 RX ADMIN — IPRATROPIUM BROMIDE 0.5 MG: 0.5 SOLUTION RESPIRATORY (INHALATION) at 07:43

## 2025-01-02 RX ADMIN — LEVALBUTEROL HYDROCHLORIDE 1.25 MG: 1.25 SOLUTION RESPIRATORY (INHALATION) at 13:08

## 2025-01-02 RX ADMIN — METOPROLOL TARTRATE 2.5 MG: 1 INJECTION, SOLUTION INTRAVENOUS at 04:04

## 2025-01-02 RX ADMIN — LEVALBUTEROL HYDROCHLORIDE 1.25 MG: 1.25 SOLUTION RESPIRATORY (INHALATION) at 07:43

## 2025-01-02 NOTE — WOUND OSTOMY CARE
Consult Note - Wound   Remedios Mcgrath 89 y.o. female MRN: [id number]  Unit/Bed#: MICU 11 Encounter: [id number]        History and Present Illness:  Patient is a 89 yo female that was admitted to [facility] for treatment of moderate protein calorie nutrition. Patient has a PMH of HTN, colon CA, prior TIA, OA, and diverticulitis. Patient was found down at home. Patient was found down for unknown period of time- there is a likely suarez of later development of DTIs status 5 days post fall. Patient is a mod/max assist for turning and repositioning. She is bony in appearance. Patient is incontinent of bowel and bladder. On assessment, patient is seen sitting OOB in recliner. Critical care mattress in room. Able to feed self. Nutrition is following patient.     Wound Care was consulted for sacrum and left shin wound.      Assessment Findings:   B/L heels are dry intact and sarah with no skin loss or wounds present. Recommend preventative foam dressings and proper offloading/ repositioning.      B/L sacro-buttocks is dry, intact, pink in color, hyperpigmented and blanches. No skin loss or wounds present. Recommend preventative foam dressing to the area.   - sacro-buttocks     Left Anterior Shin Wound: patient is unable to state etiology of wound- cannot rule out pressure component due to patient being found down. 2 irregular in shape areas of partial thickness skin loss measured together. Wound beds are red/pink in color. Essence-wounds are fragile. Scant serosanguineous drainage noted. Recommend adaptic and a foam dressing to the area.     No induration, fluctuance, odor, warmth/temperature differences, redness, or purulence noted to the above noted wounds and skin areas assessed. New dressings applied per orders listed below. Patient tolerated well- no s/s of non-verbal pain or discomfort observed during the encounter. Bedside nurse aware of plan of care. See flow sheets for more detailed assessment findings.      Orders  listed below and wound care will continue to follow, call or Secure Chat Message with questions.     Skin Care Plan:  1-Left Shin Wound: Cleanse with NSS and pat dry. Apply adaptic to wound bed and cover with a silicone bordered foam dressing. Wong with T for treatment and change every other day or PRN  2-Turn/reposition q2h or when medically stable for pressure re-distribution on skin.  3-Elevate heels to offload pressure.  4-Moisturize skin daily with skin nourishing cream  5-Ehob cushion in chair when out of bed.  6-Cleanse B/L Heels and Sacro-Buttocks with soap and water. Pat dry. Apply Silicone Border Foam (Mepilex) to areas. Wong with P for Prevention and change every 3 days or PRN soilage/displacement. Peel back and inspect Q-shift.  7-Please order patient a P-500 low air loss mattress when transferred out of MICU    Wounds:  Wound 01/01/25 Skin Tear Pretibial Left (Active)   Wound Image   01/02/25 1131   Wound Description Beefy red;Pink 01/02/25 1131   Essence-wound Assessment Fragile 01/02/25 1131   Wound Length (cm) 16 cm 01/02/25 1131   Wound Width (cm) 2.4 cm 01/02/25 1131   Wound Depth (cm) 0.1 cm 01/02/25 1131   Wound Surface Area (cm^2) 38.4 cm^2 01/02/25 1131   Wound Volume (cm^3) 3.84 cm^3 01/02/25 1131   Calculated Wound Volume (cm^3) 3.84 cm^3 01/02/25 1131   Drainage Amount Scant 01/02/25 1131   Drainage Description Serosanguineous 01/02/25 1131   Non-staged Wound Description Partial thickness 01/02/25 1131   Treatments Cleansed;Irrigation with NSS;Site care 01/02/25 1131   Dressing Non adherent;Foam, Silicon (eg. Allevyn, etc) 01/02/25 1131   Dressing Changed New 01/02/25 1131   Patient Tolerance Tolerated well 01/02/25 1131   Dressing Status Dry;Intact;Clean 01/02/25 1131               Gabriela Fischer RN, BSN, CWOCN

## 2025-01-02 NOTE — DISCHARGE INSTR - OTHER ORDERS
Skin Care Plan:  1-Left Shin Wound: Cleanse with NSS and pat dry. Apply dermagran to wound beds, cover with ABDS, and secure with tae wrap. Change every other day or PRN.   2-Turn/reposition q2h or when medically stable for pressure re-distribution on skin.  3-Elevate heels to offload pressure.  4-Moisturize skin daily with skin nourishing cream  5-Ehob cushion in chair when out of bed.  6-Cleanse B/L Heels and Sacro-Buttocks with soap and water. Pat dry. Apply Silicone Border Foam (Mepilex) to areas. Wong with P for Prevention and change every 3 days or PRN soilage/displacement. Peel back and inspect Q-shift.

## 2025-01-02 NOTE — SPEECH THERAPY NOTE
Speech-Language Pathology Progress Note      Patient Name: Remedios Mcgrath    Today's Date: 1/2/2025    Subjective:  Pt was awake and alert. She was sitting up in chair at bedside.     Objective:  Pt was seen today for dysphagia therapy at request of RN d/t report of pt dislike of current diet/liquids. Current diet is puree with honey thick liquids. Focus of today's session was determine potential for diet texture advancement and determine potential for liquid consistency advancement. Textures offered today included hard solid and thin liquid via cup and via straw.  Swallow function:   Bolus retrieval and control were adequate. Pt broke cookies into bites before eating, with slow but adequate mastication. Formation and AP transfer were slow. Pharyngeal swallow initiation was delayed. Hyolaryngeal excursion was weak. No s/s aspiration occurred during session today.    Assessment:  Swallow function is improving with current diet. Diet texture and liquid consistency advancement is recommended at this time.    Plan:  Change diet texture and liquid consistency to dysphagia 2 mechanical soft and thin liquids. Continue ST follow up. Subsequent sessions to focus on maximizing PO intake safety and determining potential for diet texture advancement.

## 2025-01-02 NOTE — PROGRESS NOTES
Progress Note - Critical Care/ICU   Name: Remedios Mcgrath 89 y.o. female I MRN: [id number]  Unit/Bed#: MICU 11 I Date of Admission: 12/31/2024   Date of Service: 1/2/2025 I Hospital Day: 2       Critical Care Interval Transfer Note:    Brief Hospital Summary: Patient is a 89 y.o. F w/hx HTN, colon CA, prior TIA, OA, and diverticulitis BIBA after being found down at home on welfare check. Son/family last saw her 3 days ago, she was in normal state of health, no complaints, no cough or SOB. Per family and pt this AM she fell around 0800 and was unable to get up.  While in the ED she was hypoxic to 82% on room air, was placed on nasal cannula but remained hypoxic and was placed on BiPAP. She also developed atrial fibrillation with RVR with rates in 110s to 140s, improved following initial Cardizem bolus; to amiodarone bolus/drip. Family reports she has no known prior history of atrial fibrillation. Per family patient is otherwise healthy 89F typically independent, ambulatory and maintains own ADLs.    Patient was admitted to the MICU. Treated for pneumonia - bcx positive for strep pneumo, treating with CTX. Treated for hypernatremia improving with D5W. Found to have new onset Afib RVR started on Heparin drip and metoprolol. Hypoxia weaned down to 4L NC. Parag slowly improving. Stable for transfer to [facility]. Will consult cardiology and infectious disease.     Barriers to discharge:   Strep Pneumoniae pneumonia - treating with CTX. Consult ID to determine final treatment course.  Afib with RVR. On metoprolol. Consider digoxin/amio as needed. Consulted cardiology.  Acute respiratory failure - improving to 4L NC.  Hypernatremia - slowly improving with D5W.  Ambulatory dysfunction - PT/OT/Placement.     Consults: IP CONSULT TO NUTRITION SERVICES     Discharge Plan: Anticipate discharge in >72 hrs to discharge location to be determined pending rehab evaluations.       Patient seen and evaluated by Critical Care today and  deemed to be appropriate for transfer to Med Surg with Telemetry. Spoke to Dr. Porter from [facility] to accept transfer. Critical care can be contacted via SecureChat with any questions or concerns. Please use the Critical Care AP Role in Secure Chat for any provider inquires until the patient is transferred out of the ICU or until tomorrow at 0600.

## 2025-01-02 NOTE — OCCUPATIONAL THERAPY NOTE
Occupational Therapy Evaluation     Patient Name: Remedios Mcgrath  Today's Date: 1/2/2025  Problem List  Active Problems:    Moderate protein-calorie malnutrition (HCC)    Past Medical History  Past Medical History:   Diagnosis Date    Allergic rhinitis     resolved 12/29/14    Bladder cancer (HCC)     last assessed 6/13/13    Colon cancer (HCC)     last assessed 12/19/12    Diverticulitis     of colon    Hypertension     Lyme disease     last assessed 12/19/12    Microscopic hematuria     Osteoarthritis     resolved 12/29/14    TIA (transient ischemic attack)      Past Surgical History  Past Surgical History:   Procedure Laterality Date    CATARACT EXTRACTION      CHOLECYSTECTOMY      COLECTOMY      partial - sigmoid    CYSTOSCOPY Left     w/insertion of ureteral stent    MOUTH SURGERY      REVISION TOTAL HIP ARTHROPLASTY Left 2015    TONSILLECTOMY           01/02/25 1023   OT Last Visit   OT Visit Date 01/02/25   Note Type   Note type Evaluation   Pain Assessment   Pain Assessment Tool FLACC   Pain Rating: FLACC (Rest) - Face 0   Pain Rating: FLACC (Rest) - Legs 0   Pain Rating: FLACC (Rest) - Activity 0   Pain Rating: FLACC (Rest) - Cry 1   Pain Rating: FLACC (Rest) - Consolability 0   Score: FLACC (Rest) 1   Pain Rating: FLACC (Activity) - Face 1   Pain Rating: FLACC (Activity) - Legs 1   Pain Rating: FLACC (Activity) - Activity 1   Pain Rating: FLACC (Activity) - Cry 1   Pain Rating: FLACC (Activity) - Consolability 1   Score: FLACC (Activity) 5   Restrictions/Precautions   Weight Bearing Precautions Per Order No   Other Precautions Cognitive;Chair Alarm;Bed Alarm;Multiple lines;Fall Risk;Pain;Telemetry;O2  (15LO2)   Home Living   Type of Home Apartment   Home Layout One level;Elevator   Bathroom Shower/Tub Tub/shower unit   Bathroom Toilet Standard   Bathroom Equipment Grab bars in shower   Home Equipment   (denies)   Additional Comments Senior high rise   Prior Function   Level of [place] Independent  "with ADLs;Independent with IADLS   Lives With (S)  Alone   Receives Help From Family   IADLs Independent with driving;Independent with meal prep;Independent with medication management   Falls in the last 6 months 1 to 4  (2 per pt report, 1 leading to current admission)   Vocational Retired   Lifestyle   Autonomy PTA, pt reports being I with ADLs, IADLs, fnxl mobility, (+)    Reciprocal Relationships 2 local sons   Service to Others Retired   Intrinsic Gratification Enjoys spending time with her kids   Subjective   Subjective \"I've been in bed for too long\"   ADL   Where Assessed Edge of bed   Eating Assistance 5  Supervision/Setup   Grooming Assistance 5  Supervision/Setup   UB Bathing Assistance 4  Minimal Assistance   LB Bathing Assistance 3  Moderate Assistance   UB Dressing Assistance 4  Minimal Assistance   LB Dressing Assistance 3  Moderate Assistance   Toileting Assistance  2  Maximal Assistance   Bed Mobility   Supine to Sit 3  Moderate assistance   Additional items Assist x 2;HOB elevated;Bedrails;Increased time required   Sit to Supine Unable to assess   Transfers   Sit to Stand 4  Minimal assistance   Additional items Assist x 2;Increased time required   Stand to Sit 4  Minimal assistance   Additional items Assist x 2;Increased time required   Additional Comments transfers with B/L HHA   Functional Mobility   Functional Mobility 4  Minimal assistance   Additional Comments Ax2 bed to chair   Additional items Hand hold assistance   Balance   Static Sitting Fair   Dynamic Sitting Fair -   Static Standing Poor +   Dynamic Standing Poor   Ambulatory Poor   Activity Tolerance   Activity Tolerance Patient limited by fatigue;Patient limited by pain   Medical Staff Made Aware PT Mary   Nurse Made Aware RN clearance for session   RUE Assessment   RUE Assessment WFL   LUE Assessment   LUE Assessment WFL   Cognition   Overall Cognitive Status Impaired   Arousal/Participation Responsive;Cooperative "   Attention Attends with cues to redirect   Orientation Level Oriented to person;Oriented to place;Oriented to time  (oriented to month and year)   Memory Decreased recall of precautions   Following Commands Follows one step commands with increased time or repetition   Comments Pt pleasant and cooperative t/o session   Assessment   Limitation Decreased ADL status;Decreased UE strength;Decreased Safe judgement during ADL;Decreased cognition;Decreased endurance;Decreased self-care trans;Decreased high-level ADLs   Prognosis Good   Assessment Pt is a 89 y.o. female admitted to [facility] on 12/31/2024 w/ a fall. Dx with AMS, A-fib, pneumonia.  has a past medical history of Allergic rhinitis, Bladder cancer, Colon cancer, Diverticulitis, Hypertension, Lyme disease, Microscopic hematuria, Osteoarthritis, and TIA. Pt with active OT orders and  OOB to chair  orders. Pt seen as a co-evaluation with PT due to the patient's co-morbidities, clinically unstable presentation/clinical complexity, and present impairments. As per pt report, pta, resides alone in a 1STA, 0STE. Pt was I w/  ADLS and IADLS, (+) drove. Upon evaluation, pt currently requires MIN A x 2 for transfers and mobility. Pt currently requires S eating, S grooming, MIN A UB ADLs, MOD A LB ADLs, and MAX A toileting. Pt is limited at this time 2*: pain, endurance, activity tolerance, functional mobility, balance, functional standing tolerance, decreased I w/ ADLS/IADLS, strength, and cognitive impairments.The following Occupational Performance Areas to address include: eating, grooming, bathing/shower, toilet hygiene, dressing, health maintenance, functional mobility, community mobility, and clothing management. Pt to benefit from immediate acute skilled OT to address above deficits, improve overall functional independence, maximize fnxl mobility and reduce caregiver burden. From OT standpoint, recommendation at time of d/c would be STR.  Pt was left after session with  all current needs met. The patient's raw score on the AM-PAC Daily Activity Inpatient Short Form is 15. A raw score of less than 19 suggests the patient may benefit from discharge to post-acute rehabilitation services. Please refer to the recommendation of the Occupational Therapist for safe discharge planning.   Goals   Patient Goals to eat   LTG Time Frame 10-14   Plan   Treatment Interventions ADL retraining;Functional transfer training;UE strengthening/ROM;Endurance training;Cognitive reorientation;Patient/family training;Equipment evaluation/education;Compensatory technique education;Continued evaluation;Activityengagement;Energy conservation   Goal Expiration Date 01/16/25   OT Frequency 2-3x/wk   Discharge Recommendation   Rehab Resource Intensity Level, OT II (Moderate Resource Intensity)   AM-PAC Daily Activity Inpatient   Lower Body Dressing 2   Bathing 2   Toileting 2   Upper Body Dressing 3   Grooming 3   Eating 3   Daily Activity Raw Score 15   Daily Activity Standardized Score (Calc for Raw Score >=11) 34.69   AM-PAC Applied Cognition Inpatient   Following a Speech/Presentation 3   Understanding Ordinary Conversation 4   Taking Medications 3   Remembering Where Things Are Placed or Put Away 3   Remembering List of 4-5 Errands 2   Taking Care of Complicated Tasks 2   Applied Cognition Raw Score 17   Applied Cognition Standardized Score 36.52     GOALS    - Pt will complete UB dressing/self care/bathing w/ mod I using adaptive device and DME as needed    - Pt will complete LB dressing/self care/bathing w/ mod I using adaptive device and DME as needed    - Pt will complete toileting w/ mod I w/ G hygiene/thoroughness using DME as needed    - Pt will improve functional transfers to Mod I on/off all surfaces using DME as needed w/ G balance/safety     - Pt will improve functional mobility during ADL/IADL/leisure tasks to Mod I using DME as needed w/ G balance/safety     - Pt will demonstrate G carryover of  pt/caregiver education and training as appropriate.    - Pt will demonstrate 100% carryover of energy conservation techniques t/o functional I/ADL/leisure tasks w/o cues s/p skilled education    - Pt will engage in ongoing cognitive assessment w/ G participation to assist w/ safe d/c planning/recommendations    - Pt will increase static and dynamic standing/sitting balance to F+  using AD/DME as needed to increase safety and I during fnxl transfers and ADLs    - Pt will maintain upright sitting position for at least 20 min during dynamic fnxl activity with F+  balance and endurance to improve ADL performance and independence, as well as reduce risk of falls     - Pt will participate in simulated IADL management task with DME as needed to increase independence w/ G safety and endurance    Uzma Mckoy MS, OTR/L

## 2025-01-02 NOTE — UTILIZATION REVIEW
Initial Clinical Review    Admission: Date/Time/Statement:   Admission Orders (From admission, onward)       Ordered        12/31/24 2258  INPATIENT ADMISSION  Once                          Orders Placed This Encounter   Procedures    INPATIENT ADMISSION     Standing Status:   Standing     Number of Occurrences:   1     Level of Care:   Med Surg [16]     Estimated length of stay:   More than 2 Midnights     Certification:   I certify that inpatient services are medically necessary for this patient for a duration of greater than two midnights. See H&P and MD Progress Notes for additional information about the patient's course of treatment.     ED Arrival Information       Expected   -    Arrival   12/31/2024 19:24    Acuity   Urgent              Means of arrival   Ambulance    Escorted by   [facility] ([place])    Service   Critical Care/ICU    Admission type   Emergency              Arrival complaint   Fall             Chief Complaint   Patient presents with    Fall     Pt here because she was found down after a welfare check and couldn't get up. Pt states she lowered herself to the floor. No HS. Pt alert and oriented and only complaining of back pain.        Initial Presentation: 89 y.o. female who presented by EMS to [facility] ED. Admitted as Inpatient for evaluation and treatment of septic shock. PMHx: bladder and colon cancer, HTN, Lyme disease, TIA. Presented after being found down at home during welfare check. Per family, last seen 3 days ago in normal health. Pt reports she fell around 0900 this morning and was unable to get up. Pt reports lowered herself to the ground, no LOC no head strike. In ED: Hypoxic to 82% on room air, initially placed on NC but required BiPAP. Noted to develop afib w/ RVR - rates in 110-140s. Rate improved after cardizem bolus and amiodarone gtt. EXAM: L lower leg skin tear, dry mucous membranes, tachycardic irregular rhythm, cachectic, frail,  accessory muscle usage, rhonchi. PLAN: admit to ICU; continue IV amiodarone gtt, Trend labs, replete electrolytes as needed; check echo, trend trop, hold home anti-HTN; Supplemental O2, weaned as tolerated from BiPAP. IV ABX. Telemetry, continuous pulse ox. Local wound care.      Date: 1/1   Day 2: Lactic trending down from 8.6 to 2.8. troponins flat. Stable on mid-flow NC. Supplemental O2, weaned as tolerated. IVF. Trend labs, replete electrolytes as needed. Follow cultures, IV ABX. Local wound care. Telemetry, continuous pulse ox.      Date: 01/02/25  Day 3: Has surpassed a 2nd midnight with active treatments and services. Concern for AMS, slightly disoriented on exam. Off amiodarone gtt, started on heparin gtt. Crt trending down appropriately. IVF. Puree diet w/ honey thick liquids. SSI w/ BG checks ACHS, blood cultures growing GCP in pairs and chains. Local wound care. Telemetery, continuous pulseo x.       ED Treatment-Medication Administration from 12/31/2024 1924 to 12/31/2024 2352         Date/Time Order Dose Route Action     12/31/2024 2033 sodium chloride 0.9 % bolus 1,000 mL 1,000 mL Intravenous New Bag     12/31/2024 2245 vancomycin (VANCOCIN) IVPB (premix in dextrose) 1,000 mg 200 mL 1,000 mg Intravenous New Bag     12/31/2024 2218 cefepime (MAXIPIME) 1,000 mg in dextrose 5 % 50 mL IVPB 1,000 mg Intravenous New Bag     12/31/2024 2132 sodium chloride 0.9 % bolus 500 mL 500 mL Intravenous New Bag     12/31/2024 2138 diltiazem (CARDIZEM) injection 10 mg 10 mg Intravenous Given     12/31/2024 2233 amiodarone 150 mg in dextrose 5 % 100 mL IV bolus 150 mg Intravenous New Bag     12/31/2024 2253 amiodarone (CORDARONE) 900 mg in dextrose 5 % 500 mL infusion 1 mg/min Intravenous New Bag     12/31/2024 2249 multi-electrolyte (ISOLYTE-S PH 7.4) bolus 1,000 mL 1,000 mL Intravenous New Bag            Scheduled Medications:  [Held by provider] aspirin, 81 mg, Oral, Daily  cefTRIAXone, 2,000 mg, Intravenous,  Q24H  chlorhexidine, 15 mL, Mouth/Throat, Q12H KATT  ipratropium, 0.5 mg, Nebulization, TID  levalbuterol, 1.25 mg, Nebulization, TID  methylPREDNISolone sodium succinate, 40 mg, Intravenous, Q12H KATT  potassium chloride, 20 mEq, Intravenous, Q2H x2  [START ON 1/3/2025] predniSONE, 40 mg, Oral, Daily    Continuous IV Infusions:  dextrose, 75 mL/hr, Intravenous, Continuous  diltiazem, 1-15 mg/hr, Intravenous, Titrated  heparin (porcine), 3-20 Units/kg/hr (Order-Specific), Intravenous, Titrated    PRN Meds:  azithromycin (ZITHROMAX) 500 mg in sodium chloride 0.9 % 250 mL IVPB  Dose: 500 mg  Freq: Every 24 hours Route: IV  Last Dose: Stopped (01/01/25 0700)  Start: 01/01/25 0430 End: 01/01/25 1229  cefTRIAXone (ROCEPHIN) 1,000 mg in dextrose 5 % 50 mL IVPB  Dose: 1,000 mg  Freq: Every 24 hours Route: IV  Last Dose: Stopped (01/01/25 2101)  Start: 01/01/25 1245 End: 01/01/25 1934  dextrose 5 % and sodium chloride 0.45 % bolus 500 mL  Dose: 500 mL  Freq: Once Route: IV  Last Dose: Stopped (01/01/25 1901)  Start: 01/01/25 1230 End: 01/01/25 1901  heparin (porcine) subcutaneous injection 5,000 Units  Dose: 5,000 Units  Freq: Every 8 hours scheduled Route: SC  Start: 12/31/24 2330 End: 01/01/25 1227  magnesium sulfate 2 g/50 mL IVPB (premix) 2 g  Dose: 2 g  Freq: Once Route: IV  Last Dose: Stopped (01/01/25 0400)  Start: 12/31/24 2145 End: 01/01/25 0400  metoprolol (LOPRESSOR) injection 2.5 mg  Dose: 2.5 mg  Freq: Once Route: IV  Start: 01/02/25 0400 End: 01/02/25 0404  metoprolol (LOPRESSOR) injection 5 mg  Dose: 5 mg  Freq: Once Route: IV  Indications of Use: ATRIAL FIBRILLATION,SUPRAVENTRICULAR TACHYCARDIA  Start: 01/02/25 0915 End: 01/02/25 0906  metoprolol (LOPRESSOR) injection 5 mg  Dose: 5 mg  Freq: Once Route: IV  Indications of Use: ATRIAL FIBRILLATION,SUPRAVENTRICULAR TACHYCARDIA  Start: 01/01/25 1215 End: 01/01/25 1215  potassium chloride 20 mEq IVPB (premix)  Dose: 20 mEq  Freq: Every 2 hours Route: IV  Last  Dose: Stopped (01/01/25 2137)  Start: 01/01/25 1200 End: 01/01/25 1712    amiodarone (CORDARONE) 900 mg in dextrose 5 % 500 mL infusion  Rate: 33.3 mL/hr Dose: 1 mg/min  Freq: Continuous Route: IV  Last Dose: Stopped (01/01/25 0507)  Start: 12/31/24 2215 End: 01/01/25 0452  amiodarone (CORDARONE) 900 mg in dextrose 5 % 500 mL infusion  Rate: 16.7 mL/hr Dose: 0.5 mg/min  Freq: Continuous Route: IV  Last Dose: Stopped (01/01/25 1502)  Start: 01/01/25 0453 End: 01/01/25 1221      ED Triage Vitals   Temperature Pulse Respirations Blood Pressure SpO2 Pain Score   12/31/24 1932 12/31/24 1932 12/31/24 1932 12/31/24 1932 12/31/24 1932 01/01/25 0000   97.6 °F (36.4 °C) 94 16 131/57 (!) 82 % No Pain     Weight (last 2 days)       Date/Time Weight    01/02/25 0536 40.5 (89.29)    01/01/25 0930 40.4 (89)    01/01/25 0535 40.5 (89.29)    01/01/25 0017 40.5 (89.29)    12/31/24 2328 40.5 (89.29)    12/31/24 2125 41 (90.39)            Vital Signs (last 3 days)       Date/Time Temp Pulse Resp BP MAP (mmHg) SpO2 Calculated FIO2 (%) - Nasal Cannula O2 Flow Rate (L/min) Nasal Cannula O2 Flow Rate (L/min) O2 Device O2 Interface Device Anderson Coma Scale Score Pain    01/02/25 0743 -- -- -- -- -- 95 % 80 -- 15 L/min Mid flow nasal cannula -- -- --    01/02/25 0618 -- 86 24 -- -- 96 % -- -- -- -- -- -- --    01/02/25 0600 98.9 °F (37.2 °C) 148 33 123/58 83 98 % -- -- -- -- -- -- --    01/02/25 0505 -- -- -- -- -- -- -- -- -- -- -- 15 --    01/02/25 0500 -- 72 19 111/56 80 99 % -- -- -- -- -- -- --    01/02/25 0400 -- 123 22 110/55 76 98 % -- -- -- -- -- -- --    01/02/25 0300 -- 83 22 124/58 84 99 % -- -- -- -- -- -- --    01/02/25 0254 -- -- -- -- -- -- -- -- -- -- -- 15 --    01/02/25 0213 -- 89 25 109/54 78 99 % 60 -- 10 L/min Mid flow nasal cannula -- -- --    01/02/25 0200 -- 81 16 109/54 78 99 % -- -- -- -- -- -- --    01/02/25 0100 -- 81 20 111/58 81 99 % -- -- -- -- -- -- --    01/02/25 0000 -- 90 20 119/57 81 98 % -- -- -- -- --  -- --    01/01/25 2300 -- 89 27 111/59 82 98 % -- -- -- -- -- -- --    01/01/25 2200 -- 106 35 92/59 70 89 % -- -- -- -- -- -- --    01/01/25 2100 -- 88 25 107/57 79 97 % -- -- -- -- -- -- --    01/01/25 2000 -- 95 27 129/59 85 98 % -- -- -- -- -- -- --    01/01/25 1947 -- -- -- -- -- -- -- -- -- -- -- 15 No Pain    01/01/25 1900 -- 94 30 113/55 79 98 % -- -- -- -- -- -- --    01/01/25 1800 -- 104 33 106/56 79 93 % -- -- -- -- -- -- --    01/01/25 1700 -- 99 31 122/59 85 93 % -- -- -- -- -- -- --    01/01/25 1600 98 °F (36.7 °C) 87 30 112/56 79 96 % -- -- -- -- -- -- --    01/01/25 1542 -- -- -- -- -- -- -- -- -- -- -- 15 No Pain    01/01/25 1527 -- -- -- -- -- -- 64 -- 11 L/min Mid flow nasal cannula -- -- --    01/01/25 1500 -- 84 28 116/55 79 94 % -- -- -- -- -- -- --    01/01/25 1400 -- 83 22 111/56 81 92 % -- -- -- -- -- -- --    01/01/25 1334 -- -- -- -- -- -- -- 11 L/min -- Mid flow nasal cannula -- -- --    01/01/25 1300 -- 76 30 99/50 71 95 % -- -- -- -- -- -- --    01/01/25 1200 97.7 °F (36.5 °C) 75 26 109/53 76 91 % -- -- -- -- -- 15 No Pain    01/01/25 1157 -- -- -- -- -- 90 % -- -- -- -- -- -- --    01/01/25 1100 -- 94 26 109/53 76 95 % -- -- -- -- -- -- --    01/01/25 1000 -- 94 26 109/56 79 88 % -- -- -- -- -- -- --    01/01/25 0939 -- -- -- -- -- -- -- -- -- -- -- 15 7    01/01/25 0930 -- 96 -- 102/50 -- -- -- -- -- -- -- -- --    01/01/25 0900 -- 96 36 102/50 72 90 % -- -- -- -- -- -- --    01/01/25 0811 -- -- -- -- -- -- -- 11 L/min -- Mid flow nasal cannula -- -- --    01/01/25 0800 98.1 °F (36.7 °C) 98 33 107/57 77 83 % -- -- -- -- -- -- --    01/01/25 0700 -- 91 26 83/47 60 93 % -- -- -- -- -- -- --    01/01/25 0600 -- 98 29 107/49 71 94 % -- -- -- -- -- -- --    01/01/25 0400 97.6 °F (36.4 °C) 122 33 110/53 76 92 % -- -- -- Mid flow nasal cannula -- 15 No Pain    01/01/25 0300 -- 93 34 103/54 74 91 % -- -- -- -- -- -- --    01/01/25 0200 -- 93 31 101/53 75 90 % -- -- -- -- -- -- --    01/01/25  0100 -- 103 33 102/68 79 91 % -- -- -- -- -- -- --    01/01/25 0017 98.6 °F (37 °C) 106 22 132/79 -- -- -- -- -- -- -- -- --    01/01/25 0000 -- -- -- -- -- -- -- -- -- -- -- 15 No Pain    12/31/24 2330 -- 100 30 119/60 86 98 % -- -- -- -- -- -- --    12/31/24 2315 -- 100 36 128/59 85 95 % -- -- -- BiPAP -- -- --    12/31/24 2300 -- 100 37 119/54 78 98 % -- -- -- BiPAP -- -- --    12/31/24 2245 -- 110 38 119/54 78 98 % -- -- -- BiPAP -- -- --    12/31/24 2230 -- 116 37 -- -- 98 % -- -- -- BiPAP -- -- --    12/31/24 2215 -- 118 44 102/60 75 97 % -- -- -- BiPAP -- -- --    12/31/24 2207 -- -- -- -- -- 95 % -- -- -- -- Face mask -- --    12/31/24 2200 -- 108 42 124/58 83 96 % -- -- -- BiPAP -- -- --    12/31/24 2145 -- 100 21 140/64 92 95 % -- -- -- BiPAP -- -- --    12/31/24 2130 -- 198 40 103/56 75 92 % -- -- -- BiPAP -- -- --    12/31/24 2125 -- 117 15 126/66 -- 90 % 40 -- 5 L/min Nasal cannula -- -- --    12/31/24 2030 -- 128 40 127/55 79 90 % -- -- -- -- -- -- --    12/31/24 1943 -- -- -- -- -- 90 % 36 -- 4 L/min Nasal cannula -- 15 --    12/31/24 1932 97.6 °F (36.4 °C) 94 16 131/57 -- 82 % -- -- -- None (Room air) -- -- --              Pertinent Labs/Diagnostic Test Results:   Radiology:  CT head without contrast   Final Interpretation by Ayan Aguirre MD (12/31 2240)      No acute intracranial abnormality.                  Workstation performed: [id number]         CT spine cervical without contrast   Final Interpretation by Ayan Aguirre MD (12/31 0213)      No cervical spine fracture or traumatic malalignment.                  Workstation performed: [id number]         CT chest abdomen pelvis wo contrast   Final Interpretation by Geronimo Medrano MD (01/01 1024)      1.  Acute right-sided pneumonia, greatest at the right lower lobe. Associated loculated parapneumonic effusion. No obvious pleural thickening on noncontrast study, though empyema has to be considered. CT with IV contrast would have greater  sensitivity    and specificity for empyema.      2.  Impacted bronchoceles in the right middle lobe with finger in glove configuration. Appearance suggests chronic infectious or inflammatory process, including consideration of allergic bronchopulmonary aspergillosis.      3.  8 mm (mean axis) solid pulmonary nodule in the left lower lobe. This area has not been covered on previous imaging degrading assessment of chronicity. Consider PET/CT after resolution of acute episode of illness or 3-month follow-up chest CT,    depending on the patient's goals of care and desire to aggressively work-up the finding.      4.  New intermediate density 12 mm left adrenal nodule. If chest CT with IV contrast mentioned above is pursued, this study could be performed with postcontrast adrenal timing for further characterization. Alternatively, this can be reevaluated on a    nonurgent basis at PET follow-up.      5.  No acute abdominopelvic findings.            Note: Agree with major findings in the preliminary [facility] report, though possibility of empyema was not mentioned at that time.  The study was marked as discrepant in EPIC with immediate notification flag.                  Workstation performed: [id number]           Cardiology:  Echo complete w/ contrast if indicated   Final Result by Shahid Mendez MD (01/01 9207)        Left Ventricle: Left ventricular cavity size is small. Wall thickness    is mildly increased. The left ventricular ejection fraction is 70% by    visual estimation. Systolic function is vigorous. Although no diagnostic    regional wall motion abnormality was identified, this possibility cannot    be completely excluded on the basis of this study. Diastolic function is    moderately abnormal, consistent with grade II (pseudonormal) relaxation.     Right Ventricle: Right ventricular cavity size is normal. Systolic    function is normal.     Aortic Valve: There is mild stenosis. The aortic valve mean  gradient is    7 mmHg. The dimensionless velocity index is 0.81. The aortic valve area is    1.95 cm2.     Mitral Valve: There is moderate diffuse thickening. There is moderate    regurgitation.     Tricuspid Valve: There is moderate regurgitation.         ECG 12 lead   Final Result by Rob David MD (12/31 2341)   Poor data quality   Possible Atrial fibrillation with rapid ventricular response   ST & T wave abnormality, consider inferolateral ischemia   Abnormal ECG   When compared with ECG of 17-Feb-2015 16:08,   Poor data quality in current ECG precludes serial comparison   Confirmed by Rob David ([id number]) on 12/31/2024 11:41:54 PM           Results from last 7 days   Lab Units 12/31/24 2032   SARS-COV-2  Negative     Results from last 7 days   Lab Units 01/02/25 0420 01/01/25 0448 12/31/24 2024   WBC Thousand/uL 11.17* 14.26* 20.40*   HEMOGLOBIN g/dL 11.1* 12.5 14.4   HEMATOCRIT % 35.2 39.0 44.7   PLATELETS Thousands/uL 193 192 245   BANDS PCT %  --   --  1         Results from last 7 days   Lab Units 01/02/25 0420 01/01/25 2127 01/01/25 0448 12/31/24 2343 12/31/24 2024   SODIUM mmol/L 149* 148* 150*  --  154*   POTASSIUM mmol/L 3.3* 3.3* 3.5  --  3.5   CHLORIDE mmol/L 116* 117* 115*  --  115*   CO2 mmol/L 21 21 19*  --  14*   ANION GAP mmol/L 12 10 16*  --  25*   BUN mg/dL 70* 73* 71*  --  82*   CREATININE mg/dL 1.99* 2.09* 2.47*  --  3.18*   EGFR ml/min/1.73sq m 20 19 16  --  11   CALCIUM mg/dL 8.2* 8.3* 7.7*  --  9.3   CALCIUM, IONIZED mmol/L  --   --  1.04*  --   --    MAGNESIUM mg/dL 2.9*  --  3.6* 2.3 2.7   PHOSPHORUS mg/dL 3.8  --  4.5* 3.7  --      Results from last 7 days   Lab Units 01/02/25 0420 01/01/25 0448 12/31/24 2024   AST U/L 50* 83* 109*   ALT U/L 30 36 37   ALK PHOS U/L 52 52 71   TOTAL PROTEIN g/dL 6.1* 5.1* 7.6   ALBUMIN g/dL 2.7* 2.6* 3.7   TOTAL BILIRUBIN mg/dL 0.63 1.20* 1.67*         Results from last 7 days   Lab Units 01/02/25  0420 01/01/25  2120  "01/01/25 0448 12/31/24 2024   GLUCOSE RANDOM mg/dL 197* 238* 212* 121             No results found for: \"BETA-HYDROXYBUTYRATE\"   Results from last 7 days   Lab Units 01/01/25  0031 12/31/24  2302   PH ART  7.323* 7.332*   PCO2 ART mm Hg 29.5* 25.4*   PO2 ART mm Hg 64.2* 103.0   HCO3 ART mmol/L 15.0* 13.2*   BASE EXC ART mmol/L -9.7 -11.1   O2 CONTENT ART mL/dL 16.0 16.0   O2 HGB, ARTERIAL % 88.8* 96.3   ABG SOURCE  Artery Radial, Left     Results from last 7 days   Lab Units 12/31/24 2024   PH JORDON  7.294*   PCO2 JORDON mm Hg 32.7*   PO2 JORDON mm Hg 26.4*   HCO3 JORDON mmol/L 15.5*   BASE EXC JORDON mmol/L -9.8   O2 CONTENT JORDON ml/dL 7.6   O2 HGB, VENOUS % 37.6*         Results from last 7 days   Lab Units 01/02/25  0416 01/02/25  0200 01/01/25  1520   CK TOTAL U/L 659* 711* 685*     Results from last 7 days   Lab Units 01/01/25  0110 12/31/24  2343 12/31/24 2024   HS TNI 0HR ng/L  --   --  187*   HS TNI 2HR ng/L  --  179*  --    HSTNI D2 ng/L  --  -8  --    HS TNI 4HR ng/L 188*  --   --    HSTNI D4 ng/L 1  --   --          Results from last 7 days   Lab Units 01/02/25  0416 01/01/25 2127 01/01/25  1829 12/31/24 2024   PROTIME seconds  --   --  18.5* 18.3*   INR   --   --  1.52* 1.50*   PTT seconds 63* 59* 51* 28     Results from last 7 days   Lab Units 01/01/25 0448   TSH 3RD GENERATON uIU/mL 1.227     Results from last 7 days   Lab Units 12/31/24 2024   PROCALCITONIN ng/ml 51.53*     Results from last 7 days   Lab Units 01/01/25  0448 01/01/25  0110 12/31/24  2237 12/31/24 2024   LACTIC ACID mmol/L 2.8* 3.9* 4.7* 8.6*      Results from last 7 days   Lab Units 12/31/24 2024   BNP pg/mL 859*      Results from last 7 days   Lab Units 12/31/24 2215   CLARITY UA  Turbid   COLOR UA  Yellow   SPEC GRAV UA  1.016   PH UA  5.5   GLUCOSE UA mg/dl Negative   KETONES UA mg/dl Negative   BLOOD UA  Moderate*   PROTEIN UA mg/dl 100 (2+)*   NITRITE UA  Negative   BILIRUBIN UA  Negative   UROBILINOGEN UA (BE) mg/dl <2.0 "   LEUKOCYTES UA  Negative   WBC UA /hpf None Seen   RBC UA /hpf 1-2   BACTERIA UA /hpf None Seen   EPITHELIAL CELLS WET PREP /hpf Occasional     Results from last 7 days   Lab Units 01/01/25  0046 12/31/24 2032   STREP PNEUMONIAE ANTIGEN, URINE  Positive*  --    LEGIONELLA URINARY ANTIGEN  Negative  --    INFLUENZA A PCR   --  Negative   INFLUENZA B PCR   --  Negative   RSV PCR   --  Negative      Results from last 7 days   Lab Units 12/31/24 2027 12/31/24 2024   GRAM STAIN RESULT  Gram positive cocci in pairs and chains* Gram positive cocci in pairs and chains*       Past Medical History:   Diagnosis Date    Allergic rhinitis     resolved 12/29/14    Bladder cancer (HCC)     last assessed 6/13/13    Colon cancer (HCC)     last assessed 12/19/12    Diverticulitis     of colon    Hypertension     Lyme disease     last assessed 12/19/12    Microscopic hematuria     Osteoarthritis     resolved 12/29/14    TIA (transient ischemic attack)           Admitting Diagnosis: Lactic acidosis [E87.20]  Acute kidney failure (HCC) [N17.9]  Pneumonia [J18.9]  Elevated troponin [R79.89]  Septic shock (HCC) [A41.9, R65.21]  Unspecified multiple injuries, initial encounter [T07.XXXA]  Hypoxic respiratory failure (HCC) [J96.91]  Age/Sex: 89 y.o. female    Network Utilization Review Department  ATTENTION: Please call with any questions or concerns to [phone] and carefully listen to the prompts so that you are directed to the right person. All voicemails are confidential.   For Discharge needs, contact Care Management DC Support Team at [phone] opt. [phone]  Send all requests for admission clinical reviews, approved or denied determinations and any other requests to dedicated fax number below belonging to the campus where the patient is receiving treatment. List of dedicated fax numbers for the Facilities:  FACILITY NAME UR FAX NUMBER   ADMISSION DENIALS (Administrative/Medical Necessity) [phone]   DISCHARGE SUPPORT TEAM  (NETWORK) [phone]   PARENT CHILD HEALTH (Maternity/NICU/Pediatrics) [phone]   [facility] [phone]   [facility] [phone]   [facility] [phone]   [facility] [phone]   [facility] [phone]   [facility] [phone]   [facility] [phone]   [facility] [phone]   [facility] [phone]   [facility] [phone]   [facility] [phone]   [facility] [phone]

## 2025-01-02 NOTE — PLAN OF CARE
Problem: OCCUPATIONAL THERAPY ADULT  Goal: Performs self-care activities at highest level of function for planned discharge setting.  See evaluation for individualized goals.  Description: Treatment Interventions: ADL retraining, Functional transfer training, UE strengthening/ROM, Endurance training, Cognitive reorientation, Patient/family training, Equipment evaluation/education, Compensatory technique education, Continued evaluation, Activityengagement, Energy conservation          See flowsheet documentation for full assessment, interventions and recommendations.   Note: Limitation: Decreased ADL status, Decreased UE strength, Decreased Safe judgement during ADL, Decreased cognition, Decreased endurance, Decreased self-care trans, Decreased high-level ADLs  Prognosis: Good  Assessment: Pt is a 89 y.o. female admitted to [facility] on 12/31/2024 w/ a fall. Dx with AMS, A-fib, pneumonia.  has a past medical history of Allergic rhinitis, Bladder cancer, Colon cancer, Diverticulitis, Hypertension, Lyme disease, Microscopic hematuria, Osteoarthritis, and TIA. Pt with active OT orders and  OOB to chair  orders. Pt seen as a co-evaluation with PT due to the patient's co-morbidities, clinically unstable presentation/clinical complexity, and present impairments. As per pt report, pta, resides alone in a 1STA, 0STE. Pt was I w/  ADLS and IADLS, (+) drove. Upon evaluation, pt currently requires MIN A x 2 for transfers and mobility. Pt currently requires S eating, S grooming, MIN A UB ADLs, MOD A LB ADLs, and MAX A toileting. Pt is limited at this time 2*: pain, endurance, activity tolerance, functional mobility, balance, functional standing tolerance, decreased I w/ ADLS/IADLS, strength, and cognitive impairments.The following Occupational Performance Areas to address include: eating, grooming, bathing/shower, toilet hygiene, dressing, health maintenance, functional mobility, community mobility, and clothing management. Pt to  benefit from immediate acute skilled OT to address above deficits, improve overall functional independence, maximize fnxl mobility and reduce caregiver burden. From OT standpoint, recommendation at time of d/c would be STR.  Pt was left after session with all current needs met. The patient's raw score on the AM-PAC Daily Activity Inpatient Short Form is 15. A raw score of less than 19 suggests the patient may benefit from discharge to post-acute rehabilitation services. Please refer to the recommendation of the Occupational Therapist for safe discharge planning.     Rehab Resource Intensity Level, OT: II (Moderate Resource Intensity)

## 2025-01-02 NOTE — PLAN OF CARE
Problem: PHYSICAL THERAPY ADULT  Goal: Performs mobility at highest level of function for planned discharge setting.  See evaluation for individualized goals.  Description: Treatment/Interventions: OT, Spoke to case management, Gait training, Bed mobility, Patient/family training, Endurance training, LE strengthening/ROM, Functional transfer training  Equipment Recommended:  (TBD)       See flowsheet documentation for full assessment, interventions and recommendations.  Note: Prognosis: Good  Problem List: Decreased strength, Decreased range of motion, Decreased endurance, Impaired balance, Decreased coordination, Decreased mobility, Impaired judgement, Decreased cognition, Decreased safety awareness, Pain, Orthopedic restrictions  Assessment: Pt is 89 y.o. female seen for PT evaluation s/p admit to [facility] on 12/31/2024 w/ sepsis, respiratory failure, recent fall, encephalopathy. PT consulted to assess pt's functional mobility and d/c needs. Order placed for PT eval and tx, w/ up w/ A order. Comorbidities affecting pt's physical performance at time of assessment include:  has a past medical history of Allergic rhinitis, Bladder cancer (HCC), Colon cancer (HCC), Diverticulitis, Hypertension, Lyme disease, Microscopic hematuria, Osteoarthritis, and TIA (transient ischemic attack). PTA, pt was ambulates household distances and lives in a senior high [facility] . Personal factors affecting pt at time of IE include: inaccessible home environment, inability to ambulate household distances, limited home support, unable to perform physical activity, inability to perform IADLs, and inability to perform ADLs. Please find objective findings from PT assessment regarding body systems outlined above with impairments and limitations including weakness, impaired balance, decreased endurance, impaired coordination, pain, decreased activity tolerance, decreased functional mobility tolerance, decreased safety awareness,  impaired judgement, fall risk, and decreased cognition. The following objective measures performed on IE also reveal limitations: The patient's AM-PAC Basic Mobility Inpatient Short Form Raw Score is 11, Standardized Score is 30.25. A standardized score less than 42.9 suggests the patient may benefit from discharge to post-acute rehabilitation services. Please also refer to the recommendation of the Physical Therapist for safe discharge planning. Pt's clinical presentation is currently unstable/unpredictable seen in pt's presentation of critical care monitoring. Pt to benefit from continued PT tx to address deficits as defined above and maximize level of functional independent mobility and consistency. From PT/mobility standpoint, recommendation at time of d/c would be level II pending progress in order to facilitate return to PLOF.  Barriers to Discharge: Decreased caregiver support     Rehab Resource Intensity Level, PT: II (Moderate Resource Intensity)    See flowsheet documentation for full assessment.

## 2025-01-02 NOTE — PROGRESS NOTES
Progress Note - Critical Care/ICU   Name: Remedios Mcgrath 89 y.o. female I MRN: [id number]  Unit/Bed#: MICU 11 I Date of Admission: 12/31/2024   Date of Service: 1/2/2025 I Hospital Day: 2       Assessment & Plan   Neuro:   Concern for AMS  -Slightly disoriented on exam. Continue to monitor. Hold home restoril for now.      CV:   New onset atrial fibrillation with rapid ventricular response  -Suspect due to acutely ill state, volume depletion  -Received Cardizem bolus in ED  -Received amio bolus, started on amiodarone drip in ED.  -Now off amiodarone.  -Monitor closely for afib rvr events and manage as needed.  -Optimize electrolytes, volume status and sepsis mgmt  -Echo: EF 70%.  -Initiated on heparin drip.     Lactic acidosis  -Secondary to sepsis/septic shock  -Initial LA 8.6, trending down to 2.8.     Troponinemia  -Suspect from demand state with dehydration, A fib RVR  -Troponin 187 -> 179 -> 188.     HTN  -Currently SBP's in 120s-130s, will hold home amlodipine and metoprolol for now     Pulm:  Hypoxia with tachypnea, hypocarbia  -Suspect tachypnea due to underlying sepsis/pneumonia  -Desats to 82 in ED, transiently on BiPAP but worsening hyppocarbia, take off BiPAP in ICU, repeat gas in 30 min, seems comfortable w normal sats on 6L NC.   -Remains stable currently on mid flow NC.  -Wean as tolerated.     Emphysema  -No chronic meds. Can start nebs if needed.     Pneumonia  -Suspect aspiration given RML appearance, recent hx  -CT CAP: Acute right-sided pneumonia, greatest at the right lower lobe. Associated loculated parapneumonic effusion. No obvious pleural thickening on noncontrast study, though empyema has to be considered. CT with IV contrast would have greater sensitivity   and specificity for empyema.  -See below for treatment.     GI:   Diarrhea with incontinence  -Resolved at this time.     Elevated transaminases  -, will follow, maybe due to recent hypoperfusion     :   Acute kidney  injury  -Creatinine 3.18 today, BUN 82--trend w labs  -, trend w labs  -Due to hypovolemia while down, possibly also renal injury from prolonged downtime  -Improved this AM 3.18 -> 2.47 -> 1.99.     Chronic kidney disease, stage III  -Baseline creatinine appears to be around 1.1-1.3  -Fluid replacement -- sepsis boluses ordered and given in ED, received total ofg 2.5L, would be careful w volume status and further resuscitation at this time given 40kg.  -Trend BMP and UOP; urinary retention protocol     F/E/N:   Fluids  -D5W 75/hr     Lytes  -Replete as needed.     Nutrition  -Dysphagia 1 puree with honey thick liquids.     Heme/Onc:   DVT ppx  -HSQ and SCDs     Endo:   -Trend glucose, titrate regimen as needed.     ID:   Pneumonia, suspect aspiration etiology  -Meeting SIRS criteria on admission.  -Consider septic etiology - pneumonia vs urine vs other.  -BCX pending.  -Strep pneumo urine positive.  -BCX growing GCP in pairs and chains.  -Narrowed to ceftriaxone.  -Consider aspiration pneumonia.     MSK/Skin:   Skin tears  -Dress wounds w mepilex, consider wound care consult.    Disposition: Critical care    ICU Core Measures     A: Assess, Prevent, and Manage Pain Has pain been assessed? Yes  Need for changes to pain regimen? No   B: Both SAT/SAT  N/A   C: Choice of Sedation RASS Goal: 0 Alert and Calm  Need for changes to sedation or analgesia regimen? No   D: Delirium CAM-ICU: Negative   E: Early Mobility  Plan for early mobility? Yes   F: Family Engagement Plan for family engagement today? Yes       Antibiotic Review: Patient on appropriate coverage based on culture data.       Prophylaxis:  VTE VTE covered by:  heparin (porcine), Intravenous, 14 Units/kg/hr at 01/02/25 0601       Stress Ulcer  not ordered         24 Hour Events : No acute events overnight. Patient seen and examined. She is slightly confused and not oriented to place. Endorses no acute symptoms aside from discomfort with mitts. ROS  negative including no SOB, fevers, chills, N/V/C/D.    Subjective       Objective :                   Vitals I/O      Most Recent Min/Max in 24hrs   Temp 98.9 °F (37.2 °C) Temp  Min: 97.7 °F (36.5 °C)  Max: 98.9 °F (37.2 °C)   Pulse 86 Pulse  Min: 72  Max: 148   Resp (!) 24 Resp  Min: 16  Max: 36   /58 BP  Min: 92/59  Max: 129/59   O2 Sat 96 % SpO2  Min: 83 %  Max: 99 %      Intake/Output Summary (Last 24 hours) at 1/2/2025 0731  Last data filed at 1/2/2025 0601  Gross per 24 hour   Intake 1960.6 ml   Output 200 ml   Net 1760.6 ml       Diet Dysphagia/Modified Consistency; Dysphagia 1-Pureed; Honey Thick Liquid    Invasive Monitoring           Physical Exam   Physical Exam  Vitals reviewed.   Eyes:      Extraocular Movements: Extraocular movements intact.      Pupils: Pupils are equal, round, and reactive to light.   Skin:     General: Skin is warm.      Capillary Refill: Capillary refill takes less than 2 seconds.   HENT:      Head: Normocephalic and atraumatic.      Mouth/Throat:      Mouth: Mucous membranes are moist.   Neck:   no midline tenderness Cardiovascular:      Rate and Rhythm: Normal rate and regular rhythm.      Pulses: Normal pulses.   Musculoskeletal:      Right lower leg: No edema.      Left lower leg: No edema.   Abdominal:      Palpations: Abdomen is soft.   Constitutional:       Appearance: She is well-developed and well-nourished.   Pulmonary:      Effort: Pulmonary effort is normal.      Breath sounds: Wheezing present.   Neurological:      General: No focal deficit present.      Mental Status: She is alert.      Motor: Strength full and intact in all extremities.      Comments: Disoriented to place but answering questions.          Diagnostic Studies        Lab Results: I have reviewed the following results:     Medications:  Scheduled PRN   [Held by provider] aspirin, 81 mg, Daily  cefTRIAXone, 2,000 mg, Q24H  chlorhexidine, 15 mL, Q12H KATT  ipratropium, 0.5 mg, TID  levalbuterol, 1.25  mg, TID  methylPREDNISolone sodium succinate, 40 mg, Q12H KATT          Continuous    dextrose, 75 mL/hr, Last Rate: 75 mL/hr (01/02/25 0601)  heparin (porcine), 3-20 Units/kg/hr (Order-Specific), Last Rate: 14 Units/kg/hr (01/02/25 0601)         Labs:   CBC    Recent Labs     12/31/24 2024 01/01/25 0448 01/02/25  0420   WBC 20.40* 14.26* 11.17*   HGB 14.4 12.5 11.1*   HCT 44.7 39.0 35.2    192 193   BANDSPCT 1  --   --      BMP    Recent Labs     01/01/25 2127 01/02/25 0420   SODIUM 148* 149*   K 3.3* 3.3*   * 116*   CO2 21 21   AGAP 10 12   BUN 73* 70*   CREATININE 2.09* 1.99*   CALCIUM 8.3* 8.2*       Coags    Recent Labs     12/31/24 2024 01/01/25 1829 01/01/25 2127 01/02/25 0416   INR 1.50* 1.52*  --   --    PTT 28 51* 59* 63*        Additional Electrolytes  Recent Labs     01/01/25 0448 01/02/25 0420   MG 3.6* 2.9*   PHOS 4.5* 3.8   CAIONIZED 1.04*  --           Blood Gas    Recent Labs     01/01/25 0031   PHART 7.323*   VFI7INT 29.5*   PO2ART 64.2*   PIN3EIW 15.0*   BEART -9.7   SOURCE Artery     Recent Labs     12/31/24 2024 12/31/24 2302 01/01/25 0031   PHVEN 7.294*  --   --    KDK4JWZ 32.7*  --   --    PO2VEN 26.4*  --   --    MIS2EOG 15.5*  --   --    BEVEN -9.8  --   --    P7CNMRH 37.6*  --   --    SOURCE  --    < > Artery    < > = values in this interval not displayed.    LFTs  Recent Labs     01/01/25 0448 01/02/25  0420   ALT 36 30   AST 83* 50*   ALKPHOS 52 52   ALB 2.6* 2.7*   TBILI 1.20* 0.63       Infectious  Recent Labs     12/31/24 2024   PROCALCITONI 51.53*     Glucose  Recent Labs     12/31/24 2024 01/01/25 0448 01/01/25 2127 01/02/25  0420   GLUC 121 212* 238* 197*

## 2025-01-02 NOTE — PHYSICAL THERAPY NOTE
Physical Therapy Evaluation     Patient's Name: Remedios Mcgrath    Admitting Diagnosis  Lactic acidosis [E87.20]  Acute kidney failure (HCC) [N17.9]  Pneumonia [J18.9]  Elevated troponin [R79.89]  Septic shock (HCC) [A41.9, R65.21]  Unspecified multiple injuries, initial encounter [T07.XXXA]  Hypoxic respiratory failure (HCC) [J96.91]    Problem List  Patient Active Problem List   Diagnosis    Hyperlipidemia    Hypertension    Impaired fasting glucose    Insomnia    Stenosis of left carotid artery    Peripheral vascular disease (HCC)    Bruises easily    Stage 3b chronic kidney disease (HCC)    Venous stasis dermatitis of both lower extremities    Mild protein-calorie malnutrition (HCC)    Moderate protein-calorie malnutrition (HCC)       Past Medical History  Past Medical History:   Diagnosis Date    Allergic rhinitis     resolved 12/29/14    Bladder cancer (HCC)     last assessed 6/13/13    Colon cancer (HCC)     last assessed 12/19/12    Diverticulitis     of colon    Hypertension     Lyme disease     last assessed 12/19/12    Microscopic hematuria     Osteoarthritis     resolved 12/29/14    TIA (transient ischemic attack)        Past Surgical History  Past Surgical History:   Procedure Laterality Date    CATARACT EXTRACTION      CHOLECYSTECTOMY      COLECTOMY      partial - sigmoid    CYSTOSCOPY Left     w/insertion of ureteral stent    MOUTH SURGERY      REVISION TOTAL HIP ARTHROPLASTY Left 2015    TONSILLECTOMY          01/02/25 1030   Note Type   Note type Evaluation   Pain Assessment   Pain Assessment Tool FLACC   Pain Rating: FLACC (Rest) - Face 0   Pain Rating: FLACC (Rest) - Legs 0   Pain Rating: FLACC (Rest) - Activity 0   Pain Rating: FLACC (Rest) - Cry 0   Pain Rating: FLACC (Rest) - Consolability 0   Score: FLACC (Rest) 0   Pain Rating: FLACC (Activity) - Face 1   Pain Rating: FLACC (Activity) - Legs 1   Pain Rating: FLACC (Activity) - Activity 1   Pain Rating: FLACC (Activity) - Cry 1   Pain Rating:  FLACC (Activity) - Consolability 1   Score: FLACC (Activity) 5   Restrictions/Precautions   Weight Bearing Precautions Per Order No   Other Precautions Pain;Fall Risk;O2;Telemetry;Multiple lines;Restraints;Bed Alarm;Chair Alarm;Impulsive;Cognitive   Home Living   Type of Home Apartment   Home Layout One level;Elevator  (senior high rise)   Prior Function   Level of [place] Independent with functional mobility   Lives With (S)  Alone   Receives Help From Family  (2 local sons)   Falls in the last 6 months 1 to 4  (2)   Cognition   Orientation Level Oriented to person   Subjective   Subjective Pt willing and agreeable to PT session   RLE Assessment   RLE Assessment   (grossly at least 3/5, pain limiting exam)   LLE Assessment   LLE Assessment   (grossly at least 3/5, pain limiting exam)   Coordination   Movements are Fluid and Coordinated 0   Coordination and Movement Description slow and gaurded with pain compared to baseline mobility   Bed Mobility   Supine to Sit 3  Moderate assistance   Additional items Assist x 2   Sit to Supine Unable to assess   Additional Comments Pt left resting in chair, call bell in reach, chair alarm active   Transfers   Sit to Stand 4  Minimal assistance   Additional items Assist x 2   Stand to Sit 4  Minimal assistance   Additional items Assist x 2   Ambulation/Elevation   Gait pattern Excessively slow;Short stride;Decreased foot clearance;Decreased L stance;Decreased R stance;Forward Flexion;Not appropriate;Inconsistent koby   Gait Assistance 4  Minimal assist   Additional items Assist x 2   Assistive Device Other (Comment)  ([facility])   Distance 3'   Balance   Static Sitting Fair   Dynamic Sitting Fair -   Static Standing Poor +   Ambulatory Zero   Endurance Deficit   Endurance Deficit Yes   Endurance Deficit Description limited by fatigue, weakness, and pain compared to baseline mobility   Activity Tolerance   Activity Tolerance Patient limited by fatigue;Patient limited by pain    Medical Staff Made Aware OT for D/C planning   Nurse Made Aware yes, nsg gave clearance to work with pt   Assessment   Prognosis Good   Problem List Decreased strength;Decreased range of motion;Decreased endurance;Impaired balance;Decreased coordination;Decreased mobility;Impaired judgement;Decreased cognition;Decreased safety awareness;Pain;Orthopedic restrictions   Assessment Pt is 89 y.o. female seen for PT evaluation s/p admit to [facility] on 12/31/2024 w/ sepsis, respiratory failure, recent fall, encephalopathy. PT consulted to assess pt's functional mobility and d/c needs. Order placed for PT eval and tx, w/ up w/ A order. Comorbidities affecting pt's physical performance at time of assessment include:  has a past medical history of Allergic rhinitis, Bladder cancer (HCC), Colon cancer (HCC), Diverticulitis, Hypertension, Lyme disease, Microscopic hematuria, Osteoarthritis, and TIA (transient ischemic attack). PTA, pt was ambulates household distances and lives in a senior high [facility] . Personal factors affecting pt at time of IE include: inaccessible home environment, inability to ambulate household distances, limited home support, unable to perform physical activity, inability to perform IADLs, and inability to perform ADLs. Please find objective findings from PT assessment regarding body systems outlined above with impairments and limitations including weakness, impaired balance, decreased endurance, impaired coordination, pain, decreased activity tolerance, decreased functional mobility tolerance, decreased safety awareness, impaired judgement, fall risk, and decreased cognition. The following objective measures performed on IE also reveal limitations: The patient's AM-PAC Basic Mobility Inpatient Short Form Raw Score is 11, Standardized Score is 30.25. A standardized score less than 42.9 suggests the patient may benefit from discharge to post-acute rehabilitation services. Please also refer to  the recommendation of the Physical Therapist for safe discharge planning. Pt's clinical presentation is currently unstable/unpredictable seen in pt's presentation of critical care monitoring. Pt to benefit from continued PT tx to address deficits as defined above and maximize level of functional independent mobility and consistency. From PT/mobility standpoint, recommendation at time of d/c would be level II pending progress in order to facilitate return to PLOF.   Barriers to Discharge Decreased caregiver support   Goals   Patient Goals To decrease pain and watch TV   STG Expiration Date 01/14/25   Short Term Goal #1 1. Complete bed mobility and transfers I to decrease need for caregiver in home. 2. Ambulate 300' I to complete household and community mobility without A. 3. Improve dynamic balance to good to decrease need for UE support during ambulation. 4. Be educated & demonstate 1 steps to be able to enter home without A.   Plan   Treatment/Interventions OT;Spoke to case management;Gait training;Bed mobility;Patient/family training;Endurance training;LE strengthening/ROM;Functional transfer training   PT Frequency 3-5x/wk   Discharge Recommendation   Rehab Resource Intensity Level, PT II (Moderate Resource Intensity)   Equipment Recommended   (TBD)   AM-PAC Basic Mobility Inpatient   Turning in Flat Bed Without Bedrails 2   Lying on Back to Sitting on Edge of Flat Bed Without Bedrails 2   Moving Bed to Chair 2   Standing Up From Chair Using Arms 2   Walk in Room 2   Climb 3-5 Stairs With Railing 1   Basic Mobility Inpatient Raw Score 11   Basic Mobility Standardized Score 30.25   [facility] Highest Level Of Mobility   -[facility] Goal 4: Move to chair/commode   -HL Achieved 4: Move to chair/commode           Mary Griffin, PT

## 2025-01-03 ENCOUNTER — APPOINTMENT (INPATIENT)
Dept: RADIOLOGY | Facility: HOSPITAL | Age: OVER 89
DRG: 871 | End: 2025-01-03
Payer: COMMERCIAL

## 2025-01-03 PROBLEM — B95.3 PNEUMOCOCCAL BACTEREMIA: Status: ACTIVE | Noted: 2025-01-03

## 2025-01-03 PROBLEM — N18.9 ACUTE KIDNEY INJURY SUPERIMPOSED ON CKD  (HCC): Status: ACTIVE | Noted: 2025-01-03

## 2025-01-03 PROBLEM — R78.81 PNEUMOCOCCAL BACTEREMIA: Status: ACTIVE | Noted: 2025-01-03

## 2025-01-03 PROBLEM — N17.9 ACUTE KIDNEY INJURY SUPERIMPOSED ON CKD  (HCC): Status: ACTIVE | Noted: 2025-01-03

## 2025-01-03 PROBLEM — I48.91 NEW ONSET ATRIAL FIBRILLATION (HCC): Status: ACTIVE | Noted: 2025-01-03

## 2025-01-03 PROBLEM — J90 PLEURAL EFFUSION, RIGHT: Status: ACTIVE | Noted: 2025-01-03

## 2025-01-03 PROBLEM — J43.9 EMPHYSEMA LUNG (HCC): Status: ACTIVE | Noted: 2025-01-03

## 2025-01-03 PROBLEM — J96.01 ACUTE HYPOXIC RESPIRATORY FAILURE (HCC): Status: ACTIVE | Noted: 2025-01-03

## 2025-01-03 PROBLEM — J96.02 ACUTE HYPERCAPNIC RESPIRATORY FAILURE (HCC): Status: ACTIVE | Noted: 2025-01-03

## 2025-01-03 PROBLEM — R13.10 DYSPHAGIA: Status: ACTIVE | Noted: 2025-01-03

## 2025-01-03 PROBLEM — A41.9 SEPSIS (HCC): Status: ACTIVE | Noted: 2025-01-03

## 2025-01-03 LAB
ANION GAP SERPL CALCULATED.3IONS-SCNC: 8 MMOL/L (ref 4–13)
BACTERIA BLD CULT: ABNORMAL
BACTERIA BLD CULT: ABNORMAL
BUN SERPL-MCNC: 54 MG/DL (ref 5–25)
CALCIUM SERPL-MCNC: 7.7 MG/DL (ref 8.4–10.2)
CHLORIDE SERPL-SCNC: 113 MMOL/L (ref 96–108)
CK SERPL-CCNC: 253 U/L (ref 26–192)
CK SERPL-CCNC: 271 U/L (ref 26–192)
CK SERPL-CCNC: 287 U/L (ref 26–192)
CK SERPL-CCNC: 334 U/L (ref 26–192)
CO2 SERPL-SCNC: 22 MMOL/L (ref 21–32)
CREAT SERPL-MCNC: 1.53 MG/DL (ref 0.6–1.3)
ERYTHROCYTE [DISTWIDTH] IN BLOOD BY AUTOMATED COUNT: 13.8 % (ref 11.6–15.1)
GFR SERPL CREATININE-BSD FRML MDRD: 28 ML/MIN/1.73SQ M
GLUCOSE SERPL-MCNC: 267 MG/DL (ref 65–140)
GRAM STN SPEC: ABNORMAL
GRAM STN SPEC: ABNORMAL
HCT VFR BLD AUTO: 30.2 % (ref 34.8–46.1)
HGB BLD-MCNC: 9.6 G/DL (ref 11.5–15.4)
MAGNESIUM SERPL-MCNC: 2.3 MG/DL (ref 1.9–2.7)
MCH RBC QN AUTO: 31.1 PG (ref 26.8–34.3)
MCHC RBC AUTO-ENTMCNC: 31.8 G/DL (ref 31.4–37.4)
MCV RBC AUTO: 98 FL (ref 82–98)
MRSA NOSE QL CULT: NORMAL
PHOSPHATE SERPL-MCNC: 2.7 MG/DL (ref 2.3–4.1)
PLATELET # BLD AUTO: 182 THOUSANDS/UL (ref 149–390)
PMV BLD AUTO: 11.1 FL (ref 8.9–12.7)
POTASSIUM SERPL-SCNC: 3.9 MMOL/L (ref 3.5–5.3)
RBC # BLD AUTO: 3.09 MILLION/UL (ref 3.81–5.12)
S PNEUM DNA BLD POS QL NAA+NON-PROBE: DETECTED
SODIUM SERPL-SCNC: 143 MMOL/L (ref 135–147)
WBC # BLD AUTO: 11.53 THOUSAND/UL (ref 4.31–10.16)

## 2025-01-03 PROCEDURE — 99222 1ST HOSP IP/OBS MODERATE 55: CPT | Performed by: INTERNAL MEDICINE

## 2025-01-03 PROCEDURE — 94640 AIRWAY INHALATION TREATMENT: CPT

## 2025-01-03 PROCEDURE — 83735 ASSAY OF MAGNESIUM: CPT

## 2025-01-03 PROCEDURE — G0545 PR INHERENT VISIT TO INPT: HCPCS | Performed by: INTERNAL MEDICINE

## 2025-01-03 PROCEDURE — 85027 COMPLETE CBC AUTOMATED: CPT

## 2025-01-03 PROCEDURE — 97112 NEUROMUSCULAR REEDUCATION: CPT

## 2025-01-03 PROCEDURE — 97530 THERAPEUTIC ACTIVITIES: CPT

## 2025-01-03 PROCEDURE — 84100 ASSAY OF PHOSPHORUS: CPT

## 2025-01-03 PROCEDURE — 94664 DEMO&/EVAL PT USE INHALER: CPT

## 2025-01-03 PROCEDURE — 99223 1ST HOSP IP/OBS HIGH 75: CPT | Performed by: INTERNAL MEDICINE

## 2025-01-03 PROCEDURE — 97116 GAIT TRAINING THERAPY: CPT

## 2025-01-03 PROCEDURE — 80048 BASIC METABOLIC PNL TOTAL CA: CPT

## 2025-01-03 PROCEDURE — 99232 SBSQ HOSP IP/OBS MODERATE 35: CPT | Performed by: STUDENT IN AN ORGANIZED HEALTH CARE EDUCATION/TRAINING PROGRAM

## 2025-01-03 PROCEDURE — 71045 X-RAY EXAM CHEST 1 VIEW: CPT

## 2025-01-03 PROCEDURE — 94760 N-INVAS EAR/PLS OXIMETRY 1: CPT

## 2025-01-03 PROCEDURE — 82550 ASSAY OF CK (CPK): CPT

## 2025-01-03 RX ORDER — AMOXICILLIN 500 MG/1
500 CAPSULE ORAL EVERY 12 HOURS SCHEDULED
Status: COMPLETED | OUTPATIENT
Start: 2025-01-04 | End: 2025-01-09

## 2025-01-03 RX ADMIN — LEVALBUTEROL HYDROCHLORIDE 1.25 MG: 1.25 SOLUTION RESPIRATORY (INHALATION) at 19:20

## 2025-01-03 RX ADMIN — IPRATROPIUM BROMIDE 0.5 MG: 0.5 SOLUTION RESPIRATORY (INHALATION) at 07:27

## 2025-01-03 RX ADMIN — LEVALBUTEROL HYDROCHLORIDE 1.25 MG: 1.25 SOLUTION RESPIRATORY (INHALATION) at 07:27

## 2025-01-03 RX ADMIN — PREDNISONE 40 MG: 20 TABLET ORAL at 09:49

## 2025-01-03 RX ADMIN — IPRATROPIUM BROMIDE 0.5 MG: 0.5 SOLUTION RESPIRATORY (INHALATION) at 13:26

## 2025-01-03 RX ADMIN — APIXABAN 2.5 MG: 2.5 TABLET, FILM COATED ORAL at 18:01

## 2025-01-03 RX ADMIN — CHLORHEXIDINE GLUCONATE 0.12% ORAL RINSE 15 ML: 1.2 LIQUID ORAL at 09:49

## 2025-01-03 RX ADMIN — METOPROLOL TARTRATE 25 MG: 25 TABLET, FILM COATED ORAL at 00:13

## 2025-01-03 RX ADMIN — DEXTROSE 75 ML/HR: 5 SOLUTION INTRAVENOUS at 03:43

## 2025-01-03 RX ADMIN — CHLORHEXIDINE GLUCONATE 0.12% ORAL RINSE 15 ML: 1.2 LIQUID ORAL at 21:51

## 2025-01-03 RX ADMIN — LEVALBUTEROL HYDROCHLORIDE 1.25 MG: 1.25 SOLUTION RESPIRATORY (INHALATION) at 13:26

## 2025-01-03 RX ADMIN — APIXABAN 2.5 MG: 2.5 TABLET, FILM COATED ORAL at 09:49

## 2025-01-03 RX ADMIN — IPRATROPIUM BROMIDE 0.5 MG: 0.5 SOLUTION RESPIRATORY (INHALATION) at 19:20

## 2025-01-03 RX ADMIN — CEFTRIAXONE SODIUM 2000 MG: 10 INJECTION, POWDER, FOR SOLUTION INTRAVENOUS at 13:12

## 2025-01-03 NOTE — PHYSICAL THERAPY NOTE
Physical Therapy Treatment Note    Patient's Name: Remedios Mcgrath  : 25 1407   PT Last Visit   PT Visit Date 25   Note Type   Note Type Treatment   Pain Assessment   Pain Assessment Tool 0-10   Pain Score No Pain   Restrictions/Precautions   Weight Bearing Precautions Per Order No   Other Precautions Cognitive;Chair Alarm;Bed Alarm;Multiple lines;O2;Fall Risk  (O2 5-6L)   General   Chart Reviewed Yes   Response to Previous Treatment Patient with no complaints from previous session.   Family/Caregiver Present Yes   Subjective   Subjective Agreeable to mobilize.   Bed Mobility   Supine to Sit 3  Moderate assistance   Additional items Assist x 1;HOB elevated;Bedrails;Increased time required;Verbal cues;LE management   Additional Comments Pt greeted in supine.   Transfers   Sit to Stand 4  Minimal assistance   Additional items Assist x 1;Increased time required;Verbal cues;Armrests   Stand to Sit 4  Minimal assistance   Additional items Assist x 1;Increased time required;Verbal cues;Armrests   Additional Comments RW   Ambulation/Elevation   Gait pattern Excessively slow;Short stride;Decreased foot clearance;Improper Weight shift;Decreased heel strike   Gait Assistance 3  Moderate assist   Additional items Assist x 1;Verbal cues;Tactile cues  (+ 2nd for line management)   Assistive Device Rolling walker  (+ HHA)   Distance 15' w/ RW + 30' w/ HHA   Stair Management Assistance Not tested   Balance   Static Sitting Fair   Dynamic Sitting Fair -   Static Standing Poor +   Dynamic Standing Poor   Ambulatory Poor  (RW)   Endurance Deficit   Endurance Deficit Yes   Endurance Deficit Description weakness, fatigue   Activity Tolerance   Activity Tolerance Patient limited by fatigue  (+ bowel incontinence)   Nurse Made Aware yes   Exercises   Neuro re-ed Sitting EOB unsupported. Static standing x3 min for hygiene and linen changes.   Assessment   Prognosis Good   Problem List Decreased  "strength;Decreased range of motion;Decreased endurance;Impaired balance;Decreased coordination;Decreased mobility;Impaired judgement;Decreased cognition;Decreased safety awareness;Pain;Orthopedic restrictions   Assessment Pt motivated to participate in PT to see if she is able to walk. Pt demonstrated progress compared to previous session w/ decreased assistance required for bed mobility + t/f, as well as increased distance covered during gait training. Pt w/ difficulty maneuvering RW. Switched to HHA w/ minimal improvement in gait quality. Pt hesitant and unsteady. Pt limited primarily by SOB and feeling weak. Stopped ambulation after 30' due to bowel incontinence. Instructed pt in further t/f and prolonged standing for hygiene and changing of bed sheets once returned to room. From a PT standpoint continue to recommend rehab upon d/c.   Barriers to Discharge Decreased caregiver support   Goals   Patient Goals to walk   Plan   Treatment/Interventions Functional transfer training;LE strengthening/ROM;Endurance training;Therapeutic exercise;Patient/family training;Equipment eval/education;Bed mobility;Compensatory technique education;Gait training;Spoke to nursing   Progress Progressing toward goals   PT Frequency 3-5x/wk   Discharge Recommendation   Rehab Resource Intensity Level, PT II (Moderate Resource Intensity)   Equipment Recommended Walker   Walker Package Recommended Wheeled walker   Change/add to Walker Package? Yes, Change Size   Walker Size Charlie (Ht <5'1\")   AM-PAC Basic Mobility Inpatient   Turning in Flat Bed Without Bedrails 3   Lying on Back to Sitting on Edge of Flat Bed Without Bedrails 2   Moving Bed to Chair 3   Standing Up From Chair Using Arms 3   Walk in Room 2   Climb 3-5 Stairs With Railing 1   Basic Mobility Inpatient Raw Score 14   Basic Mobility Standardized Score 35.55   [facility] Highest Level Of Mobility   -[facility] Goal 4: Move to chair/commode   -[facility] Achieved 7: Walk 25 feet or " more   Education   Education Provided Mobility training;Assistive device   Patient Reinforcement needed   End of Consult   Patient Position at End of Consult Bedside chair;Bed/Chair alarm activated;All needs within reach  (on waffle cushion, all lines in tact)       Bere Perez, PT, DPT

## 2025-01-03 NOTE — ASSESSMENT & PLAN NOTE
Presumed pneumococcal given positive urinary antigen, bacteremia as above.    Continue antibiotics as above  Follow respiratory status closely

## 2025-01-03 NOTE — CASE MANAGEMENT
Case Management Assessment & Discharge Planning Note    Patient name Remedios Mcgrath  Location [facility] 603/[facility] 603-01 MRN [id number]  : 1929 Date 1/3/2025       Current Admission Date: 2024  Current Admission Diagnosis:Pneumonia   Patient Active Problem List    Diagnosis Date Noted Date Diagnosed    New onset atrial fibrillation with RVR ([facility]) 2025     Sepsis ([facility]) 2025     Pneumococcal bacteremia 2025     Acute kidney injury superimposed on CKD  ([facility]) 2025     Acute hypoxic respiratory failure (HCC) 2025     Emphysema lung ([facility]) 2025     Dysphagia 2025     Pleural effusion, right 2025     Pneumonia 2025     Moderate protein-calorie malnutrition ([facility]) 2025     Mild protein-calorie malnutrition ([facility]) 10/04/2024     Venous stasis dermatitis of both lower extremities 2024     Stage 3b chronic kidney disease ([facility]) 03/15/2022     Bruises easily 2020     Impaired fasting glucose 2012     Stenosis of left carotid artery 2012     Peripheral vascular disease ([facility]) 2012     Hyperlipidemia 2012     Hypertension 2012     Insomnia 2012       LOS (days): 3  Geometric Mean LOS (GMLOS) (days): 4.9  Days to GMLOS:2.2     OBJECTIVE:    Risk of Unplanned Readmission Score: 19.52   Current admission status: Inpatient       Preferred Pharmacy:   [facility] [id number] [address]  Phone: [phone] Fax: [phone]    Primary Care Provider: Dion Tsang MD    Primary Insurance: [employer]  Secondary Insurance:     ASSESSMENT:  Active Health Care Proxies    There are no active Health Care Proxies on file.       Readmission Root Cause  30 Day Readmission: No    Patient Information  Admitted from:: Home  Mental Status: Alert  During Assessment patient was accompanied by: Son, Other-Comment ([facility])  Assessment information provided by:: Son  Primary  Caregiver: Self  Support Systems: Self, Son, Family members  County of Residence: [place]  What city do you live in?: From home with daughter, Therapy recs STR, pt declined, agreeable to [facility]  Home entry access options. Select all that apply.: Elevator  Type of Current Residence: Apartment  Floor Level: 4  Upon entering residence, is there a bedroom on the main floor (no further steps)?: Yes  Upon entering residence, is there a bathroom on the main floor (no further steps)?: Yes  Living Arrangements: Lives Alone    Activities of Daily Living Prior to Admission  Functional Status: Independent  Completes ADLs independently?: Yes  Ambulates independently?: Yes  Does patient use assisted devices?: No  Does patient currently own DME?: No  Does patient have a history of Outpatient Therapy (PT/OT)?: Yes  Does the patient have a history of Short-Term Rehab?: Yes  Does patient have a history of HHC?: No  Does patient currently have HHC?: No    Patient Information Continued  Income Source: Pension/long term  Does patient have prescription coverage?: Yes  Does patient receive dialysis treatments?: No  Does patient have a history of substance abuse?: No  Does patient have a history of Mental Health Diagnosis?: No    Means of Transportation  Means of Transport to Appts:: Drives Self  Social Determinants of Health (SDOH)      Flowsheet Row Most Recent Value   Housing Stability    In the last 12 months, was there a time when you were not able to pay the mortgage or rent on time? N   At any time in the past 12 months, were you homeless or living in a shelter (including now)? N   Transportation Needs    In the past 12 months, has lack of transportation kept you from medical appointments or from getting medications? no   In the past 12 months, has lack of transportation kept you from meetings, work, or from getting things needed for daily living? No   Food Insecurity    Within the past 12 months, you worried that your food would run  out before you got the money to buy more. Never true   Within the past 12 months, the food you bought just didn't last and you didn't have money to get more. Never true   Utilities    In the past 12 months has the electric, gas, oil, or water company threatened to shut off services in your home? No        DISCHARGE DETAILS:    Discharge planning discussed with:: patient, Son, grand daughter at bedside  Freedom of Choice: Yes  Comments - Freedom of Choice: Discussed FOC  CM contacted family/caregiver?: Yes  Were Treatment Team discharge recommendations reviewed with patient/caregiver?: Yes      Other Referral/Resources/Interventions Provided:  Interventions: Short Term Rehab  Referral Comments: This CM discussed therapy recs with pt and family. Therapy recs STR. Pt and family requested referral to [facility] and agreeable to blanket referral to SNF. Referrals entered in aidin awaiting response.    Treatment Team Recommendation: Short Term Rehab  CM reviewed d/c planning process including the following: identifying help at home, patient preference for d/c planning needs, Discharge Lounge, [facility] Meds to Bed program, availability of treatment team to discuss questions or concerns patient and/or family may have regarding understanding medications and recognizing signs and symptoms once discharged.  CM also encouraged patient to follow up with all recommended appointments after discharge. Patient advised of importance for patient and family to participate in managing patient’s medical well being.     This CM introduced self and role to Pt and family at bedside. Pt lives in a senior apartment in [place]. 4th level, elevator to reach. Prior to admission pt functional status was independent. Pt managed ADLs/IADLs independently. Pt owns no DME. Pt drives self and supported by family.

## 2025-01-03 NOTE — PROGRESS NOTES
Progress Note - Hospitalist   Name: Remedios Mcgrath 89 y.o. female I MRN: [id number]  Unit/Bed#: [facility] 603-01 I Date of Admission: 12/31/2024   Date of Service: 1/3/2025 I Hospital Day: 3    Assessment & Plan  Pneumonia  CT CAP: Acute right-sided pneumonia, greatest at the right lower lobe. Associated loculated parapneumonic effusion. No obvious pleural thickening on noncontrast study, though empyema has to be considered.   Currently on IV ceftriaxone  ID consulted  TTE technically difficult but no obvious vegetations  Continue ceftriaxone through today's dose  In AM, change to amoxicillin 500 mg PO Q12 with plan to continue through 1/9 to complete 10 days total antibiotics  Follow up final repeat blood cultures    Hypertension  Currenlty on lopressor 25 mg bid   Moderate protein-calorie malnutrition (HCC)  Malnutrition Findings:   Adult Malnutrition type: Chronic illness  Adult Degree of Malnutrition: Malnutrition of moderate degree  Malnutrition Characteristics: Inadequate energy, Muscle loss                  360 Statement: Chronic/moderate malnutrition r/t inadequate PO intake, as evidenced by intake meeting <75% of estimated needs x > 1 month, and muscle mass depletion (temporal). Treatment: pending ability to advance to PO diet - liberal diet as able and trial oral nutrition supplements pending SLP recommendations    BMI Findings:           Body mass index is 18.52 kg/m².     New onset atrial fibrillation with RVR (HCC)  Cardiology consulted   Continue Lopressor PO 25 mg BID  Continue to monitor on Telemetry  Continue Eliquis 2.5 mg BID for AC  Discontinue IVF given increased O2 requirements  Sepsis (HCC)  Improved   Pneumococcal bacteremia  ID consulted  TTE technically difficult but no obvious vegetations  Continue ceftriaxone through today's dose  In AM, change to amoxicillin 500 mg PO Q12 with plan to continue through 1/9 to complete 10 days total antibiotics  Follow up final repeat blood cultures  No  indication for GARRETT from an ID perspective  Acute kidney injury superimposed on CKD  (HCC)  Lab Results   Component Value Date    EGFR 28 01/03/2025    EGFR 20 01/02/2025    EGFR 19 01/01/2025    CREATININE 1.53 (H) 01/03/2025    CREATININE 1.99 (H) 01/02/2025    CREATININE 2.09 (H) 01/01/2025     Baseline Cr 1.3 to 1.4  Imprvoed to 2 to 1.5  Acute hypoxic respiratory failure (HCC)  Most likely secondary to pneumonia and severe COPD  Goal O2 88-92  Wean to room air  Home o2 eval prior to d/c  Emphysema lung (HCC)  Patient was treated for COPD exacerbation with IV Solu-Medrol and has been transitioned to oral prednisone 40 mg daily for 3 days   Today is day 1 of 3  Dysphagia  Speech following    Pleural effusion, right      VTE Pharmacologic Prophylaxis:   Moderate Risk (Score 3-4) - Pharmacological DVT Prophylaxis Ordered: apixaban (Eliquis).    Mobility:   Basic Mobility Inpatient Raw Score: 14  JH-HLM Goal: 4: Move to chair/commode  JH-HLM Achieved: 7: Walk 25 feet or more  JH-HLM Goal achieved. Continue to encourage appropriate mobility.    Patient Centered Rounds: I performed bedside rounds with nursing staff today.   Discussions with Specialists or Other Care Team Provider: ID    Education and Discussions with Family / Patient: Updated  (son and daughter) at bedside.    Current Length of Stay: 3 day(s)  Current Patient Status: Inpatient   Certification Statement: The patient will continue to require additional inpatient hospital stay due to IV abx  Discharge Plan: Anticipate discharge tomorrow to discharge location to be determined pending rehab evaluations.    Code Status: Level 3 - DNAR and DNI    Subjective   Pt states her breathing and coughing is better.    Objective :  Temp:  [97.1 °F (36.2 °C)-98.1 °F (36.7 °C)] 97.8 °F (36.6 °C)  HR:  [71-94] 76  BP: (104-137)/(51-60) 137/59  Resp:  [16-25] 20  SpO2:  [91 %-97 %] 92 %  O2 Device: Nasal cannula  Nasal Cannula O2 Flow Rate (L/min):  [4  L/min-5 L/min] 5 L/min    Body mass index is 18.52 kg/m².     Input and Output Summary (last 24 hours):     Intake/Output Summary (Last 24 hours) at 1/3/2025 1602  Last data filed at 1/3/2025 0343  Gross per 24 hour   Intake 942.03 ml   Output --   Net 942.03 ml       Physical Exam  Vitals and nursing note reviewed.   Constitutional:       General: She is not in acute distress.     Appearance: She is well-developed. She is ill-appearing.   HENT:      Head: Normocephalic and atraumatic.   Eyes:      Conjunctiva/sclera: Conjunctivae normal.   Cardiovascular:      Rate and Rhythm: Normal rate. Rhythm irregular.      Heart sounds: No murmur heard.  Pulmonary:      Effort: Pulmonary effort is normal.      Breath sounds: Rhonchi present.   Abdominal:      Palpations: Abdomen is soft.      Tenderness: There is no abdominal tenderness.   Musculoskeletal:         General: No swelling.   Skin:     General: Skin is warm and dry.   Neurological:      Mental Status: She is alert. Mental status is at baseline.   Psychiatric:         Mood and Affect: Mood normal.           Lines/Drains:                   Lab Results: I have reviewed the following results:   Results from last 7 days   Lab Units 01/03/25  0439 01/01/25 0448 12/31/24 2024   WBC Thousand/uL 11.53*   < > 20.40*   HEMOGLOBIN g/dL 9.6*   < > 14.4   HEMATOCRIT % 30.2*   < > 44.7   PLATELETS Thousands/uL 182   < > 245   BANDS PCT %  --   --  1   LYMPHO PCT %  --   --  2*   MONO PCT %  --   --  6   EOS PCT %  --   --  0    < > = values in this interval not displayed.     Results from last 7 days   Lab Units 01/03/25  0439 01/02/25  0420   SODIUM mmol/L 143 149*   POTASSIUM mmol/L 3.9 3.3*   CHLORIDE mmol/L 113* 116*   CO2 mmol/L 22 21   BUN mg/dL 54* 70*   CREATININE mg/dL 1.53* 1.99*   ANION GAP mmol/L 8 12   CALCIUM mg/dL 7.7* 8.2*   ALBUMIN g/dL  --  2.7*   TOTAL BILIRUBIN mg/dL  --  0.63   ALK PHOS U/L  --  52   ALT U/L  --  30   AST U/L  --  50*   GLUCOSE RANDOM  mg/dL 267* 197*     Results from last 7 days   Lab Units 01/01/25  1829   INR  1.52*             Results from last 7 days   Lab Units 01/01/25  0448 01/01/25  0110 12/31/24  2237 12/31/24 2024   LACTIC ACID mmol/L 2.8* 3.9* 4.7* 8.6*   PROCALCITONIN ng/ml  --   --   --  51.53*       Recent Cultures (last 7 days):   Results from last 7 days   Lab Units 01/02/25  1716 01/01/25  1226 01/01/25  0046 12/31/24 2027 12/31/24 2024   BLOOD CULTURE  Received in Microbiology Lab. Culture in Progress.  Received in Microbiology Lab. Culture in Progress.  --   --  Streptococcus pneumoniae* Streptococcus pneumoniae*   GRAM STAIN RESULT   --   --   --  Gram positive cocci in pairs and chains* Gram positive cocci in pairs and chains*   LEGIONELLA URINARY ANTIGEN   --   --  Negative  --   --    C DIFF TOXIN B BY PCR   --  Negative  --   --   --              Last 24 Hours Medication List:     Current Facility-Administered Medications:     [START ON 1/4/2025] amoxicillin (AMOXIL) capsule 500 mg, Q12H KATT    apixaban (ELIQUIS) tablet 2.5 mg, BID    chlorhexidine (PERIDEX) 0.12 % oral rinse 15 mL, Q12H KATT    ipratropium (ATROVENT) 0.02 % inhalation solution 0.5 mg, TID    levalbuterol (XOPENEX) inhalation solution 1.25 mg, TID    metoprolol tartrate (LOPRESSOR) tablet 25 mg, Q12H KATT    predniSONE tablet 40 mg, Daily    Administrative Statements   Today, Patient Was Seen By: Cristina Cherry DO      **Please Note: This note may have been constructed using a voice recognition system.**

## 2025-01-03 NOTE — ASSESSMENT & PLAN NOTE
Patient was treated for COPD exacerbation with IV Solu-Medrol and has been transitioned to oral prednisone 40 mg daily for 3 days   Today is day 1 of 3

## 2025-01-03 NOTE — ASSESSMENT & PLAN NOTE
Cardiology consulted   Continue Lopressor PO 25 mg BID  Continue to monitor on Telemetry  Continue Eliquis 2.5 mg BID for AC  Discontinue IVF given increased O2 requirements

## 2025-01-03 NOTE — ASSESSMENT & PLAN NOTE
CT CAP: Acute right-sided pneumonia, greatest at the right lower lobe. Associated loculated parapneumonic effusion. No obvious pleural thickening on noncontrast study, though empyema has to be considered.   Currently on IV ceftriaxone  ID consulted  TTE technically difficult but no obvious vegetations  Continue ceftriaxone through today's dose  In AM, change to amoxicillin 500 mg PO Q12 with plan to continue through 1/9 to complete 10 days total antibiotics  Follow up final repeat blood cultures

## 2025-01-03 NOTE — ASSESSMENT & PLAN NOTE
WBC, HR.  Due to bacteremia, pneumonia as below.  No other appreciable source.  Improving with antibiotics.    Continue antibiotics as below  Follow temperatures closely  Recheck WBC in AM to monitor infection  Supportive care as per the primary service

## 2025-01-03 NOTE — CONSULTS
Consultation - Cardiology   Name: Remedios Mcgrath 89 y.o. female I MRN: [id number]  [facility] I Date of Admission: 12/31/2024   Date of Service: 1/3/2025 I Hospital Day: 3   Inpatient consult to Cardiology  Consult performed by: Carlos Zapata MD  Consult ordered by: Jefe Roger MD        Physician Requesting Evaluation: Cristina Cherry DO   Reason for Evaluation / Principal Problem: New Onset Atrial Fibrillation with RVR     Assessment & Plan  Pneumonia  Patient being treated with CX for pansensitive Strep Pneumoniae  Given oxygenation requirements, may be component of HFpEF also contributing  Infectious Disease following, appreciate recs  Moderate protein-calorie malnutrition (HCC)  Malnutrition Findings:   Adult Malnutrition type: Chronic illness  Adult Degree of Malnutrition: Malnutrition of moderate degree  Malnutrition Characteristics: Inadequate energy, Muscle loss                  360 Statement: Chronic/moderate malnutrition r/t inadequate PO intake, as evidenced by intake meeting <75% of estimated needs x > 1 month, and muscle mass depletion (temporal). Treatment: pending ability to advance to PO diet - liberal diet as able and trial oral nutrition supplements pending SLP recommendations    BMI Findings:           Body mass index is 18.52 kg/m².     Hypertension  Continue Lopressor  New onset atrial fibrillation with RVR (HCC)  At this time patient does not have previous hx of atrial fibrillation, but could possibly be paroxsymal given prior TIA  She was given Cardizem and Amiodarone bolus in ED following which patient converted to NSR that is still seen on Telemetry today  She is currently being rate controlled on Lopressor 25 mg BID  Suspect her bacteremia and dehydration POA precipitated volume depletion from sepsis leading to her initial A-fib  Given patient's current symptoms are likely due to her current bacteremia, will continue rate control at this time given patient is stable  on Telemetry    Plan  Continue Lopressor PO 25 mg BID  Continue to monitor on Telemetry  Continue Eliquis 2.5 mg BID for AC  Discontinue IVF given increased O2 requirements      History of Present Illness   Remedios Mcgrath is a 89 y.o. female PMHx HTN, Colon Ca, Prior TIA who presents with 1-day history of AMS after possible unwitnessed fall. Patient was found to be hypoxic and tachycardic with irregular rhythm on initial evaluation requiring MICU evaluation. She was admitted to MICU for severe sepsis in the setting of Strep Pneumoniae bacteremia showing clinical improvement now downgraded to medical floors following antibiotic regimen and IVF. Cardiology consulted for management of new-onset Afib with RVR on admission. Patient reports little memory of events preceding her hospitalization as well as during this admission. Patient at this time endorsing general malaise and nausea this morning. She denies any current chest pain, dyspnea, palpitations, lightheadedness/dizziness or fevers. Patient at this time shown to be in NSR on Telemetry.    Review of Systems   Constitutional:  Positive for chills. Negative for fever.   HENT:  Negative for ear pain and sore throat.    Eyes:  Negative for pain and visual disturbance.   Respiratory:  Negative for cough and shortness of breath.    Cardiovascular:  Negative for chest pain and palpitations.   Gastrointestinal:  Positive for nausea. Negative for abdominal pain and vomiting.   Genitourinary:  Negative for dysuria and hematuria.   Musculoskeletal:  Negative for arthralgias and back pain.   Skin:  Negative for color change and rash.   Neurological:  Negative for seizures and syncope.   All other systems reviewed and are negative.    I have reviewed the patient's PMH, PSH, Social History, Family History, Meds, and Allergies  Historical Information   Past Medical History:   Diagnosis Date    Allergic rhinitis     resolved 12/29/14    Bladder cancer (HCC)     last assessed  6/13/13    Colon cancer (HCC)     last assessed 12/19/12    Diverticulitis     of colon    Hypertension     Lyme disease     last assessed 12/19/12    Microscopic hematuria     Osteoarthritis     resolved 12/29/14    TIA (transient ischemic attack)      Past Surgical History:   Procedure Laterality Date    CATARACT EXTRACTION      CHOLECYSTECTOMY      COLECTOMY      partial - sigmoid    CYSTOSCOPY Left     w/insertion of ureteral stent    MOUTH SURGERY      REVISION TOTAL HIP ARTHROPLASTY Left 2015    TONSILLECTOMY       Social History     Tobacco Use    Smoking status: Former     Passive exposure: Past    Smokeless tobacco: Never   Vaping Use    Vaping status: Never Used   Substance and Sexual Activity    Alcohol use: No    Drug use: No    Sexual activity: Not Currently     E-Cigarette/Vaping    E-Cigarette Use Never User      E-Cigarette/Vaping Substances    Nicotine No     THC No        Social History     Tobacco Use    Smoking status: Former     Passive exposure: Past    Smokeless tobacco: Never   Vaping Use    Vaping status: Never Used   Substance and Sexual Activity    Alcohol use: No    Drug use: No    Sexual activity: Not Currently       Current Facility-Administered Medications:     apixaban (ELIQUIS) tablet 2.5 mg, BID    cefTRIAXone (ROCEPHIN) 2,000 mg in dextrose 5 % 50 mL IVPB, Q24H, Last Rate: 2,000 mg (01/02/25 8314)    chlorhexidine (PERIDEX) 0.12 % oral rinse 15 mL, Q12H KATT    dextrose 5 % infusion, Continuous, Last Rate: 75 mL/hr (01/03/25 3463)    ipratropium (ATROVENT) 0.02 % inhalation solution 0.5 mg, TID    levalbuterol (XOPENEX) inhalation solution 1.25 mg, TID    metoprolol tartrate (LOPRESSOR) tablet 25 mg, Q12H KATT    predniSONE tablet 40 mg, Daily  Prior to Admission Medications   Prescriptions Last Dose Informant Patient Reported? Taking?   amLODIPine (NORVASC) 5 mg tablet   No No   Sig: Take 1 tablet (5 mg total) by mouth 2 (two) times a day   aspirin 81 MG tablet  Self Yes No   Sig:  Take 1 tablet by mouth daily   metoprolol tartrate (LOPRESSOR) 50 mg tablet   No No   Sig: Take 0.5 tablets (25 mg total) by mouth 2 (two) times a day   temazepam (RESTORIL) 15 mg capsule   No No   Sig: Take 2 capsules (30 mg total) by mouth daily at bedtime as needed for sleep      Facility-Administered Medications: None     Codeine sulfate and Naproxen    Objective :  Temp:  [97.1 °F (36.2 °C)-98.1 °F (36.7 °C)] 97.1 °F (36.2 °C)  HR:  [] 82  BP: (104-132)/(50-60) 109/53  Resp:  [16-30] 16  SpO2:  [91 %-100 %] 92 %  O2 Device: Nasal cannula  Nasal Cannula O2 Flow Rate (L/min):  [4 L/min] 4 L/min  Orthostatic Blood Pressures      Flowsheet Row Most Recent Value   Blood Pressure 109/53 filed at 01/03/2025 0801          First Weight: Weight - Scale: 41 kg (90 lb 6.2 oz) (12/31/24 2125)  Vitals:    01/02/25 2052 01/03/25 0600   Weight: 40.1 kg (88 lb 8 oz) 40.2 kg (88 lb 9.6 oz)       Physical Exam  Vitals and nursing note reviewed.   Constitutional:       Appearance: She is well-developed. She is ill-appearing.   HENT:      Head: Normocephalic and atraumatic.   Eyes:      Conjunctiva/sclera: Conjunctivae normal.   Cardiovascular:      Rate and Rhythm: Normal rate and regular rhythm.      Heart sounds: No murmur heard.  Pulmonary:      Effort: Pulmonary effort is normal. No respiratory distress.      Breath sounds: Normal breath sounds.   Abdominal:      Palpations: Abdomen is soft.      Tenderness: There is no abdominal tenderness.   Musculoskeletal:         General: No swelling.      Cervical back: Neck supple.   Skin:     General: Skin is warm and dry.      Capillary Refill: Capillary refill takes less than 2 seconds.   Neurological:      Mental Status: She is alert.   Psychiatric:         Mood and Affect: Mood normal.           Lab Results: I have reviewed the following results:  Results from last 7 days   Lab Units 01/03/25  0439 01/02/25  0420 01/01/25  0448   WBC Thousand/uL 11.53* 11.17* 14.26*    HEMOGLOBIN g/dL 9.6* 11.1* 12.5   HEMATOCRIT % 30.2* 35.2 39.0   PLATELETS Thousands/uL 182 193 192     Results from last 7 days   Lab Units 01/03/25  0439 01/02/25  0420 01/01/25 2127   POTASSIUM mmol/L 3.9 3.3* 3.3*   CHLORIDE mmol/L 113* 116* 117*   CO2 mmol/L 22 21 21   BUN mg/dL 54* 70* 73*   CREATININE mg/dL 1.53* 1.99* 2.09*   CALCIUM mg/dL 7.7* 8.2* 8.3*     Results from last 7 days   Lab Units 01/02/25  1220 01/02/25  0416 01/01/25 2127 01/01/25 1829 12/31/24 2024   INR   --   --   --  1.52* 1.50*   PTT seconds 59* 63* 59* 51* 28     Lab Results   Component Value Date    HGBA1C 5.9 (H) 09/23/2024     Lab Results   Component Value Date    CKTOTAL 287 (H) 01/03/2025    CKMBINDEX <1 03/29/2014    TROPONINI <0.04 03/30/2014       Imaging Results Review: I reviewed radiology reports from this admission including: CT chest, CT abdomen/pelvis, CT head, Ultrasound(s), and Echocardiogram.  Other Study Results Review: EKG was reviewed.     VTE Prophylaxis: VTE covered by:  apixaban, Oral, 2.5 mg at 01/02/25 9126       Administrative Statements   I have spent a total time of 30 minutes in caring for this patient on the day of the visit/encounter including Diagnostic results, Prognosis, Risks and benefits of tx options, Instructions for management, Patient and family education, Importance of tx compliance, Risk factor reductions, Impressions, Counseling / Coordination of care, Documenting in the medical record, Reviewing / ordering tests, medicine, procedures  , Obtaining or reviewing history  , and Communicating with other healthcare professionals .

## 2025-01-03 NOTE — ASSESSMENT & PLAN NOTE
At this time patient does not have previous hx of atrial fibrillation, but could possibly be paroxsymal given prior TIA  She was given Cardizem and Amiodarone bolus in ED following which patient converted to NSR that is still seen on Telemetry today  She is currently being rate controlled on Lopressor 25 mg BID  Suspect her bacteremia and dehydration POA precipitated volume depletion from sepsis leading to her initial A-fib  Given patient's current symptoms are likely due to her current bacteremia, will continue rate control at this time given patient is stable on Telemetry    Plan  Continue Lopressor PO 25 mg BID  Continue to monitor on Telemetry  Continue Eliquis 2.5 mg BID for AC  Discontinue IVF given increased O2 requirements

## 2025-01-03 NOTE — ASSESSMENT & PLAN NOTE
Patient being treated with CX for pansensitive Strep Pneumoniae  Given oxygenation requirements, may be component of HFpEF also contributing  Infectious Disease following, appreciate recs

## 2025-01-03 NOTE — ASSESSMENT & PLAN NOTE
Due to pneumonia.  Repeat blood cultures negative.  TTE (technically difficult) showed MV thickening (likely age-related) but no vegetations.  Low clinical suspicion for endocarditis.    Continue ceftriaxone through today's dose  In AM, change to amoxicillin 500 mg PO Q12 with plan to continue through 1/9 to complete 10 days total antibiotics  Follow up final repeat blood cultures  No indication for GARRETT from an ID perspective

## 2025-01-03 NOTE — ASSESSMENT & PLAN NOTE
Malnutrition Findings:   Adult Malnutrition type: Chronic illness  Adult Degree of Malnutrition: Malnutrition of moderate degree  Malnutrition Characteristics: Inadequate energy, Muscle loss                  360 Statement: Chronic/moderate malnutrition r/t inadequate PO intake, as evidenced by intake meeting <75% of estimated needs x > 1 month, and muscle mass depletion (temporal). Treatment: pending ability to advance to PO diet - liberal diet as able and trial oral nutrition supplements pending SLP recommendations    BMI Findings:           Body mass index is 18.52 kg/m².

## 2025-01-03 NOTE — PLAN OF CARE
Problem: PHYSICAL THERAPY ADULT  Goal: Performs mobility at highest level of function for planned discharge setting.  See evaluation for individualized goals.  Description: Treatment/Interventions: OT, Spoke to case management, Gait training, Bed mobility, Patient/family training, Endurance training, LE strengthening/ROM, Functional transfer training  Equipment Recommended:  (TBD)       See flowsheet documentation for full assessment, interventions and recommendations.  Outcome: Progressing  Note: Prognosis: Good  Problem List: Decreased strength, Decreased range of motion, Decreased endurance, Impaired balance, Decreased coordination, Decreased mobility, Impaired judgement, Decreased cognition, Decreased safety awareness, Pain, Orthopedic restrictions  Assessment: Pt motivated to participate in PT to see if she is able to walk. Pt demonstrated progress compared to previous session w/ decreased assistance required for bed mobility + t/f, as well as increased distance covered during gait training. Pt w/ difficulty maneuvering RW. Switched to HHA w/ minimal improvement in gait quality. Pt hesitant and unsteady. Pt limited primarily by SOB and feeling weak. Stopped ambulation after 30' due to bowel incontinence. Instructed pt in further t/f and prolonged standing for hygiene and changing of bed sheets once returned to room. From a PT standpoint continue to recommend rehab upon d/c.  Barriers to Discharge: Decreased caregiver support     Rehab Resource Intensity Level, PT: II (Moderate Resource Intensity)    See flowsheet documentation for full assessment.

## 2025-01-03 NOTE — ASSESSMENT & PLAN NOTE
ID consulted  TTE technically difficult but no obvious vegetations  Continue ceftriaxone through today's dose  In AM, change to amoxicillin 500 mg PO Q12 with plan to continue through 1/9 to complete 10 days total antibiotics  Follow up final repeat blood cultures  No indication for GARRETT from an ID perspective

## 2025-01-03 NOTE — ASSESSMENT & PLAN NOTE
Lab Results   Component Value Date    EGFR 28 01/03/2025    EGFR 20 01/02/2025    EGFR 19 01/01/2025    CREATININE 1.53 (H) 01/03/2025    CREATININE 1.99 (H) 01/02/2025    CREATININE 2.09 (H) 01/01/2025     Baseline Cr 1.3 to 1.4  Imprvoed to 2 to 1.5

## 2025-01-03 NOTE — ASSESSMENT & PLAN NOTE
Baseline Cr 1.3-1.4.  Improved.    Follow creatinine closely and dose-adjust antibiotics as indicated  Recheck BMP in AM

## 2025-01-03 NOTE — ASSESSMENT & PLAN NOTE
Most likely secondary to pneumonia and severe COPD  Goal O2 88-92  Wean to room air  Home o2 eval prior to d/c

## 2025-01-03 NOTE — CONSULTS
Consultation - Infectious Disease   Name: Remedios Mcgrath 89 y.o. female I MRN: [id number]  Unit/Bed#: [facility] 603-01 I Date of Admission: 12/31/2024   Date of Service: 1/3/2025 I Hospital Day: 3   Inpatient consult to Infectious Diseases  Consult performed by: Gege Owen MD  Consult ordered by: Jefe Roger MD        Physician Requesting Evaluation: Cristina Cherry DO   Reason for Evaluation / Principal Problem: Bacteremia    Assessment & Plan  Sepsis (HCC)  WBC, HR.  Due to bacteremia, pneumonia as below.  No other appreciable source.  Improving with antibiotics.    Continue antibiotics as below  Follow temperatures closely  Recheck WBC in AM to monitor infection  Supportive care as per the primary service  Pneumococcal bacteremia  Due to pneumonia.  Repeat blood cultures negative.  TTE (technically difficult) showed MV thickening (likely age-related) but no vegetations.  Low clinical suspicion for endocarditis.    Continue ceftriaxone through today's dose  In AM, change to amoxicillin 500 mg PO Q12 with plan to continue through 1/9 to complete 10 days total antibiotics  Follow up final repeat blood cultures  No indication for GARRETT from an ID perspective  Pneumonia  Presumed pneumococcal given positive urinary antigen, bacteremia as above.    Continue antibiotics as above  Follow respiratory status closely  Pleural effusion, right  Likely parapneumonic.    Follow respiratory status closely  If clinical worsens check repeat CXR  Acute hypoxic respiratory failure (HCC)  Due to pneumonia in setting of severe emphysema.  Acute kidney injury superimposed on CKD  (HCC)  Baseline Cr 1.3-1.4.  Improved.    Follow creatinine closely and dose-adjust antibiotics as indicated  Recheck BMP in AM  Dysphagia  Risk factor for aspiration, pneumonia.    Modified diet per SLP with close follow-up ongoing  Emphysema lung (HCC)  Unknown severity.  Risk factor for pneumonia.  Consideration for exacerbation.    Steroid taper per  pulmonology    I have discussed with Dr. Cherry the above plan to transition to PO antibiotics. He agrees with the plan.  I will reassess the patient on Monday 1/6.  Please call in the interim with new questions.    Antibiotics:  Ceftriaxone #4    History of Present Illness   Remedios Mcgrath is a 89 y.o. year old female with severe emphysema by imaging who was brought to the ED 12/31 after being found down at home for undetermined period of time during a welfare check.  Upon presentation was noted to be afebrile but had significant tachycardia and hypoxia.  Labs revealed JOSE ROBERTO and leukocytosis.  Chest imaging showed right lower lobe pneumonia.  She was started on IV antibiotics and admitted to the ICU.  Blood cultures have come back growing pneumococcus.  She has clinically improved.  We are asked to comment on further evaluation and management.    A complete review of systems is negative other than that noted in the HPI.    I have reviewed the patient's PMH, PSH, Social History, Family History, Meds, and Allergies    Objective :  Temp:  [97.1 °F (36.2 °C)-98.1 °F (36.7 °C)] 97.1 °F (36.2 °C)  HR:  [] 85  BP: (104-132)/(50-60) 110/51  Resp:  [16-28] 16  SpO2:  [91 %-99 %] 93 %  O2 Device: Nasal cannula  Nasal Cannula O2 Flow Rate (L/min):  [4 L/min-5 L/min] 5 L/min    General: Frail elderly female in no acute distress  Psychiatric:  Awake and alert  Pulmonary:  Normal respiratory excursion without accessory muscle use, CTA anteriorly  Abdomen:  Soft, nontender  Extremities:  No edema  Skin:  No rashes      Lab Results: I have reviewed the following results:  Results from last 7 days   Lab Units 01/03/25  0439 01/02/25  0420 01/01/25  0448   WBC Thousand/uL 11.53* 11.17* 14.26*   HEMOGLOBIN g/dL 9.6* 11.1* 12.5   PLATELETS Thousands/uL 182 193 192     Results from last 7 days   Lab Units 01/03/25  0439 01/02/25  0420 01/01/25 2127 01/01/25  0448 12/31/24  2024   SODIUM mmol/L 143 149* 148* 150* 154*   POTASSIUM  mmol/L 3.9 3.3* 3.3* 3.5 3.5   CHLORIDE mmol/L 113* 116* 117* 115* 115*   CO2 mmol/L 22 21 21 19* 14*   BUN mg/dL 54* 70* 73* 71* 82*   CREATININE mg/dL 1.53* 1.99* 2.09* 2.47* 3.18*   EGFR ml/min/1.73sq m 28 20 19 16 11   CALCIUM mg/dL 7.7* 8.2* 8.3* 7.7* 9.3   AST U/L  --  50*  --  83* 109*   ALT U/L  --  30  --  36 37   ALK PHOS U/L  --  52  --  52 71   ALBUMIN g/dL  --  2.7*  --  2.6* 3.7     Results from last 7 days   Lab Units 01/02/25  1716 01/01/25  1525 01/01/25  1226 01/01/25  0046 12/31/24 2027 12/31/24 2024   BLOOD CULTURE  Received in Microbiology Lab. Culture in Progress.  Received in Microbiology Lab. Culture in Progress.  --   --   --  Streptococcus pneumoniae* Streptococcus pneumoniae*   GRAM STAIN RESULT   --   --   --   --  Gram positive cocci in pairs and chains* Gram positive cocci in pairs and chains*   MRSA CULTURE ONLY   --  No Methicillin Resistant Staphlyococcus aureus (MRSA) isolated  --   --   --   --    LEGIONELLA URINARY ANTIGEN   --   --   --  Negative  --   --    C DIFF TOXIN B BY PCR   --   --  Negative  --   --   --      Results from last 7 days   Lab Units 12/31/24 2024   PROCALCITONIN ng/ml 51.53*                   Imaging Results Review: I personally reviewed the following image studies in PACS and associated radiology reports: CT chest. My interpretation of the radiology images/reports is: RLL infiltrate with small associated effusion.

## 2025-01-03 NOTE — ASSESSMENT & PLAN NOTE
Unknown severity.  Risk factor for pneumonia.  Consideration for exacerbation.    Steroid taper per pulmonology

## 2025-01-04 PROBLEM — R73.9 HYPERGLYCEMIA: Status: ACTIVE | Noted: 2025-01-04

## 2025-01-04 LAB
ALBUMIN SERPL BCG-MCNC: 2.7 G/DL (ref 3.5–5)
ANION GAP SERPL CALCULATED.3IONS-SCNC: 13 MMOL/L (ref 4–13)
BUN SERPL-MCNC: 47 MG/DL (ref 5–25)
CALCIUM ALBUM COR SERPL-MCNC: 8.9 MG/DL (ref 8.3–10.1)
CALCIUM SERPL-MCNC: 7.9 MG/DL (ref 8.4–10.2)
CHLORIDE SERPL-SCNC: 109 MMOL/L (ref 96–108)
CK SERPL-CCNC: 116 U/L (ref 26–192)
CK SERPL-CCNC: 137 U/L (ref 26–192)
CK SERPL-CCNC: 192 U/L (ref 26–192)
CK SERPL-CCNC: 197 U/L (ref 26–192)
CO2 SERPL-SCNC: 21 MMOL/L (ref 21–32)
CREAT SERPL-MCNC: 1.36 MG/DL (ref 0.6–1.3)
ERYTHROCYTE [DISTWIDTH] IN BLOOD BY AUTOMATED COUNT: 13.7 % (ref 11.6–15.1)
GFR SERPL CREATININE-BSD FRML MDRD: 33 ML/MIN/1.73SQ M
GLUCOSE SERPL-MCNC: 140 MG/DL (ref 65–140)
HCT VFR BLD AUTO: 32.6 % (ref 34.8–46.1)
HGB BLD-MCNC: 10.5 G/DL (ref 11.5–15.4)
MAGNESIUM SERPL-MCNC: 2.2 MG/DL (ref 1.9–2.7)
MCH RBC QN AUTO: 31.3 PG (ref 26.8–34.3)
MCHC RBC AUTO-ENTMCNC: 32.2 G/DL (ref 31.4–37.4)
MCV RBC AUTO: 97 FL (ref 82–98)
PHOSPHATE SERPL-MCNC: 2.7 MG/DL (ref 2.3–4.1)
PLATELET # BLD AUTO: 240 THOUSANDS/UL (ref 149–390)
PMV BLD AUTO: 10.9 FL (ref 8.9–12.7)
POTASSIUM SERPL-SCNC: 3.4 MMOL/L (ref 3.5–5.3)
RBC # BLD AUTO: 3.35 MILLION/UL (ref 3.81–5.12)
SODIUM SERPL-SCNC: 143 MMOL/L (ref 135–147)
WBC # BLD AUTO: 15.88 THOUSAND/UL (ref 4.31–10.16)

## 2025-01-04 PROCEDURE — 82550 ASSAY OF CK (CPK): CPT

## 2025-01-04 PROCEDURE — 85027 COMPLETE CBC AUTOMATED: CPT | Performed by: STUDENT IN AN ORGANIZED HEALTH CARE EDUCATION/TRAINING PROGRAM

## 2025-01-04 PROCEDURE — 80069 RENAL FUNCTION PANEL: CPT | Performed by: STUDENT IN AN ORGANIZED HEALTH CARE EDUCATION/TRAINING PROGRAM

## 2025-01-04 PROCEDURE — 94640 AIRWAY INHALATION TREATMENT: CPT

## 2025-01-04 PROCEDURE — 94760 N-INVAS EAR/PLS OXIMETRY 1: CPT

## 2025-01-04 PROCEDURE — 94664 DEMO&/EVAL PT USE INHALER: CPT

## 2025-01-04 PROCEDURE — 83735 ASSAY OF MAGNESIUM: CPT | Performed by: STUDENT IN AN ORGANIZED HEALTH CARE EDUCATION/TRAINING PROGRAM

## 2025-01-04 PROCEDURE — 99232 SBSQ HOSP IP/OBS MODERATE 35: CPT | Performed by: INTERNAL MEDICINE

## 2025-01-04 RX ORDER — POTASSIUM CHLORIDE 20MEQ/15ML
20 LIQUID (ML) ORAL ONCE
Status: COMPLETED | OUTPATIENT
Start: 2025-01-04 | End: 2025-01-04

## 2025-01-04 RX ORDER — ALBUTEROL SULFATE 0.83 MG/ML
2.5 SOLUTION RESPIRATORY (INHALATION) EVERY 6 HOURS PRN
Status: DISCONTINUED | OUTPATIENT
Start: 2025-01-04 | End: 2025-01-06

## 2025-01-04 RX ADMIN — PREDNISONE 40 MG: 20 TABLET ORAL at 09:44

## 2025-01-04 RX ADMIN — LEVALBUTEROL HYDROCHLORIDE 1.25 MG: 1.25 SOLUTION RESPIRATORY (INHALATION) at 11:21

## 2025-01-04 RX ADMIN — IPRATROPIUM BROMIDE 0.5 MG: 0.5 SOLUTION RESPIRATORY (INHALATION) at 14:29

## 2025-01-04 RX ADMIN — METOPROLOL TARTRATE 25 MG: 25 TABLET, FILM COATED ORAL at 09:44

## 2025-01-04 RX ADMIN — AMOXICILLIN 500 MG: 500 CAPSULE ORAL at 09:46

## 2025-01-04 RX ADMIN — CHLORHEXIDINE GLUCONATE 0.12% ORAL RINSE 15 ML: 1.2 LIQUID ORAL at 20:42

## 2025-01-04 RX ADMIN — CHLORHEXIDINE GLUCONATE 0.12% ORAL RINSE 15 ML: 1.2 LIQUID ORAL at 09:44

## 2025-01-04 RX ADMIN — APIXABAN 2.5 MG: 2.5 TABLET, FILM COATED ORAL at 09:44

## 2025-01-04 RX ADMIN — LEVALBUTEROL HYDROCHLORIDE 1.25 MG: 1.25 SOLUTION RESPIRATORY (INHALATION) at 14:29

## 2025-01-04 RX ADMIN — AMOXICILLIN 500 MG: 500 CAPSULE ORAL at 20:42

## 2025-01-04 RX ADMIN — METOPROLOL TARTRATE 25 MG: 25 TABLET, FILM COATED ORAL at 20:42

## 2025-01-04 RX ADMIN — IPRATROPIUM BROMIDE 0.5 MG: 0.5 SOLUTION RESPIRATORY (INHALATION) at 11:21

## 2025-01-04 RX ADMIN — APIXABAN 2.5 MG: 2.5 TABLET, FILM COATED ORAL at 17:37

## 2025-01-04 RX ADMIN — POTASSIUM CHLORIDE 20 MEQ: 20 SOLUTION ORAL at 12:51

## 2025-01-04 NOTE — ASSESSMENT & PLAN NOTE
Noted while in the ER, cardiology was consulted, spontaneously converted to sinus rhythm currently on Lopressor 25 mg twice daily and Eliquis 2.5 mg for anticoagulation  As she will be discharged to facility, will defer Eliquis price check at this time  Cardiology signed off as of 1/4  Continue telemetry

## 2025-01-04 NOTE — ASSESSMENT & PLAN NOTE
Patient was treated for COPD exacerbation with IV Solu-Medrol and has been transitioned to oral prednisone 40 mg daily for 3 days  Monitor O2 sats

## 2025-01-04 NOTE — PROGRESS NOTES
Progress Note - Hospitalist   Name: Remedios Mcgrath 89 y.o. female I MRN: [id number]  Unit/Bed#: [facility] 603-01 I Date of Admission: 12/31/2024   Date of Service: 1/4/2025 I Hospital Day: 4    Assessment & Plan  Pneumonia  Presented to ER after being found down on welfare check. Was noted to be hypoxic in ER (82% on RA) requiring BIPAP and then developed a-fib with RVR.  Was in ICU, transferred out on 1/2.   CT CAP: Acute right-sided pneumonia, greatest at the right lower lobe. Associated loculated parapneumonic effusion. No obvious pleural thickening on noncontrast study, though empyema has to be considered.   Was on IV CTX, transitioned to Amoxicillin BID on 1/4 thru 1/9 to complete 10 days total of abx per ID recommendations  Wean o2 as able, continue nebulizers, supportive care  Repeat CXR if clinical status worsens  DC planning to rehab once medically stable     Hypertension  BP overall stable  on lopressor 25 mg bid   Moderate protein-calorie malnutrition (HCC)  Malnutrition Findings:   Adult Malnutrition type: Chronic illness  Adult Degree of Malnutrition: Malnutrition of moderate degree  Malnutrition Characteristics: Inadequate energy, Muscle loss                  360 Statement: Chronic/moderate malnutrition r/t inadequate PO intake, as evidenced by intake meeting <75% of estimated needs x > 1 month, and muscle mass depletion (temporal). Treatment: pending ability to advance to PO diet - liberal diet as able and trial oral nutrition supplements pending SLP recommendations    BMI Findings:           Body mass index is 18.52 kg/m².     New onset atrial fibrillation with RVR (HCC)  Noted while in the ER, cardiology was consulted, spontaneously converted to sinus rhythm currently on Lopressor 25 mg twice daily and Eliquis 2.5 mg for anticoagulation  As she will be discharged to facility, will defer Eliquis price check at this time  Cardiology signed off as of 1/4  Continue telemetry  Sepsis (HCC)  As evidenced by  leukocytosis and tachycardia, due to bacteremia and pneumonia  Improving with antibiotics  Of note, leukocytosis slightly worsened on 1/4, likely secondary to steroids started on 1/3  Plan as above  Pneumococcal bacteremia  Due to pneumonia.  Repeat blood cultures negative.  TTE (technically difficult) showed MV thickening (likely age-related) but no vegetations.  Low clinical suspicion for endocarditis.  Status post IV ceftriaxone, transition to amoxicillin 500 mg PO Q12 with plan to continue through 1/9 to complete 10 days total antibiotics  No indication for GARRETT from an ID perspective  Acute kidney injury superimposed on CKD  (HCC)  Lab Results   Component Value Date    EGFR 33 01/04/2025    EGFR 28 01/03/2025    EGFR 20 01/02/2025    CREATININE 1.36 (H) 01/04/2025    CREATININE 1.53 (H) 01/03/2025    CREATININE 1.99 (H) 01/02/2025     POA 2.47, likely 2/2 sepsis  Overall improved, baseline is 1.3-1.4 and currently at baseline  Monitor BMP  Acute hypoxic respiratory failure (HCC)  Most likely secondary to pneumonia and severe COPD. Appears was up to 15L in ICU, now down to 4L as of 1/4  Goal O2 88-90%  As will be going to rehab will not require O2 evaluation at this time  Continue nebulizers  Was on IV steroids, transitioned to PO prednisone to complete course  Emphysema lung (HCC)  Patient was treated for COPD exacerbation with IV Solu-Medrol and has been transitioned to oral prednisone 40 mg daily for 3 days  Monitor O2 sats  Dysphagia  Speech following, currently on modified diet    Pleural effusion, right  Likely parapneumonic.  Follow respiratory status closely  If clinical worsens check repeat CXR  Hyperglycemia  Likely 2/2 steroids, was prediabetic as of 9/2024  Check A1C    VTE Pharmacologic Prophylaxis:   Moderate Risk (Score 3-4) - Pharmacological DVT Prophylaxis Ordered: apixaban (Eliquis).    Mobility:   Basic Mobility Inpatient Raw Score: 14  -[facility] Goal: 4: Move to chair/commode  -[facility] Achieved: 6:  Walk 10 steps or more  -[facility] Goal achieved. Continue to encourage appropriate mobility.    Patient Centered Rounds: I performed bedside rounds with nursing staff today.   Discussions with Specialists or Other Care Team Provider: appreciate specialists     Education and Discussions with Family / Patient: Updated  (several family members) at bedside.    Current Length of Stay: 4 day(s)  Current Patient Status: Inpatient   Certification Statement: The patient will continue to require additional inpatient hospital stay due to ongoing support   Discharge Plan: Anticipate discharge in 48-72 hrs to rehab facility.    Code Status: Level 3 - DNAR and DNI    Subjective   Doing better today. Admits to occasional productive cough. No other complaints. Family agree she looks closer to her baseline.     Objective :  Temp:  [97 °F (36.1 °C)-97.8 °F (36.6 °C)] 97 °F (36.1 °C)  HR:  [74-92] 90  BP: (109-137)/(49-62) 115/56  Resp:  [19-20] 19  SpO2:  [87 %-96 %] 92 %  O2 Device: Nasal cannula  Nasal Cannula O2 Flow Rate (L/min):  [4.5 L/min] 4.5 L/min    Body mass index is 18.52 kg/m².     Input and Output Summary (last 24 hours):     Intake/Output Summary (Last 24 hours) at 1/4/2025 1210  Last data filed at 1/4/2025 0945  Gross per 24 hour   Intake 120 ml   Output 50 ml   Net 70 ml       Physical Exam  Vitals and nursing note reviewed.   Constitutional:       General: She is not in acute distress.     Appearance: She is cachectic.      Comments: On 4L, receiving breathing treatment    Cardiovascular:      Rate and Rhythm: Normal rate and regular rhythm.   Pulmonary:      Comments: Decreased, rhonchi  Abdominal:      General: Bowel sounds are normal. There is no distension.      Palpations: Abdomen is soft.   Musculoskeletal:      Right lower leg: No edema.      Left lower leg: No edema.   Skin:     Comments: Venous stasis changes b/l LE, wound to L anterior shin dressed    Neurological:      Mental Status: She is  alert and oriented to person, place, and time.   Psychiatric:         Mood and Affect: Mood normal.           Lines/Drains:        Telemetry:  Telemetry Orders (From admission, onward)               24 Hour Telemetry Monitoring  Continuous x 24 Hours (Telem)        Expiring   Question:  Reason for 24 Hour Telemetry  Answer:  Arrhythmias requiring acute medical intervention / PPM or ICD malfunction                     Telemetry Reviewed:  NSR 80s  Indication for Continued Telemetry Use: Arrthymias requiring medical therapy               Lab Results: I have reviewed the following results:   Results from last 7 days   Lab Units 01/04/25 0441 01/01/25 0448 12/31/24 2024   WBC Thousand/uL 15.88*   < > 20.40*   HEMOGLOBIN g/dL 10.5*   < > 14.4   HEMATOCRIT % 32.6*   < > 44.7   PLATELETS Thousands/uL 240   < > 245   BANDS PCT %  --   --  1   LYMPHO PCT %  --   --  2*   MONO PCT %  --   --  6   EOS PCT %  --   --  0    < > = values in this interval not displayed.     Results from last 7 days   Lab Units 01/04/25 0441 01/03/25  0439 01/02/25  0420   SODIUM mmol/L 143   < > 149*   POTASSIUM mmol/L 3.4*   < > 3.3*   CHLORIDE mmol/L 109*   < > 116*   CO2 mmol/L 21   < > 21   BUN mg/dL 47*   < > 70*   CREATININE mg/dL 1.36*   < > 1.99*   ANION GAP mmol/L 13   < > 12   CALCIUM mg/dL 7.9*   < > 8.2*   ALBUMIN g/dL 2.7*  --  2.7*   TOTAL BILIRUBIN mg/dL  --   --  0.63   ALK PHOS U/L  --   --  52   ALT U/L  --   --  30   AST U/L  --   --  50*   GLUCOSE RANDOM mg/dL 140   < > 197*    < > = values in this interval not displayed.     Results from last 7 days   Lab Units 01/01/25  1829   INR  1.52*             Results from last 7 days   Lab Units 01/01/25 0448 01/01/25  0110 12/31/24 2237 12/31/24 2024   LACTIC ACID mmol/L 2.8* 3.9* 4.7* 8.6*   PROCALCITONIN ng/ml  --   --   --  51.53*       Recent Cultures (last 7 days):   Results from last 7 days   Lab Units 01/02/25  1716 01/01/25  1226 01/01/25  0046 12/31/24 2027  12/31/24 2024   BLOOD CULTURE  No Growth at 24 hrs.  No Growth at 24 hrs.  --   --  Streptococcus pneumoniae* Streptococcus pneumoniae*   GRAM STAIN RESULT   --   --   --  Gram positive cocci in pairs and chains* Gram positive cocci in pairs and chains*   LEGIONELLA URINARY ANTIGEN   --   --  Negative  --   --    C DIFF TOXIN B BY PCR   --  Negative  --   --   --        Imaging Results Review: No pertinent imaging studies reviewed.  Other Study Results Review: No additional pertinent studies reviewed.    Last 24 Hours Medication List:     Current Facility-Administered Medications:     amoxicillin (AMOXIL) capsule 500 mg, Q12H KATT    apixaban (ELIQUIS) tablet 2.5 mg, BID    chlorhexidine (PERIDEX) 0.12 % oral rinse 15 mL, Q12H KATT    ipratropium (ATROVENT) 0.02 % inhalation solution 0.5 mg, TID    levalbuterol (XOPENEX) inhalation solution 1.25 mg, TID    metoprolol tartrate (LOPRESSOR) tablet 25 mg, Q12H KATT    potassium chloride oral solution 20 mEq, Once    predniSONE tablet 40 mg, Daily    Administrative Statements   Today, Patient Was Seen By: Gabriela Almeida PA-C      **Please Note: This note may have been constructed using a voice recognition system.**

## 2025-01-04 NOTE — ASSESSMENT & PLAN NOTE
Due to pneumonia.  Repeat blood cultures negative.  TTE (technically difficult) showed MV thickening (likely age-related) but no vegetations.  Low clinical suspicion for endocarditis.  Status post IV ceftriaxone, transition to amoxicillin 500 mg PO Q12 with plan to continue through 1/9 to complete 10 days total antibiotics  No indication for GARRETT from an ID perspective

## 2025-01-04 NOTE — ASSESSMENT & PLAN NOTE
Lab Results   Component Value Date    EGFR 33 01/04/2025    EGFR 28 01/03/2025    EGFR 20 01/02/2025    CREATININE 1.36 (H) 01/04/2025    CREATININE 1.53 (H) 01/03/2025    CREATININE 1.99 (H) 01/02/2025     POA 2.47, likely 2/2 sepsis  Overall improved, baseline is 1.3-1.4 and currently at baseline  Monitor BMP

## 2025-01-04 NOTE — PLAN OF CARE
Problem: Prexisting or High Potential for Compromised Skin Integrity  Goal: Skin integrity is maintained or improved  Description: INTERVENTIONS:  - Identify patients at risk for skin breakdown  - Assess and monitor skin integrity  - Assess and monitor nutrition and hydration status  - Monitor labs   - Assess for incontinence   - Turn and reposition patient  - Assist with mobility/ambulation  - Relieve pressure over bony prominences  - Avoid friction and shearing  - Provide appropriate hygiene as needed including keeping skin clean and dry  - Evaluate need for skin moisturizer/barrier cream  - Collaborate with interdisciplinary team   - Patient/family teaching  - Consider wound care consult   1/4/2025 1430 by Ellen Falcon RN  Outcome: Progressing  1/4/2025 1427 by Ellen Falcon RN  Outcome: Progressing     Problem: RESPIRATORY - ADULT  Goal: Achieves optimal ventilation and oxygenation  Description: INTERVENTIONS:  - Assess for changes in respiratory status  - Assess for changes in mentation and behavior  - Position to facilitate oxygenation and minimize respiratory effort  - Oxygen administered by appropriate delivery if ordered  - Initiate smoking cessation education as indicated  - Encourage broncho-pulmonary hygiene including cough, deep breathe, Incentive Spirometry  - Assess the need for suctioning and aspirate as needed  - Assess and instruct to report SOB or any respiratory difficulty  - Respiratory Therapy support as indicated  1/4/2025 1430 by Ellen Falcon RN  Outcome: Progressing  1/4/2025 1427 by Ellen Falcon RN  Outcome: Progressing     Problem: Nutrition/Hydration-ADULT  Goal: Nutrient/Hydration intake appropriate for improving, restoring or maintaining nutritional needs  Description: Monitor and assess patient's nutrition/hydration status for malnutrition. Collaborate with interdisciplinary team and initiate plan and interventions as ordered.  Monitor patient's weight and dietary intake as  ordered or per policy. Utilize nutrition screening tool and intervene as necessary. Determine patient's food preferences and provide high-protein, high-caloric foods as appropriate.     INTERVENTIONS:  - Monitor oral intake, urinary output, labs, and treatment plans  - Assess nutrition and hydration status and recommend course of action  - Evaluate amount of meals eaten  - Assist patient with eating if necessary   - Allow adequate time for meals  - Recommend/ encourage appropriate diets, oral nutritional supplements, and vitamin/mineral supplements  - Order, calculate, and assess calorie counts as needed  - Recommend, monitor, and adjust tube feedings and TPN/PPN based on assessed needs  - Assess need for intravenous fluids  - Provide specific nutrition/hydration education as appropriate  - Include patient/family/caregiver in decisions related to nutrition  1/4/2025 1430 by Ellen Falcon RN  Outcome: Progressing  1/4/2025 1427 by Ellen Falcon RN  Outcome: Progressing     Problem: PAIN - ADULT  Goal: Verbalizes/displays adequate comfort level or baseline comfort level  Description: Interventions:  - Encourage patient to monitor pain and request assistance  - Assess pain using appropriate pain scale  - Administer analgesics based on type and severity of pain and evaluate response  - Implement non-pharmacological measures as appropriate and evaluate response  - Consider cultural and social influences on pain and pain management  - Notify physician/advanced practitioner if interventions unsuccessful or patient reports new pain  1/4/2025 1430 by Ellen Falcon RN  Outcome: Progressing  1/4/2025 1428 by Ellen Falcon RN  Outcome: Progressing     Problem: INFECTION - ADULT  Goal: Absence or prevention of progression during hospitalization  Description: INTERVENTIONS:  - Assess and monitor for signs and symptoms of infection  - Monitor lab/diagnostic results  - Monitor all insertion sites, i.e. indwelling lines, tubes, and  drains  - Monitor endotracheal if appropriate and nasal secretions for changes in amount and color  - [place] appropriate cooling/warming therapies per order  - Administer medications as ordered  - Instruct and encourage patient and family to use good hand hygiene technique  - Identify and instruct in appropriate isolation precautions for identified infection/condition  Outcome: Progressing  Goal: Absence of fever/infection during neutropenic period  Description: INTERVENTIONS:  - Monitor WBC    Outcome: Progressing     Problem: SAFETY ADULT  Goal: Patient will remain free of falls  Description: INTERVENTIONS:  - Educate patient/family on patient safety including physical limitations  - Instruct patient to call for assistance with activity   - Consult OT/PT to assist with strengthening/mobility   - Keep Call bell within reach  - Keep bed low and locked with side rails adjusted as appropriate  - Keep care items and personal belongings within reach  - Initiate and maintain comfort rounds  - Make Fall Risk Sign visible to staff  -- Apply yellow socks and bracelet for high fall risk patients  - Consider moving patient to room near nurses station  Outcome: Progressing  Goal: Maintain or return to baseline ADL function  Description: INTERVENTIONS:  -  Assess patient's ability to carry out ADLs; assess patient's baseline for ADL function and identify physical deficits which impact ability to perform ADLs (bathing, care of mouth/teeth, toileting, grooming, dressing, etc.)  - Assess/evaluate cause of self-care deficits   - Assess range of motion  - Assess patient's mobility; develop plan if impaired  - Assess patient's need for assistive devices and provide as appropriate  - Encourage maximum independence but intervene and supervise when necessary  - Involve family in performance of ADLs  - Assess for home care needs following discharge   - Consider OT consult to assist with ADL evaluation and planning for discharge  -  Provide patient education as appropriate  Outcome: Progressing  Goal: Maintains/Returns to pre admission functional level  Description: INTERVENTIONS:  - Perform AM-PAC 6 Click Basic Mobility/ Daily Activity assessment daily.  - Set and communicate daily mobility goal to care team and patient/family/caregiver.   - Collaborate with rehabilitation services on mobility goals if consulted  -- Out of bed for toileting  - Record patient progress and toleration of activity level   Outcome: Progressing     Problem: DISCHARGE PLANNING  Goal: Discharge to home or other facility with appropriate resources  Description: INTERVENTIONS:  - Identify barriers to discharge w/patient and caregiver  - Arrange for needed discharge resources and transportation as appropriate  - Identify discharge learning needs (meds, wound care, etc.)  - Arrange for interpretive services to assist at discharge as needed  - Refer to Case Management Department for coordinating discharge planning if the patient needs post-hospital services based on physician/advanced practitioner order or complex needs related to functional status, cognitive ability, or social support system  Outcome: Progressing     Problem: Knowledge Deficit  Goal: Patient/family/caregiver demonstrates understanding of disease process, treatment plan, medications, and discharge instructions  Description: Complete learning assessment and assess knowledge base.  Interventions:  - Provide teaching at level of understanding  - Provide teaching via preferred learning methods  Outcome: Progressing

## 2025-01-04 NOTE — ASSESSMENT & PLAN NOTE
Presented to ER after being found down on welfare check. Was noted to be hypoxic in ER (82% on RA) requiring BIPAP and then developed a-fib with RVR.  Was in ICU, transferred out on 1/2.   CT CAP: Acute right-sided pneumonia, greatest at the right lower lobe. Associated loculated parapneumonic effusion. No obvious pleural thickening on noncontrast study, though empyema has to be considered.   Was on IV CTX, transitioned to Amoxicillin BID on 1/4 thru 1/9 to complete 10 days total of abx per ID recommendations  Wean o2 as able, continue nebulizers, supportive care  Repeat CXR if clinical status worsens  DC planning to rehab once medically stable

## 2025-01-04 NOTE — ASSESSMENT & PLAN NOTE
As evidenced by leukocytosis and tachycardia, due to bacteremia and pneumonia  Improving with antibiotics  Of note, leukocytosis slightly worsened on 1/4, likely secondary to steroids started on 1/3  Plan as above

## 2025-01-04 NOTE — ASSESSMENT & PLAN NOTE
Most likely secondary to pneumonia and severe COPD. Appears was up to 15L in ICU, now down to 4L as of 1/4  Goal O2 88-90%  As will be going to rehab will not require O2 evaluation at this time  Continue nebulizers  Was on IV steroids, transitioned to PO prednisone to complete course

## 2025-01-04 NOTE — PROGRESS NOTES
Progress Note - Cardiology   Name: Remedios Mcgrath 89 y.o. female I MRN: [id number]  Unit/Bed#: [facility] 603-01 I Date of Admission: 12/31/2024   Date of Service: 1/4/2025 I Hospital Day: 4     Assessment & Plan    Assessment:  New onset atrial fibrillation with RVR - Spontaneously converted to sinus rhythm. She remains in sinus rhythm today. [id number] score >5 including history of TIA. I recommend continuing Lopressor 25mg BID. Continue Eliquis 2.5mg BID. I recommend continuing a rate control strategy due to on-going pneumonia.   Hypertension - Stable. Continue Lopressor as noted above.   Pneumonia - Pan sensitive streptococcus pneumoniae. Management as per primary, being started on PO Amoxicillin today.      ECHO personally reviewed. There is preserved biventricular size and systolic function. Grade II diastolic dysfunction. Mild aortic stenosis, moderate mitral and tricuspid valve regurgitation.     No additional inpatient cardiac workup or changes to cardiac medications anticipated at this time. We will sign off, please call if we can be of further assistance in the care of this patient.     Rob David MD      Subjective     Patient seen and evaluated. No new cardiopulmonary complaints.     Objective :  Temp:  [97 °F (36.1 °C)-97.8 °F (36.6 °C)] 97 °F (36.1 °C)  HR:  [74-89] 77  BP: (109-137)/(49-62) 128/50  Resp:  [16-20] 20  SpO2:  [87 %-96 %] 96 %  O2 Device: Nasal cannula  Nasal Cannula O2 Flow Rate (L/min):  [4 L/min-5 L/min] 4.5 L/min  Orthostatic Blood Pressures      Flowsheet Row Most Recent Value   Blood Pressure 128/50 filed at 01/04/2025 0207   Patient Position - Orthostatic VS Lying filed at 01/03/2025 1854          First Weight: Weight - Scale: 41 kg (90 lb 6.2 oz) (12/31/24 2125)  Vitals:    01/02/25 2052 01/03/25 0600   Weight: 40.1 kg (88 lb 8 oz) 40.2 kg (88 lb 9.6 oz)     Physical Exam  Physical Examination:  Gen: A&Ox3  HEENT: NC/AT, no JVD  CVS: RRR, S1S2 audible and w/o m/r/g  Resp:  Clear to ascultation bilaterally, no wheeze, rales, ronchi.   Abd: Soft, non-tender, non-distended.   MSK: No edema        Lab Results: I have reviewed the following results:  Results from last 7 days   Lab Units 01/04/25  0441 01/03/25  0439 01/02/25  0420   WBC Thousand/uL 15.88* 11.53* 11.17*   HEMOGLOBIN g/dL 10.5* 9.6* 11.1*   HEMATOCRIT % 32.6* 30.2* 35.2   PLATELETS Thousands/uL 240 182 193     Results from last 7 days   Lab Units 01/04/25  0441 01/03/25  0439 01/02/25  0420   POTASSIUM mmol/L 3.4* 3.9 3.3*   CHLORIDE mmol/L 109* 113* 116*   CO2 mmol/L 21 22 21   BUN mg/dL 47* 54* 70*   CREATININE mg/dL 1.36* 1.53* 1.99*   CALCIUM mg/dL 7.9* 7.7* 8.2*     Results from last 7 days   Lab Units 01/02/25  1220 01/02/25  0416 01/01/25  2127 01/01/25  1829 12/31/24 2024   INR   --   --   --  1.52* 1.50*   PTT seconds 59* 63* 59* 51* 28     Lab Results   Component Value Date    HGBA1C 5.9 (H) 09/23/2024     Lab Results   Component Value Date    CKTOTAL 192 01/04/2025    CKMBINDEX <1 03/29/2014    TROPONINI <0.04 03/30/2014

## 2025-01-05 ENCOUNTER — APPOINTMENT (INPATIENT)
Dept: RADIOLOGY | Facility: HOSPITAL | Age: OVER 89
DRG: 871 | End: 2025-01-05
Payer: COMMERCIAL

## 2025-01-05 LAB
ALBUMIN SERPL BCG-MCNC: 3.1 G/DL (ref 3.5–5)
ANION GAP SERPL CALCULATED.3IONS-SCNC: 11 MMOL/L (ref 4–13)
BUN SERPL-MCNC: 37 MG/DL (ref 5–25)
CALCIUM ALBUM COR SERPL-MCNC: 8.9 MG/DL (ref 8.3–10.1)
CALCIUM SERPL-MCNC: 8.2 MG/DL (ref 8.4–10.2)
CHLORIDE SERPL-SCNC: 104 MMOL/L (ref 96–108)
CK SERPL-CCNC: 124 U/L (ref 26–192)
CK SERPL-CCNC: 126 U/L (ref 26–192)
CO2 SERPL-SCNC: 25 MMOL/L (ref 21–32)
CREAT SERPL-MCNC: 1.27 MG/DL (ref 0.6–1.3)
ERYTHROCYTE [DISTWIDTH] IN BLOOD BY AUTOMATED COUNT: 13.4 % (ref 11.6–15.1)
EST. AVERAGE GLUCOSE BLD GHB EST-MCNC: 137 MG/DL
GFR SERPL CREATININE-BSD FRML MDRD: 35 ML/MIN/1.73SQ M
GLUCOSE SERPL-MCNC: 100 MG/DL (ref 65–140)
GLUCOSE SERPL-MCNC: 137 MG/DL (ref 65–140)
GLUCOSE SERPL-MCNC: 160 MG/DL (ref 65–140)
GLUCOSE SERPL-MCNC: 180 MG/DL (ref 65–140)
HBA1C MFR BLD: 6.4 %
HCT VFR BLD AUTO: 38.2 % (ref 34.8–46.1)
HGB BLD-MCNC: 12.5 G/DL (ref 11.5–15.4)
MAGNESIUM SERPL-MCNC: 2 MG/DL (ref 1.9–2.7)
MCH RBC QN AUTO: 31.5 PG (ref 26.8–34.3)
MCHC RBC AUTO-ENTMCNC: 32.7 G/DL (ref 31.4–37.4)
MCV RBC AUTO: 96 FL (ref 82–98)
PHOSPHATE SERPL-MCNC: 2.4 MG/DL (ref 2.3–4.1)
PLATELET # BLD AUTO: 312 THOUSANDS/UL (ref 149–390)
PMV BLD AUTO: 10.5 FL (ref 8.9–12.7)
POTASSIUM SERPL-SCNC: 3.6 MMOL/L (ref 3.5–5.3)
RBC # BLD AUTO: 3.97 MILLION/UL (ref 3.81–5.12)
SODIUM SERPL-SCNC: 140 MMOL/L (ref 135–147)
WBC # BLD AUTO: 18.66 THOUSAND/UL (ref 4.31–10.16)

## 2025-01-05 PROCEDURE — 82550 ASSAY OF CK (CPK): CPT

## 2025-01-05 PROCEDURE — 99232 SBSQ HOSP IP/OBS MODERATE 35: CPT | Performed by: INTERNAL MEDICINE

## 2025-01-05 PROCEDURE — 85027 COMPLETE CBC AUTOMATED: CPT | Performed by: STUDENT IN AN ORGANIZED HEALTH CARE EDUCATION/TRAINING PROGRAM

## 2025-01-05 PROCEDURE — 83036 HEMOGLOBIN GLYCOSYLATED A1C: CPT | Performed by: INTERNAL MEDICINE

## 2025-01-05 PROCEDURE — 83735 ASSAY OF MAGNESIUM: CPT | Performed by: STUDENT IN AN ORGANIZED HEALTH CARE EDUCATION/TRAINING PROGRAM

## 2025-01-05 PROCEDURE — NC001 PR NO CHARGE: Performed by: NURSE PRACTITIONER

## 2025-01-05 PROCEDURE — 82948 REAGENT STRIP/BLOOD GLUCOSE: CPT

## 2025-01-05 PROCEDURE — 80069 RENAL FUNCTION PANEL: CPT | Performed by: STUDENT IN AN ORGANIZED HEALTH CARE EDUCATION/TRAINING PROGRAM

## 2025-01-05 PROCEDURE — 71045 X-RAY EXAM CHEST 1 VIEW: CPT

## 2025-01-05 RX ORDER — BENZONATATE 100 MG/1
100 CAPSULE ORAL 3 TIMES DAILY
Status: DISCONTINUED | OUTPATIENT
Start: 2025-01-05 | End: 2025-01-09

## 2025-01-05 RX ORDER — GUAIFENESIN 600 MG/1
600 TABLET, EXTENDED RELEASE ORAL EVERY 12 HOURS SCHEDULED
Status: DISCONTINUED | OUTPATIENT
Start: 2025-01-05 | End: 2025-01-09

## 2025-01-05 RX ORDER — INSULIN LISPRO 100 [IU]/ML
1-5 INJECTION, SOLUTION INTRAVENOUS; SUBCUTANEOUS
Status: DISCONTINUED | OUTPATIENT
Start: 2025-01-05 | End: 2025-01-15 | Stop reason: HOSPADM

## 2025-01-05 RX ORDER — POTASSIUM CHLORIDE 20MEQ/15ML
40 LIQUID (ML) ORAL ONCE
Status: COMPLETED | OUTPATIENT
Start: 2025-01-05 | End: 2025-01-05

## 2025-01-05 RX ORDER — BENZONATATE 100 MG/1
200 CAPSULE ORAL 3 TIMES DAILY
Status: DISCONTINUED | OUTPATIENT
Start: 2025-01-05 | End: 2025-01-05

## 2025-01-05 RX ADMIN — GUAIFENESIN 600 MG: 600 TABLET, EXTENDED RELEASE ORAL at 13:00

## 2025-01-05 RX ADMIN — CHLORHEXIDINE GLUCONATE 0.12% ORAL RINSE 15 ML: 1.2 LIQUID ORAL at 10:09

## 2025-01-05 RX ADMIN — AMOXICILLIN 500 MG: 500 CAPSULE ORAL at 10:09

## 2025-01-05 RX ADMIN — METOPROLOL TARTRATE 25 MG: 25 TABLET, FILM COATED ORAL at 21:53

## 2025-01-05 RX ADMIN — INSULIN LISPRO 1 UNITS: 100 INJECTION, SOLUTION INTRAVENOUS; SUBCUTANEOUS at 17:23

## 2025-01-05 RX ADMIN — APIXABAN 2.5 MG: 2.5 TABLET, FILM COATED ORAL at 10:09

## 2025-01-05 RX ADMIN — CHLORHEXIDINE GLUCONATE 0.12% ORAL RINSE 15 ML: 1.2 LIQUID ORAL at 21:53

## 2025-01-05 RX ADMIN — APIXABAN 2.5 MG: 2.5 TABLET, FILM COATED ORAL at 17:22

## 2025-01-05 RX ADMIN — GUAIFENESIN 600 MG: 600 TABLET, EXTENDED RELEASE ORAL at 21:53

## 2025-01-05 RX ADMIN — POTASSIUM CHLORIDE 40 MEQ: 20 SOLUTION ORAL at 13:00

## 2025-01-05 RX ADMIN — PREDNISONE 40 MG: 20 TABLET ORAL at 10:09

## 2025-01-05 RX ADMIN — AMOXICILLIN 500 MG: 500 CAPSULE ORAL at 21:53

## 2025-01-05 RX ADMIN — BENZONATATE 100 MG: 100 CAPSULE ORAL at 21:53

## 2025-01-05 RX ADMIN — BENZONATATE 100 MG: 100 CAPSULE ORAL at 17:22

## 2025-01-05 RX ADMIN — METOPROLOL TARTRATE 25 MG: 25 TABLET, FILM COATED ORAL at 10:09

## 2025-01-05 RX ADMIN — BENZONATATE 100 MG: 100 CAPSULE ORAL at 13:00

## 2025-01-05 NOTE — ASSESSMENT & PLAN NOTE
Noted while in the ER, cardiology was consulted, spontaneously converted to sinus rhythm currently on Lopressor 25 mg twice daily and Eliquis 2.5 mg for anticoagulation  As she will be discharged to facility, will defer Eliquis price check at this time  Cardiology signed off as of 1/4  Dc telemetry 1/5

## 2025-01-05 NOTE — ASSESSMENT & PLAN NOTE
Malnutrition Findings:   Adult Malnutrition type: Chronic illness  Adult Degree of Malnutrition: Malnutrition of moderate degree  Malnutrition Characteristics: Inadequate energy, Muscle loss                  360 Statement: Chronic/moderate malnutrition r/t inadequate PO intake, as evidenced by intake meeting <75% of estimated needs x > 1 month, and muscle mass depletion (temporal). Treatment: pending ability to advance to PO diet - liberal diet as able and trial oral nutrition supplements pending SLP recommendations    BMI Findings:           Body mass index is 19.31 kg/m².   Encourage oral intake as able

## 2025-01-05 NOTE — ASSESSMENT & PLAN NOTE
As evidenced by leukocytosis and tachycardia, due to bacteremia and pneumonia  Improving with antibiotics  Of note, leukocytosis slightly worsened on 1/4, likely secondary to steroids   Plan as above

## 2025-01-05 NOTE — PROGRESS NOTES
Progress Note - Hospitalist   Name: Remedios Mcgrath 89 y.o. female I MRN: [id number]  Unit/Bed#: [facility] 603-01 I Date of Admission: 12/31/2024   Date of Service: 1/5/2025 I Hospital Day: 5    Assessment & Plan  Pneumonia  Presented to ER after being found down on welfare check. Was noted to be hypoxic in ER (82% on RA) requiring BIPAP and then developed a-fib with RVR.  Was in ICU, transferred out on 1/2.   CT CAP: Acute right-sided pneumonia, greatest at the right lower lobe. Associated loculated parapneumonic effusion. No obvious pleural thickening on noncontrast study, though empyema has to be considered.   Was on IV CTX, transitioned to Amoxicillin BID on 1/4 thru 1/9 to complete 10 days total of abx per ID recommendations  Wean o2 as able, continue nebulizers, supportive care  Add mucinex/tessalon on 1/5  Repeat CXR 1/5 given episode of desat to 80s overnight requiring increase of O2 to 6L  DC planning to rehab once medically stable     New onset atrial fibrillation with RVR (HCC)  Noted while in the ER, cardiology was consulted, spontaneously converted to sinus rhythm currently on Lopressor 25 mg twice daily and Eliquis 2.5 mg for anticoagulation  As she will be discharged to facility, will defer Eliquis price check at this time  Cardiology signed off as of 1/4  Dc telemetry 1/5  Pneumococcal bacteremia  Due to pneumonia.  Repeat blood cultures negative.  TTE (technically difficult) showed MV thickening (likely age-related) but no vegetations.  Low clinical suspicion for endocarditis.  Status post IV ceftriaxone, transition to amoxicillin 500 mg PO Q12 with plan to continue through 1/9 to complete 10 days total antibiotics  No indication for GARRETT from an ID perspective  Repeat blood cx thus far negative x 48 hours   Sepsis (HCC)  As evidenced by leukocytosis and tachycardia, due to bacteremia and pneumonia  Improving with antibiotics  Of note, leukocytosis slightly worsened on 1/4, likely secondary to steroids    Plan as above  Acute hypoxic respiratory failure (HCC)  Most likely secondary to pneumonia and severe COPD. Appears was up to 15L in ICU, now down to 5L as of 1/5.   Goal O2 88-90%  As will be going to rehab will not require O2 evaluation at this time  Continue nebulizers  Was on IV steroids, transitioned to PO prednisone to complete course  Repeat CXR 1/5 given episode of desat to 80s overnight requiring increase of O2 to 6L  Hypertension  BP overall stable  On lopressor 25 mg bid   Moderate protein-calorie malnutrition (HCC)  Malnutrition Findings:   Adult Malnutrition type: Chronic illness  Adult Degree of Malnutrition: Malnutrition of moderate degree  Malnutrition Characteristics: Inadequate energy, Muscle loss                  360 Statement: Chronic/moderate malnutrition r/t inadequate PO intake, as evidenced by intake meeting <75% of estimated needs x > 1 month, and muscle mass depletion (temporal). Treatment: pending ability to advance to PO diet - liberal diet as able and trial oral nutrition supplements pending SLP recommendations    BMI Findings:           Body mass index is 19.31 kg/m².   Encourage oral intake as able  Acute kidney injury superimposed on CKD  (HCC)  Lab Results   Component Value Date    EGFR 35 01/05/2025    EGFR 33 01/04/2025    EGFR 28 01/03/2025    CREATININE 1.27 01/05/2025    CREATININE 1.36 (H) 01/04/2025    CREATININE 1.53 (H) 01/03/2025     POA 2.47, likely 2/2 sepsis  Overall improved, baseline is 1.3-1.4 and currently below baseline  Monitor BMP  Emphysema lung (HCC)  Patient was treated for COPD exacerbation with IV Solu-Medrol and has been transitioned to oral prednisone 40 mg daily for 3 days  Monitor O2 sats  Dysphagia  Speech following, currently on modified diet    Pleural effusion, right  Likely parapneumonic.  Follow respiratory status closely  If clinical worsens check repeat CXR  Hyperglycemia  Likely 2/2 steroids, was prediabetic as of 9/2024  A1C 6.4  SSI    VTE  Pharmacologic Prophylaxis:   Moderate Risk (Score 3-4) - Pharmacological DVT Prophylaxis Ordered: apixaban (Eliquis).    Mobility:   Basic Mobility Inpatient Raw Score: 14  -HLM Goal: 4: Move to chair/commode  JH-HLM Achieved: 3: Sit at edge of bed  JH-HLM Goal achieved. Continue to encourage appropriate mobility.    Patient Centered Rounds: I performed bedside rounds with nursing staff today.   Discussions with Specialists or Other Care Team Provider: brandin VEGA     Education and Discussions with Family / Patient:  updated several family members at bedside, questions answered .     Current Length of Stay: 5 day(s)  Current Patient Status: Inpatient   Certification Statement: The patient will continue to require additional inpatient hospital stay due to improving O2 sats, f/u final blood cultures, discharge planning   Discharge Plan: Anticipate discharge in 48-72 hrs to rehab facility.    Code Status: Level 3 - DNAR and DNI    Subjective   Doing okay today. She reports she has ongoing cough but cannot expectorate sputum. She denies feeling SOB at the moment. Appetite poor but that is baseline.     Objective :  Temp:  [97.1 °F (36.2 °C)-98.7 °F (37.1 °C)] 98.7 °F (37.1 °C)  HR:  [] 95  BP: (108-138)/(51-67) 138/67  Resp:  [14-18] 14  SpO2:  [85 %-96 %] 95 %  O2 Device: Nasal cannula  Nasal Cannula O2 Flow Rate (L/min):  [5 L/min] 5 L/min    Body mass index is 19.31 kg/m².     Input and Output Summary (last 24 hours):     Intake/Output Summary (Last 24 hours) at 1/5/2025 1028  Last data filed at 1/4/2025 2100  Gross per 24 hour   Intake 220 ml   Output --   Net 220 ml       Physical Exam  Vitals and nursing note reviewed.   Constitutional:       Appearance: She is cachectic. She is not ill-appearing.      Comments: On 5L   Cardiovascular:      Rate and Rhythm: Normal rate.   Pulmonary:      Effort: No respiratory distress.      Comments: Decreased R side, occasional rhonchi noted throughout   Abdominal:       General: There is no distension.      Palpations: Abdomen is soft.   Musculoskeletal:      Right lower leg: No edema.      Left lower leg: No edema.   Skin:     Comments: Venous stasis changes bilaterally, wound to anterior shin dressed    Neurological:      Mental Status: She is alert.      Comments: Oriented x3   Psychiatric:         Mood and Affect: Mood normal.           Lines/Drains:              Lab Results: I have reviewed the following results:   Results from last 7 days   Lab Units 01/05/25  0902 01/01/25 0448 12/31/24 2024   WBC Thousand/uL 18.66*   < > 20.40*   HEMOGLOBIN g/dL 12.5   < > 14.4   HEMATOCRIT % 38.2   < > 44.7   PLATELETS Thousands/uL 312   < > 245   BANDS PCT %  --   --  1   LYMPHO PCT %  --   --  2*   MONO PCT %  --   --  6   EOS PCT %  --   --  0    < > = values in this interval not displayed.     Results from last 7 days   Lab Units 01/05/25  0602 01/03/25  0439 01/02/25  0420   SODIUM mmol/L 140   < > 149*   POTASSIUM mmol/L 3.6   < > 3.3*   CHLORIDE mmol/L 104   < > 116*   CO2 mmol/L 25   < > 21   BUN mg/dL 37*   < > 70*   CREATININE mg/dL 1.27   < > 1.99*   ANION GAP mmol/L 11   < > 12   CALCIUM mg/dL 8.2*   < > 8.2*   ALBUMIN g/dL 3.1*   < > 2.7*   TOTAL BILIRUBIN mg/dL  --   --  0.63   ALK PHOS U/L  --   --  52   ALT U/L  --   --  30   AST U/L  --   --  50*   GLUCOSE RANDOM mg/dL 100   < > 197*    < > = values in this interval not displayed.     Results from last 7 days   Lab Units 01/01/25  1829   INR  1.52*         Results from last 7 days   Lab Units 01/05/25  0602   HEMOGLOBIN A1C % 6.4*     Results from last 7 days   Lab Units 01/01/25  0448 01/01/25  0110 12/31/24 2237 12/31/24 2024   LACTIC ACID mmol/L 2.8* 3.9* 4.7* 8.6*   PROCALCITONIN ng/ml  --   --   --  51.53*       Recent Cultures (last 7 days):   Results from last 7 days   Lab Units 01/02/25  1716 01/01/25  1226 01/01/25  0046 12/31/24 2027 12/31/24 2024   BLOOD CULTURE  No Growth at 48 hrs.  No Growth at 48 hrs.   --   --  Streptococcus pneumoniae* Streptococcus pneumoniae*   GRAM STAIN RESULT   --   --   --  Gram positive cocci in pairs and chains* Gram positive cocci in pairs and chains*   LEGIONELLA URINARY ANTIGEN   --   --  Negative  --   --    C DIFF TOXIN B BY PCR   --  Negative  --   --   --        Imaging Results Review: No pertinent imaging studies reviewed.  Other Study Results Review: No additional pertinent studies reviewed.    Last 24 Hours Medication List:     Current Facility-Administered Medications:     albuterol inhalation solution 2.5 mg, Q6H PRN    amoxicillin (AMOXIL) capsule 500 mg, Q12H KATT    apixaban (ELIQUIS) tablet 2.5 mg, BID    chlorhexidine (PERIDEX) 0.12 % oral rinse 15 mL, Q12H KATT    insulin lispro (HumALOG/ADMELOG) 100 units/mL subcutaneous injection 1-5 Units, TID AC **AND** Fingerstick Glucose (POCT), TID AC    insulin lispro (HumALOG/ADMELOG) 100 units/mL subcutaneous injection 1-5 Units, HS    metoprolol tartrate (LOPRESSOR) tablet 25 mg, Q12H KATT    potassium chloride oral solution 40 mEq, Once    Administrative Statements   Today, Patient Was Seen By: Gabriela Almeida PA-C      **Please Note: This note may have been constructed using a voice recognition system.**

## 2025-01-05 NOTE — ASSESSMENT & PLAN NOTE
Lab Results   Component Value Date    EGFR 35 01/05/2025    EGFR 33 01/04/2025    EGFR 28 01/03/2025    CREATININE 1.27 01/05/2025    CREATININE 1.36 (H) 01/04/2025    CREATININE 1.53 (H) 01/03/2025     POA 2.47, likely 2/2 sepsis  Overall improved, baseline is 1.3-1.4 and currently below baseline  Monitor BMP

## 2025-01-05 NOTE — ASSESSMENT & PLAN NOTE
Due to pneumonia.  Repeat blood cultures negative.  TTE (technically difficult) showed MV thickening (likely age-related) but no vegetations.  Low clinical suspicion for endocarditis.  Status post IV ceftriaxone, transition to amoxicillin 500 mg PO Q12 with plan to continue through 1/9 to complete 10 days total antibiotics  No indication for GARRETT from an ID perspective  Repeat blood cx thus far negative x 48 hours

## 2025-01-05 NOTE — ASSESSMENT & PLAN NOTE
Most likely secondary to pneumonia and severe COPD. Appears was up to 15L in ICU, now down to 5L as of 1/5.   Goal O2 88-90%  As will be going to rehab will not require O2 evaluation at this time  Continue nebulizers  Was on IV steroids, transitioned to PO prednisone to complete course  Repeat CXR 1/5 given episode of desat to 80s overnight requiring increase of O2 to 6L

## 2025-01-05 NOTE — PLAN OF CARE
Problem: Prexisting or High Potential for Compromised Skin Integrity  Goal: Skin integrity is maintained or improved  Description: INTERVENTIONS:  - Identify patients at risk for skin breakdown  - Assess and monitor skin integrity  - Assess and monitor nutrition and hydration status  - Monitor labs   - Assess for incontinence   - Turn and reposition patient  - Assist with mobility/ambulation  - Relieve pressure over bony prominences  - Avoid friction and shearing  - Provide appropriate hygiene as needed including keeping skin clean and dry  - Evaluate need for skin moisturizer/barrier cream  - Collaborate with interdisciplinary team   - Patient/family teaching  - Consider wound care consult   Outcome: Progressing     Problem: RESPIRATORY - ADULT  Goal: Achieves optimal ventilation and oxygenation  Description: INTERVENTIONS:  - Assess for changes in respiratory status  - Assess for changes in mentation and behavior  - Position to facilitate oxygenation and minimize respiratory effort  - Oxygen administered by appropriate delivery if ordered  - Initiate smoking cessation education as indicated  - Encourage broncho-pulmonary hygiene including cough, deep breathe, Incentive Spirometry  - Assess the need for suctioning and aspirate as needed  - Assess and instruct to report SOB or any respiratory difficulty  - Respiratory Therapy support as indicated  Outcome: Progressing     Problem: Nutrition/Hydration-ADULT  Goal: Nutrient/Hydration intake appropriate for improving, restoring or maintaining nutritional needs  Description: Monitor and assess patient's nutrition/hydration status for malnutrition. Collaborate with interdisciplinary team and initiate plan and interventions as ordered.  Monitor patient's weight and dietary intake as ordered or per policy. Utilize nutrition screening tool and intervene as necessary. Determine patient's food preferences and provide high-protein, high-caloric foods as appropriate.      INTERVENTIONS:  - Monitor oral intake, urinary output, labs, and treatment plans  - Assess nutrition and hydration status and recommend course of action  - Evaluate amount of meals eaten  - Assist patient with eating if necessary   - Allow adequate time for meals  - Recommend/ encourage appropriate diets, oral nutritional supplements, and vitamin/mineral supplements  - Order, calculate, and assess calorie counts as needed  - Recommend, monitor, and adjust tube feedings and TPN/PPN based on assessed needs  - Assess need for intravenous fluids  - Provide specific nutrition/hydration education as appropriate  - Include patient/family/caregiver in decisions related to nutrition  Outcome: Progressing     Problem: PAIN - ADULT  Goal: Verbalizes/displays adequate comfort level or baseline comfort level  Description: Interventions:  - Encourage patient to monitor pain and request assistance  - Assess pain using appropriate pain scale  - Administer analgesics based on type and severity of pain and evaluate response  - Implement non-pharmacological measures as appropriate and evaluate response  - Consider cultural and social influences on pain and pain management  - Notify physician/advanced practitioner if interventions unsuccessful or patient reports new pain  Outcome: Progressing     Problem: INFECTION - ADULT  Goal: Absence or prevention of progression during hospitalization  Description: INTERVENTIONS:  - Assess and monitor for signs and symptoms of infection  - Monitor lab/diagnostic results  - Monitor all insertion sites, i.e. indwelling lines, tubes, and drains  - Monitor endotracheal if appropriate and nasal secretions for changes in amount and color  - Dexter appropriate cooling/warming therapies per order  - Administer medications as ordered  - Instruct and encourage patient and family to use good hand hygiene technique  - Identify and instruct in appropriate isolation precautions for identified  infection/condition  Outcome: Progressing  Goal: Absence of fever/infection during neutropenic period  Description: INTERVENTIONS:  - Monitor WBC    Outcome: Progressing     Problem: SAFETY ADULT  Goal: Patient will remain free of falls  Description: INTERVENTIONS:  - Educate patient/family on patient safety including physical limitations  - Instruct patient to call for assistance with activity   - Consult OT/PT to assist with strengthening/mobility   - Keep Call bell within reach  - Keep bed low and locked with side rails adjusted as appropriate  - Keep care items and personal belongings within reach  - Initiate and maintain comfort rounds  - Make Fall Risk Sign visible to staff  - - Apply yellow socks and bracelet for high fall risk patients  - Consider moving patient to room near nurses station  Outcome: Progressing  Goal: Maintain or return to baseline ADL function  Description: INTERVENTIONS:  -  Assess patient's ability to carry out ADLs; assess patient's baseline for ADL function and identify physical deficits which impact ability to perform ADLs (bathing, care of mouth/teeth, toileting, grooming, dressing, etc.)  - Assess/evaluate cause of self-care deficits   - Assess range of motion  - Assess patient's mobility; develop plan if impaired  - Assess patient's need for assistive devices and provide as appropriate  - Encourage maximum independence but intervene and supervise when necessary  - Involve family in performance of ADLs  - Assess for home care needs following discharge   - Consider OT consult to assist with ADL evaluation and planning for discharge  - Provide patient education as appropriate  Outcome: Progressing  Goal: Maintains/Returns to pre admission functional level  Description: INTERVENTIONS:  - Perform AM-PAC 6 Click Basic Mobility/ Daily Activity assessment daily.  - Set and communicate daily mobility goal to care team and patient/family/caregiver.   - Collaborate with rehabilitation services  on mobility goals if consulted  -- Out of bed for toileting  - Record patient progress and toleration of activity level   Outcome: Progressing     Problem: DISCHARGE PLANNING  Goal: Discharge to home or other facility with appropriate resources  Description: INTERVENTIONS:  - Identify barriers to discharge w/patient and caregiver  - Arrange for needed discharge resources and transportation as appropriate  - Identify discharge learning needs (meds, wound care, etc.)  - Arrange for interpretive services to assist at discharge as needed  - Refer to Case Management Department for coordinating discharge planning if the patient needs post-hospital services based on physician/advanced practitioner order or complex needs related to functional status, cognitive ability, or social support system  Outcome: Progressing     Problem: Knowledge Deficit  Goal: Patient/family/caregiver demonstrates understanding of disease process, treatment plan, medications, and discharge instructions  Description: Complete learning assessment and assess knowledge base.  Interventions:  - Provide teaching at level of understanding  - Provide teaching via preferred learning methods  Outcome: Progressing

## 2025-01-05 NOTE — ASSESSMENT & PLAN NOTE
Presented to ER after being found down on welfare check. Was noted to be hypoxic in ER (82% on RA) requiring BIPAP and then developed a-fib with RVR.  Was in ICU, transferred out on 1/2.   CT CAP: Acute right-sided pneumonia, greatest at the right lower lobe. Associated loculated parapneumonic effusion. No obvious pleural thickening on noncontrast study, though empyema has to be considered.   Was on IV CTX, transitioned to Amoxicillin BID on 1/4 thru 1/9 to complete 10 days total of abx per ID recommendations  Wean o2 as able, continue nebulizers, supportive care  Add mucinex/tessalon on 1/5  Repeat CXR 1/5 given episode of desat to 80s overnight requiring increase of O2 to 6L  DC planning to rehab once medically stable

## 2025-01-05 NOTE — CASE MANAGEMENT
Case Management Discharge Planning Note    Patient name Remedios Mcgrath  [facility] MRN [id number]  : 1929 Date 2025       Current Admission Date: 2024  Current Admission Diagnosis:Pneumonia   Patient Active Problem List    Diagnosis Date Noted Date Diagnosed    Hyperglycemia 2025     New onset atrial fibrillation with RVR ([facility]) 2025     Sepsis ([facility]) 2025     Pneumococcal bacteremia 2025     Acute kidney injury superimposed on CKD  ([facility]) 2025     Acute hypoxic respiratory failure ([facility]) 2025     Emphysema lung ([facility]) 2025     Dysphagia 2025     Pleural effusion, right 2025     Pneumonia 2025     Moderate protein-calorie malnutrition ([facility]) 2025     Mild protein-calorie malnutrition ([facility]) 10/04/2024     Venous stasis dermatitis of both lower extremities 2024     Stage 3b chronic kidney disease ([facility]) 03/15/2022     Bruises easily 2020     Impaired fasting glucose 2012     Stenosis of left carotid artery 2012     Peripheral vascular disease ([facility]) 2012     Hyperlipidemia 2012     Hypertension 2012     Insomnia 2012       LOS (days): 5  Geometric Mean LOS (GMLOS) (days): 4.9  Days to GMLOS:0.4     OBJECTIVE:  Risk of Unplanned Readmission Score: 17.75         Current admission status: Inpatient   Preferred Pharmacy:   [facility] [place] [place] - [id number] [place] [address]  Phone: [phone] Fax: [phone]    Primary Care Provider: Dion Tsang MD    Primary Insurance: [id number]  Secondary Insurance:     DISCHARGE DETAILS:    Discharge planning discussed with:: Son at bedside  Freedom of Choice: Yes  Comments - Freedom of Choice: Discussed FOC  CM contacted family/caregiver?: Yes  Were Treatment Team discharge recommendations reviewed with patient/caregiver?: Yes  Did patient/caregiver verbalize understanding of patient care  needs?: N/A- going to facility  Were patient/caregiver advised of the risks associated with not following Treatment Team discharge recommendations?: Yes    Other Referral/Resources/Interventions Provided:  Interventions: Short Term Rehab  Referral Comments: This CM discussed therapy recs with son. [facility] able to accept and reserved in aidin. Awaiting updated therapy notes to submit for auth.      Treatment Team Recommendation: Short Term Rehab  Discharge Destination Plan:: Short Term Rehab

## 2025-01-06 ENCOUNTER — APPOINTMENT (INPATIENT)
Dept: RADIOLOGY | Facility: HOSPITAL | Age: OVER 89
DRG: 871 | End: 2025-01-06
Payer: COMMERCIAL

## 2025-01-06 PROBLEM — R29.6 UNWITNESSED FALL: Status: ACTIVE | Noted: 2025-01-06

## 2025-01-06 LAB
ANION GAP SERPL CALCULATED.3IONS-SCNC: 8 MMOL/L (ref 4–13)
BUN SERPL-MCNC: 34 MG/DL (ref 5–25)
CALCIUM SERPL-MCNC: 7.9 MG/DL (ref 8.4–10.2)
CHLORIDE SERPL-SCNC: 106 MMOL/L (ref 96–108)
CO2 SERPL-SCNC: 26 MMOL/L (ref 21–32)
CREAT SERPL-MCNC: 1.05 MG/DL (ref 0.6–1.3)
ERYTHROCYTE [DISTWIDTH] IN BLOOD BY AUTOMATED COUNT: 13.3 % (ref 11.6–15.1)
GFR SERPL CREATININE-BSD FRML MDRD: 45 ML/MIN/1.73SQ M
GLUCOSE SERPL-MCNC: 105 MG/DL (ref 65–140)
GLUCOSE SERPL-MCNC: 125 MG/DL (ref 65–140)
GLUCOSE SERPL-MCNC: 141 MG/DL (ref 65–140)
GLUCOSE SERPL-MCNC: 141 MG/DL (ref 65–140)
GLUCOSE SERPL-MCNC: 145 MG/DL (ref 65–140)
HCT VFR BLD AUTO: 35.1 % (ref 34.8–46.1)
HGB BLD-MCNC: 11.4 G/DL (ref 11.5–15.4)
MCH RBC QN AUTO: 31.6 PG (ref 26.8–34.3)
MCHC RBC AUTO-ENTMCNC: 32.5 G/DL (ref 31.4–37.4)
MCV RBC AUTO: 97 FL (ref 82–98)
PLATELET # BLD AUTO: 282 THOUSANDS/UL (ref 149–390)
PMV BLD AUTO: 10.1 FL (ref 8.9–12.7)
POTASSIUM SERPL-SCNC: 4 MMOL/L (ref 3.5–5.3)
RBC # BLD AUTO: 3.61 MILLION/UL (ref 3.81–5.12)
SODIUM SERPL-SCNC: 140 MMOL/L (ref 135–147)
WBC # BLD AUTO: 16.54 THOUSAND/UL (ref 4.31–10.16)

## 2025-01-06 PROCEDURE — 97530 THERAPEUTIC ACTIVITIES: CPT

## 2025-01-06 PROCEDURE — 73560 X-RAY EXAM OF KNEE 1 OR 2: CPT

## 2025-01-06 PROCEDURE — 97116 GAIT TRAINING THERAPY: CPT

## 2025-01-06 PROCEDURE — 85027 COMPLETE CBC AUTOMATED: CPT | Performed by: STUDENT IN AN ORGANIZED HEALTH CARE EDUCATION/TRAINING PROGRAM

## 2025-01-06 PROCEDURE — 92526 ORAL FUNCTION THERAPY: CPT

## 2025-01-06 PROCEDURE — 82948 REAGENT STRIP/BLOOD GLUCOSE: CPT

## 2025-01-06 PROCEDURE — 97535 SELF CARE MNGMENT TRAINING: CPT

## 2025-01-06 PROCEDURE — 80048 BASIC METABOLIC PNL TOTAL CA: CPT | Performed by: INTERNAL MEDICINE

## 2025-01-06 PROCEDURE — 99233 SBSQ HOSP IP/OBS HIGH 50: CPT | Performed by: INTERNAL MEDICINE

## 2025-01-06 PROCEDURE — 99232 SBSQ HOSP IP/OBS MODERATE 35: CPT | Performed by: PHYSICIAN ASSISTANT

## 2025-01-06 PROCEDURE — 72170 X-RAY EXAM OF PELVIS: CPT

## 2025-01-06 RX ADMIN — BENZONATATE 100 MG: 100 CAPSULE ORAL at 08:31

## 2025-01-06 RX ADMIN — BENZONATATE 100 MG: 100 CAPSULE ORAL at 17:17

## 2025-01-06 RX ADMIN — GUAIFENESIN 600 MG: 600 TABLET, EXTENDED RELEASE ORAL at 08:31

## 2025-01-06 RX ADMIN — APIXABAN 2.5 MG: 2.5 TABLET, FILM COATED ORAL at 08:31

## 2025-01-06 RX ADMIN — GUAIFENESIN 600 MG: 600 TABLET, EXTENDED RELEASE ORAL at 22:05

## 2025-01-06 RX ADMIN — AMOXICILLIN 500 MG: 500 CAPSULE ORAL at 08:31

## 2025-01-06 RX ADMIN — APIXABAN 2.5 MG: 2.5 TABLET, FILM COATED ORAL at 17:17

## 2025-01-06 RX ADMIN — METOPROLOL TARTRATE 25 MG: 25 TABLET, FILM COATED ORAL at 22:05

## 2025-01-06 RX ADMIN — CHLORHEXIDINE GLUCONATE 0.12% ORAL RINSE 15 ML: 1.2 LIQUID ORAL at 22:05

## 2025-01-06 RX ADMIN — BENZONATATE 100 MG: 100 CAPSULE ORAL at 22:05

## 2025-01-06 RX ADMIN — AMOXICILLIN 500 MG: 500 CAPSULE ORAL at 22:05

## 2025-01-06 NOTE — PHYSICAL THERAPY NOTE
Physical Therapy Treatment Note    Patient's Name: Remedios Mcgrath  : 25 1535   PT Last Visit   PT Visit Date 25   Note Type   Note Type Treatment for insurance authorization   Pain Assessment   Pain Assessment Tool 0-10   Pain Score 5   Pain Location/Orientation Orientation: Left;Location: Leg   Hospital Pain Intervention(s) Elevated   Restrictions/Precautions   Weight Bearing Precautions Per Order No   Other Precautions Cognitive;Chair Alarm;Bed Alarm;O2;Fall Risk;Pain  (6 L O2)   General   Chart Reviewed Yes   Response to Previous Treatment Patient with no complaints from previous session.   Family/Caregiver Present Yes   Subjective   Subjective Agreeable to mobilize.   Bed Mobility   Supine to Sit 3  Moderate assistance   Additional items Assist x 1;Increased time required;Verbal cues;LE management   Additional Comments Pt greeted in supine.   Transfers   Sit to Stand 4  Minimal assistance   Additional items Assist x 1;Increased time required;Verbal cues   Stand to Sit 4  Minimal assistance   Additional items Assist x 1;Increased time required;Verbal cues   Additional Comments RW   Ambulation/Elevation   Gait pattern Excessively slow;Short stride;Decreased foot clearance;Forward Flexion;Improper Weight shift;Ataxia   Gait Assistance 3  Moderate assist   Additional items Assist x 1;Verbal cues;Tactile cues   Assistive Device Rolling walker   Distance 50'   Stair Management Assistance Not tested   Balance   Static Sitting Fair   Dynamic Sitting Fair -   Static Standing Poor +   Dynamic Standing Poor   Ambulatory Poor  (RW)   Endurance Deficit   Endurance Deficit Yes   Endurance Deficit Description weakness, fatigue   Activity Tolerance   Activity Tolerance Patient limited by fatigue   Medical Staff Made Aware OT Chise   Nurse Made Aware yes -updated re: SPO2 levels   Assessment   Prognosis Good   Problem List Decreased strength;Decreased range of motion;Decreased endurance;Impaired  balance;Decreased coordination;Decreased mobility;Impaired judgement;Decreased cognition;Decreased safety awareness;Pain;Orthopedic restrictions   Assessment Initiated session w/ bed mobility training. Pt requires encouragement to complete as independently as possible. Pt able to increase gait distance this session w/ encouragement but SpO2 desaturated to 82% on 6L/min. ~5 min required to recover. Deferred further gait training. Updated nursing. Continue to recommend rehab upon d/c.   Barriers to Discharge Decreased caregiver support   Goals   Patient Goals go home   Plan   Treatment/Interventions LE strengthening/ROM;Functional transfer training;Therapeutic exercise;Endurance training;Patient/family training;Equipment eval/education;Bed mobility;Gait training;Compensatory technique education;Spoke to nursing;OT   Progress Progressing toward goals   PT Frequency 3-5x/wk   Discharge Recommendation   Rehab Resource Intensity Level, PT II (Moderate Resource Intensity)   AM-PAC Basic Mobility Inpatient   Turning in Flat Bed Without Bedrails 3   Lying on Back to Sitting on Edge of Flat Bed Without Bedrails 2   Moving Bed to Chair 3   Standing Up From Chair Using Arms 3   Walk in Room 2   Climb 3-5 Stairs With Railing 1   Basic Mobility Inpatient Raw Score 14   Basic Mobility Standardized Score 35.55   [facility] Highest Level Of Mobility   -[facility] Goal 4: Move to chair/commode   -HL Achieved 7: Walk 25 feet or more   Education   Education Provided Mobility training;Assistive device   Patient Demonstrates acceptance/verbal understanding;Reinforcement needed   End of Consult   Patient Position at End of Consult Bedside chair;Bed/Chair alarm activated;All needs within reach  (on waffle cushion, BLE elevated)       Bere Perez, PT, DPT

## 2025-01-06 NOTE — PLAN OF CARE
Problem: PHYSICAL THERAPY ADULT  Goal: Performs mobility at highest level of function for planned discharge setting.  See evaluation for individualized goals.  Description: Treatment/Interventions: OT, Spoke to case management, Gait training, Bed mobility, Patient/family training, Endurance training, LE strengthening/ROM, Functional transfer training  Equipment Recommended:  (TBD)       See flowsheet documentation for full assessment, interventions and recommendations.  Outcome: Progressing  Note: Prognosis: Good  Problem List: Decreased strength, Decreased range of motion, Decreased endurance, Impaired balance, Decreased coordination, Decreased mobility, Impaired judgement, Decreased cognition, Decreased safety awareness, Pain, Orthopedic restrictions  Assessment: Initiated session w/ bed mobility training. Pt requires encouragement to complete as independently as possible. Pt able to increase gait distance this session w/ encouragement but SpO2 desaturated to 82% on 6L/min. ~5 min required to recover. Deferred further gait training. Updated nursing. Continue to recommend rehab upon d/c.  Barriers to Discharge: Decreased caregiver support     Rehab Resource Intensity Level, PT: II (Moderate Resource Intensity)    See flowsheet documentation for full assessment.

## 2025-01-06 NOTE — SPEECH THERAPY NOTE
"Speech-Language Pathology Progress Note      Patient Name: Remedios Mcgrath    Today's Date: 1/6/2025      Subjective:  Pt was awake and alert. She was sitting up in chair at bedside. Patient stated \"The food has been wonderful.\"    Objective:  Pt was seen today for dysphagia therapy. Current diet is dysphagia 2 mechanical soft with thin liquids. Pt was on 5L O2 via nasal cannula. Oral care had already been completed. Focus of today's session was  to assess tolerance of thin liquids . Textures offered today included thin liquid via straw.  Swallow function:   Bolus retrieval and control were adequate.  Manipulation and AP transfer were adequate. Pharyngeal swallow initiation was mildly delayed. Hyolaryngeal excursion was adequate. No s/s aspiration occurred during session today.    Assessment:  Swallow function is stable with current diet.   Cough noted at baseline but no increase with PO trials. Pt repors long hx of wet cough. If additional testing is desired, MBS can be completed. For now, okay to continue current diet.     Plan:  Continue dysphagia 2 mechanical soft and thin liquids. No additional ST f/u needed at this time. Please re consult with any new changes or concerns.    "

## 2025-01-06 NOTE — ASSESSMENT & PLAN NOTE
Noted while in the ER, cardiology was consulted, spontaneously converted to sinus rhythm currently on Lopressor 25 mg twice daily and Eliquis 2.5 mg for anticoagulation  As she will be discharged to facility, will defer Eliquis price check at this time  Cardiology signed off as of 1/4

## 2025-01-06 NOTE — PROGRESS NOTES
Progress Note - Infectious Disease   Name: Remedios Mcgrath 89 y.o. female I MRN: [id number]  Unit/Bed#: [facility] 603-01 I Date of Admission: 12/31/2024   Date of Service: 1/6/2025 I Hospital Day: 6    Assessment & Plan  Sepsis (HCC)  WBC, HR.  Due to bacteremia, pneumonia as below.  No other appreciable source.  Improving with antibiotics.  Persistent WBC likely due to steroids.    Continue antibiotics as below  Follow temperatures closely  Recheck WBC in AM to monitor infection  Supportive care as per the primary service  Pneumococcal bacteremia  Due to pneumonia.  Repeat blood cultures negative.  TTE (technically difficult) showed MV thickening (likely age-related) but no vegetations.  Low clinical suspicion for endocarditis.    Continue amoxicillin 500 mg PO Q12 with plan to continue through 1/9 to complete 10 days total antibiotics  No indication for GARRETT from an ID perspective  Pneumonia  Presumed pneumococcal given positive urinary antigen, bacteremia as above.    Continue antibiotics as above  Follow respiratory status closely  Pleural effusion, right  Likely parapneumonic.  Small on CT.  Not well-visualized on repeat CXR.    Follow respiratory status closely  Acute hypoxic respiratory failure (HCC)  Due to pneumonia in setting of severe emphysema.  Acute kidney injury superimposed on CKD  (HCC)  Baseline Cr 1.3-1.4.  Improved.  Now below baseline.    Follow creatinine closely and dose-adjust antibiotics as indicated  Recheck BMP in AM  Dysphagia  Risk factor for aspiration, pneumonia.      Modified diet per SLP with close follow-up ongoing  Emphysema lung (HCC)  Unknown severity.  Risk factor for pneumonia.  Consideration for exacerbation.    Steroid taper per pulmonology    Ok for discharge to rehab from Infectious Disease service perspective.    Antibiotics:  Amoxicillin #3  Antibiotics #7    Subjective   Patient sleeping, not awakened.  Noted to have fall OOB last night.  No fevers or diarrhea  noted.    Objective :  Temp:  [96.9 °F (36.1 °C)-98.5 °F (36.9 °C)] 96.9 °F (36.1 °C)  HR:  [] 79  BP: (106-142)/(47-85) 106/47  Resp:  [16-20] 17  SpO2:  [85 %-98 %] 93 %  O2 Device: Nasal cannula  Nasal Cannula O2 Flow Rate (L/min):  [5 L/min] 5 L/min    General:  No acute distress  Psychiatric:  Sleeping  Pulmonary:  Normal respiratory excursion without accessory muscle use  Abdomen:  Soft, nontender  Extremities:  No edema  Skin:  No rashes      Lab Results: I have reviewed the following results:  Results from last 7 days   Lab Units 01/06/25  0514 01/05/25  0902 01/04/25  0441   WBC Thousand/uL 16.54* 18.66* 15.88*   HEMOGLOBIN g/dL 11.4* 12.5 10.5*   PLATELETS Thousands/uL 282 312 240     Results from last 7 days   Lab Units 01/06/25  0514 01/05/25  0602 01/04/25  0441 01/03/25  0439 01/02/25  0420 01/01/25  2127 01/01/25  0448 12/31/24  2024   SODIUM mmol/L 140 140 143   < > 149*   < > 150* 154*   POTASSIUM mmol/L 4.0 3.6 3.4*   < > 3.3*   < > 3.5 3.5   CHLORIDE mmol/L 106 104 109*   < > 116*   < > 115* 115*   CO2 mmol/L 26 25 21   < > 21   < > 19* 14*   BUN mg/dL 34* 37* 47*   < > 70*   < > 71* 82*   CREATININE mg/dL 1.05 1.27 1.36*   < > 1.99*   < > 2.47* 3.18*   EGFR ml/min/1.73sq m 45 35 33   < > 20   < > 16 11   CALCIUM mg/dL 7.9* 8.2* 7.9*   < > 8.2*   < > 7.7* 9.3   AST U/L  --   --   --   --  50*  --  83* 109*   ALT U/L  --   --   --   --  30  --  36 37   ALK PHOS U/L  --   --   --   --  52  --  52 71   ALBUMIN g/dL  --  3.1* 2.7*  --  2.7*  --  2.6* 3.7    < > = values in this interval not displayed.     Results from last 7 days   Lab Units 01/02/25  1716 01/01/25  1525 01/01/25  1226 01/01/25  0046 12/31/24 2027 12/31/24 2024   BLOOD CULTURE  No Growth at 72 hrs.  No Growth at 72 hrs.  --   --   --  Streptococcus pneumoniae* Streptococcus pneumoniae*   GRAM STAIN RESULT   --   --   --   --  Gram positive cocci in pairs and chains* Gram positive cocci in pairs and chains*   MRSA CULTURE ONLY    --  No Methicillin Resistant Staphlyococcus aureus (MRSA) isolated  --   --   --   --    LEGIONELLA URINARY ANTIGEN   --   --   --  Negative  --   --    C DIFF TOXIN B BY PCR   --   --  Negative  --   --   --      Results from last 7 days   Lab Units 12/31/24 2024   PROCALCITONIN ng/ml 51.53*                 Imaging Results Review: I personally reviewed the following image studies/reports in PACS and discussed pertinent findings with Radiology: chest xray. My interpretation of the radiology images/reports is: no effusion.

## 2025-01-06 NOTE — RAPID RESPONSE
Rapid Response Note  Remedios Mcgrath 89 y.o. female MRN: [id number]  Unit/Bed#: [facility] 603-01 Encounter: [id number]    Rapid Response Notification(s):   Response called date/time:  1/5/2025 9:38 PM  Response team arrival date/time:  1/5/2025 9:39 PM  Response end date/time:  1/5/2025 9:57 PM  Level of care:  Medsur  [facility]  Primary reason for rapid response call:  Fall    Rapid Response Intervention(s):        Assessment:   Unwitnessed fall, denies head strike. Says she slide off side of bed to her knees  Rt hip pain    Plan:   Ok to stay at current LOC  Re-in forced using call bell to get assistance when wanting to get up  Fall precautions: bed alarm  Will get xrays of Rt hip and knee     Rapid Response Outcome:   Transfer:  Remain on floor  Code Status: Level 3 (DNAR and DNI)         Background/Situation:   Remedios Mcgrath is a 89 y.o. female who was a rapid after being found sitting by the side of her bed on the floor. Pt reports she slid out of bed onto her knees, she didn't hit her head.     Review of Systems   Musculoskeletal:         Rt knee and hip pain       Objective:   Vitals:    01/05/25 2145 01/05/25 2147 01/05/25 2147 01/05/25 2148   BP:  133/61 133/61    Pulse: 91  92 94   Resp:   16    Temp:       TempSrc:       SpO2: 95%  95% 95%   Weight:       Height:         Physical Exam  Constitutional:       General: She is not in acute distress.  HENT:      Mouth/Throat:      Mouth: Mucous membranes are moist.   Eyes:      Conjunctiva/sclera: Conjunctivae normal.   Pulmonary:      Effort: Pulmonary effort is normal. No respiratory distress.   Abdominal:      General: Bowel sounds are normal.      Palpations: Abdomen is soft.   Musculoskeletal:      Cervical back: Normal range of motion and neck supple.      Comments: Able to raise Rt leg off of bed and bend it at the knee but c/o pain in the right hip with movement   Skin:     General: Skin is warm and dry.   Neurological:       General: No focal deficit present.      Mental Status: She is alert. Mental status is at baseline.   Psychiatric:         Mood and Affect: Mood normal.

## 2025-01-06 NOTE — CASE MANAGEMENT
Case Management Discharge Planning Note    Patient name Remedios Mcgrath  Location [facility] 603/[facility] 603-01 MRN [id number]  : 1929 Date 2025       Current Admission Date: 2024  Current Admission Diagnosis:Pneumonia   Patient Active Problem List    Diagnosis Date Noted Date Diagnosed    Hyperglycemia 2025     New onset atrial fibrillation with RVR ([facility]) 2025     Sepsis ([facility]) 2025     Pneumococcal bacteremia 2025     Acute kidney injury superimposed on CKD  ([facility]) 2025     Acute hypoxic respiratory failure (HCC) 2025     Emphysema lung ([facility]) 2025     Dysphagia 2025     Pleural effusion, right 2025     Pneumonia 2025     Moderate protein-calorie malnutrition ([facility]) 2025     Mild protein-calorie malnutrition ([facility]) 10/04/2024     Venous stasis dermatitis of both lower extremities 2024     Stage 3b chronic kidney disease ([facility]) 03/15/2022     Bruises easily 2020     Impaired fasting glucose 2012     Stenosis of left carotid artery 2012     Peripheral vascular disease ([facility]) 2012     Hyperlipidemia 2012     Hypertension 2012     Insomnia 2012       LOS (days): 6  Geometric Mean LOS (GMLOS) (days): 4.9  Days to GMLOS:-0.7     OBJECTIVE:  Risk of Unplanned Readmission Score: 17.44         Current admission status: Inpatient   Preferred Pharmacy:   [facility] [address] [address]  Phone: [phone] Fax: [phone]    Primary Care Provider: Dion Tsang MD    Primary Insurance: [employer]SN [employer]  Secondary Insurance:     DISCHARGE DETAILS:    Other Referral/Resources/Interventions Provided:  Referral Comments: Per rounds with [facility], pt is not yet medically ready for discharge at this time. Plan to admit to [facility] pending insurance auth approval and final medical clearance. PT/OT to see pt for updated therapy notes as would be required for auth  submission. CM to submit for insurance auth when d/c readiness is known. [facility] updated via [facility] and pt/family at bedside.    Treatment Team Recommendation: Short Term Rehab  Discharge Destination Plan:: Short Term Rehab

## 2025-01-06 NOTE — PLAN OF CARE
Problem: Prexisting or High Potential for Compromised Skin Integrity  Goal: Skin integrity is maintained or improved  Description: INTERVENTIONS:  - Identify patients at risk for skin breakdown  - Assess and monitor skin integrity  - Assess and monitor nutrition and hydration status  - Monitor labs   - Assess for incontinence   - Turn and reposition patient  - Assist with mobility/ambulation  - Relieve pressure over bony prominences  - Avoid friction and shearing  - Provide appropriate hygiene as needed including keeping skin clean and dry  - Evaluate need for skin moisturizer/barrier cream  - Collaborate with interdisciplinary team   - Patient/family teaching  - Consider wound care consult   Outcome: Progressing     Problem: RESPIRATORY - ADULT  Goal: Achieves optimal ventilation and oxygenation  Description: INTERVENTIONS:  - Assess for changes in respiratory status  - Assess for changes in mentation and behavior  - Position to facilitate oxygenation and minimize respiratory effort  - Oxygen administered by appropriate delivery if ordered  - Initiate smoking cessation education as indicated  - Encourage broncho-pulmonary hygiene including cough, deep breathe, Incentive Spirometry  - Assess the need for suctioning and aspirate as needed  - Assess and instruct to report SOB or any respiratory difficulty  - Respiratory Therapy support as indicated  Outcome: Progressing     Problem: Nutrition/Hydration-ADULT  Goal: Nutrient/Hydration intake appropriate for improving, restoring or maintaining nutritional needs  Description: Monitor and assess patient's nutrition/hydration status for malnutrition. Collaborate with interdisciplinary team and initiate plan and interventions as ordered.  Monitor patient's weight and dietary intake as ordered or per policy. Utilize nutrition screening tool and intervene as necessary. Determine patient's food preferences and provide high-protein, high-caloric foods as appropriate.      INTERVENTIONS:  - Monitor oral intake, urinary output, labs, and treatment plans  - Assess nutrition and hydration status and recommend course of action  - Evaluate amount of meals eaten  - Assist patient with eating if necessary   - Allow adequate time for meals  - Recommend/ encourage appropriate diets, oral nutritional supplements, and vitamin/mineral supplements  - Order, calculate, and assess calorie counts as needed  - Recommend, monitor, and adjust tube feedings and TPN/PPN based on assessed needs  - Assess need for intravenous fluids  - Provide specific nutrition/hydration education as appropriate  - Include patient/family/caregiver in decisions related to nutrition  Outcome: Progressing     Problem: PAIN - ADULT  Goal: Verbalizes/displays adequate comfort level or baseline comfort level  Description: Interventions:  - Encourage patient to monitor pain and request assistance  - Assess pain using appropriate pain scale  - Administer analgesics based on type and severity of pain and evaluate response  - Implement non-pharmacological measures as appropriate and evaluate response  - Consider cultural and social influences on pain and pain management  - Notify physician/advanced practitioner if interventions unsuccessful or patient reports new pain  Outcome: Progressing     Problem: INFECTION - ADULT  Goal: Absence or prevention of progression during hospitalization  Description: INTERVENTIONS:  - Assess and monitor for signs and symptoms of infection  - Monitor lab/diagnostic results  - Monitor all insertion sites, i.e. indwelling lines, tubes, and drains  - Monitor endotracheal if appropriate and nasal secretions for changes in amount and color  - [place] appropriate cooling/warming therapies per order  - Administer medications as ordered  - Instruct and encourage patient and family to use good hand hygiene technique  - Identify and instruct in appropriate isolation precautions for identified  infection/condition  Outcome: Progressing     Problem: SAFETY ADULT  Goal: Patient will remain free of falls  Description: INTERVENTIONS:  - Educate patient/family on patient safety including physical limitations  - Instruct patient to call for assistance with activity   - Consult OT/PT to assist with strengthening/mobility   - Keep Call bell within reach  - Keep bed low and locked with side rails adjusted as appropriate  - Keep care items and personal belongings within reach  - Initiate and maintain comfort rounds  - Make Fall Risk Sign visible to staff  - Apply yellow socks and bracelet for high fall risk patients  - Consider moving patient to room near nurses station  Outcome: Progressing  Goal: Maintain or return to baseline ADL function  Description: INTERVENTIONS:  -  Assess patient's ability to carry out ADLs; assess patient's baseline for ADL function and identify physical deficits which impact ability to perform ADLs (bathing, care of mouth/teeth, toileting, grooming, dressing, etc.)  - Assess/evaluate cause of self-care deficits   - Assess range of motion  - Assess patient's mobility; develop plan if impaired  - Assess patient's need for assistive devices and provide as appropriate  - Encourage maximum independence but intervene and supervise when necessary  - Involve family in performance of ADLs  - Assess for home care needs following discharge   - Consider OT consult to assist with ADL evaluation and planning for discharge  - Provide patient education as appropriate  Outcome: Progressing  Goal: Maintains/Returns to pre admission functional level  Description: INTERVENTIONS:  - Perform AM-PAC 6 Click Basic Mobility/ Daily Activity assessment daily.  - Set and communicate daily mobility goal to care team and patient/family/caregiver.   - Collaborate with rehabilitation services on mobility goals if consulted  - Out of bed for toileting  - Record patient progress and toleration of activity level   Outcome:  Progressing     Problem: DISCHARGE PLANNING  Goal: Discharge to home or other facility with appropriate resources  Description: INTERVENTIONS:  - Identify barriers to discharge w/patient and caregiver  - Arrange for needed discharge resources and transportation as appropriate  - Identify discharge learning needs (meds, wound care, etc.)  - Arrange for interpretive services to assist at discharge as needed  - Refer to Case Management Department for coordinating discharge planning if the patient needs post-hospital services based on physician/advanced practitioner order or complex needs related to functional status, cognitive ability, or social support system  Outcome: Progressing     Problem: Knowledge Deficit  Goal: Patient/family/caregiver demonstrates understanding of disease process, treatment plan, medications, and discharge instructions  Description: Complete learning assessment and assess knowledge base.  Interventions:  - Provide teaching at level of understanding  - Provide teaching via preferred learning methods  Outcome: Progressing

## 2025-01-06 NOTE — ASSESSMENT & PLAN NOTE
Baseline Cr 1.3-1.4.  Improved.  Now below baseline.    Follow creatinine closely and dose-adjust antibiotics as indicated  Recheck BMP in AM

## 2025-01-06 NOTE — ASSESSMENT & PLAN NOTE
WBC, HR.  Due to bacteremia, pneumonia as below.  No other appreciable source.  Improving with antibiotics.  Persistent WBC likely due to steroids.    Continue antibiotics as below  Follow temperatures closely  Recheck WBC in AM to monitor infection  Supportive care as per the primary service

## 2025-01-06 NOTE — ASSESSMENT & PLAN NOTE
Due to pneumonia.  Repeat blood cultures negative.  TTE (technically difficult) showed MV thickening (likely age-related) but no vegetations.  Low clinical suspicion for endocarditis.    Continue amoxicillin 500 mg PO Q12 with plan to continue through 1/9 to complete 10 days total antibiotics  No indication for GARRETT from an ID perspective

## 2025-01-06 NOTE — ASSESSMENT & PLAN NOTE
Presented to ER after being found down on welfare check. Was noted to be hypoxic in ER (82% on RA) requiring BIPAP and then developed a-fib with RVR.  Was in ICU, transferred out on 1/2.   CT CAP: Acute right-sided pneumonia, greatest at the right lower lobe. Associated loculated parapneumonic effusion. No obvious pleural thickening on noncontrast study, though empyema has to be considered.   Was on IV CTX, transitioned to Amoxicillin BID on 1/4 thru 1/9 to complete 10 days total of abx per ID recommendations  Wean o2 as able, continue nebulizers, supportive care  Updated CXR 1/5 formal results pending   DC planning to rehab once medically stable

## 2025-01-06 NOTE — ASSESSMENT & PLAN NOTE
As evidenced by leukocytosis and tachycardia, due to bacteremia and pneumonia  ID following  Plan as above

## 2025-01-06 NOTE — OCCUPATIONAL THERAPY NOTE
Occupational Therapy Progress Note     Patient Name: Remedios Mcgrath  Today's Date: 1/6/2025  Problem List  Principal Problem:    Pneumonia  Active Problems:    Hypertension    Moderate protein-calorie malnutrition (HCC)    New onset atrial fibrillation with RVR (HCC)    Sepsis (HCC)    Pneumococcal bacteremia    Acute kidney injury superimposed on CKD  (HCC)    Acute hypoxic respiratory failure (HCC)    Emphysema lung (HCC)    Dysphagia    Pleural effusion, right    Hyperglycemia    Unwitnessed fall            01/06/25 1534   OT Last Visit   OT Visit Date 01/06/25   Note Type   Note Type Treatment   Pain Assessment   Pain Assessment Tool 0-10   Pain Score 5   Pain Location/Orientation Orientation: Left;Location: Leg;Orientation: Lower   Hospital Pain Intervention(s) Repositioned;Ambulation/increased activity;Emotional support   Restrictions/Precautions   Weight Bearing Precautions Per Order No   Other Precautions Cognitive;Chair Alarm;Bed Alarm;O2;Fall Risk;Pain  (6l 02)   Lifestyle   Autonomy PTA, pt reports being I with ADLs, IADLs, fnxl mobility, (+)    Reciprocal Relationships 2 local [place]   Service to Others Retired   Intrinsic Gratification Enjoys spending time with her kids   ADL   Where Assessed Edge of bed   UB Dressing Assistance 3  Moderate Assistance   UB Dressing Deficit Thread RUE;Thread LUE;Pull around back;Fasteners;Setup   LB Dressing Assistance 2  Maximal Assistance   LB Dressing Deficit Thread RLE into underwear;Thread LLE into underwear;Pull up over hips;Setup;Steadying;Requires assistive device for steadying;Supervision/safety;Increased time to complete   Bed Mobility   Supine to Sit 3  Moderate assistance   Additional items Assist x 1;Increased time required;Verbal cues;LE management   Sit to Supine Unable to assess  (left oob with alarm on)   Transfers   Sit to Stand 4  Minimal assistance   Additional items Assist x 1;Increased time required;Verbal cues   Stand to Sit 4  Minimal  assistance   Additional items Assist x 1;Increased time required;Verbal cues   Functional Mobility   Functional Mobility 3  Moderate assistance   Additional Comments + CHAIR FOLLOW FOR SAFETY   Additional items Rolling walker   Cognition   Overall Cognitive Status Impaired   Arousal/Participation Alert;Cooperative   Attention Attends with cues to redirect   Orientation Level Oriented to person;Disoriented to place;Disoriented to time;Disoriented to situation   Memory Decreased recall of precautions;Decreased recall of recent events;Decreased short term memory   Following Commands Follows one step commands with increased time or repetition   Activity Tolerance   Activity Tolerance Patient limited by fatigue   Medical Staff Made Aware PT SEEN FOR PARTIAL CO-SESSION WITH SKILLED PHYSICAL THERAPIST 2' CLINICALLY UNSTABLE PRESENTATION, NEW PRECAUTIONS/LIMITATIONS, LIMITED ACTIVITY TOLERANCE AND PRESENT IMPAIRMENTS WHICH ARE A REGRESSION FROM THE PT'S BASELINE AND IMPACTING OVERALL OCCUPATIONAL PERFORMANCE.   Assessment   Assessment Pt seen for OT tx session with focus on ADL retraining, functional txfs/mobility, and overall activity tolerance. Pt agreeable to participate. Pt required additional assist with ADLs compared to previous session- currently MOD A for UB dressing and MAX A for LB dressing- pt required cues for sequencing t/o task. Improvement noted in txfs/functional mobility to Ax1 however recommend chair follow for safety. Pt with noted fatigue and labored breathing with activity. O2 sat dropped to 82% on 6L O@ with activity, requiring seated rest break. With cues for deep breathing and rest break, improved to low 90s. Pt left with alarm on and reeducated on call bell.  Pt remains limited 2' pain, fatigue, impaired balance, decreased cognition, limited insight/judgement/safety awareness, and decreased endurance. Pt conts to be functioning below baseline. Therefore, pt would cont to benefit from LEVEL II  resources upon d/c. Will cont to follow to address the initially established OT goals.   Plan   Treatment Interventions ADL retraining;Functional transfer training;Endurance training;Cognitive reorientation;Patient/family training;Equipment evaluation/education;Compensatory technique education;Energy conservation;Activityengagement   Goal Expiration Date 01/16/25   OT Treatment Day 1   OT Frequency 2-3x/wk   Discharge Recommendation   Rehab Resource Intensity Level, OT II (Moderate Resource Intensity)   Additional Comments  The patient's raw score on the AM-PAC Daily Activity Inpatient Short Form is 15. A raw score of less than 19 suggests the patient may benefit from discharge to post-acute rehabilitation services. Please refer to the recommendation of the Occupational Therapist for safe discharge planning.   AM-PAC Daily Activity Inpatient   Lower Body Dressing 2   Bathing 2   Toileting 2   Upper Body Dressing 2   Grooming 3   Eating 3   Daily Activity Raw Score 14   Daily Activity Standardized Score (Calc for Raw Score >=11) 33.39   AM-PAC Applied Cognition Inpatient   Following a Speech/Presentation 2   Understanding Ordinary Conversation 3   Taking Medications 2   Remembering Where Things Are Placed or Put Away 2   Remembering List of 4-5 Errands 2   Taking Care of Complicated Tasks 2   Applied Cognition Raw Score 13   Applied Cognition Standardized Score 30.46         Documentation completed by VICKY Willingham OTR/L  MOCA Certified ID# [id number]

## 2025-01-06 NOTE — ASSESSMENT & PLAN NOTE
Most likely secondary to pneumonia and severe COPD. Appears was up to 15L in ICU, now down to 5L as of 1/5.   Goal O2 88-90%  As will be going to rehab will not require O2 evaluation at this time  Continue nebulizers  Was on IV steroids, transitioned to PO prednisone to complete course  Repeat CXR formal read pending

## 2025-01-06 NOTE — ASSESSMENT & PLAN NOTE
Due to pneumonia.  Repeat blood cultures negative.  TTE (technically difficult) showed MV thickening (likely age-related) but no vegetations.  Low clinical suspicion for endocarditis.  Status post IV ceftriaxone, transition to amoxicillin 500 mg PO Q12 with plan to continue through 1/9 to complete 10 days total antibiotics  No indication for GARRETT from an ID perspective  Repeat blood cx thus far negative after 72 hours x2

## 2025-01-06 NOTE — ASSESSMENT & PLAN NOTE
Likely parapneumonic.  Small on CT.  Not well-visualized on repeat CXR.    Follow respiratory status closely

## 2025-01-06 NOTE — ASSESSMENT & PLAN NOTE
Patient was a Rapid Respone on 1/5  XRs of right knee and pelvis with no acute osseous abnormalities per the results reports  PT/OT

## 2025-01-06 NOTE — PLAN OF CARE
Problem: OCCUPATIONAL THERAPY ADULT  Goal: Performs self-care activities at highest level of function for planned discharge setting.  See evaluation for individualized goals.  Description: Treatment Interventions: ADL retraining, Functional transfer training, UE strengthening/ROM, Endurance training, Cognitive reorientation, Patient/family training, Equipment evaluation/education, Compensatory technique education, Continued evaluation, Activityengagement, Energy conservation          See flowsheet documentation for full assessment, interventions and recommendations.   Outcome: Progressing  Note: Limitation: Decreased ADL status, Decreased UE strength, Decreased Safe judgement during ADL, Decreased cognition, Decreased endurance, Decreased self-care trans, Decreased high-level ADLs  Prognosis: Good  Assessment: Pt seen for OT tx session with focus on ADL retraining, functional txfs/mobility, and overall activity tolerance. Pt agreeable to participate. Pt required additional assist with ADLs compared to previous session- currently MOD A for UB dressing and MAX A for LB dressing- pt required cues for sequencing t/o task. Improvement noted in txfs/functional mobility to Ax1 however recommend chair follow for safety. Pt with noted fatigue and labored breathing with activity. O2 sat dropped to 82% on 6L O@ with activity, requiring seated rest break. With cues for deep breathing and rest break, improved to low 90s. Pt left with alarm on and reeducated on call bell.  Pt remains limited 2' pain, fatigue, impaired balance, decreased cognition, limited insight/judgement/safety awareness, and decreased endurance. Pt conts to be functioning below baseline. Therefore, pt would cont to benefit from LEVEL II resources upon d/c. Will cont to follow to address the initially established OT goals.     Rehab Resource Intensity Level, OT: II (Moderate Resource Intensity)

## 2025-01-06 NOTE — PROGRESS NOTES
Progress Note - Hospitalist   Name: Remedios Mcgrath 89 y.o. female I MRN: [id number]  Unit/Bed#: [facility] 603-01 I Date of Admission: 12/31/2024   Date of Service: 1/6/2025 I Hospital Day: 6    Assessment & Plan  Pneumonia  Presented to ER after being found down on welfare check. Was noted to be hypoxic in ER (82% on RA) requiring BIPAP and then developed a-fib with RVR.  Was in ICU, transferred out on 1/2.   CT CAP: Acute right-sided pneumonia, greatest at the right lower lobe. Associated loculated parapneumonic effusion. No obvious pleural thickening on noncontrast study, though empyema has to be considered.   Was on IV CTX, transitioned to Amoxicillin BID on 1/4 thru 1/9 to complete 10 days total of abx per ID recommendations  Wean o2 as able, continue nebulizers, supportive care  Updated CXR 1/5 formal results pending   DC planning to rehab once medically stable     New onset atrial fibrillation with RVR (HCC)  Noted while in the ER, cardiology was consulted, spontaneously converted to sinus rhythm currently on Lopressor 25 mg twice daily and Eliquis 2.5 mg for anticoagulation  As she will be discharged to facility, will defer Eliquis price check at this time  Cardiology signed off as of 1/4  Pneumococcal bacteremia  Due to pneumonia.  Repeat blood cultures negative.  TTE (technically difficult) showed MV thickening (likely age-related) but no vegetations.  Low clinical suspicion for endocarditis.  Status post IV ceftriaxone, transition to amoxicillin 500 mg PO Q12 with plan to continue through 1/9 to complete 10 days total antibiotics  No indication for GARRETT from an ID perspective  Repeat blood cx thus far negative after 72 hours x2   Sepsis (HCC)  As evidenced by leukocytosis and tachycardia, due to bacteremia and pneumonia  ID following  Plan as above  Acute hypoxic respiratory failure (HCC)  Most likely secondary to pneumonia and severe COPD. Appears was up to 15L in ICU, now down to 5L as of 1/5.   Goal O2  88-90%  As will be going to rehab will not require O2 evaluation at this time  Continue nebulizers  Was on IV steroids, transitioned to PO prednisone to complete course  Repeat CXR formal read pending   Hypertension  BP overall stable  On lopressor 25 mg bid   Moderate protein-calorie malnutrition (HCC)  Malnutrition Findings:   Adult Malnutrition type: Chronic illness  Adult Degree of Malnutrition: Malnutrition of moderate degree  Malnutrition Characteristics: Inadequate energy, Muscle loss                  360 Statement: Chronic/moderate malnutrition r/t inadequate PO intake, as evidenced by intake meeting <75% of estimated needs x > 1 month, and muscle mass depletion (temporal). Treatment: pending ability to advance to PO diet - liberal diet as able and trial oral nutrition supplements pending SLP recommendations    BMI Findings:           Body mass index is 19.31 kg/m².   Encourage oral intake as able  Acute kidney injury superimposed on CKD  (HCC)  Lab Results   Component Value Date    EGFR 45 01/06/2025    EGFR 35 01/05/2025    EGFR 33 01/04/2025    CREATININE 1.05 01/06/2025    CREATININE 1.27 01/05/2025    CREATININE 1.36 (H) 01/04/2025     POA 2.47, likely 2/2 sepsis  Overall improved, baseline is 1.3-1.4 and currently below baseline  Monitor BMP  Emphysema lung (HCC)  Patient was treated for COPD exacerbation with IV Solu-Medrol and has been transitioned to oral prednisone 40 mg daily for 3 days  Monitor O2 sats  Dysphagia  Speech following, currently on modified diet    Pleural effusion, right  Likely parapneumonic.  Follow respiratory status closely    Hyperglycemia  Likely 2/2 steroids, was prediabetic as of 9/2024  A1C 6.4  SSI  Unwitnessed fall  Patient was a Rapid Respone on 1/5  XRs of right knee and pelvis with no acute osseous abnormalities per the results reports  PT/OT    VTE Pharmacologic Prophylaxis:    Eliquis as above    Mobility:   Basic Mobility Inpatient Raw Score: 14  -[facility] Goal: 4:  Move to chair/commode  JH-HLM Achieved: 4: Move to chair/commode  JH-HLM Goal achieved. Continue to encourage appropriate mobility.    Patient Centered Rounds: I performed bedside rounds with nursing staff today.   Discussions with Specialists or Other Care Team Provider:      Education and Discussions with Family / Patient: Updated  (son and daughter in law) at bedside.    Current Length of Stay: 6 day(s)  Current Patient Status: Inpatient   Certification Statement: The patient will continue to require additional inpatient hospital stay due to O2 requirements  Discharge Plan: Anticipate discharge in 48 hrs to rehab facility.    Code Status: Level 3 - DNAR and DNI    Subjective   Ms. Mcgrath reports feeling well, asks for a pillow to be wedged under her bottom. Denies any pain    Objective :  Temp:  [96.9 °F (36.1 °C)-98.5 °F (36.9 °C)] 96.9 °F (36.1 °C)  HR:  [] 79  BP: (106-142)/(47-85) 106/47  Resp:  [16-20] 17  SpO2:  [85 %-98 %] 93 %  O2 Device: Nasal cannula  Nasal Cannula O2 Flow Rate (L/min):  [5 L/min] 5 L/min    Body mass index is 19.31 kg/m².     Input and Output Summary (last 24 hours):     Intake/Output Summary (Last 24 hours) at 1/6/2025 1425  Last data filed at 1/6/2025 0900  Gross per 24 hour   Intake 360 ml   Output --   Net 360 ml       Physical Exam  Vitals reviewed.   Constitutional:       Comments: Patient seen sitting in bed, family at bedside, NAD   Cardiovascular:      Rate and Rhythm: Normal rate and regular rhythm.   Pulmonary:      Effort: Pulmonary effort is normal. No respiratory distress.      Breath sounds: Normal breath sounds.      Comments: 6L  Abdominal:      General: Bowel sounds are normal.      Palpations: Abdomen is soft.      Tenderness: There is no abdominal tenderness. There is no guarding.   Musculoskeletal:      Right lower leg: No edema.      Left lower leg: No edema.   Skin:     General: Skin is warm.   Neurological:      Mental Status:  She is alert and oriented to person, place, and time.   Psychiatric:         Mood and Affect: Mood normal.           Lines/Drains:              Lab Results: I have reviewed the following results:   Results from last 7 days   Lab Units 01/06/25  0514 01/01/25 0448 12/31/24 2024   WBC Thousand/uL 16.54*   < > 20.40*   HEMOGLOBIN g/dL 11.4*   < > 14.4   HEMATOCRIT % 35.1   < > 44.7   PLATELETS Thousands/uL 282   < > 245   BANDS PCT %  --   --  1   LYMPHO PCT %  --   --  2*   MONO PCT %  --   --  6   EOS PCT %  --   --  0    < > = values in this interval not displayed.     Results from last 7 days   Lab Units 01/06/25  0514 01/05/25  0602 01/03/25  0439 01/02/25  0420   SODIUM mmol/L 140 140   < > 149*   POTASSIUM mmol/L 4.0 3.6   < > 3.3*   CHLORIDE mmol/L 106 104   < > 116*   CO2 mmol/L 26 25   < > 21   BUN mg/dL 34* 37*   < > 70*   CREATININE mg/dL 1.05 1.27   < > 1.99*   ANION GAP mmol/L 8 11   < > 12   CALCIUM mg/dL 7.9* 8.2*   < > 8.2*   ALBUMIN g/dL  --  3.1*   < > 2.7*   TOTAL BILIRUBIN mg/dL  --   --   --  0.63   ALK PHOS U/L  --   --   --  52   ALT U/L  --   --   --  30   AST U/L  --   --   --  50*   GLUCOSE RANDOM mg/dL 105 100   < > 197*    < > = values in this interval not displayed.     Results from last 7 days   Lab Units 01/01/25  1829   INR  1.52*     Results from last 7 days   Lab Units 01/06/25  1247 01/06/25  0705 01/05/25  2125 01/05/25  1714 01/05/25  1308   POC GLUCOSE mg/dl 125 145* 137 160* 180*     Results from last 7 days   Lab Units 01/05/25  0602   HEMOGLOBIN A1C % 6.4*     Results from last 7 days   Lab Units 01/01/25  0448 01/01/25  0110 12/31/24 2237 12/31/24 2024   LACTIC ACID mmol/L 2.8* 3.9* 4.7* 8.6*   PROCALCITONIN ng/ml  --   --   --  51.53*       Recent Cultures (last 7 days):   Results from last 7 days   Lab Units 01/02/25  1716 01/01/25  1226 01/01/25  0046 12/31/24 2027 12/31/24 2024   BLOOD CULTURE  No Growth at 72 hrs.  No Growth at 72 hrs.  --   --  Streptococcus  pneumoniae* Streptococcus pneumoniae*   GRAM STAIN RESULT   --   --   --  Gram positive cocci in pairs and chains* Gram positive cocci in pairs and chains*   LEGIONELLA URINARY ANTIGEN   --   --  Negative  --   --    C DIFF TOXIN B BY PCR   --  Negative  --   --   --        Imaging Results Review: I reviewed radiology reports from this admission including: chest xray and xray(s).      Last 24 Hours Medication List:     Current Facility-Administered Medications:     albuterol inhalation solution 2.5 mg, Q6H PRN    amoxicillin (AMOXIL) capsule 500 mg, Q12H KATT    apixaban (ELIQUIS) tablet 2.5 mg, BID    benzonatate (TESSALON PERLES) capsule 100 mg, TID    chlorhexidine (PERIDEX) 0.12 % oral rinse 15 mL, Q12H KATT    guaiFENesin (MUCINEX) 12 hr tablet 600 mg, Q12H KATT    insulin lispro (HumALOG/ADMELOG) 100 units/mL subcutaneous injection 1-5 Units, TID AC **AND** Fingerstick Glucose (POCT), TID AC    insulin lispro (HumALOG/ADMELOG) 100 units/mL subcutaneous injection 1-5 Units, HS    metoprolol tartrate (LOPRESSOR) tablet 25 mg, Q12H KATT    Administrative Statements   Today, Patient Was Seen By: Osman Mccray PA-C      **Please Note: This note may have been constructed using a voice recognition system.**

## 2025-01-06 NOTE — ASSESSMENT & PLAN NOTE
Lab Results   Component Value Date    EGFR 45 01/06/2025    EGFR 35 01/05/2025    EGFR 33 01/04/2025    CREATININE 1.05 01/06/2025    CREATININE 1.27 01/05/2025    CREATININE 1.36 (H) 01/04/2025     POA 2.47, likely 2/2 sepsis  Overall improved, baseline is 1.3-1.4 and currently below baseline  Monitor BMP

## 2025-01-07 LAB
2HR DELTA HS TROPONIN: -2 NG/L
4HR DELTA HS TROPONIN: -1 NG/L
ANION GAP SERPL CALCULATED.3IONS-SCNC: 7 MMOL/L (ref 4–13)
ATRIAL RATE: 71 BPM
ATRIAL RATE: 78 BPM
BACTERIA BLD CULT: NORMAL
BACTERIA BLD CULT: NORMAL
BUN SERPL-MCNC: 29 MG/DL (ref 5–25)
CALCIUM SERPL-MCNC: 7.4 MG/DL (ref 8.4–10.2)
CARDIAC TROPONIN I PNL SERPL HS: 16 NG/L (ref ?–50)
CARDIAC TROPONIN I PNL SERPL HS: 17 NG/L (ref ?–50)
CARDIAC TROPONIN I PNL SERPL HS: 18 NG/L (ref ?–50)
CHLORIDE SERPL-SCNC: 106 MMOL/L (ref 96–108)
CO2 SERPL-SCNC: 27 MMOL/L (ref 21–32)
CREAT SERPL-MCNC: 1.08 MG/DL (ref 0.6–1.3)
ERYTHROCYTE [DISTWIDTH] IN BLOOD BY AUTOMATED COUNT: 13.3 % (ref 11.6–15.1)
GFR SERPL CREATININE-BSD FRML MDRD: 43 ML/MIN/1.73SQ M
GLUCOSE SERPL-MCNC: 100 MG/DL (ref 65–140)
GLUCOSE SERPL-MCNC: 146 MG/DL (ref 65–140)
GLUCOSE SERPL-MCNC: 153 MG/DL (ref 65–140)
GLUCOSE SERPL-MCNC: 169 MG/DL (ref 65–140)
GLUCOSE SERPL-MCNC: 90 MG/DL (ref 65–140)
HCT VFR BLD AUTO: 36.1 % (ref 34.8–46.1)
HGB BLD-MCNC: 12.5 G/DL (ref 11.5–15.4)
MCH RBC QN AUTO: 33.2 PG (ref 26.8–34.3)
MCHC RBC AUTO-ENTMCNC: 34.6 G/DL (ref 31.4–37.4)
MCV RBC AUTO: 96 FL (ref 82–98)
P AXIS: 63 DEGREES
P AXIS: 87 DEGREES
PLATELET # BLD AUTO: 329 THOUSANDS/UL (ref 149–390)
PMV BLD AUTO: 10.2 FL (ref 8.9–12.7)
POTASSIUM SERPL-SCNC: 3.6 MMOL/L (ref 3.5–5.3)
PR INTERVAL: 132 MS
PR INTERVAL: 134 MS
QRS AXIS: 75 DEGREES
QRS AXIS: 77 DEGREES
QRSD INTERVAL: 72 MS
QRSD INTERVAL: 76 MS
QT INTERVAL: 394 MS
QT INTERVAL: 428 MS
QTC INTERVAL: 449 MS
QTC INTERVAL: 466 MS
RBC # BLD AUTO: 3.77 MILLION/UL (ref 3.81–5.12)
SODIUM SERPL-SCNC: 140 MMOL/L (ref 135–147)
T WAVE AXIS: 44 DEGREES
T WAVE AXIS: 60 DEGREES
VENTRICULAR RATE: 71 BPM
VENTRICULAR RATE: 78 BPM
WBC # BLD AUTO: 18.37 THOUSAND/UL (ref 4.31–10.16)

## 2025-01-07 PROCEDURE — 93010 ELECTROCARDIOGRAM REPORT: CPT | Performed by: INTERNAL MEDICINE

## 2025-01-07 PROCEDURE — 85027 COMPLETE CBC AUTOMATED: CPT | Performed by: PHYSICIAN ASSISTANT

## 2025-01-07 PROCEDURE — 76604 US EXAM CHEST: CPT | Performed by: STUDENT IN AN ORGANIZED HEALTH CARE EDUCATION/TRAINING PROGRAM

## 2025-01-07 PROCEDURE — 80048 BASIC METABOLIC PNL TOTAL CA: CPT | Performed by: PHYSICIAN ASSISTANT

## 2025-01-07 PROCEDURE — 99223 1ST HOSP IP/OBS HIGH 75: CPT | Performed by: STUDENT IN AN ORGANIZED HEALTH CARE EDUCATION/TRAINING PROGRAM

## 2025-01-07 PROCEDURE — 84484 ASSAY OF TROPONIN QUANT: CPT | Performed by: PHYSICIAN ASSISTANT

## 2025-01-07 PROCEDURE — 99232 SBSQ HOSP IP/OBS MODERATE 35: CPT | Performed by: INTERNAL MEDICINE

## 2025-01-07 PROCEDURE — 93005 ELECTROCARDIOGRAM TRACING: CPT

## 2025-01-07 PROCEDURE — 99232 SBSQ HOSP IP/OBS MODERATE 35: CPT | Performed by: PHYSICIAN ASSISTANT

## 2025-01-07 PROCEDURE — 82948 REAGENT STRIP/BLOOD GLUCOSE: CPT

## 2025-01-07 RX ORDER — ACETAMINOPHEN 325 MG/1
650 TABLET ORAL EVERY 6 HOURS PRN
Status: DISCONTINUED | OUTPATIENT
Start: 2025-01-07 | End: 2025-01-11

## 2025-01-07 RX ORDER — QUETIAPINE FUMARATE 25 MG/1
12.5 TABLET, FILM COATED ORAL ONCE
Status: DISCONTINUED | OUTPATIENT
Start: 2025-01-07 | End: 2025-01-15 | Stop reason: HOSPADM

## 2025-01-07 RX ORDER — ALBUTEROL SULFATE 90 UG/1
2 INHALANT RESPIRATORY (INHALATION) EVERY 4 HOURS PRN
Status: DISCONTINUED | OUTPATIENT
Start: 2025-01-07 | End: 2025-01-15 | Stop reason: HOSPADM

## 2025-01-07 RX ADMIN — ACETAMINOPHEN 650 MG: 325 TABLET, FILM COATED ORAL at 17:08

## 2025-01-07 RX ADMIN — BENZONATATE 100 MG: 100 CAPSULE ORAL at 08:38

## 2025-01-07 RX ADMIN — METOPROLOL TARTRATE 25 MG: 25 TABLET, FILM COATED ORAL at 21:34

## 2025-01-07 RX ADMIN — BENZONATATE 100 MG: 100 CAPSULE ORAL at 17:08

## 2025-01-07 RX ADMIN — AMOXICILLIN 500 MG: 500 CAPSULE ORAL at 08:38

## 2025-01-07 RX ADMIN — CHLORHEXIDINE GLUCONATE 0.12% ORAL RINSE 15 ML: 1.2 LIQUID ORAL at 21:34

## 2025-01-07 RX ADMIN — GUAIFENESIN 600 MG: 600 TABLET, EXTENDED RELEASE ORAL at 21:34

## 2025-01-07 RX ADMIN — AMOXICILLIN 500 MG: 500 CAPSULE ORAL at 21:40

## 2025-01-07 RX ADMIN — METOPROLOL TARTRATE 25 MG: 25 TABLET, FILM COATED ORAL at 08:38

## 2025-01-07 RX ADMIN — CHLORHEXIDINE GLUCONATE 0.12% ORAL RINSE 15 ML: 1.2 LIQUID ORAL at 08:38

## 2025-01-07 RX ADMIN — INSULIN LISPRO 1 UNITS: 100 INJECTION, SOLUTION INTRAVENOUS; SUBCUTANEOUS at 21:34

## 2025-01-07 RX ADMIN — APIXABAN 2.5 MG: 2.5 TABLET, FILM COATED ORAL at 17:08

## 2025-01-07 RX ADMIN — APIXABAN 2.5 MG: 2.5 TABLET, FILM COATED ORAL at 08:38

## 2025-01-07 RX ADMIN — INSULIN LISPRO 1 UNITS: 100 INJECTION, SOLUTION INTRAVENOUS; SUBCUTANEOUS at 17:53

## 2025-01-07 RX ADMIN — BENZONATATE 100 MG: 100 CAPSULE ORAL at 21:34

## 2025-01-07 RX ADMIN — GUAIFENESIN 600 MG: 600 TABLET, EXTENDED RELEASE ORAL at 08:38

## 2025-01-07 NOTE — ASSESSMENT & PLAN NOTE
Due to pneumonia.  Repeat blood cultures negative.  TTE (technically difficult) showed MV thickening (likely age-related) but no vegetations.  Low clinical suspicion for endocarditis, so GARRETT not indicated.    Continue amoxicillin 500 mg PO Q12 through 1/9 to complete 10 days total antibiotics

## 2025-01-07 NOTE — PROCEDURES
POC Cardiac US    Date/Time: 1/7/2025 4:07 PM    Performed by: Jemal Yoo MD  Authorized by: Jemal Yoo MD    Pulmonary findings:     Left Lung Findings: left pleural effusion present and left lung sliding      Right lung findings: right pleural effusion present and right lung sliding    Interpretation:      Patient had very small sized simple non-loculated fluid bilaterally, unable to undergo thoracentesis given small size and also given it's simple with no radiographic concern for empyema

## 2025-01-07 NOTE — ASSESSMENT & PLAN NOTE
Lab Results   Component Value Date    EGFR 43 01/07/2025    EGFR 45 01/06/2025    EGFR 35 01/05/2025    CREATININE 1.08 01/07/2025    CREATININE 1.05 01/06/2025    CREATININE 1.27 01/05/2025

## 2025-01-07 NOTE — CASE MANAGEMENT
[facility] received request for authorization from Care Manager.  Authorization request submitted for: Acute Rehab  Facility Name: [facility] NPI: [id number]   Facility MD: Saul Casarez NPI: [id number]  Authorization initiated by contacting insurance: [employer]   Via: [employer]   Clinicals submitted via Portal attachment   Pending Reference #: [id number]     Care Manager notified: Julian Blue     Updates to authorization status will be noted in chart. Please reach out to CM for updates on any clinical information.

## 2025-01-07 NOTE — CONSULTS
Consult Note - Pulmonology   Name: Remedios Mcgrath 89 y.o. female I MRN: [id number]  Unit/Bed#: [facility] 603-01 I Date of Admission: 12/31/2024   Date of Service: 1/7/2025 I Hospital Day: 7     Assessment & Plan  Acute hypoxic respiratory failure (HCC)  Most likely due to pneumonia, and poor lung reserve given emphysema and very significant smoking history  Was on 5-6 LPM, but weaned off to 3 L staying in high 90s      Incentive spirometry  Continue weaning oxygen  Antibiotics per ID  Continue physical therapy  No current need for maintenance inhaler or steroids given no COPD exacerbation  Pneumonia  Patient was found to have pneumonia on CT   ID has been following, antibiotics per ID  Patient had new opacity on CXR from 1/6/2024  Pneumococcal bacteremia  Patient had bacteremia likely in the setting of the pneumonia  Emphysema lung (HCC)  Patient has severe emphysema  Not on inhaler at home  Is not wheezing, no current exacerbation  Might need outpatient follow up and PFTs, could have LABA/LAMA if needed, but for now can jose luis PRN albuterol  Pleural effusion, right  Patient has bilateral pleural effusions  Performed US at bedside to evaluate the fluid and seems like very small amount, not able to tap on either side, and seems to be a simple fluid  Continue monitoring, most likely simple parapneumonic    HPI: Patient is a 89-year-old female with past medical history of hypertension, colon cancer, diverticulitis, emphysema, who was found down at home on welfare check.  Was admitted to the ICU because of A-fib with RVR, lactic acidosis and septic shock. Patient is followed by ID for bacteremia and pneumonia. Patient has acute hypoxic respiratory failure, pneumonia, emphysema and right pleural effusion. Treated with Cefepime and Vanc at first, now deescalated to Amoxicillin.     Objective :  Temp:  [94.4 °F (34.7 °C)-98.3 °F (36.8 °C)] 94.4 °F (34.7 °C)  HR:  [67-92] 70  BP: (116-142)/(46-59) 116/47  Resp:  [16-22] 22  SpO2:   [93 %-99 %] 98 %  O2 Device: Nasal cannula  Nasal Cannula O2 Flow Rate (L/min):  [5 L/min-6 L/min] 5 L/min    Physical Exam  Vitals and nursing note reviewed.   Constitutional:       General: She is not in acute distress.     Appearance: She is well-developed.   HENT:      Head: Normocephalic and atraumatic.   Eyes:      Conjunctiva/sclera: Conjunctivae normal.   Cardiovascular:      Rate and Rhythm: Normal rate and regular rhythm.      Heart sounds: No murmur heard.  Pulmonary:      Effort: Pulmonary effort is normal. No respiratory distress.      Breath sounds: Normal breath sounds.   Abdominal:      Palpations: Abdomen is soft.      Tenderness: There is no abdominal tenderness.   Musculoskeletal:         General: No swelling.      Cervical back: Neck supple.      Right lower leg: No edema.      Left lower leg: No edema.   Skin:     General: Skin is warm and dry.      Capillary Refill: Capillary refill takes less than 2 seconds.   Neurological:      Mental Status: She is alert.   Psychiatric:         Mood and Affect: Mood normal.           Lab Results: I have reviewed the following results:   .     01/07/25  0904 01/07/25  0906 01/07/25  1254   WBC 18.37*  --   --    HGB 12.5  --   --    HCT 36.1  --   --      --   --    SODIUM 140  --   --    K 3.6  --   --      --   --    CO2 27  --   --    BUN 29*  --   --    CREATININE 1.08  --   --    GLUC 100  --   --    HSTNI0  --  18  --    HSTNI2  --   --  16     ABG: No new results in last 24 hours.    Imaging Results Review: I personally reviewed the following image studies in PACS and associated radiology reports: chest xray and CT chest.

## 2025-01-07 NOTE — ASSESSMENT & PLAN NOTE
WBC, HR.  Due to bacteremia, pneumonia as below.  No other appreciable source.  Improving with antibiotics.  Persistent WBC likely due to steroids.    Continue antibiotics as below  Monitor closely while on antibiotics for toxicities including diarrhea, rash, cytopenias, or kidney injury  Follow temperatures closely  Recheck WBC in AM to monitor infection  Supportive care as per the primary service

## 2025-01-07 NOTE — ASSESSMENT & PLAN NOTE
Risk factor for aspiration, pneumonia.  Improving per SLP notes.    Modified diet per SLP with close follow-up ongoing

## 2025-01-07 NOTE — ASSESSMENT & PLAN NOTE
Most likely secondary to pneumonia and severe COPD. Appears was up to 15L in ICU, difficulty weaning below 5-6L   Goal O2 88-90%  As will be going to rehab will not require O2 evaluation at this time  Continue nebulizers  Was on IV steroids, transitioned to PO prednisone to complete course  Pulm consulted

## 2025-01-07 NOTE — ASSESSMENT & PLAN NOTE
Patient has bilateral pleural effusions  Performed US at bedside to evaluate the fluid and seems like very small amount, not able to tap on either side, and seems to be a simple fluid  Continue monitoring, most likely simple parapneumonic

## 2025-01-07 NOTE — PROGRESS NOTES
Progress Note - Infectious Disease   Name: Remedios Mcgrath 89 y.o. female I MRN: [id number]  Unit/Bed#: [facility] 603-01 I Date of Admission: 12/31/2024   Date of Service: 1/7/2025 I Hospital Day: 7    Assessment & Plan  Sepsis (HCC)  WBC, HR.  Due to bacteremia, pneumonia as below.  No other appreciable source.  Improving with antibiotics.  Persistent WBC likely due to steroids.    Continue antibiotics as below  Monitor closely while on antibiotics for toxicities including diarrhea, rash, cytopenias, or kidney injury  Follow temperatures closely  Recheck WBC in AM to monitor infection  Supportive care as per the primary service  Pneumococcal bacteremia  Due to pneumonia.  Repeat blood cultures negative.  TTE (technically difficult) showed MV thickening (likely age-related) but no vegetations.  Low clinical suspicion for endocarditis, so GARRETT not indicated.    Continue amoxicillin 500 mg PO Q12 through 1/9 to complete 10 days total antibiotics  Pneumonia  Presumed pneumococcal given positive urinary antigen, bacteremia as above.    Continue antibiotics as above  Follow respiratory status closely  Pleural effusion, right  Likely parapneumonic.  Small on CT.  Not well-visualized on repeat CXR.    Follow respiratory status closely  Acute hypoxic respiratory failure (HCC)  Due to pneumonia in setting of severe emphysema.  Acute kidney injury superimposed on CKD  (HCC)  Baseline Cr 1.3-1.4.  Improved.  Now below baseline.    Follow creatinine closely and dose-adjust antibiotics as indicated  Recheck BMP in AM  Dysphagia  Risk factor for aspiration, pneumonia.  Improving per SLP notes.    Modified diet per SLP with close follow-up ongoing  Emphysema lung (HCC)  Unknown severity.  Risk factor for pneumonia.  Consideration for exacerbation.    Steroid taper per pulmonology    Ok for discharge from Infectious Disease service perspective.    Antibiotics:  Amoxicillin #4  Antibiotics #8    Subjective   Patient sleeping.  No events  noted.  Remains on 5L O2.  No noted fevers or diarrhea.      Objective :  Temp:  [97.2 °F (36.2 °C)-98.3 °F (36.8 °C)] 98.3 °F (36.8 °C)  HR:  [67-92] 68  BP: (119-142)/(46-59) 119/46  Resp:  [16-20] 20  SpO2:  [93 %-99 %] 99 %  O2 Device: Nasal cannula  Nasal Cannula O2 Flow Rate (L/min):  [5 L/min-6 L/min] 5 L/min    General:  No acute distress  Psychiatric:  Sleeping  Pulmonary:  Normal respiratory excursion without accessory muscle use  Abdomen:  Soft, nontender  Extremities:  No edema  Skin:  No rashes      Lab Results: I have reviewed the following results:  Results from last 7 days   Lab Units 01/07/25  0904 01/06/25  0514 01/05/25  0902   WBC Thousand/uL 18.37* 16.54* 18.66*   HEMOGLOBIN g/dL 12.5 11.4* 12.5   PLATELETS Thousands/uL 329 282 312     Results from last 7 days   Lab Units 01/07/25  0904 01/06/25  0514 01/05/25  0602 01/03/25  0439 01/02/25  0420 01/01/25 2127 01/01/25  0448 12/31/24 2024   SODIUM mmol/L 140 140 140   < > 149*   < > 150* 154*   POTASSIUM mmol/L 3.6 4.0 3.6   < > 3.3*   < > 3.5 3.5   CHLORIDE mmol/L 106 106 104   < > 116*   < > 115* 115*   CO2 mmol/L 27 26 25   < > 21   < > 19* 14*   BUN mg/dL 29* 34* 37*   < > 70*   < > 71* 82*   CREATININE mg/dL 1.08 1.05 1.27   < > 1.99*   < > 2.47* 3.18*   EGFR ml/min/1.73sq m 43 45 35   < > 20   < > 16 11   CALCIUM mg/dL 7.4* 7.9* 8.2*   < > 8.2*   < > 7.7* 9.3   AST U/L  --   --   --   --  50*  --  83* 109*   ALT U/L  --   --   --   --  30  --  36 37   ALK PHOS U/L  --   --   --   --  52  --  52 71   ALBUMIN g/dL  --   --  3.1*   < > 2.7*  --  2.6* 3.7    < > = values in this interval not displayed.     Results from last 7 days   Lab Units 01/02/25  1716 01/01/25  1525 01/01/25  1226 01/01/25  0046 12/31/24 2027 12/31/24 2024   BLOOD CULTURE  No Growth After 4 Days.  No Growth After 4 Days.  --   --   --  Streptococcus pneumoniae* Streptococcus pneumoniae*   GRAM STAIN RESULT   --   --   --   --  Gram positive cocci in pairs and chains*  Gram positive cocci in pairs and chains*   MRSA CULTURE ONLY   --  No Methicillin Resistant Staphlyococcus aureus (MRSA) isolated  --   --   --   --    LEGIONELLA URINARY ANTIGEN   --   --   --  Negative  --   --    C DIFF TOXIN B BY PCR   --   --  Negative  --   --   --      Results from last 7 days   Lab Units 12/31/24 2024   PROCALCITONIN ng/ml 51.53*

## 2025-01-07 NOTE — PROGRESS NOTES
"Progress Note - Hospitalist   Name: Remedios Mcgrath 89 y.o. female I MRN: [id number]  Unit/Bed#: [facility] 603-01 I Date of Admission: 12/31/2024   Date of Service: 1/7/2025 I Hospital Day: 7    Assessment & Plan  Pneumonia  Presented to ER after being found down on welfare check. Was noted to be hypoxic in ER (82% on RA) requiring BIPAP and then developed a-fib with RVR.  Was in ICU, transferred out on 1/2.   CT CAP: Acute right-sided pneumonia, greatest at the right lower lobe. Associated loculated parapneumonic effusion. No obvious pleural thickening on noncontrast study, though empyema has to be considered.   Was on IV CTX, transitioned to Amoxicillin BID on 1/4 thru 1/9 to complete 10 days total of abx per ID recommendations  Wean o2 as able, continue nebulizers, supportive care  Updated CXR 1/5 showed, \"New left lower lobe consolidation with additional worsening of right base infiltrates.\"  DC planning to rehab once medically stable     New onset atrial fibrillation with RVR (HCC)  Noted while in the ER, cardiology was consulted, spontaneously converted to sinus rhythm currently on Lopressor 25 mg twice daily and Eliquis 2.5 mg for anticoagulation  As she will be discharged to facility, will defer Eliquis price check at this time  Cardiology signed off as of 1/4  Pneumococcal bacteremia  Due to pneumonia.  Repeat blood cultures negative.  TTE (technically difficult) showed MV thickening (likely age-related) but no vegetations.  Low clinical suspicion for endocarditis.  Status post IV ceftriaxone, transition to amoxicillin 500 mg PO Q12 with plan to continue through 1/9 to complete 10 days total antibiotics  No indication for GARRETT from an ID perspective  Repeat blood cx thus far negative after 4 days x2  Sepsis (HCC)  As evidenced by leukocytosis and tachycardia, due to bacteremia and pneumonia  ID following  Plan as above  Acute hypoxic respiratory failure (HCC)  Most likely secondary to pneumonia and severe COPD. " Appears was up to 15L in ICU, difficulty weaning below 5-6L   Goal O2 88-90%  As will be going to rehab will not require O2 evaluation at this time  Continue nebulizers  Was on IV steroids, transitioned to PO prednisone to complete course  Pulm consulted  Hypertension  BP overall stable  On lopressor 25 mg bid   Moderate protein-calorie malnutrition (HCC)  Malnutrition Findings:   Adult Malnutrition type: Chronic illness  Adult Degree of Malnutrition: Malnutrition of moderate degree  Malnutrition Characteristics: Inadequate energy, Muscle loss                  360 Statement: Chronic/moderate malnutrition r/t inadequate PO intake, as evidenced by intake meeting <75% of estimated needs x > 1 month, and muscle mass depletion (temporal). Treatment: pending ability to advance to PO diet - liberal diet as able and trial oral nutrition supplements pending SLP recommendations    BMI Findings:           Body mass index is 19.42 kg/m².   Encourage oral intake as able  Acute kidney injury superimposed on CKD  (HCC)  Lab Results   Component Value Date    EGFR 43 01/07/2025    EGFR 45 01/06/2025    EGFR 35 01/05/2025    CREATININE 1.08 01/07/2025    CREATININE 1.05 01/06/2025    CREATININE 1.27 01/05/2025     POA 2.47, likely 2/2 sepsis  Overall improved, baseline is 1.3-1.4 and currently below baseline  Monitor BMP  Emphysema lung (HCC)  Patient was treated for COPD exacerbation with IV Solu-Medrol and was transitioned to oral prednisone 40 mg daily for 3 days  Monitor O2 sats  Pulm consulted as above  Dysphagia  Speech following, currently on modified diet    Pleural effusion, right  Likely parapneumonic.  Follow respiratory status closely  Pulm consulted    Hyperglycemia  Likely 2/2 steroids, was prediabetic as of 9/2024  A1C 6.4  SSI  Unwitnessed fall  Patient was a Rapid Respone on 1/5  XRs of right knee and pelvis with no acute osseous abnormalities per the results reports  PT/OT      VTE Pharmacologic Prophylaxis:     Eliquis    Mobility:   Basic Mobility Inpatient Raw Score: 14  JH-HLM Goal: 4: Move to chair/commode  JH-HLM Achieved: 7: Walk 25 feet or more  JH-HLM Goal achieved. Continue to encourage appropriate mobility.    Patient Centered Rounds: I performed bedside rounds with nursing staff today.   Discussions with Specialists or Other Care Team Provider: Pulm fellow     Education and Discussions with Family / Patient: Updated  (son) via phone. [email or web address]    Current Length of Stay: 7 day(s)  Current Patient Status: Inpatient   Certification Statement: The patient will continue to require additional inpatient hospital stay due to O2 requirements  Discharge Plan:  pending Pulm consult and improvement in O2 sats, to rehab    Code Status: Level 3 - DNAR and DNI    Subjective   Ms. Mcgrath reported back pain this AM. HS trops 18, 16. She refused tylenol     Objective :  Temp:  [97.2 °F (36.2 °C)-98.3 °F (36.8 °C)] 98.3 °F (36.8 °C)  HR:  [67-92] 68  BP: (119-142)/(46-59) 119/46  Resp:  [16-20] 20  SpO2:  [93 %-99 %] 99 %  O2 Device: Nasal cannula  Nasal Cannula O2 Flow Rate (L/min):  [5 L/min-6 L/min] 5 L/min    Body mass index is 19.42 kg/m².     Input and Output Summary (last 24 hours):     Intake/Output Summary (Last 24 hours) at 1/7/2025 1356  Last data filed at 1/7/2025 0543  Gross per 24 hour   Intake 318 ml   Output --   Net 318 ml       Physical Exam  Vitals reviewed.   Constitutional:       Comments: Patient seen lying in bed with nurse present   Cardiovascular:      Rate and Rhythm: Normal rate and regular rhythm.   Pulmonary:      Effort: Pulmonary effort is normal.      Comments: 6 L via nasal cannula  Abdominal:      General: Bowel sounds are normal.      Palpations: Abdomen is soft.      Tenderness: There is no abdominal tenderness.   Musculoskeletal:      Right lower leg: No edema.      Left lower leg: No edema.   Skin:     General: Skin is warm.   Neurological:      Mental Status: She is alert and  oriented to person, place, and time.   Psychiatric:         Mood and Affect: Mood normal.           Lines/Drains:              Lab Results: I have reviewed the following results:   Results from last 7 days   Lab Units 01/07/25  0904 01/01/25  0448 12/31/24  2024   WBC Thousand/uL 18.37*   < > 20.40*   HEMOGLOBIN g/dL 12.5   < > 14.4   HEMATOCRIT % 36.1   < > 44.7   PLATELETS Thousands/uL 329   < > 245   BANDS PCT %  --   --  1   LYMPHO PCT %  --   --  2*   MONO PCT %  --   --  6   EOS PCT %  --   --  0    < > = values in this interval not displayed.     Results from last 7 days   Lab Units 01/07/25  0904 01/06/25  0514 01/05/25  0602 01/03/25  0439 01/02/25  0420   SODIUM mmol/L 140   < > 140   < > 149*   POTASSIUM mmol/L 3.6   < > 3.6   < > 3.3*   CHLORIDE mmol/L 106   < > 104   < > 116*   CO2 mmol/L 27   < > 25   < > 21   BUN mg/dL 29*   < > 37*   < > 70*   CREATININE mg/dL 1.08   < > 1.27   < > 1.99*   ANION GAP mmol/L 7   < > 11   < > 12   CALCIUM mg/dL 7.4*   < > 8.2*   < > 8.2*   ALBUMIN g/dL  --   --  3.1*   < > 2.7*   TOTAL BILIRUBIN mg/dL  --   --   --   --  0.63   ALK PHOS U/L  --   --   --   --  52   ALT U/L  --   --   --   --  30   AST U/L  --   --   --   --  50*   GLUCOSE RANDOM mg/dL 100   < > 100   < > 197*    < > = values in this interval not displayed.     Results from last 7 days   Lab Units 01/01/25  1829   INR  1.52*     Results from last 7 days   Lab Units 01/07/25  1226 01/07/25  0826 01/06/25  2239 01/06/25  1700 01/06/25  1247 01/06/25  0705 01/05/25  2125 01/05/25  1714 01/05/25  1308   POC GLUCOSE mg/dl 146* 90 141* 141* 125 145* 137 160* 180*     Results from last 7 days   Lab Units 01/05/25  0602   HEMOGLOBIN A1C % 6.4*     Results from last 7 days   Lab Units 01/01/25  0448 01/01/25  0110 12/31/24 2237 12/31/24 2024   LACTIC ACID mmol/L 2.8* 3.9* 4.7* 8.6*   PROCALCITONIN ng/ml  --   --   --  51.53*       Recent Cultures (last 7 days):   Results from last 7 days   Lab Units  01/02/25  1716 01/01/25  1226 01/01/25  0046 12/31/24 2027 12/31/24 2024   BLOOD CULTURE  No Growth After 4 Days.  No Growth After 4 Days.  --   --  Streptococcus pneumoniae* Streptococcus pneumoniae*   GRAM STAIN RESULT   --   --   --  Gram positive cocci in pairs and chains* Gram positive cocci in pairs and chains*   LEGIONELLA URINARY ANTIGEN   --   --  Negative  --   --    C DIFF TOXIN B BY PCR   --  Negative  --   --   --        Imaging Results Review: I reviewed radiology reports from this admission including: chest xray.      Last 24 Hours Medication List:     Current Facility-Administered Medications:     acetaminophen (TYLENOL) tablet 650 mg, Q6H PRN    amoxicillin (AMOXIL) capsule 500 mg, Q12H KATT    apixaban (ELIQUIS) tablet 2.5 mg, BID    benzonatate (TESSALON PERLES) capsule 100 mg, TID    chlorhexidine (PERIDEX) 0.12 % oral rinse 15 mL, Q12H KATT    guaiFENesin (MUCINEX) 12 hr tablet 600 mg, Q12H KATT    insulin lispro (HumALOG/ADMELOG) 100 units/mL subcutaneous injection 1-5 Units, TID AC **AND** Fingerstick Glucose (POCT), TID AC    insulin lispro (HumALOG/ADMELOG) 100 units/mL subcutaneous injection 1-5 Units, HS    metoprolol tartrate (LOPRESSOR) tablet 25 mg, Q12H KATT    Administrative Statements   Today, Patient Was Seen By: Osman Mccray PA-C      **Please Note: This note may have been constructed using a voice recognition system.**

## 2025-01-07 NOTE — PLAN OF CARE
Problem: Prexisting or High Potential for Compromised Skin Integrity  Goal: Skin integrity is maintained or improved  Description: INTERVENTIONS:  - Identify patients at risk for skin breakdown  - Assess and monitor skin integrity  - Assess and monitor nutrition and hydration status  - Monitor labs   - Assess for incontinence   - Turn and reposition patient  - Assist with mobility/ambulation  - Relieve pressure over bony prominences  - Avoid friction and shearing  - Provide appropriate hygiene as needed including keeping skin clean and dry  - Evaluate need for skin moisturizer/barrier cream  - Collaborate with interdisciplinary team   - Patient/family teaching  - Consider wound care consult   Outcome: Progressing     Problem: RESPIRATORY - ADULT  Goal: Achieves optimal ventilation and oxygenation  Description: INTERVENTIONS:  - Assess for changes in respiratory status  - Assess for changes in mentation and behavior  - Position to facilitate oxygenation and minimize respiratory effort  - Oxygen administered by appropriate delivery if ordered  - Initiate smoking cessation education as indicated  - Encourage broncho-pulmonary hygiene including cough, deep breathe, Incentive Spirometry  - Assess the need for suctioning and aspirate as needed  - Assess and instruct to report SOB or any respiratory difficulty  - Respiratory Therapy support as indicated  Outcome: Progressing     Problem: Nutrition/Hydration-ADULT  Goal: Nutrient/Hydration intake appropriate for improving, restoring or maintaining nutritional needs  Description: Monitor and assess patient's nutrition/hydration status for malnutrition. Collaborate with interdisciplinary team and initiate plan and interventions as ordered.  Monitor patient's weight and dietary intake as ordered or per policy. Utilize nutrition screening tool and intervene as necessary. Determine patient's food preferences and provide high-protein, high-caloric foods as appropriate.      INTERVENTIONS:  - Monitor oral intake, urinary output, labs, and treatment plans  - Assess nutrition and hydration status and recommend course of action  - Evaluate amount of meals eaten  - Assist patient with eating if necessary   - Allow adequate time for meals  - Recommend/ encourage appropriate diets, oral nutritional supplements, and vitamin/mineral supplements  - Order, calculate, and assess calorie counts as needed  - Recommend, monitor, and adjust tube feedings and TPN/PPN based on assessed needs  - Assess need for intravenous fluids  - Provide specific nutrition/hydration education as appropriate  - Include patient/family/caregiver in decisions related to nutrition  Outcome: Progressing     Problem: PAIN - ADULT  Goal: Verbalizes/displays adequate comfort level or baseline comfort level  Description: Interventions:  - Encourage patient to monitor pain and request assistance  - Assess pain using appropriate pain scale  - Administer analgesics based on type and severity of pain and evaluate response  - Implement non-pharmacological measures as appropriate and evaluate response  - Consider cultural and social influences on pain and pain management  - Notify physician/advanced practitioner if interventions unsuccessful or patient reports new pain  Outcome: Progressing     Problem: INFECTION - ADULT  Goal: Absence or prevention of progression during hospitalization  Description: INTERVENTIONS:  - Assess and monitor for signs and symptoms of infection  - Monitor lab/diagnostic results  - Monitor all insertion sites, i.e. indwelling lines, tubes, and drains  - Monitor endotracheal if appropriate and nasal secretions for changes in amount and color  - [place] appropriate cooling/warming therapies per order  - Administer medications as ordered  - Instruct and encourage patient and family to use good hand hygiene technique  - Identify and instruct in appropriate isolation precautions for identified  infection/condition  Outcome: Progressing     Problem: SAFETY ADULT  Goal: Patient will remain free of falls  Description: INTERVENTIONS:  - Educate patient/family on patient safety including physical limitations  - Instruct patient to call for assistance with activity   - Consult OT/PT to assist with strengthening/mobility   - Keep Call bell within reach  - Keep bed low and locked with side rails adjusted as appropriate  - Keep care items and personal belongings within reach  - Initiate and maintain comfort rounds  - Make Fall Risk Sign visible to staff  - Apply yellow socks and bracelet for high fall risk patients  - Consider moving patient to room near nurses station  Outcome: Progressing  Goal: Maintain or return to baseline ADL function  Description: INTERVENTIONS:  -  Assess patient's ability to carry out ADLs; assess patient's baseline for ADL function and identify physical deficits which impact ability to perform ADLs (bathing, care of mouth/teeth, toileting, grooming, dressing, etc.)  - Assess/evaluate cause of self-care deficits   - Assess range of motion  - Assess patient's mobility; develop plan if impaired  - Assess patient's need for assistive devices and provide as appropriate  - Encourage maximum independence but intervene and supervise when necessary  - Involve family in performance of ADLs  - Assess for home care needs following discharge   - Consider OT consult to assist with ADL evaluation and planning for discharge  - Provide patient education as appropriate  Outcome: Progressing  Goal: Maintains/Returns to pre admission functional level  Description: INTERVENTIONS:  - Perform AM-PAC 6 Click Basic Mobility/ Daily Activity assessment daily.  - Set and communicate daily mobility goal to care team and patient/family/caregiver.   - Collaborate with rehabilitation services on mobility goals if consulted  - Out of bed for toileting  - Record patient progress and toleration of activity level   Outcome:  Progressing     Problem: DISCHARGE PLANNING  Goal: Discharge to home or other facility with appropriate resources  Description: INTERVENTIONS:  - Identify barriers to discharge w/patient and caregiver  - Arrange for needed discharge resources and transportation as appropriate  - Identify discharge learning needs (meds, wound care, etc.)  - Arrange for interpretive services to assist at discharge as needed  - Refer to Case Management Department for coordinating discharge planning if the patient needs post-hospital services based on physician/advanced practitioner order or complex needs related to functional status, cognitive ability, or social support system  Outcome: Progressing     Problem: Knowledge Deficit  Goal: Patient/family/caregiver demonstrates understanding of disease process, treatment plan, medications, and discharge instructions  Description: Complete learning assessment and assess knowledge base.  Interventions:  - Provide teaching at level of understanding  - Provide teaching via preferred learning methods  Outcome: Progressing

## 2025-01-07 NOTE — PROGRESS NOTES
Father Juan gave a blessing and prayer.    01/07/25 1500   Clinical Encounter Type   Visited With Patient   Church Encounters   Church Needs Prayer

## 2025-01-07 NOTE — ASSESSMENT & PLAN NOTE
Patient was treated for COPD exacerbation with IV Solu-Medrol and was transitioned to oral prednisone 40 mg daily for 3 days  Monitor O2 sats  Pulm consulted as above

## 2025-01-07 NOTE — ASSESSMENT & PLAN NOTE
Malnutrition Findings:   Adult Malnutrition type: Chronic illness  Adult Degree of Malnutrition: Malnutrition of moderate degree  Malnutrition Characteristics: Inadequate energy, Muscle loss                  360 Statement: Chronic/moderate malnutrition r/t inadequate PO intake, as evidenced by intake meeting <75% of estimated needs x > 1 month, and muscle mass depletion (temporal). Treatment: pending ability to advance to PO diet - liberal diet as able and trial oral nutrition supplements pending SLP recommendations    BMI Findings:           Body mass index is 19.42 kg/m².   Encourage oral intake as able

## 2025-01-07 NOTE — ASSESSMENT & PLAN NOTE
"Presented to ER after being found down on welfare check. Was noted to be hypoxic in ER (82% on RA) requiring BIPAP and then developed a-fib with RVR.  Was in ICU, transferred out on 1/2.   CT CAP: Acute right-sided pneumonia, greatest at the right lower lobe. Associated loculated parapneumonic effusion. No obvious pleural thickening on noncontrast study, though empyema has to be considered.   Was on IV CTX, transitioned to Amoxicillin BID on 1/4 thru 1/9 to complete 10 days total of abx per ID recommendations  Wean o2 as able, continue nebulizers, supportive care  Updated CXR 1/5 showed, \"New left lower lobe consolidation with additional worsening of right base infiltrates.\"  DC planning to rehab once medically stable     "

## 2025-01-07 NOTE — ASSESSMENT & PLAN NOTE
Most likely due to pneumonia, and poor lung reserve given emphysema and very significant smoking history  Was on 5-6 LPM, but weaned off to 3 L staying in high 90s      Incentive spirometry  Continue weaning oxygen  Antibiotics per ID  Continue physical therapy  No current need for maintenance inhaler or steroids given no COPD exacerbation

## 2025-01-07 NOTE — CASE MANAGEMENT
Case Management Discharge Planning Note    Patient name Remedios Mcgrath  Location [facility]01 MRN [id number]  : 1929 Date 2025       Current Admission Date: 2024  Current Admission Diagnosis:Pneumonia   Patient Active Problem List    Diagnosis Date Noted Date Diagnosed    Unwitnessed fall 2025     Hyperglycemia 2025     New onset atrial fibrillation with RVR ([facility]) 2025     Sepsis ([facility]) 2025     Pneumococcal bacteremia 2025     Acute kidney injury superimposed on CKD  ([facility]) 2025     Acute hypoxic respiratory failure ([facility]) 2025     Emphysema lung ([facility]) 2025     Dysphagia 2025     Pleural effusion, right 2025     Pneumonia 2025     Moderate protein-calorie malnutrition ([facility]) 2025     Mild protein-calorie malnutrition ([facility]) 10/04/2024     Venous stasis dermatitis of both lower extremities 2024     Stage 3b chronic kidney disease ([facility]) 03/15/2022     Bruises easily 2020     Impaired fasting glucose 2012     Stenosis of left carotid artery 2012     Peripheral vascular disease ([facility]) 2012     Hyperlipidemia 2012     Hypertension 2012     Insomnia 2012       LOS (days): 7  Geometric Mean LOS (GMLOS) (days): 4.9  Days to GMLOS:-1.8     OBJECTIVE:  Risk of Unplanned Readmission Score: 16   Current admission status: Inpatient   Preferred Pharmacy:   [facility]  [place], [place] - [address]  [address]  [place] [address]  Phone: [phone] Fax: [phone]    Primary Care Provider: Dion Tsang MD    Primary Insurance: [employer] RE[employer]  Secondary Insurance:     DISCHARGE DETAILS:    Discharge planning discussed with:: Robert (son) and Jonah ( daughter in law) via   Freedom of Choice: Yes  Comments - Freedom of Choice: Discussed FOC  CM contacted family/caregiver?: Yes  Were Treatment Team discharge recommendations reviewed with patient/caregiver?: Yes  Did  patient/caregiver verbalize understanding of patient care needs?: N/A- going to facility  Were patient/caregiver advised of the risks associated with not following Treatment Team discharge recommendations?: Yes    Other Referral/Resources/Interventions Provided:  Referral Comments: [facility] able to accept and reserved in aidin. CM discharge support to submit for auth.Family in agreement with SDp    Treatment Team Recommendation: Short Term Rehab  Discharge Destination Plan:: Short Term Rehab

## 2025-01-07 NOTE — ASSESSMENT & PLAN NOTE
Malnutrition Findings:   Adult Malnutrition type: Chronic illness  Adult Degree of Malnutrition: Malnutrition of moderate degree  Malnutrition Characteristics: Inadequate energy, Muscle loss                  360 Statement: Chronic/moderate malnutrition r/t inadequate PO intake, as evidenced by intake meeting <75% of estimated needs x > 1 month, and muscle mass depletion (temporal). Treatment: pending ability to advance to PO diet - liberal diet as able and trial oral nutrition supplements pending SLP recommendations    BMI Findings:           Body mass index is 19.42 kg/m².

## 2025-01-07 NOTE — ASSESSMENT & PLAN NOTE
Due to pneumonia.  Repeat blood cultures negative.  TTE (technically difficult) showed MV thickening (likely age-related) but no vegetations.  Low clinical suspicion for endocarditis.  Status post IV ceftriaxone, transition to amoxicillin 500 mg PO Q12 with plan to continue through 1/9 to complete 10 days total antibiotics  No indication for GARRETT from an ID perspective  Repeat blood cx thus far negative after 4 days x2

## 2025-01-07 NOTE — ASSESSMENT & PLAN NOTE
Lab Results   Component Value Date    EGFR 43 01/07/2025    EGFR 45 01/06/2025    EGFR 35 01/05/2025    CREATININE 1.08 01/07/2025    CREATININE 1.05 01/06/2025    CREATININE 1.27 01/05/2025     POA 2.47, likely 2/2 sepsis  Overall improved, baseline is 1.3-1.4 and currently below baseline  Monitor BMP

## 2025-01-07 NOTE — ASSESSMENT & PLAN NOTE
Patient was found to have pneumonia on CT   ID has been following, antibiotics per ID  Patient had new opacity on CXR from 1/6/2024

## 2025-01-07 NOTE — ASSESSMENT & PLAN NOTE
Patient has severe emphysema  Not on inhaler at home  Is not wheezing, no current exacerbation  Might need outpatient follow up and PFTs, could have LABA/LAMA if needed, but for now can jose luis PRN albuterol

## 2025-01-08 LAB
ANION GAP SERPL CALCULATED.3IONS-SCNC: 10 MMOL/L (ref 4–13)
BUN SERPL-MCNC: 28 MG/DL (ref 5–25)
CALCIUM SERPL-MCNC: 7.1 MG/DL (ref 8.4–10.2)
CHLORIDE SERPL-SCNC: 106 MMOL/L (ref 96–108)
CO2 SERPL-SCNC: 24 MMOL/L (ref 21–32)
CREAT SERPL-MCNC: 0.97 MG/DL (ref 0.6–1.3)
ERYTHROCYTE [DISTWIDTH] IN BLOOD BY AUTOMATED COUNT: 13.2 % (ref 11.6–15.1)
GFR SERPL CREATININE-BSD FRML MDRD: 49 ML/MIN/1.73SQ M
GLUCOSE SERPL-MCNC: 139 MG/DL (ref 65–140)
GLUCOSE SERPL-MCNC: 219 MG/DL (ref 65–140)
GLUCOSE SERPL-MCNC: 85 MG/DL (ref 65–140)
GLUCOSE SERPL-MCNC: 87 MG/DL (ref 65–140)
GLUCOSE SERPL-MCNC: 91 MG/DL (ref 65–140)
HCT VFR BLD AUTO: 34.7 % (ref 34.8–46.1)
HGB BLD-MCNC: 11.4 G/DL (ref 11.5–15.4)
MCH RBC QN AUTO: 31.4 PG (ref 26.8–34.3)
MCHC RBC AUTO-ENTMCNC: 32.9 G/DL (ref 31.4–37.4)
MCV RBC AUTO: 96 FL (ref 82–98)
PLATELET # BLD AUTO: 298 THOUSANDS/UL (ref 149–390)
PMV BLD AUTO: 10.2 FL (ref 8.9–12.7)
POTASSIUM SERPL-SCNC: 3.7 MMOL/L (ref 3.5–5.3)
RBC # BLD AUTO: 3.63 MILLION/UL (ref 3.81–5.12)
SODIUM SERPL-SCNC: 140 MMOL/L (ref 135–147)
WBC # BLD AUTO: 16.37 THOUSAND/UL (ref 4.31–10.16)

## 2025-01-08 PROCEDURE — 99232 SBSQ HOSP IP/OBS MODERATE 35: CPT | Performed by: INTERNAL MEDICINE

## 2025-01-08 PROCEDURE — 99232 SBSQ HOSP IP/OBS MODERATE 35: CPT | Performed by: STUDENT IN AN ORGANIZED HEALTH CARE EDUCATION/TRAINING PROGRAM

## 2025-01-08 PROCEDURE — 82948 REAGENT STRIP/BLOOD GLUCOSE: CPT

## 2025-01-08 PROCEDURE — 80048 BASIC METABOLIC PNL TOTAL CA: CPT | Performed by: PHYSICIAN ASSISTANT

## 2025-01-08 PROCEDURE — 85027 COMPLETE CBC AUTOMATED: CPT | Performed by: PHYSICIAN ASSISTANT

## 2025-01-08 PROCEDURE — 99232 SBSQ HOSP IP/OBS MODERATE 35: CPT | Performed by: PHYSICIAN ASSISTANT

## 2025-01-08 RX ORDER — CLOTRIMAZOLE 1 %
1 CREAM WITH APPLICATOR VAGINAL
Status: COMPLETED | OUTPATIENT
Start: 2025-01-08 | End: 2025-01-08

## 2025-01-08 RX ORDER — SIMETHICONE 80 MG
80 TABLET,CHEWABLE ORAL EVERY 6 HOURS PRN
Status: DISCONTINUED | OUTPATIENT
Start: 2025-01-08 | End: 2025-01-11

## 2025-01-08 RX ORDER — CLOTRIMAZOLE 1 %
CREAM (GRAM) TOPICAL 2 TIMES DAILY
Status: COMPLETED | OUTPATIENT
Start: 2025-01-08 | End: 2025-01-12

## 2025-01-08 RX ADMIN — BENZONATATE 100 MG: 100 CAPSULE ORAL at 18:06

## 2025-01-08 RX ADMIN — AMOXICILLIN 500 MG: 500 CAPSULE ORAL at 22:11

## 2025-01-08 RX ADMIN — AMOXICILLIN 500 MG: 500 CAPSULE ORAL at 09:50

## 2025-01-08 RX ADMIN — CLOTRIMAZOLE: 1 CREAM TOPICAL at 18:06

## 2025-01-08 RX ADMIN — BENZONATATE 100 MG: 100 CAPSULE ORAL at 09:50

## 2025-01-08 RX ADMIN — GUAIFENESIN 600 MG: 600 TABLET, EXTENDED RELEASE ORAL at 09:50

## 2025-01-08 RX ADMIN — METOPROLOL TARTRATE 25 MG: 25 TABLET, FILM COATED ORAL at 09:50

## 2025-01-08 RX ADMIN — APIXABAN 2.5 MG: 2.5 TABLET, FILM COATED ORAL at 09:50

## 2025-01-08 RX ADMIN — CHLORHEXIDINE GLUCONATE 0.12% ORAL RINSE 15 ML: 1.2 LIQUID ORAL at 09:50

## 2025-01-08 RX ADMIN — BENZONATATE 100 MG: 100 CAPSULE ORAL at 22:11

## 2025-01-08 RX ADMIN — METOPROLOL TARTRATE 25 MG: 25 TABLET, FILM COATED ORAL at 22:11

## 2025-01-08 RX ADMIN — CHLORHEXIDINE GLUCONATE 0.12% ORAL RINSE 15 ML: 1.2 LIQUID ORAL at 22:14

## 2025-01-08 RX ADMIN — GUAIFENESIN 600 MG: 600 TABLET, EXTENDED RELEASE ORAL at 22:11

## 2025-01-08 RX ADMIN — INSULIN LISPRO 1 UNITS: 100 INJECTION, SOLUTION INTRAVENOUS; SUBCUTANEOUS at 12:50

## 2025-01-08 RX ADMIN — APIXABAN 2.5 MG: 2.5 TABLET, FILM COATED ORAL at 18:06

## 2025-01-08 RX ADMIN — CLOTRIMAZOLE 1 APPLICATOR: 10 CREAM VAGINAL at 22:11

## 2025-01-08 RX ADMIN — CLOTRIMAZOLE: 1 CREAM TOPICAL at 09:51

## 2025-01-08 NOTE — ASSESSMENT & PLAN NOTE
Most likely secondary to pneumonia and severe COPD. Appears was up to 15L in ICU  Goal O2 88-90%  As will be going to rehab will not require O2 evaluation at this time  Continue nebulizers  Was on IV steroids, transitioned to PO prednisone to complete course  Pulm consult appreciated  O2 requirements have improved

## 2025-01-08 NOTE — CASE MANAGEMENT
Per [facility], Acute Rehab auth still pending.     Escalation email sent to [facility] Escalation Team requesting expedite of determination.     CM notified: Julian Blue

## 2025-01-08 NOTE — ASSESSMENT & PLAN NOTE
Patient has severe emphysema  Not on inhaler at home  Is not wheezing, no current exacerbation  Might need outpatient follow up and PFTs, could have LABA/LAMA if needed, but for now can use PRN albuterol

## 2025-01-08 NOTE — ASSESSMENT & PLAN NOTE
Likely parapneumonic.  Small on CT.  Not well-visualized on repeat CXR.  Not significant enough to tap per Pulmonary.

## 2025-01-08 NOTE — ASSESSMENT & PLAN NOTE
Most likely due to pneumonia, and poor lung reserve given emphysema and very significant smoking history  Was on 5-6 LPM, but weaned off to 3 L staying in high 90s, will be able to wean more      Incentive spirometry  Continue weaning oxygen  Antibiotics per ID  Continue physical therapy  No current need for maintenance inhaler or steroids given no COPD exacerbation

## 2025-01-08 NOTE — OCCUPATIONAL THERAPY NOTE
Occupational Therapy Treatment Note:      01/08/25 1500   OT Last Visit   OT Visit Date 01/08/25   Note Type   Note Type Cancelled Session   Cancel Reasons   (pt with loose bowels, not appropriate for oob to chair per nsg)     April A Storm

## 2025-01-08 NOTE — PROGRESS NOTES
"Progress Note - Hospitalist   Name: Remedios Mcgrath 89 y.o. female I MRN: [id number]  Unit/Bed#: [facility] 603-01 I Date of Admission: 12/31/2024   Date of Service: 1/8/2025 I Hospital Day: 8    Assessment & Plan  Pneumonia  Presented to ER after being found down on welfare check. Was noted to be hypoxic in ER (82% on RA) requiring BIPAP and then developed a-fib with RVR.  Was in ICU, transferred out on 1/2.   CT CAP: Acute right-sided pneumonia, greatest at the right lower lobe. Associated loculated parapneumonic effusion. No obvious pleural thickening on noncontrast study, though empyema has to be considered.   Was on IV CTX, transitioned to Amoxicillin BID on 1/4 thru 1/9 to complete 10 days total of abx per ID recommendations  Wean o2 as able, continue nebulizers, supportive care  Updated CXR 1/5 showed, \"New left lower lobe consolidation with additional worsening of right base infiltrates.\"  DC planning to rehab once medically stable     New onset atrial fibrillation with RVR (HCC)  Noted while in the ER, cardiology was consulted, spontaneously converted to sinus rhythm currently on Lopressor 25 mg twice daily and Eliquis 2.5 mg for anticoagulation  As she will be discharged to facility, will defer Eliquis price check at this time  Cardiology signed off as of 1/4  Pneumococcal bacteremia  Due to pneumonia.  Repeat blood cultures negative.  TTE (technically difficult) showed MV thickening (likely age-related) but no vegetations.  Low clinical suspicion for endocarditis.  Status post IV ceftriaxone, transition to amoxicillin 500 mg PO Q12 with plan to continue through 1/9 to complete 10 days total antibiotics  No indication for GARRETT from an ID perspective  Repeat blood cx are negative x2 after 5 days  Sepsis (HCC)  As evidenced by leukocytosis and tachycardia, due to bacteremia and pneumonia  ID following  Plan as above  Acute hypoxic respiratory failure (HCC)  Most likely secondary to pneumonia and severe COPD. Appears " was up to 15L in ICU  Goal O2 88-90%  As will be going to rehab will not require O2 evaluation at this time  Continue nebulizers  Was on IV steroids, transitioned to PO prednisone to complete course  Pulm consult appreciated  O2 requirements have improved  Hypertension  BP overall stable  On lopressor 25 mg bid   Moderate protein-calorie malnutrition (HCC)  Malnutrition Findings:   Adult Malnutrition type: Chronic illness  Adult Degree of Malnutrition: Malnutrition of moderate degree  Malnutrition Characteristics: Inadequate energy, Muscle loss                  360 Statement: Chronic/moderate malnutrition r/t inadequate PO intake, as evidenced by intake meeting <75% of estimated needs x > 1 month, and muscle mass depletion (temporal). Treatment: pending ability to advance to PO diet - liberal diet as able and trial oral nutrition supplements pending SLP recommendations    BMI Findings:           Body mass index is 19.35 kg/m².   Encourage oral intake as able  Acute kidney injury superimposed on CKD  (HCC)  Lab Results   Component Value Date    EGFR 49 01/08/2025    EGFR 43 01/07/2025    EGFR 45 01/06/2025    CREATININE 0.97 01/08/2025    CREATININE 1.08 01/07/2025    CREATININE 1.05 01/06/2025     POA 2.47, likely 2/2 sepsis  Overall improved, baseline is 1.3-1.4 and currently below baseline  Monitor BMP  Emphysema lung (HCC)  Patient was treated for COPD exacerbation with IV Solu-Medrol and was transitioned to oral prednisone 40 mg daily for 3 days  Monitor O2 sats  Pulm following as above  Dysphagia  Speech following, currently on modified diet    Pleural effusion, right  Pulm following - do not appear loculated per Pulm and they did not recommend draining them    Hyperglycemia  Likely 2/2 steroids, was prediabetic as of 9/2024  A1C 6.4  SSI  Unwitnessed fall  Patient was a Rapid Respone on 1/5  XRs of right knee and pelvis with no acute osseous abnormalities per the results reports  PT/OT    VTE Pharmacologic  Prophylaxis:    Eliquis as above    Mobility:   Basic Mobility Inpatient Raw Score: 13  JH-HLM Goal: 4: Move to chair/commode  JH-HLM Achieved: 2: Bed activities/Dependent transfer  JH-HLM Goal NOT achieved. Continue with multidisciplinary rounding and encourage appropriate mobility to improve upon JH-HLM goals.    Patient Centered Rounds: I performed bedside rounds with nursing staff today.   Discussions with Specialists or Other Care Team Provider:     Education and Discussions with Family / Patient: Updated  (son) via phone.     Current Length of Stay: 8 day(s)  Current Patient Status: Inpatient   Certification Statement: The patient will continue to require additional inpatient hospital stay due to safe d/c planning   Discharge Plan:  to rehab pending auth    Code Status: Level 3 - DNAR and DNI    Subjective   Ms. Mcgrath reports that she just celebrated her 89th birthday on [birth year]. She reports occasional abdominal discomfort which resolved after she passed gas.    Objective :  Temp:  [32 °F (0 °C)-98.8 °F (37.1 °C)] 32 °F (0 °C)  HR:  [67-76] 74  BP: (114-150)/(47-92) 150/92  Resp:  [18-22] 18  SpO2:  [92 %-98 %] 92 %  O2 Device: Nasal cannula  Nasal Cannula O2 Flow Rate (L/min):  [2 L/min] 2 L/min    Body mass index is 19.35 kg/m².     Input and Output Summary (last 24 hours):     Intake/Output Summary (Last 24 hours) at 1/8/2025 1232  Last data filed at 1/8/2025 0923  Gross per 24 hour   Intake 658 ml   Output 200 ml   Net 458 ml       Physical Exam  Vitals reviewed.   Constitutional:       Comments: Patient seen sitting In bed, NAD   Cardiovascular:      Rate and Rhythm: Normal rate and regular rhythm.   Pulmonary:      Effort: Pulmonary effort is normal. No respiratory distress.      Breath sounds: Normal breath sounds.   Abdominal:      General: Bowel sounds are normal.      Palpations: Abdomen is soft.      Tenderness: There is no abdominal tenderness.   Musculoskeletal:       Right lower leg: No edema.      Left lower leg: No edema.   Skin:     General: Skin is warm.   Neurological:      Mental Status: She is alert.   Psychiatric:         Mood and Affect: Mood normal.           Lines/Drains:              Lab Results: I have reviewed the following results:   Results from last 7 days   Lab Units 01/08/25  0445   WBC Thousand/uL 16.37*   HEMOGLOBIN g/dL 11.4*   HEMATOCRIT % 34.7*   PLATELETS Thousands/uL 298     Results from last 7 days   Lab Units 01/08/25  0445 01/06/25  0514 01/05/25  0602 01/03/25  0439 01/02/25  0420   SODIUM mmol/L 140   < > 140   < > 149*   POTASSIUM mmol/L 3.7   < > 3.6   < > 3.3*   CHLORIDE mmol/L 106   < > 104   < > 116*   CO2 mmol/L 24   < > 25   < > 21   BUN mg/dL 28*   < > 37*   < > 70*   CREATININE mg/dL 0.97   < > 1.27   < > 1.99*   ANION GAP mmol/L 10   < > 11   < > 12   CALCIUM mg/dL 7.1*   < > 8.2*   < > 8.2*   ALBUMIN g/dL  --   --  3.1*   < > 2.7*   TOTAL BILIRUBIN mg/dL  --   --   --   --  0.63   ALK PHOS U/L  --   --   --   --  52   ALT U/L  --   --   --   --  30   AST U/L  --   --   --   --  50*   GLUCOSE RANDOM mg/dL 87   < > 100   < > 197*    < > = values in this interval not displayed.     Results from last 7 days   Lab Units 01/01/25  1829   INR  1.52*     Results from last 7 days   Lab Units 01/08/25  1119 01/08/25  0749 01/07/25  2106 01/07/25  1749 01/07/25  1226 01/07/25  0826 01/06/25  2239 01/06/25  1700 01/06/25  1247 01/06/25  0705 01/05/25  2125 01/05/25  1714   POC GLUCOSE mg/dl 219* 85 153* 169* 146* 90 141* 141* 125 145* 137 160*     Results from last 7 days   Lab Units 01/05/25  0602   HEMOGLOBIN A1C % 6.4*           Recent Cultures (last 7 days):   Results from last 7 days   Lab Units 01/02/25  1716   BLOOD CULTURE  No Growth After 5 Days.  No Growth After 5 Days.       Imaging Results Review: I reviewed radiology reports from this admission including: chest xray.      Last 24 Hours Medication List:     Current  Facility-Administered Medications:     acetaminophen (TYLENOL) tablet 650 mg, Q6H PRN    albuterol (PROVENTIL HFA,VENTOLIN HFA) inhaler 2 puff, Q4H PRN    amoxicillin (AMOXIL) capsule 500 mg, Q12H KATT    apixaban (ELIQUIS) tablet 2.5 mg, BID    benzonatate (TESSALON PERLES) capsule 100 mg, TID    chlorhexidine (PERIDEX) 0.12 % oral rinse 15 mL, Q12H KATT    clotrimazole (GYNE-LOTRIMIN) 1 % vaginal cream 1 applicator, HS    clotrimazole (LOTRIMIN) 1 % cream, BID    guaiFENesin (MUCINEX) 12 hr tablet 600 mg, Q12H KATT    insulin lispro (HumALOG/ADMELOG) 100 units/mL subcutaneous injection 1-5 Units, TID AC **AND** Fingerstick Glucose (POCT), TID AC    insulin lispro (HumALOG/ADMELOG) 100 units/mL subcutaneous injection 1-5 Units, HS    melatonin tablet 3 mg, Once    melatonin tablet 3 mg, HS PRN    metoprolol tartrate (LOPRESSOR) tablet 25 mg, Q12H KATT    QUEtiapine (SEROquel) tablet 12.5 mg, Once    simethicone (MYLICON) chewable tablet 80 mg, Q6H PRN    Administrative Statements   Today, Patient Was Seen By: Osman Mccray PA-C      **Please Note: This note may have been constructed using a voice recognition system.**

## 2025-01-08 NOTE — ASSESSMENT & PLAN NOTE
WBC, HR.  Due to bacteremia, pneumonia as below.  No other appreciable source.  Improving with antibiotics.  Persistent WBC likely due to recent steroids, now trending downward.    Continue antibiotics as below  Monitor closely while on antibiotics for toxicities including diarrhea, rash, cytopenias, or kidney injury  Follow temperatures closely  Recheck WBC in AM to monitor infection  Supportive care as per the primary service

## 2025-01-08 NOTE — ASSESSMENT & PLAN NOTE
Unknown severity.  Risk factor for pneumonia.  Consideration for exacerbation.  Status post steroid taper per pulmonology

## 2025-01-08 NOTE — PROGRESS NOTES
Progress Note - Infectious Disease   Name: Remedios Mcgrath 89 y.o. female I MRN: [id number]  Unit/Bed#: [facility] 603-01 I Date of Admission: 12/31/2024   Date of Service: 1/8/2025 I Hospital Day: 8    Assessment & Plan  Sepsis (HCC)  WBC, HR.  Due to bacteremia, pneumonia as below.  No other appreciable source.  Improving with antibiotics.  Persistent WBC likely due to recent steroids, now trending downward.    Continue antibiotics as below  Monitor closely while on antibiotics for toxicities including diarrhea, rash, cytopenias, or kidney injury  Follow temperatures closely  Recheck WBC in AM to monitor infection  Supportive care as per the primary service  Pneumococcal bacteremia  Due to pneumonia.  Repeat blood cultures negative.  TTE (technically difficult) showed MV thickening (likely age-related) but no vegetations.  Low clinical suspicion for endocarditis, so GARRETT not indicated.    Continue amoxicillin 500 mg PO Q12 through 1/9 to complete 10 days total antibiotics  Pneumonia  Presumed pneumococcal given positive urinary antigen, bacteremia as above.    Continue antibiotics as above  Follow respiratory status closely  Acute hypoxic respiratory failure (HCC)  Due to pneumonia in setting of severe emphysema.    Wean O2 per pulmonology  Pleural effusion, right  Likely parapneumonic.  Small on CT.  Not well-visualized on repeat CXR.  Not significant enough to tap per Pulmonary.  Acute kidney injury superimposed on CKD  (HCC)  Baseline Cr 1.3-1.4.  Improved.  CrCl<30..    Follow creatinine closely and dose-adjust antibiotics as indicated  Recheck BMP in AM  Dysphagia  Risk factor for aspiration, pneumonia.  Improving per SLP notes.    Modified diet per SLP with close follow-up ongoing  Emphysema lung (HCC)  Unknown severity.  Risk factor for pneumonia.  Consideration for exacerbation.  Status post steroid taper per pulmonology      The patient is stable from an ID standpoint.    Antibiotics:  Amoxicillin #5  Antibiotics  #9    Subjective   Patient has no fever, chills, sweats; no nausea, vomiting, diarrhea; no cough, shortness of breath; no pain. No new symptoms.    Objective :  Temp:  [32 °F (0 °C)-98.8 °F (37.1 °C)] 32 °F (0 °C)  HR:  [67-76] 74  BP: (114-150)/(46-92) 150/92  Resp:  [18-22] 18  SpO2:  [92 %-99 %] 92 %  O2 Device: Nasal cannula  Nasal Cannula O2 Flow Rate (L/min):  [2 L/min-5 L/min] 2 L/min    General:  No acute distress  Psychiatric:  Awake and alert  Pulmonary:  Normal respiratory excursion without accessory muscle use  Abdomen:  Soft, nontender  Extremities:  No edema  Skin:  No rashes      Lab Results: I have reviewed the following results:  Results from last 7 days   Lab Units 01/08/25  0445 01/07/25  0904 01/06/25  0514   WBC Thousand/uL 16.37* 18.37* 16.54*   HEMOGLOBIN g/dL 11.4* 12.5 11.4*   PLATELETS Thousands/uL 298 329 282     Results from last 7 days   Lab Units 01/08/25  0445 01/07/25  0904 01/06/25  0514 01/05/25  0602 01/03/25  0439 01/02/25  0420   SODIUM mmol/L 140 140 140 140   < > 149*   POTASSIUM mmol/L 3.7 3.6 4.0 3.6   < > 3.3*   CHLORIDE mmol/L 106 106 106 104   < > 116*   CO2 mmol/L 24 27 26 25   < > 21   BUN mg/dL 28* 29* 34* 37*   < > 70*   CREATININE mg/dL 0.97 1.08 1.05 1.27   < > 1.99*   EGFR ml/min/1.73sq m 49 43 45 35   < > 20   CALCIUM mg/dL 7.1* 7.4* 7.9* 8.2*   < > 8.2*   AST U/L  --   --   --   --   --  50*   ALT U/L  --   --   --   --   --  30   ALK PHOS U/L  --   --   --   --   --  52   ALBUMIN g/dL  --   --   --  3.1*   < > 2.7*    < > = values in this interval not displayed.     Results from last 7 days   Lab Units 01/02/25  1716 01/01/25  1525 01/01/25  1226   BLOOD CULTURE  No Growth After 5 Days.  No Growth After 5 Days.  --   --    MRSA CULTURE ONLY   --  No Methicillin Resistant Staphlyococcus aureus (MRSA) isolated  --    C DIFF TOXIN B BY PCR   --   --  Negative

## 2025-01-08 NOTE — WOUND OSTOMY CARE
Progress Note - Wound   Remedios Mcgrath 89 y.o. female MRN: [id number]  Unit/Bed#: [facility] 603-01 Encounter: [id number]        Assessment:   Patient is seen for wound care follow-up. Seen lying on p-500 low air loss mattress. Min/mod assist for turning and repositioning. Granddaughter present at bedside.   Incontinent of bowel and bladder.     Findings:  B/L heels are dry intact and sarah with no skin loss or wounds present. Recommend preventative foam dressings and proper offloading/ repositioning.       B/L sacro-buttocks is dry, intact, pink in color, hyperpigmented and blanches. Significant bony prominence noted. No skin loss or wounds present. Recommend preventative foam dressing to the area.      Left Anterior Shin Wound: 2 irregular in shape areas of full thickness skin loss measured together. Wound bed with mix of pink/yellow in color tissue. Essence-wound is fragile. Scant serosanguineous drainage noted. Recommend dermagran and DSD to the area.     No induration, fluctuance, odor, warmth/temperature differences, redness, or purulence noted to the above noted wounds and skin areas assessed. New dressings applied per orders listed below. Patient tolerated well- no s/s of non-verbal pain or discomfort observed during the encounter. Bedside nurse aware of plan of care. See flow sheets for more detailed assessment findings.      Orders listed below and wound care will continue to follow, call or Secure Chat Message with questions.       Skin Care Plan:  1-Left Shin Wound: Cleanse with NSS and pat dry. Apply dermagran to wound beds, cover with ABDS, and secure with tae wrap. Change every other day or PRN.   2-Turn/reposition q2h or when medically stable for pressure re-distribution on skin.  3-Elevate heels to offload pressure.  4-Moisturize skin daily with skin nourishing cream  5-Ehob cushion in chair when out of bed.  6-Cleanse B/L Heels and Sacro-Buttocks with soap and water. Pat dry. Apply Silicone Border Foam  (Mepilex) to areas. Wong with P for Prevention and change every 3 days or PRN soilage/displacement. Peel back and inspect Q-shift.        WOUNDS:  Wound 01/01/25 Skin Tear Pretibial Left (Active)   Wound Image   01/08/25 1035   Wound Description Pink;Yellow 01/08/25 1035   Essence-wound Assessment Fragile 01/08/25 1035   Wound Length (cm) 15 cm 01/08/25 1035   Wound Width (cm) 2.5 cm 01/08/25 1035   Wound Depth (cm) 0.2 cm 01/08/25 1035   Wound Surface Area (cm^2) 37.5 cm^2 01/08/25 1035   Wound Volume (cm^3) 7.5 cm^3 01/08/25 1035   Calculated Wound Volume (cm^3) 7.5 cm^3 01/08/25 1035   Change in Wound Size % -95.31 01/08/25 1035   Drainage Amount Scant 01/08/25 1035   Drainage Description Serosanguineous 01/08/25 1035   Non-staged Wound Description Full thickness 01/08/25 1035   Treatments Cleansed;Irrigation with NSS;Site care 01/08/25 1035   Dressing Dermagran gauze;ABD;Dry dressing 01/08/25 1035   Dressing Changed Changed 01/08/25 1035   Patient Tolerance Tolerated well 01/08/25 1035   Dressing Status Clean;Dry;Intact 01/08/25 1035                Gabriela Fischer RN, BSN, CWOCN

## 2025-01-08 NOTE — PLAN OF CARE
Problem: Prexisting or High Potential for Compromised Skin Integrity  Goal: Skin integrity is maintained or improved  Description: INTERVENTIONS:  - Identify patients at risk for skin breakdown  - Assess and monitor skin integrity  - Assess and monitor nutrition and hydration status  - Monitor labs   - Assess for incontinence   - Turn and reposition patient  - Assist with mobility/ambulation  - Relieve pressure over bony prominences  - Avoid friction and shearing  - Provide appropriate hygiene as needed including keeping skin clean and dry  - Evaluate need for skin moisturizer/barrier cream  - Collaborate with interdisciplinary team   - Patient/family teaching  - Consider wound care consult   Outcome: Progressing     Problem: RESPIRATORY - ADULT  Goal: Achieves optimal ventilation and oxygenation  Description: INTERVENTIONS:  - Assess for changes in respiratory status  - Assess for changes in mentation and behavior  - Position to facilitate oxygenation and minimize respiratory effort  - Oxygen administered by appropriate delivery if ordered  - Initiate smoking cessation education as indicated  - Encourage broncho-pulmonary hygiene including cough, deep breathe, Incentive Spirometry  - Assess the need for suctioning and aspirate as needed  - Assess and instruct to report SOB or any respiratory difficulty  - Respiratory Therapy support as indicated  Outcome: Progressing     Problem: Nutrition/Hydration-ADULT  Goal: Nutrient/Hydration intake appropriate for improving, restoring or maintaining nutritional needs  Description: Monitor and assess patient's nutrition/hydration status for malnutrition. Collaborate with interdisciplinary team and initiate plan and interventions as ordered.  Monitor patient's weight and dietary intake as ordered or per policy. Utilize nutrition screening tool and intervene as necessary. Determine patient's food preferences and provide high-protein, high-caloric foods as appropriate.      INTERVENTIONS:  - Monitor oral intake, urinary output, labs, and treatment plans  - Assess nutrition and hydration status and recommend course of action  - Evaluate amount of meals eaten  - Assist patient with eating if necessary   - Allow adequate time for meals  - Recommend/ encourage appropriate diets, oral nutritional supplements, and vitamin/mineral supplements  - Order, calculate, and assess calorie counts as needed  - Recommend, monitor, and adjust tube feedings and TPN/PPN based on assessed needs  - Assess need for intravenous fluids  - Provide specific nutrition/hydration education as appropriate  - Include patient/family/caregiver in decisions related to nutrition  Outcome: Progressing     Problem: PAIN - ADULT  Goal: Verbalizes/displays adequate comfort level or baseline comfort level  Description: Interventions:  - Encourage patient to monitor pain and request assistance  - Assess pain using appropriate pain scale  - Administer analgesics based on type and severity of pain and evaluate response  - Implement non-pharmacological measures as appropriate and evaluate response  - Consider cultural and social influences on pain and pain management  - Notify physician/advanced practitioner if interventions unsuccessful or patient reports new pain  Outcome: Progressing     Problem: INFECTION - ADULT  Goal: Absence or prevention of progression during hospitalization  Description: INTERVENTIONS:  - Assess and monitor for signs and symptoms of infection  - Monitor lab/diagnostic results  - Monitor all insertion sites, i.e. indwelling lines, tubes, and drains  - Monitor endotracheal if appropriate and nasal secretions for changes in amount and color  - [place] appropriate cooling/warming therapies per order  - Administer medications as ordered  - Instruct and encourage patient and family to use good hand hygiene technique  - Identify and instruct in appropriate isolation precautions for identified  infection/condition  Outcome: Progressing     Problem: SAFETY ADULT  Goal: Patient will remain free of falls  Description: INTERVENTIONS:  - Educate patient/family on patient safety including physical limitations  - Instruct patient to call for assistance with activity   - Consult OT/PT to assist with strengthening/mobility   - Keep Call bell within reach  - Keep bed low and locked with side rails adjusted as appropriate  - Keep care items and personal belongings within reach  - Initiate and maintain comfort rounds  - Make Fall Risk Sign visible to staff  - Apply yellow socks and bracelet for high fall risk patients  - Consider moving patient to room near nurses station  Outcome: Progressing  Goal: Maintain or return to baseline ADL function  Description: INTERVENTIONS:  -  Assess patient's ability to carry out ADLs; assess patient's baseline for ADL function and identify physical deficits which impact ability to perform ADLs (bathing, care of mouth/teeth, toileting, grooming, dressing, etc.)  - Assess/evaluate cause of self-care deficits   - Assess range of motion  - Assess patient's mobility; develop plan if impaired  - Assess patient's need for assistive devices and provide as appropriate  - Encourage maximum independence but intervene and supervise when necessary  - Involve family in performance of ADLs  - Assess for home care needs following discharge   - Consider OT consult to assist with ADL evaluation and planning for discharge  - Provide patient education as appropriate  Outcome: Progressing  Goal: Maintains/Returns to pre admission functional level  Description: INTERVENTIONS:  - Perform AM-PAC 6 Click Basic Mobility/ Daily Activity assessment daily.  - Set and communicate daily mobility goal to care team and patient/family/caregiver.   - Collaborate with rehabilitation services on mobility goals if consulted  - Out of bed for toileting  - Record patient progress and toleration of activity level   Outcome:  Progressing     Problem: DISCHARGE PLANNING  Goal: Discharge to home or other facility with appropriate resources  Description: INTERVENTIONS:  - Identify barriers to discharge w/patient and caregiver  - Arrange for needed discharge resources and transportation as appropriate  - Identify discharge learning needs (meds, wound care, etc.)  - Arrange for interpretive services to assist at discharge as needed  - Refer to Case Management Department for coordinating discharge planning if the patient needs post-hospital services based on physician/advanced practitioner order or complex needs related to functional status, cognitive ability, or social support system  Outcome: Progressing     Problem: Knowledge Deficit  Goal: Patient/family/caregiver demonstrates understanding of disease process, treatment plan, medications, and discharge instructions  Description: Complete learning assessment and assess knowledge base.  Interventions:  - Provide teaching at level of understanding  - Provide teaching via preferred learning methods  Outcome: Progressing

## 2025-01-08 NOTE — PLAN OF CARE
Problem: Prexisting or High Potential for Compromised Skin Integrity  Goal: Skin integrity is maintained or improved  Description: INTERVENTIONS:  - Identify patients at risk for skin breakdown  - Assess and monitor skin integrity  - Assess and monitor nutrition and hydration status  - Monitor labs   - Assess for incontinence   - Turn and reposition patient  - Assist with mobility/ambulation  - Relieve pressure over bony prominences  - Avoid friction and shearing  - Provide appropriate hygiene as needed including keeping skin clean and dry  - Evaluate need for skin moisturizer/barrier cream  - Collaborate with interdisciplinary team   - Patient/family teaching  - Consider wound care consult   Outcome: Progressing     Problem: RESPIRATORY - ADULT  Goal: Achieves optimal ventilation and oxygenation  Description: INTERVENTIONS:  - Assess for changes in respiratory status  - Assess for changes in mentation and behavior  - Position to facilitate oxygenation and minimize respiratory effort  - Oxygen administered by appropriate delivery if ordered  - Initiate smoking cessation education as indicated  - Encourage broncho-pulmonary hygiene including cough, deep breathe, Incentive Spirometry  - Assess the need for suctioning and aspirate as needed  - Assess and instruct to report SOB or any respiratory difficulty  - Respiratory Therapy support as indicated  Outcome: Progressing     Problem: Nutrition/Hydration-ADULT  Goal: Nutrient/Hydration intake appropriate for improving, restoring or maintaining nutritional needs  Description: Monitor and assess patient's nutrition/hydration status for malnutrition. Collaborate with interdisciplinary team and initiate plan and interventions as ordered.  Monitor patient's weight and dietary intake as ordered or per policy. Utilize nutrition screening tool and intervene as necessary. Determine patient's food preferences and provide high-protein, high-caloric foods as appropriate.      INTERVENTIONS:  - Monitor oral intake, urinary output, labs, and treatment plans  - Assess nutrition and hydration status and recommend course of action  - Evaluate amount of meals eaten  - Assist patient with eating if necessary   - Allow adequate time for meals  - Recommend/ encourage appropriate diets, oral nutritional supplements, and vitamin/mineral supplements  - Order, calculate, and assess calorie counts as needed  - Recommend, monitor, and adjust tube feedings and TPN/PPN based on assessed needs  - Assess need for intravenous fluids  - Provide specific nutrition/hydration education as appropriate  - Include patient/family/caregiver in decisions related to nutrition  Outcome: Progressing     Problem: PAIN - ADULT  Goal: Verbalizes/displays adequate comfort level or baseline comfort level  Description: Interventions:  - Encourage patient to monitor pain and request assistance  - Assess pain using appropriate pain scale  - Administer analgesics based on type and severity of pain and evaluate response  - Implement non-pharmacological measures as appropriate and evaluate response  - Consider cultural and social influences on pain and pain management  - Notify physician/advanced practitioner if interventions unsuccessful or patient reports new pain  Outcome: Progressing     Problem: SAFETY ADULT  Goal: Patient will remain free of falls  Description: INTERVENTIONS:  - Educate patient/family on patient safety including physical limitations  - Instruct patient to call for assistance with activity   - Consult OT/PT to assist with strengthening/mobility   - Keep Call bell within reach  - Keep bed low and locked with side rails adjusted as appropriate  - Keep care items and personal belongings within reach  - Initiate and maintain comfort rounds  - Make Fall Risk Sign visible to staff  - Apply yellow socks and bracelet for high fall risk patients  - Consider moving patient to room near nurses station  Outcome:  Progressing  Goal: Maintain or return to baseline ADL function  Description: INTERVENTIONS:  -  Assess patient's ability to carry out ADLs; assess patient's baseline for ADL function and identify physical deficits which impact ability to perform ADLs (bathing, care of mouth/teeth, toileting, grooming, dressing, etc.)  - Assess/evaluate cause of self-care deficits   - Assess range of motion  - Assess patient's mobility; develop plan if impaired  - Assess patient's need for assistive devices and provide as appropriate  - Encourage maximum independence but intervene and supervise when necessary  - Involve family in performance of ADLs  - Assess for home care needs following discharge   - Consider OT consult to assist with ADL evaluation and planning for discharge  - Provide patient education as appropriate  Outcome: Progressing  Goal: Maintains/Returns to pre admission functional level  Description: INTERVENTIONS:  - Perform AM-PAC 6 Click Basic Mobility/ Daily Activity assessment daily.  - Set and communicate daily mobility goal to care team and patient/family/caregiver.   - Collaborate with rehabilitation services on mobility goals if consulted  - Out of bed for toileting  - Record patient progress and toleration of activity level   Outcome: Progressing     Problem: INFECTION - ADULT  Goal: Absence or prevention of progression during hospitalization  Description: INTERVENTIONS:  - Assess and monitor for signs and symptoms of infection  - Monitor lab/diagnostic results  - Monitor all insertion sites, i.e. indwelling lines, tubes, and drains  - Monitor endotracheal if appropriate and nasal secretions for changes in amount and color  - [place] appropriate cooling/warming therapies per order  - Administer medications as ordered  - Instruct and encourage patient and family to use good hand hygiene technique  - Identify and instruct in appropriate isolation precautions for identified infection/condition  Outcome:  Progressing     Problem: DISCHARGE PLANNING  Goal: Discharge to home or other facility with appropriate resources  Description: INTERVENTIONS:  - Identify barriers to discharge w/patient and caregiver  - Arrange for needed discharge resources and transportation as appropriate  - Identify discharge learning needs (meds, wound care, etc.)  - Arrange for interpretive services to assist at discharge as needed  - Refer to Case Management Department for coordinating discharge planning if the patient needs post-hospital services based on physician/advanced practitioner order or complex needs related to functional status, cognitive ability, or social support system  Outcome: Progressing     Problem: Knowledge Deficit  Goal: Patient/family/caregiver demonstrates understanding of disease process, treatment plan, medications, and discharge instructions  Description: Complete learning assessment and assess knowledge base.  Interventions:  - Provide teaching at level of understanding  - Provide teaching via preferred learning methods  Outcome: Progressing

## 2025-01-08 NOTE — ASSESSMENT & PLAN NOTE
Baseline Cr 1.3-1.4.  Improved.  CrCl<30..    Follow creatinine closely and dose-adjust antibiotics as indicated  Recheck BMP in AM

## 2025-01-08 NOTE — ASSESSMENT & PLAN NOTE
Patient was found to have pneumonia on CT   ID has been following, antibiotics per ID  Patient had new opacity on CXR from 1/6/2024   no

## 2025-01-08 NOTE — PROGRESS NOTES
Progress Note - Pulmonology   Name: Remedios Mcgrath 89 y.o. female I MRN: [id number]  Unit/Bed#: [facility] 603-01 I Date of Admission: 12/31/2024   Date of Service: 1/8/2025 I Hospital Day: 8     Assessment & Plan  Acute hypoxic respiratory failure (HCC)  Most likely due to pneumonia, and poor lung reserve given emphysema and very significant smoking history  Was on 5-6 LPM, but weaned off to 3 L staying in high 90s, will be able to wean more      Incentive spirometry  Continue weaning oxygen  Antibiotics per ID  Continue physical therapy  No current need for maintenance inhaler or steroids given no COPD exacerbation  Pneumonia  Patient was found to have pneumonia on CT   ID has been following, antibiotics per ID  Patient had new opacity on CXR from 1/6/2024  Pneumococcal bacteremia  Patient had bacteremia likely in the setting of the pneumonia  Emphysema lung (HCC)  Patient has severe emphysema  Not on inhaler at home  Is not wheezing, no current exacerbation  Might need outpatient follow up and PFTs, could have LABA/LAMA if needed, but for now can use PRN albuterol  Pleural effusion, right  Patient has bilateral pleural effusions  Performed US at bedside to evaluate the fluid and seems like very small amount, not able to tap on either side, and seems to be a simple fluid  Continue monitoring, most likely simple parapneumonic    24 Hour Events : No overnight events  Subjective : Patient denies shortness of breath, chest pain or chest tightness. Denies any wheezing, denies complaints for her breathing.    Objective :  Temp:  [32 °F (0 °C)-98.8 °F (37.1 °C)] 32 °F (0 °C)  HR:  [67-76] 74  BP: (114-150)/(47-92) 150/92  Resp:  [18-22] 18  SpO2:  [92 %-98 %] 92 %  O2 Device: Nasal cannula  Nasal Cannula O2 Flow Rate (L/min):  [2 L/min] 2 L/min    Physical Exam  Vitals and nursing note reviewed.   Constitutional:       General: She is not in acute distress.     Appearance: She is well-developed.   HENT:      Head:  Normocephalic and atraumatic.   Eyes:      Conjunctiva/sclera: Conjunctivae normal.   Cardiovascular:      Rate and Rhythm: Normal rate and regular rhythm.      Heart sounds: No murmur heard.  Pulmonary:      Effort: Pulmonary effort is normal. No respiratory distress.      Breath sounds: Normal breath sounds.   Abdominal:      Palpations: Abdomen is soft.      Tenderness: There is no abdominal tenderness.   Musculoskeletal:         General: No swelling.      Cervical back: Neck supple.   Skin:     General: Skin is warm and dry.      Capillary Refill: Capillary refill takes less than 2 seconds.   Neurological:      Mental Status: She is alert.   Psychiatric:         Mood and Affect: Mood normal.           Lab Results: I have reviewed the following results:   .     01/08/25  0445   WBC 16.37*   HGB 11.4*   HCT 34.7*      SODIUM 140   K 3.7      CO2 24   BUN 28*   CREATININE 0.97   GLUC 87     ABG: No new results in last 24 hours.

## 2025-01-08 NOTE — ASSESSMENT & PLAN NOTE
Due to pneumonia.  Repeat blood cultures negative.  TTE (technically difficult) showed MV thickening (likely age-related) but no vegetations.  Low clinical suspicion for endocarditis.  Status post IV ceftriaxone, transition to amoxicillin 500 mg PO Q12 with plan to continue through 1/9 to complete 10 days total antibiotics  No indication for GARRETT from an ID perspective  Repeat blood cx are negative x2 after 5 days

## 2025-01-08 NOTE — CASE MANAGEMENT
Support Center has received INTENT TO DENY for Acute Rehab Authorization.   Insurance: [employer]   Information obtained via Insurance Rep: Uzma AWAN#: [phone]  Intent to Deny Reason: Does not meet CMS guidelines   Facility: [facility]   Pending Auth #: [id number]   Peer to Peer Phone#: [phone] opt. 2 Deadline: 01/09 by 12  CM to task P2P to [facility].    Please notify Discharge Support if P2P will not be completed.     Care Manager notified: Julian Blue     Please reach out to CM for updates on any clinical information.

## 2025-01-08 NOTE — ASSESSMENT & PLAN NOTE
Malnutrition Findings:   Adult Malnutrition type: Chronic illness  Adult Degree of Malnutrition: Malnutrition of moderate degree  Malnutrition Characteristics: Inadequate energy, Muscle loss                  360 Statement: Chronic/moderate malnutrition r/t inadequate PO intake, as evidenced by intake meeting <75% of estimated needs x > 1 month, and muscle mass depletion (temporal). Treatment: pending ability to advance to PO diet - liberal diet as able and trial oral nutrition supplements pending SLP recommendations    BMI Findings:           Body mass index is 19.35 kg/m².   Encourage oral intake as able

## 2025-01-08 NOTE — ASSESSMENT & PLAN NOTE
Patient was treated for COPD exacerbation with IV Solu-Medrol and was transitioned to oral prednisone 40 mg daily for 3 days  Monitor O2 sats  Pulm following as above

## 2025-01-08 NOTE — ASSESSMENT & PLAN NOTE
Lab Results   Component Value Date    EGFR 49 01/08/2025    EGFR 43 01/07/2025    EGFR 45 01/06/2025    CREATININE 0.97 01/08/2025    CREATININE 1.08 01/07/2025    CREATININE 1.05 01/06/2025     POA 2.47, likely 2/2 sepsis  Overall improved, baseline is 1.3-1.4 and currently below baseline  Monitor BMP

## 2025-01-09 LAB
GLUCOSE SERPL-MCNC: 100 MG/DL (ref 65–140)
GLUCOSE SERPL-MCNC: 101 MG/DL (ref 65–140)
GLUCOSE SERPL-MCNC: 116 MG/DL (ref 65–140)
GLUCOSE SERPL-MCNC: 116 MG/DL (ref 65–140)

## 2025-01-09 PROCEDURE — 97116 GAIT TRAINING THERAPY: CPT

## 2025-01-09 PROCEDURE — 99232 SBSQ HOSP IP/OBS MODERATE 35: CPT | Performed by: NURSE PRACTITIONER

## 2025-01-09 PROCEDURE — 97535 SELF CARE MNGMENT TRAINING: CPT

## 2025-01-09 PROCEDURE — 97530 THERAPEUTIC ACTIVITIES: CPT

## 2025-01-09 PROCEDURE — 82948 REAGENT STRIP/BLOOD GLUCOSE: CPT

## 2025-01-09 PROCEDURE — 99232 SBSQ HOSP IP/OBS MODERATE 35: CPT | Performed by: INTERNAL MEDICINE

## 2025-01-09 RX ORDER — SACCHAROMYCES BOULARDII 250 MG
250 CAPSULE ORAL 2 TIMES DAILY
Status: DISCONTINUED | OUTPATIENT
Start: 2025-01-09 | End: 2025-01-15 | Stop reason: HOSPADM

## 2025-01-09 RX ORDER — DICYCLOMINE HYDROCHLORIDE 10 MG/1
10 CAPSULE ORAL 3 TIMES DAILY PRN
Status: DISCONTINUED | OUTPATIENT
Start: 2025-01-09 | End: 2025-01-11

## 2025-01-09 RX ADMIN — CHLORHEXIDINE GLUCONATE 0.12% ORAL RINSE 15 ML: 1.2 LIQUID ORAL at 09:48

## 2025-01-09 RX ADMIN — ALBUTEROL SULFATE 2 PUFF: 90 AEROSOL, METERED RESPIRATORY (INHALATION) at 03:56

## 2025-01-09 RX ADMIN — AMOXICILLIN 500 MG: 500 CAPSULE ORAL at 21:45

## 2025-01-09 RX ADMIN — CLOTRIMAZOLE: 1 CREAM TOPICAL at 18:20

## 2025-01-09 RX ADMIN — APIXABAN 2.5 MG: 2.5 TABLET, FILM COATED ORAL at 18:20

## 2025-01-09 RX ADMIN — METOPROLOL TARTRATE 25 MG: 25 TABLET, FILM COATED ORAL at 21:45

## 2025-01-09 RX ADMIN — METOPROLOL TARTRATE 25 MG: 25 TABLET, FILM COATED ORAL at 09:50

## 2025-01-09 RX ADMIN — AMOXICILLIN 500 MG: 500 CAPSULE ORAL at 09:49

## 2025-01-09 RX ADMIN — CLOTRIMAZOLE: 1 CREAM TOPICAL at 09:48

## 2025-01-09 RX ADMIN — APIXABAN 2.5 MG: 2.5 TABLET, FILM COATED ORAL at 09:48

## 2025-01-09 RX ADMIN — Medication 250 MG: at 13:43

## 2025-01-09 RX ADMIN — Medication 250 MG: at 18:20

## 2025-01-09 RX ADMIN — CHLORHEXIDINE GLUCONATE 0.12% ORAL RINSE 15 ML: 1.2 LIQUID ORAL at 21:45

## 2025-01-09 NOTE — CASE MANAGEMENT
Support Center has received DENIAL for Acute Rehab Authorization.   Insurance: [employer]   Denial obtained via Portal  Denial Reason: Does not meet CMS guidelines   Facility: [facility]   Denial #: [id number]   Appeal P#: [phone] / F#: [phone]    Care Manager notified: Julian Blue     Please reach out to CM for updates on any clinical information.

## 2025-01-09 NOTE — CASE MANAGEMENT
Case Management Progress Note    Patient name Remedios Mcgrath  Location [facility] 603/[facility] 603-01 MRN [id number]  : 1929 Date 2025       LOS (days): 9  Geometric Mean LOS (GMLOS) (days): 4.9  Days to GMLOS:-3.6        OBJECTIVE:        Current admission status: Inpatient  Preferred Pharmacy:   [address] [address]  Phone: [phone] Fax: [phone]    Primary Care Provider: Dion Tsang MD    Primary Insurance: [facility]  Secondary Insurance:     PROGRESS NOTE:    This CM received intent to deny auth notice from CM discharge support. [facility] tasked with p2p via aidin 25. Awaiting determination.

## 2025-01-09 NOTE — PLAN OF CARE
Problem: OCCUPATIONAL THERAPY ADULT  Goal: Performs self-care activities at highest level of function for planned discharge setting.  See evaluation for individualized goals.  Description: Treatment Interventions: ADL retraining, Functional transfer training, UE strengthening/ROM, Endurance training, Cognitive reorientation, Patient/family training, Equipment evaluation/education, Compensatory technique education, Continued evaluation, Activityengagement, Energy conservation          See flowsheet documentation for full assessment, interventions and recommendations.   Note: Limitation: Decreased ADL status, Decreased UE strength, Decreased Safe judgement during ADL, Decreased cognition, Decreased endurance, Decreased self-care trans, Decreased high-level ADLs  Prognosis: Good  Assessment: Pt seen for OT session focusing on self care, functional mobility, dynamic balance/safety, safe use of RW, cognition and overall tolerance to activity- functionally requires mod to max a for adls and mod a for functional mob/transfers - poor dynamic standing balance and poor overall tolerance to activity - limited insight into deficits - requires encouragement at times 2* c/o  SHAH - reviewed pursed lip breathing techniques and slowing breathing pattern - continue to recommend level II resources post d/c - OT to continue to follow to address goals as stated on eval     Rehab Resource Intensity Level, OT: II (Moderate Resource Intensity)

## 2025-01-09 NOTE — ASSESSMENT & PLAN NOTE
"Presented to ER after being found down on welfare check. Was noted to be hypoxic in ER (82% on RA) requiring BIPAP and then developed a-fib with RVR.  Was in ICU, transferred out on 1/2.   CT CAP: Acute right-sided pneumonia, greatest at the right lower lobe. Associated loculated parapneumonic effusion. No obvious pleural thickening on noncontrast study, though empyema has to be considered.   Was on IV CTX, transitioned to Amoxicillin BID on 1/4 thru 1/9- as of today completed 10 days total of abx per ID recommendations  Wean o2 as able, continue nebulizers, supportive care  Updated CXR 1/5 showed, \"New left lower lobe consolidation with additional worsening of right base infiltrates.\"  DC planning to rehab once medically stable     "

## 2025-01-09 NOTE — PLAN OF CARE
Problem: PHYSICAL THERAPY ADULT  Goal: Performs mobility at highest level of function for planned discharge setting.  See evaluation for individualized goals.  Description: Treatment/Interventions: OT, Spoke to case management, Gait training, Bed mobility, Patient/family training, Endurance training, LE strengthening/ROM, Functional transfer training  Equipment Recommended:  (TBD)       See flowsheet documentation for full assessment, interventions and recommendations.  Outcome: Progressing  Note: Prognosis: Good  Problem List: Decreased strength, Decreased endurance, Impaired balance, Decreased mobility, Decreased safety awareness, Decreased range of motion  Assessment: Patient resting in bed at time of PT treatment session.  Patient reports feeling fatigued however is willing and motivated to participate with PT.  Patient was able perform all bed mobility with mod a x 1 weeks prior to same level assistance required compared to previous session.  Patient performed all transfers with mod a x 1 which is increased assistance needed at compared to previous session.  Cueing needed for proper hand placement to facilitate weight shift during sit to stand transfers.  Patient tolerated ambulation with mod a x 1 with use of rolling walker, but distances remain limited by fatigue and weakness.  Patient reported feeling more tired today and was unable to ambulate any further.  At conclusion of PT treatment session patient was assisted back into bed with all needs within reach as pt declined to sit OOB in chair despite max verbal encouragement.  PT will continue to follow, D/C recommendation when medically cleared is level II resource intensity.  Barriers to Discharge: Decreased caregiver support     Rehab Resource Intensity Level, PT: II (Moderate Resource Intensity)    See flowsheet documentation for full assessment.

## 2025-01-09 NOTE — ASSESSMENT & PLAN NOTE
Lab Results   Component Value Date    EGFR 49 01/08/2025    EGFR 43 01/07/2025    EGFR 45 01/06/2025    CREATININE 0.97 01/08/2025    CREATININE 1.08 01/07/2025    CREATININE 1.05 01/06/2025     POA 2.47, likely 2/2 sepsis  resolved baseline is 1.3-1.4 and currently below baseline  Monitor BMP

## 2025-01-09 NOTE — CASE MANAGEMENT
Case Management Progress Note    Patient name Remedios Mcgrath  Location [facility] 603/[facility] 603-01 MRN [id number]  : 1929 Date 2025       LOS (days): 9  Geometric Mean LOS (GMLOS) (days): 4.9  Days to GMLOS:-3.8        OBJECTIVE:        Current admission status: Inpatient  Preferred Pharmacy:   [facility] [id number] [address]  Phone: [phone] Fax: [phone]    Primary Care Provider: Dion Tsang MD    Primary Insurance: [employer]  Secondary Insurance:     PROGRESS NOTE:    P2P completed by [facility], denial upheld. Family will not appeal. Robert and ender at bedside, provided with accepting SNF list. CM will follow up tomorrow with choice.

## 2025-01-09 NOTE — PHYSICAL THERAPY NOTE
PHYSICAL THERAPY NOTE          Patient Name: Remedios Mcgrath  Today's Date: 1/9/2025 01/09/25 1435   Note Type   Note Type Treatment   Pain Assessment   Pain Assessment Tool 0-10   Pain Score No Pain   Restrictions/Precautions   Weight Bearing Precautions Per Order No   Other Precautions Cognitive;Chair Alarm;Bed Alarm;O2;Fall Risk   General   Chart Reviewed Yes   Response to Previous Treatment Patient with no complaints from previous session.   Family/Caregiver Present No   Cognition   Overall Cognitive Status Impaired   Arousal/Participation Alert   Attention Attends with cues to redirect   Orientation Level Oriented to person;Oriented to place;Oriented to time;Oriented to situation   Memory Decreased recall of precautions   Following Commands Follows one step commands with increased time or repetition   Subjective   Subjective Pt willing to participate in PT treatment session   Bed Mobility   Supine to Sit 3  Moderate assistance   Additional items Assist x 1;Increased time required;Verbal cues   Transfers   Sit to Stand 3  Moderate assistance   Additional items Assist x 1;Increased time required;Verbal cues   Stand to Sit 3  Moderate assistance   Additional items Assist x 1;Increased time required;Verbal cues   Toilet transfer 3  Moderate assistance   Additional items Assist x 1;Increased time required;Verbal cues   Ambulation/Elevation   Gait pattern Excessively slow;Short stride;Foward flexed;Inconsistent koby   Gait Assistance 3  Moderate assist   Additional items Assist x 1   Assistive Device Rolling walker   Distance 12ft x 2   Balance   Static Sitting Fair   Static Standing Poor +   Ambulatory Poor   Endurance Deficit   Endurance Deficit Yes   Endurance Deficit Description fatigue, weakness   Activity Tolerance   Activity Tolerance Patient limited by fatigue   Medical Staff Made Aware PAU Schwartz; OT present for co  "treatment session due to pts current medical presentation   Nurse Made Aware Pt appropriate to be seen and mobilize per nsg   Assessment   Prognosis Good   Problem List Decreased strength;Decreased endurance;Impaired balance;Decreased mobility;Decreased safety awareness;Decreased range of motion   Assessment Patient resting in bed at time of PT treatment session.  Patient reports feeling fatigued however is willing and motivated to participate with PT.  Patient was able perform all bed mobility with mod a x 1 weeks prior to same level assistance required compared to previous session.  Patient performed all transfers with mod a x 1 which is increased assistance needed at compared to previous session.  Cueing needed for proper hand placement to facilitate weight shift during sit to stand transfers.  Patient tolerated ambulation with mod a x 1 with use of rolling walker, but distances remain limited by fatigue and weakness.  Patient reported feeling more tired today and was unable to ambulate any further.  At conclusion of PT treatment session patient was assisted back into bed with all needs within reach as pt declined to sit OOB in chair despite max verbal encouragement.  PT will continue to follow, D/C recommendation when medically cleared is level II resource intensity.   Goals   Patient Goals \" to rest\"   [facility] Expiration Date 01/14/25   PT Treatment Day 1   Plan   Treatment/Interventions Functional transfer training;LE strengthening/ROM;Therapeutic exercise;Endurance training;Patient/family training;Equipment eval/education;Bed mobility;Gait training;Spoke to nursing;OT   Progress Progressing toward goals   PT Frequency 3-5x/wk   Discharge Recommendation   Rehab Resource Intensity Level, PT II (Moderate Resource Intensity)   Equipment Recommended Walker   Walker Package Recommended Wheeled walker   AM-PAC Basic Mobility Inpatient   Turning in Flat Bed Without Bedrails 2   Lying on Back to Sitting on Edge of Flat " Bed Without Bedrails 2   Moving Bed to Chair 2   Standing Up From Chair Using Arms 2   Walk in Room 2   Climb 3-5 Stairs With Railing 1   Basic Mobility Inpatient Raw Score 11   Basic Mobility Standardized Score 30.25   [facility] Highest Level Of Mobility   -HL Goal 4: Move to chair/commode   -HLM Achieved 6: Walk 10 steps or more     Portions of the documentation may have been created using voice recognition software.Occasional wrong word or sound alike substitutions may have occurred due to the inherent limitations of the voice recognition software. Read the chart carefully and recognize, using context, where substitutions have occurred.    [place], PT, DPT

## 2025-01-09 NOTE — PROGRESS NOTES
Progress Note - Infectious Disease   Name: Remedios Mcgrath 89 y.o. female I MRN: [id number]  Unit/Bed#: [facility] 603-01 I Date of Admission: 12/31/2024   Date of Service: 1/9/2025 I Hospital Day: 9    Assessment & Plan  Sepsis (HCC)  WBC, HR.  Due to bacteremia, pneumonia as below.  No other appreciable source.  Improving with antibiotics.  Persistent WBC likely due to recent steroids, now trending downward.    Continue antibiotics as below  Monitor closely while on antibiotics for toxicities including diarrhea, rash, cytopenias, or kidney injury  Follow temperatures closely  Recheck WBC in AM to monitor infection  Supportive care as per the primary service  Pneumococcal bacteremia  Due to pneumonia.  Repeat blood cultures negative.  TTE (technically difficult) showed MV thickening (likely age-related) but no vegetations.  Low clinical suspicion for endocarditis, so GARRETT not indicated.    Continue amoxicillin 500 mg PO Q12 through tonight's dose to complete 10 days total antibiotics  Pneumonia  Presumed pneumococcal given positive urinary antigen, bacteremia as above.    Continue antibiotics as above  Follow respiratory status closely  Acute hypoxic respiratory failure (HCC)  Due to pneumonia in setting of severe emphysema.    Wean O2 per pulmonology  Pleural effusion, right  Likely parapneumonic.  Small on CT.  Not well-visualized on repeat CXR.  Not significant enough to tap per Pulmonary.  Acute kidney injury superimposed on CKD  (HCC)  Baseline Cr 1.3-1.4.  Improved.  CrCl<30.  Dysphagia  Risk factor for aspiration, pneumonia.  Improving per SLP notes.    Modified diet per SLP with close follow-up ongoing  Emphysema lung (HCC)  Unknown severity.  Risk factor for pneumonia.  Consideration for exacerbation.  Status post steroid taper per pulmonology    Ok for discharge from Infectious Disease service perspective.    Antibiotics:  Amoxicillin #6  Antibiotics #10    Subjective   Patient has no fever, chills, sweats; no  nausea, vomiting, diarrhea; no cough, shortness of breath; no pain. No new symptoms. Has some epigastric pain, had BM shortly after my visit and felt a bit better.     Objective :  Temp:  [97.1 °F (36.2 °C)-97.4 °F (36.3 °C)] 97.3 °F (36.3 °C)  HR:  [68-85] 85  BP: (110-135)/(47-56) 110/47  Resp:  [17-19] 17  SpO2:  [91 %-95 %] 95 %  O2 Device: Nasal cannula  Nasal Cannula O2 Flow Rate (L/min):  [3 L/min] 3 L/min    General:  No acute distress  Psychiatric:  Awake and alert  Pulmonary:  Normal respiratory excursion without accessory muscle use  Abdomen:  Soft, nontender  Extremities:  No edema  Skin:  No rashes      Lab Results: I have reviewed the following results:  Results from last 7 days   Lab Units 01/08/25 0445 01/07/25  0904 01/06/25  0514   WBC Thousand/uL 16.37* 18.37* 16.54*   HEMOGLOBIN g/dL 11.4* 12.5 11.4*   PLATELETS Thousands/uL 298 329 282     Results from last 7 days   Lab Units 01/08/25  0445 01/07/25  0904 01/06/25  0514 01/05/25  0602   SODIUM mmol/L 140 140 140 140   POTASSIUM mmol/L 3.7 3.6 4.0 3.6   CHLORIDE mmol/L 106 106 106 104   CO2 mmol/L 24 27 26 25   BUN mg/dL 28* 29* 34* 37*   CREATININE mg/dL 0.97 1.08 1.05 1.27   EGFR ml/min/1.73sq m 49 43 45 35   CALCIUM mg/dL 7.1* 7.4* 7.9* 8.2*   ALBUMIN g/dL  --   --   --  3.1*     Results from last 7 days   Lab Units 01/02/25  1716   BLOOD CULTURE  No Growth After 5 Days.  No Growth After 5 Days.

## 2025-01-09 NOTE — ASSESSMENT & PLAN NOTE
Patient was a Rapid Respone on 1/5  XRs of right knee and pelvis with no acute osseous abnormalities   PT/OT recommending rehab

## 2025-01-09 NOTE — PLAN OF CARE
Problem: Prexisting or High Potential for Compromised Skin Integrity  Goal: Skin integrity is maintained or improved  Description: INTERVENTIONS:  - Identify patients at risk for skin breakdown  - Assess and monitor skin integrity  - Assess and monitor nutrition and hydration status  - Monitor labs   - Assess for incontinence   - Turn and reposition patient  - Assist with mobility/ambulation  - Relieve pressure over bony prominences  - Avoid friction and shearing  - Provide appropriate hygiene as needed including keeping skin clean and dry  - Evaluate need for skin moisturizer/barrier cream  - Collaborate with interdisciplinary team   - Patient/family teaching  - Consider wound care consult   Outcome: Progressing     Problem: RESPIRATORY - ADULT  Goal: Achieves optimal ventilation and oxygenation  Description: INTERVENTIONS:  - Assess for changes in respiratory status  - Assess for changes in mentation and behavior  - Position to facilitate oxygenation and minimize respiratory effort  - Oxygen administered by appropriate delivery if ordered  - Initiate smoking cessation education as indicated  - Encourage broncho-pulmonary hygiene including cough, deep breathe, Incentive Spirometry  - Assess the need for suctioning and aspirate as needed  - Assess and instruct to report SOB or any respiratory difficulty  - Respiratory Therapy support as indicated  Outcome: Progressing     Problem: Nutrition/Hydration-ADULT  Goal: Nutrient/Hydration intake appropriate for improving, restoring or maintaining nutritional needs  Description: Monitor and assess patient's nutrition/hydration status for malnutrition. Collaborate with interdisciplinary team and initiate plan and interventions as ordered.  Monitor patient's weight and dietary intake as ordered or per policy. Utilize nutrition screening tool and intervene as necessary. Determine patient's food preferences and provide high-protein, high-caloric foods as appropriate.      INTERVENTIONS:  - Monitor oral intake, urinary output, labs, and treatment plans  - Assess nutrition and hydration status and recommend course of action  - Evaluate amount of meals eaten  - Assist patient with eating if necessary   - Allow adequate time for meals  - Recommend/ encourage appropriate diets, oral nutritional supplements, and vitamin/mineral supplements  - Order, calculate, and assess calorie counts as needed  - Recommend, monitor, and adjust tube feedings and TPN/PPN based on assessed needs  - Assess need for intravenous fluids  - Provide specific nutrition/hydration education as appropriate  - Include patient/family/caregiver in decisions related to nutrition  Outcome: Progressing     Problem: PAIN - ADULT  Goal: Verbalizes/displays adequate comfort level or baseline comfort level  Description: Interventions:  - Encourage patient to monitor pain and request assistance  - Assess pain using appropriate pain scale  - Administer analgesics based on type and severity of pain and evaluate response  - Implement non-pharmacological measures as appropriate and evaluate response  - Consider cultural and social influences on pain and pain management  - Notify physician/advanced practitioner if interventions unsuccessful or patient reports new pain  Outcome: Progressing     Problem: INFECTION - ADULT  Goal: Absence or prevention of progression during hospitalization  Description: INTERVENTIONS:  - Assess and monitor for signs and symptoms of infection  - Monitor lab/diagnostic results  - Monitor all insertion sites, i.e. indwelling lines, tubes, and drains  - Monitor endotracheal if appropriate and nasal secretions for changes in amount and color  - [place] appropriate cooling/warming therapies per order  - Administer medications as ordered  - Instruct and encourage patient and family to use good hand hygiene technique  - Identify and instruct in appropriate isolation precautions for identified  infection/condition  Outcome: Progressing     Problem: SAFETY ADULT  Goal: Patient will remain free of falls  Description: INTERVENTIONS:  - Educate patient/family on patient safety including physical limitations  - Instruct patient to call for assistance with activity   - Consult OT/PT to assist with strengthening/mobility   - Keep Call bell within reach  - Keep bed low and locked with side rails adjusted as appropriate  - Keep care items and personal belongings within reach  - Initiate and maintain comfort rounds  - Make Fall Risk Sign visible to staff  - Apply yellow socks and bracelet for high fall risk patients  - Consider moving patient to room near nurses station  Outcome: Progressing  Goal: Maintain or return to baseline ADL function  Description: INTERVENTIONS:  -  Assess patient's ability to carry out ADLs; assess patient's baseline for ADL function and identify physical deficits which impact ability to perform ADLs (bathing, care of mouth/teeth, toileting, grooming, dressing, etc.)  - Assess/evaluate cause of self-care deficits   - Assess range of motion  - Assess patient's mobility; develop plan if impaired  - Assess patient's need for assistive devices and provide as appropriate  - Encourage maximum independence but intervene and supervise when necessary  - Involve family in performance of ADLs  - Assess for home care needs following discharge   - Consider OT consult to assist with ADL evaluation and planning for discharge  - Provide patient education as appropriate  Outcome: Progressing  Goal: Maintains/Returns to pre admission functional level  Description: INTERVENTIONS:  - Perform AM-PAC 6 Click Basic Mobility/ Daily Activity assessment daily.  - Set and communicate daily mobility goal to care team and patient/family/caregiver.   - Collaborate with rehabilitation services on mobility goals if consulted  - Out of bed for toileting  - Record patient progress and toleration of activity level   Outcome:  Progressing     Problem: DISCHARGE PLANNING  Goal: Discharge to home or other facility with appropriate resources  Description: INTERVENTIONS:  - Identify barriers to discharge w/patient and caregiver  - Arrange for needed discharge resources and transportation as appropriate  - Identify discharge learning needs (meds, wound care, etc.)  - Arrange for interpretive services to assist at discharge as needed  - Refer to Case Management Department for coordinating discharge planning if the patient needs post-hospital services based on physician/advanced practitioner order or complex needs related to functional status, cognitive ability, or social support system  Outcome: Progressing     Problem: Knowledge Deficit  Goal: Patient/family/caregiver demonstrates understanding of disease process, treatment plan, medications, and discharge instructions  Description: Complete learning assessment and assess knowledge base.  Interventions:  - Provide teaching at level of understanding  - Provide teaching via preferred learning methods  Outcome: Progressing

## 2025-01-09 NOTE — OCCUPATIONAL THERAPY NOTE
"  Occupational Therapy Progress Note     Patient Name: Remedios Mcgrath  Today's Date: 1/9/2025  Problem List  Principal Problem:    Pneumonia  Active Problems:    Hypertension    Moderate protein-calorie malnutrition (HCC)    New onset atrial fibrillation with RVR (HCC)    Sepsis (HCC)    Pneumococcal bacteremia    Acute kidney injury superimposed on CKD  (HCC)    Acute hypoxic respiratory failure (HCC)    Emphysema lung (HCC)    Dysphagia    Pleural effusion, right    Hyperglycemia    Unwitnessed fall          01/09/25 1445   OT Last Visit   OT Visit Date 01/09/25   Note Type   Note Type Treatment   Pain Assessment   Pain Assessment Tool 0-10   Pain Score No Pain   Restrictions/Precautions   Weight Bearing Precautions Per Order No   Other Precautions Cognitive;Chair Alarm;Bed Alarm;O2;Fall Risk   Lifestyle   Autonomy I adls and mobility - family assists with iadls   Reciprocal Relationships supportive family - 2 sons live locally   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   Intrinsic Gratification sedentary   ADL   Eating Assistance 5  Supervision/Setup   Grooming Assistance 4  Minimal Assistance   UB Bathing Assistance 3  Moderate Assistance   LB Bathing Assistance 2  Maximal Assistance   UB Dressing Assistance 3  Moderate Assistance   LB Dressing Assistance 2  Maximal Assistance   Toileting Assistance  2  Maximal Assistance   Bed Mobility   Supine to Sit 3  Moderate assistance   Sit to Supine 3  Moderate assistance   Transfers   Sit to Stand 3  Moderate assistance   Stand to Sit 3  Moderate assistance   Toilet transfer 3  Moderate assistance   Functional Mobility   Functional Mobility 3  Moderate assistance   Additional Comments chair follow   Additional items Rolling walker   Subjective   Subjective \"I don't want to get up I'm so comfortable right now\"   Cognition   Arousal/Participation Arousable;Cooperative   Attention Attends with cues to redirect   Orientation Level Oriented to person;Oriented to " place;Oriented to time;Oriented to situation   Memory Decreased recall of recent events;Decreased recall of precautions   Following Commands Follows one step commands with increased time or repetition   Activity Tolerance   Activity Tolerance Patient limited by fatigue;Treatment limited secondary to medical complications (Comment)   Assessment   Assessment Pt seen for OT session focusing on self care, functional mobility, dynamic balance/safety, safe use of RW, cognition and overall tolerance to activity- functionally requires mod to max a for adls and mod a for functional mob/transfers - poor dynamic standing balance and poor overall tolerance to activity - limited insight into deficits - requires encouragement at times 2* c/o  SHAH - reviewed pursed lip breathing techniques and slowing breathing pattern - continue to recommend level II resources post d/c - OT to continue to follow to address goals as stated on eval   Plan   Treatment Interventions ADL retraining;Functional transfer training;UE strengthening/ROM;Endurance training;Patient/family training;Equipment evaluation/education;Compensatory technique education;Energy conservation;Activityengagement   Goal Expiration Date 01/16/25   OT Treatment Day 2   OT Frequency 2-3x/wk   Discharge Recommendation   Rehab Resource Intensity Level, OT II (Moderate Resource Intensity)   AM-PAC Daily Activity Inpatient   Lower Body Dressing 2   Bathing 2   Toileting 2   Upper Body Dressing 2   Grooming 3   Eating 3   Daily Activity Raw Score 14   Daily Activity Standardized Score (Calc for Raw Score >=11) 33.39   AM-PAC Applied Cognition Inpatient   Following a Speech/Presentation 3   Understanding Ordinary Conversation 4   Taking Medications 3   Remembering Where Things Are Placed or Put Away 3   Remembering List of 4-5 Errands 2   Taking Care of Complicated Tasks 2   Applied Cognition Raw Score 17   Applied Cognition Standardized Score 36.52   End of Consult   Education  Provided Yes   Patient Position at End of Consult Supine;Bed/Chair alarm activated;All needs within reach  (declined to sit OOB in chair at end of session  2* reporting that chair was uncomfortable for her)   Nurse Communication Nurse aware of consult           The patient's raw score on the AM-PAC Daily Activity Inpatient Short Form is 14. A raw score of less than 19 suggests the patient may benefit from discharge to post-acute rehabilitation services. Please refer to the recommendation of the Occupational Therapist for safe discharge planning.      Documentation Completed By:    YAMILET Conklin/L  [employer] Certified - [id number]    \

## 2025-01-09 NOTE — PROGRESS NOTES
"Progress Note - Hospitalist   Name: Remedios Mcgrath 89 y.o. female I MRN: [id number]  Unit/Bed#: [facility] 603-01 I Date of Admission: 12/31/2024   Date of Service: 1/9/2025 I Hospital Day: 9    Assessment & Plan  Pneumonia  Presented to ER after being found down on welfare check. Was noted to be hypoxic in ER (82% on RA) requiring BIPAP and then developed a-fib with RVR.  Was in ICU, transferred out on 1/2.   CT CAP: Acute right-sided pneumonia, greatest at the right lower lobe. Associated loculated parapneumonic effusion. No obvious pleural thickening on noncontrast study, though empyema has to be considered.   Was on IV CTX, transitioned to Amoxicillin BID on 1/4 thru 1/9- as of today completed 10 days total of abx per ID recommendations  Wean o2 as able, continue nebulizers, supportive care  Updated CXR 1/5 showed, \"New left lower lobe consolidation with additional worsening of right base infiltrates.\"  DC planning to rehab once medically stable     New onset atrial fibrillation with RVR (HCC)  Noted while in the ER, cardiology was consulted, spontaneously converted to sinus rhythm currently on Lopressor 25 mg twice daily and Eliquis 2.5 mg for anticoagulation  As she will be discharged to facility, will defer Eliquis price check at this time  Cardiology signed off as of 1/4  Pneumococcal bacteremia  Due to pneumonia.  Repeat blood cultures negative.  TTE (technically difficult) showed MV thickening (likely age-related) but no vegetations.  Low clinical suspicion for endocarditis.  Status post IV ceftriaxone, transitioned to amoxicillin 500 mg PO Q12, completed 10 days total antibiotics  No indication for GARRETT from an ID perspective  Repeat blood cx are negative x2 after 5 days  Sepsis (HCC)  As evidenced by leukocytosis and tachycardia, due to bacteremia and pneumonia  ID following  Plan as above  Acute hypoxic respiratory failure (HCC)  Most likely secondary to pneumonia and severe COPD. Appears was up to 15L in " ICU  Goal O2 88-90%  As will be going to rehab will not require O2 evaluation at this time  Continue nebulizers  Was on IV steroids, transitioned to PO prednisone to complete course  Pulm consult appreciated  O2 requirements have improved  Hypertension  BP overall stable  On lopressor 25 mg bid   Holding PTA norvasc  Moderate protein-calorie malnutrition (HCC)  Malnutrition Findings:   Adult Malnutrition type: Chronic illness  Adult Degree of Malnutrition: Malnutrition of moderate degree  Malnutrition Characteristics: Inadequate energy, Muscle loss                  360 Statement: Chronic/moderate malnutrition r/t inadequate PO intake, as evidenced by intake meeting <75% of estimated needs x > 1 month, and muscle mass depletion (temporal). Treatment: pending ability to advance to PO diet - liberal diet as able and trial oral nutrition supplements pending SLP recommendations    BMI Findings:           Body mass index is 19.35 kg/m².   Encourage oral intake as able  Acute kidney injury superimposed on CKD  (HCC)  Lab Results   Component Value Date    EGFR 49 01/08/2025    EGFR 43 01/07/2025    EGFR 45 01/06/2025    CREATININE 0.97 01/08/2025    CREATININE 1.08 01/07/2025    CREATININE 1.05 01/06/2025     POA 2.47, likely 2/2 sepsis  resolved baseline is 1.3-1.4 and currently below baseline  Monitor BMP  Emphysema lung (HCC)  Patient was treated for COPD exacerbation with IV Solu-Medrol and was transitioned to oral prednisone 40 mg daily for 3 days  Monitor O2 sats  Pulm following as above  Dysphagia  Speech following, currently on modified diet    Pleural effusion, right  Pulm following - do not appear loculated per Pulm and they did not recommend draining them    Hyperglycemia  Likely 2/2 steroids, was prediabetic as of 9/2024  A1C 6.4  SSI  Unwitnessed fall  Patient was a Rapid Respone on 1/5  XRs of right knee and pelvis with no acute osseous abnormalities   PT/OT recommending rehab     VTE Pharmacologic  Prophylaxis:   High Risk (Score >/= 5) - Pharmacological DVT Prophylaxis Ordered: apixaban (Eliquis). Sequential Compression Devices Ordered.    Mobility:   Basic Mobility Inpatient Raw Score: 12  JH-HLM Goal: 4: Move to chair/commode  JH-HLM Achieved: 2: Bed activities/Dependent transfer  JH-HLM Goal NOT achieved. Continue with multidisciplinary rounding and encourage appropriate mobility to improve upon JH-HLM goals.    Patient Centered Rounds: I performed bedside rounds with nursing staff today.   Discussions with Specialists or Other Care Team Provider: d/w RN and CM     Education and Discussions with Family / Patient: Updated  (son) via phone.    Current Length of Stay: 9 day(s)  Current Patient Status: Inpatient   Certification Statement: The patient will continue to require additional inpatient hospital stay due to pending placement   Discharge Plan: Anticipate discharge in 24-48 hrs to rehab facility.    Code Status: Level 3 - DNAR and DNI    Subjective   Pt lying in bed. Reports she is having some stomach cramping and loose stools. Reports having  3 loose stools yesterday and one this am. Denies any sob or chest pain.     Objective :  Temp:  [97.1 °F (36.2 °C)-97.4 °F (36.3 °C)] 97.3 °F (36.3 °C)  HR:  [68-85] 77  BP: (110-135)/(45-56) 114/45  Resp:  [17-19] 17  SpO2:  [91 %-95 %] 93 %  O2 Device: Nasal cannula  Nasal Cannula O2 Flow Rate (L/min):  [3 L/min] 3 L/min    Body mass index is 19.35 kg/m².     Input and Output Summary (last 24 hours):     Intake/Output Summary (Last 24 hours) at 1/9/2025 1219  Last data filed at 1/8/2025 1700  Gross per 24 hour   Intake 360 ml   Output --   Net 360 ml       Physical Exam  Constitutional:       General: She is not in acute distress.     Appearance: She is cachectic. She is not diaphoretic.   Cardiovascular:      Rate and Rhythm: Normal rate and regular rhythm.      Heart sounds: Normal heart sounds. No murmur heard.  Pulmonary:      Effort: No  respiratory distress.      Breath sounds: Normal breath sounds. No wheezing or rales.      Comments: 3lnc, poor effort   Abdominal:      General: Bowel sounds are normal. There is no distension.      Palpations: Abdomen is soft.      Tenderness: There is abdominal tenderness (lower quadrants).   Musculoskeletal:         General: No swelling or tenderness.   Skin:     General: Skin is warm and dry.      Findings: Bruising present. No erythema or rash.   Neurological:      Mental Status: She is alert and oriented to person, place, and time.      Motor: Weakness (generalized) present.   Psychiatric:         Mood and Affect: Mood normal.           Lines/Drains:              Lab Results: I have reviewed the following results:   Results from last 7 days   Lab Units 01/08/25  0445   WBC Thousand/uL 16.37*   HEMOGLOBIN g/dL 11.4*   HEMATOCRIT % 34.7*   PLATELETS Thousands/uL 298     Results from last 7 days   Lab Units 01/08/25  0445 01/06/25  0514 01/05/25  0602   SODIUM mmol/L 140   < > 140   POTASSIUM mmol/L 3.7   < > 3.6   CHLORIDE mmol/L 106   < > 104   CO2 mmol/L 24   < > 25   BUN mg/dL 28*   < > 37*   CREATININE mg/dL 0.97   < > 1.27   ANION GAP mmol/L 10   < > 11   CALCIUM mg/dL 7.1*   < > 8.2*   ALBUMIN g/dL  --   --  3.1*   GLUCOSE RANDOM mg/dL 87   < > 100    < > = values in this interval not displayed.         Results from last 7 days   Lab Units 01/09/25  1146 01/09/25  0807 01/08/25  2110 01/08/25  1710 01/08/25  1119 01/08/25  0749 01/07/25  2106 01/07/25  1749 01/07/25  1226 01/07/25  0826 01/06/25  2239 01/06/25  1700   POC GLUCOSE mg/dl 116 101 139 91 219* 85 153* 169* 146* 90 141* 141*     Results from last 7 days   Lab Units 01/05/25  0602   HEMOGLOBIN A1C % 6.4*           Recent Cultures (last 7 days):   Results from last 7 days   Lab Units 01/02/25  1716   BLOOD CULTURE  No Growth After 5 Days.  No Growth After 5 Days.       Imaging Results Review: I reviewed radiology reports from this admission  including: CT chest, CT abdomen/pelvis, CT head, CT C-spine, and xray(s).  Other Study Results Review: No additional pertinent studies reviewed.    Last 24 Hours Medication List:     Current Facility-Administered Medications:     acetaminophen (TYLENOL) tablet 650 mg, Q6H PRN    albuterol (PROVENTIL HFA,VENTOLIN HFA) inhaler 2 puff, Q4H PRN    amoxicillin (AMOXIL) capsule 500 mg, Q12H KATT    apixaban (ELIQUIS) tablet 2.5 mg, BID    chlorhexidine (PERIDEX) 0.12 % oral rinse 15 mL, Q12H KATT    clotrimazole (LOTRIMIN) 1 % cream, BID    insulin lispro (HumALOG/ADMELOG) 100 units/mL subcutaneous injection 1-5 Units, TID AC **AND** Fingerstick Glucose (POCT), TID AC    insulin lispro (HumALOG/ADMELOG) 100 units/mL subcutaneous injection 1-5 Units, HS    melatonin tablet 3 mg, Once    melatonin tablet 3 mg, HS PRN    metoprolol tartrate (LOPRESSOR) tablet 25 mg, Q12H KATT    QUEtiapine (SEROquel) tablet 12.5 mg, Once    saccharomyces boulardii (FLORASTOR) capsule 250 mg, BID    simethicone (MYLICON) chewable tablet 80 mg, Q6H PRN    Administrative Statements   Today, Patient Was Seen By: KEISHA Lr      **Please Note: This note may have been constructed using a voice recognition system.**

## 2025-01-09 NOTE — ASSESSMENT & PLAN NOTE
Due to pneumonia.  Repeat blood cultures negative.  TTE (technically difficult) showed MV thickening (likely age-related) but no vegetations.  Low clinical suspicion for endocarditis, so GARRETT not indicated.    Continue amoxicillin 500 mg PO Q12 through tonight's dose to complete 10 days total antibiotics

## 2025-01-09 NOTE — ASSESSMENT & PLAN NOTE
Due to pneumonia.  Repeat blood cultures negative.  TTE (technically difficult) showed MV thickening (likely age-related) but no vegetations.  Low clinical suspicion for endocarditis.  Status post IV ceftriaxone, transitioned to amoxicillin 500 mg PO Q12, completed 10 days total antibiotics  No indication for GARRETT from an ID perspective  Repeat blood cx are negative x2 after 5 days

## 2025-01-10 LAB
GLUCOSE SERPL-MCNC: 102 MG/DL (ref 65–140)
GLUCOSE SERPL-MCNC: 108 MG/DL (ref 65–140)
GLUCOSE SERPL-MCNC: 210 MG/DL (ref 65–140)
GLUCOSE SERPL-MCNC: 91 MG/DL (ref 65–140)

## 2025-01-10 PROCEDURE — 82948 REAGENT STRIP/BLOOD GLUCOSE: CPT

## 2025-01-10 PROCEDURE — 99232 SBSQ HOSP IP/OBS MODERATE 35: CPT | Performed by: INTERNAL MEDICINE

## 2025-01-10 PROCEDURE — 99232 SBSQ HOSP IP/OBS MODERATE 35: CPT | Performed by: NURSE PRACTITIONER

## 2025-01-10 RX ADMIN — CLOTRIMAZOLE: 1 CREAM TOPICAL at 17:43

## 2025-01-10 RX ADMIN — ACETAMINOPHEN 650 MG: 325 TABLET, FILM COATED ORAL at 00:48

## 2025-01-10 RX ADMIN — APIXABAN 2.5 MG: 2.5 TABLET, FILM COATED ORAL at 17:43

## 2025-01-10 RX ADMIN — INSULIN LISPRO 1 UNITS: 100 INJECTION, SOLUTION INTRAVENOUS; SUBCUTANEOUS at 21:36

## 2025-01-10 RX ADMIN — APIXABAN 2.5 MG: 2.5 TABLET, FILM COATED ORAL at 08:47

## 2025-01-10 RX ADMIN — MELATONIN TAB 3 MG 3 MG: 3 TAB at 00:48

## 2025-01-10 RX ADMIN — CHLORHEXIDINE GLUCONATE 0.12% ORAL RINSE 15 ML: 1.2 LIQUID ORAL at 08:47

## 2025-01-10 RX ADMIN — CLOTRIMAZOLE: 1 CREAM TOPICAL at 08:47

## 2025-01-10 RX ADMIN — METOPROLOL TARTRATE 25 MG: 25 TABLET, FILM COATED ORAL at 08:47

## 2025-01-10 RX ADMIN — CHLORHEXIDINE GLUCONATE 0.12% ORAL RINSE 15 ML: 1.2 LIQUID ORAL at 21:36

## 2025-01-10 RX ADMIN — Medication 250 MG: at 17:43

## 2025-01-10 RX ADMIN — Medication 250 MG: at 08:47

## 2025-01-10 NOTE — RESTORATIVE TECHNICIAN NOTE
Restorative Technician Note      Patient Name: Remedios Mcgrath     Restorative Tech Visit Date: 01/10/25  Note Type: Mobility  Patient Position Upon Consult: Supine  Activity Performed: Ambulated (Steps to bedside chair)  Assistive Device: Roller walker  Patient Position at End of Consult: Bedside chair; All needs within reach; Bed/Chair alarm activated    LETTY Carson

## 2025-01-10 NOTE — CASE MANAGEMENT
Case Management Discharge Planning Note    Patient name Remedios Mcgrath  Location [facility] 603/[facility] 603-01 MRN [id number]  : 1929 Date 1/10/2025       Current Admission Date: 2024  Current Admission Diagnosis:Pneumonia   Patient Active Problem List    Diagnosis Date Noted Date Diagnosed    Unwitnessed fall 2025     Hyperglycemia 2025     New onset atrial fibrillation with RVR ([facility]) 2025     Sepsis ([facility]) 2025     Pneumococcal bacteremia 2025     Acute kidney injury superimposed on CKD  ([facility]) 2025     Acute hypoxic respiratory failure ([facility]) 2025     Emphysema lung ([facility]) 2025     Dysphagia 2025     Pleural effusion, right 2025     Pneumonia 2025     Moderate protein-calorie malnutrition ([facility]) 2025     Mild protein-calorie malnutrition ([facility]) 10/04/2024     Venous stasis dermatitis of both lower extremities 2024     Stage 3b chronic kidney disease ([facility]) 03/15/2022     Bruises easily 2020     Impaired fasting glucose 2012     Stenosis of left carotid artery 2012     Peripheral vascular disease ([facility]) 2012     Hyperlipidemia 2012     Hypertension 2012     Insomnia 2012       LOS (days): 10  Geometric Mean LOS (GMLOS) (days): 4.9  Days to GMLOS:-4.6     OBJECTIVE:  Risk of Unplanned Readmission Score: 20.66   Current admission status: Inpatient   Preferred Pharmacy:   [facility]  [place], [place] - [address]  [id number]  [place] [address]  Phone: [phone] Fax: [phone]    Primary Care Provider: Dion Tsang MD    Primary Insurance: [employer]  Secondary Insurance:     DISCHARGE DETAILS:    Discharge planning discussed with:: Srini (son) via tc  [place] of Choice: Yes  Comments - [place] of Choice: Discuss FOC  CM contacted family/caregiver?: Yes  Were Treatment Team discharge recommendations reviewed with patient/caregiver?: Yes  Did patient/caregiver verbalize  understanding of patient care needs?: N/A- going to facility  Were patient/caregiver advised of the risks associated with not following Treatment Team discharge recommendations?: Yes    Other Referral/Resources/Interventions Provided:  Interventions: SNF  Referral Comments: This CM discussed accepting SNF facilties with Srini (son).Family in agreement with accepting bed at [facility]. CM discharge support tasked with submitting for auth.    Treatment Team Recommendation: Short Term Rehab  Discharge Destination Plan:: Short Term Rehab

## 2025-01-10 NOTE — ASSESSMENT & PLAN NOTE
Most likely secondary to pneumonia and severe COPD. Appears was up to 15L in ICU  Goal O2 88-90%  As will be going to rehab will not require O2 evaluation at this time  Continue nebulizers  S/p steroids   Pulm consult appreciated  O2 requirements have improved

## 2025-01-10 NOTE — PLAN OF CARE
Problem: Prexisting or High Potential for Compromised Skin Integrity  Goal: Skin integrity is maintained or improved  Description: INTERVENTIONS:  - Identify patients at risk for skin breakdown  - Assess and monitor skin integrity  - Assess and monitor nutrition and hydration status  - Monitor labs   - Assess for incontinence   - Turn and reposition patient  - Assist with mobility/ambulation  - Relieve pressure over bony prominences  - Avoid friction and shearing  - Provide appropriate hygiene as needed including keeping skin clean and dry  - Evaluate need for skin moisturizer/barrier cream  - Collaborate with interdisciplinary team   - Patient/family teaching  - Consider wound care consult   Outcome: Progressing     Problem: RESPIRATORY - ADULT  Goal: Achieves optimal ventilation and oxygenation  Description: INTERVENTIONS:  - Assess for changes in respiratory status  - Assess for changes in mentation and behavior  - Position to facilitate oxygenation and minimize respiratory effort  - Oxygen administered by appropriate delivery if ordered  - Initiate smoking cessation education as indicated  - Encourage broncho-pulmonary hygiene including cough, deep breathe, Incentive Spirometry  - Assess the need for suctioning and aspirate as needed  - Assess and instruct to report SOB or any respiratory difficulty  - Respiratory Therapy support as indicated  Outcome: Progressing     Problem: Nutrition/Hydration-ADULT  Goal: Nutrient/Hydration intake appropriate for improving, restoring or maintaining nutritional needs  Description: Monitor and assess patient's nutrition/hydration status for malnutrition. Collaborate with interdisciplinary team and initiate plan and interventions as ordered.  Monitor patient's weight and dietary intake as ordered or per policy. Utilize nutrition screening tool and intervene as necessary. Determine patient's food preferences and provide high-protein, high-caloric foods as appropriate.      INTERVENTIONS:  - Monitor oral intake, urinary output, labs, and treatment plans  - Assess nutrition and hydration status and recommend course of action  - Evaluate amount of meals eaten  - Assist patient with eating if necessary   - Allow adequate time for meals  - Recommend/ encourage appropriate diets, oral nutritional supplements, and vitamin/mineral supplements  - Order, calculate, and assess calorie counts as needed  - Recommend, monitor, and adjust tube feedings and TPN/PPN based on assessed needs  - Assess need for intravenous fluids  - Provide specific nutrition/hydration education as appropriate  - Include patient/family/caregiver in decisions related to nutrition  Outcome: Progressing     Problem: PAIN - ADULT  Goal: Verbalizes/displays adequate comfort level or baseline comfort level  Description: Interventions:  - Encourage patient to monitor pain and request assistance  - Assess pain using appropriate pain scale  - Administer analgesics based on type and severity of pain and evaluate response  - Implement non-pharmacological measures as appropriate and evaluate response  - Consider cultural and social influences on pain and pain management  - Notify physician/advanced practitioner if interventions unsuccessful or patient reports new pain  Outcome: Progressing     Problem: INFECTION - ADULT  Goal: Absence or prevention of progression during hospitalization  Description: INTERVENTIONS:  - Assess and monitor for signs and symptoms of infection  - Monitor lab/diagnostic results  - Monitor all insertion sites, i.e. indwelling lines, tubes, and drains  - Monitor endotracheal if appropriate and nasal secretions for changes in amount and color  - [place] appropriate cooling/warming therapies per order  - Administer medications as ordered  - Instruct and encourage patient and family to use good hand hygiene technique  - Identify and instruct in appropriate isolation precautions for identified  infection/condition  Outcome: Progressing     Problem: SAFETY ADULT  Goal: Patient will remain free of falls  Description: INTERVENTIONS:  - Educate patient/family on patient safety including physical limitations  - Instruct patient to call for assistance with activity   - Consult OT/PT to assist with strengthening/mobility   - Keep Call bell within reach  - Keep bed low and locked with side rails adjusted as appropriate  - Keep care items and personal belongings within reach  - Initiate and maintain comfort rounds  - Make Fall Risk Sign visible to staff  - Apply yellow socks and bracelet for high fall risk patients  - Consider moving patient to room near nurses station  Outcome: Progressing  Goal: Maintain or return to baseline ADL function  Description: INTERVENTIONS:  -  Assess patient's ability to carry out ADLs; assess patient's baseline for ADL function and identify physical deficits which impact ability to perform ADLs (bathing, care of mouth/teeth, toileting, grooming, dressing, etc.)  - Assess/evaluate cause of self-care deficits   - Assess range of motion  - Assess patient's mobility; develop plan if impaired  - Assess patient's need for assistive devices and provide as appropriate  - Encourage maximum independence but intervene and supervise when necessary  - Involve family in performance of ADLs  - Assess for home care needs following discharge   - Consider OT consult to assist with ADL evaluation and planning for discharge  - Provide patient education as appropriate  Outcome: Progressing  Goal: Maintains/Returns to pre admission functional level  Description: INTERVENTIONS:  - Perform AM-PAC 6 Click Basic Mobility/ Daily Activity assessment daily.  - Set and communicate daily mobility goal to care team and patient/family/caregiver.   - Collaborate with rehabilitation services on mobility goals if consulted  - Out of bed for toileting  - Record patient progress and toleration of activity level   Outcome:  Progressing     Problem: DISCHARGE PLANNING  Goal: Discharge to home or other facility with appropriate resources  Description: INTERVENTIONS:  - Identify barriers to discharge w/patient and caregiver  - Arrange for needed discharge resources and transportation as appropriate  - Identify discharge learning needs (meds, wound care, etc.)  - Arrange for interpretive services to assist at discharge as needed  - Refer to Case Management Department for coordinating discharge planning if the patient needs post-hospital services based on physician/advanced practitioner order or complex needs related to functional status, cognitive ability, or social support system  Outcome: Progressing     Problem: Knowledge Deficit  Goal: Patient/family/caregiver demonstrates understanding of disease process, treatment plan, medications, and discharge instructions  Description: Complete learning assessment and assess knowledge base.  Interventions:  - Provide teaching at level of understanding  - Provide teaching via preferred learning methods  Outcome: Progressing

## 2025-01-10 NOTE — ASSESSMENT & PLAN NOTE
WBC, HR.  Due to bacteremia, pneumonia as below.  No other appreciable source.  Improving with antibiotics.  Persistent WBC likely due to recent steroids, now trending downward.    Continue to follow closely off antibiotic  Follow temperatures and WBC closely  Supportive care as per the primary service

## 2025-01-10 NOTE — ASSESSMENT & PLAN NOTE
Due to pneumonia.  Repeat blood cultures negative.  TTE (technically difficult) showed MV thickening (likely age-related) but no vegetations.  Low clinical suspicion for endocarditis, so GARRETT not indicated.  Improved status post 10 days total antibiotics.    Continue to follow closely off antibiotics

## 2025-01-10 NOTE — ASSESSMENT & PLAN NOTE
"Presented to ER after being found down on welfare check. Was noted to be hypoxic in ER (82% on RA) requiring BIPAP and then developed a-fib with RVR.  Was in ICU, transferred out on 1/2.   CT CAP: Acute right-sided pneumonia, greatest at the right lower lobe. Associated loculated parapneumonic effusion. No obvious pleural thickening on noncontrast study, though empyema has to be considered.   Was on IV CTX, transitioned to Amoxicillin BID on 1/4 thru 1/9- completed 10 days total of abx per ID recommendations  Wean o2 as able, continue nebulizers, supportive care  Updated CXR 1/5 showed, \"New left lower lobe consolidation with additional worsening of right base infiltrates.\"  DC planning to rehab once medically stable     "

## 2025-01-10 NOTE — PROGRESS NOTES
Progress Note - Infectious Disease   Name: Remedios Mcgrath 89 y.o. female I MRN: [id number]  Unit/Bed#: [facility] 603-01 I Date of Admission: 12/31/2024   Date of Service: 1/10/2025 I Hospital Day: 10    Assessment & Plan  Sepsis (HCC)  WBC, HR.  Due to bacteremia, pneumonia as below.  No other appreciable source.  Improving with antibiotics.  Persistent WBC likely due to recent steroids, now trending downward.    Continue to follow closely off antibiotic  Follow temperatures and WBC closely  Supportive care as per the primary service  Pneumococcal bacteremia  Due to pneumonia.  Repeat blood cultures negative.  TTE (technically difficult) showed MV thickening (likely age-related) but no vegetations.  Low clinical suspicion for endocarditis, so GARRETT not indicated.  Improved status post 10 days total antibiotics.    Continue to follow closely off antibiotics  Pneumonia  Presumed pneumococcal given positive urinary antigen, bacteremia as above.  Improved status post 10 days total antibiotics.    Continue to follow closely off antibiotics  Follow respiratory status closely  Acute hypoxic respiratory failure (HCC)  Due to pneumonia in setting of severe emphysema.    Wean O2 per pulmonology  Pleural effusion, right  Likely parapneumonic.  Small on CT.  Not well-visualized on repeat CXR.  Not significant enough to tap per Pulmonary.  Acute kidney injury superimposed on CKD  (HCC)  Baseline Cr 1.3-1.4.  Improved.  CrCl<30.  Dysphagia  Risk factor for aspiration, pneumonia.  Improving per SLP notes.  Emphysema lung ([facility])  Unknown severity.  Risk factor for pneumonia.  Consideration for exacerbation.  Status post steroid taper per pulmonology    I have discussed with JAS Hernandez with [facility] the above plan to follow closely off antibiotics. She agrees with the plan.  The patient is stable from an ID standpoint  We will sign off for now.  Please call with new questions.    Antibiotics:  None    Subjective   Patient has no fever, chills,  sweats; no nausea, vomiting, diarrhea; no cough, shortness of breath; no pain. No new symptoms.  Soft stool noted by nursing.    Objective :  Temp:  [97 °F (36.1 °C)-97.2 °F (36.2 °C)] 97.2 °F (36.2 °C)  HR:  [68-75] 68  BP: (126-132)/(50-51) 131/51  Resp:  [16-18] 18  SpO2:  [91 %-94 %] 91 %  O2 Device: Nasal cannula  Nasal Cannula O2 Flow Rate (L/min):  [3 L/min] 3 L/min    General:  No acute distress  Psychiatric:  Awake and alert  Pulmonary:  Normal respiratory excursion without accessory muscle use  Abdomen:  Soft, nontender  Extremities:  No edema  Skin:  No rashes      Lab Results: I have reviewed the following results:  Results from last 7 days   Lab Units 01/08/25 0445 01/07/25  0904 01/06/25  0514   WBC Thousand/uL 16.37* 18.37* 16.54*   HEMOGLOBIN g/dL 11.4* 12.5 11.4*   PLATELETS Thousands/uL 298 329 282     Results from last 7 days   Lab Units 01/08/25 0445 01/07/25  0904 01/06/25  0514 01/05/25  0602   SODIUM mmol/L 140 140 140 140   POTASSIUM mmol/L 3.7 3.6 4.0 3.6   CHLORIDE mmol/L 106 106 106 104   CO2 mmol/L 24 27 26 25   BUN mg/dL 28* 29* 34* 37*   CREATININE mg/dL 0.97 1.08 1.05 1.27   EGFR ml/min/1.73sq m 49 43 45 35   CALCIUM mg/dL 7.1* 7.4* 7.9* 8.2*   ALBUMIN g/dL  --   --   --  3.1*

## 2025-01-10 NOTE — ASSESSMENT & PLAN NOTE
Presumed pneumococcal given positive urinary antigen, bacteremia as above.  Improved status post 10 days total antibiotics.    Continue to follow closely off antibiotics  Follow respiratory status closely

## 2025-01-10 NOTE — CASE MANAGEMENT
[facility] received request for authorization from Care Manager.  Authorization request submitted for: SNF  Facility Name: [facility] NPI: [id number]   Facility MD: Dr. Barbara Benoit  NPI: [id number]  Authorization initiated by contacting insurance:  [employer]  Via: [employer] Portal   Clinicals submitted via [employer] attachment   Pending Reference #: [id number]    Care Manager notified: Julian Blue    Updates to authorization status will be noted in chart. Please reach out to CM for updates on any clinical information.

## 2025-01-10 NOTE — PROGRESS NOTES
"Progress Note - Hospitalist   Name: Remedios Mcgrath 89 y.o. female I MRN: [id number]  Unit/Bed#: [facility] 603-01 I Date of Admission: 12/31/2024   Date of Service: 1/10/2025 I Hospital Day: 10    Assessment & Plan  Pneumonia  Presented to ER after being found down on welfare check. Was noted to be hypoxic in ER (82% on RA) requiring BIPAP and then developed a-fib with RVR.  Was in ICU, transferred out on 1/2.   CT CAP: Acute right-sided pneumonia, greatest at the right lower lobe. Associated loculated parapneumonic effusion. No obvious pleural thickening on noncontrast study, though empyema has to be considered.   Was on IV CTX, transitioned to Amoxicillin BID on 1/4 thru 1/9- completed 10 days total of abx per ID recommendations  Wean o2 as able, continue nebulizers, supportive care  Updated CXR 1/5 showed, \"New left lower lobe consolidation with additional worsening of right base infiltrates.\"  DC planning to rehab once medically stable     New onset atrial fibrillation with RVR (HCC)  Noted while in the ER, cardiology was consulted, spontaneously converted to sinus rhythm currently on Lopressor 25 mg twice daily and Eliquis 2.5 mg for anticoagulation  As she will be discharged to facility, will defer Eliquis price check at this time  Cardiology signed off as of 1/4  Pneumococcal bacteremia  Due to pneumonia.  Repeat blood cultures negative.  TTE (technically difficult) showed MV thickening (likely age-related) but no vegetations.  Low clinical suspicion for endocarditis.  Status post IV ceftriaxone, transitioned to amoxicillin 500 mg PO Q12, completed 10 days total antibiotics  No indication for GARRETT from an ID perspective  Repeat blood cx are negative x2 after 5 days  Sepsis (HCC)  As evidenced by leukocytosis and tachycardia, due to bacteremia and pneumonia  ID following  Plan as above  Acute hypoxic respiratory failure (HCC)  Most likely secondary to pneumonia and severe COPD. Appears was up to 15L in ICU  Goal O2 " 88-90%  As will be going to rehab will not require O2 evaluation at this time  Continue nebulizers  S/p steroids   Pulm consult appreciated  O2 requirements have improved  Hypertension  BP overall stable  On lopressor 25 mg bid   Holding PTA norvasc  Moderate protein-calorie malnutrition (HCC)  Malnutrition Findings:   Adult Malnutrition type: Chronic illness  Adult Degree of Malnutrition: Malnutrition of moderate degree  Malnutrition Characteristics: Inadequate energy, Muscle loss                  360 Statement: Chronic/moderate malnutrition r/t inadequate PO intake, as evidenced by intake meeting <75% of estimated needs x > 1 month, and muscle mass depletion (temporal). Treatment: pending ability to advance to PO diet - liberal diet as able and trial oral nutrition supplements pending SLP recommendations    BMI Findings:           Body mass index is 19.35 kg/m².   Encourage oral intake as able  Acute kidney injury superimposed on CKD  (HCC)  Lab Results   Component Value Date    EGFR 49 01/08/2025    EGFR 43 01/07/2025    EGFR 45 01/06/2025    CREATININE 0.97 01/08/2025    CREATININE 1.08 01/07/2025    CREATININE 1.05 01/06/2025     POA 2.47, likely 2/2 sepsis  resolved baseline is 1.3-1.4 and currently below baseline  Monitor BMP  Emphysema lung (HCC)  Patient was treated for COPD exacerbation with IV Solu-Medrol and was transitioned to oral prednisone 40 mg daily for 3 days  Monitor O2 sats  Pulm following as above  Dysphagia  Speech following, currently on modified diet    Pleural effusion, right  Pulm following - do not appear loculated per Pulm and they did not recommend draining them    Hyperglycemia  Likely 2/2 steroids, was prediabetic as of 9/2024  A1C 6.4  SSI  Unwitnessed fall  Patient was a Rapid Respone on 1/5  XRs of right knee and pelvis with no acute osseous abnormalities   PT/OT recommending rehab     VTE Pharmacologic Prophylaxis:   High Risk (Score >/= 5) - Pharmacological DVT Prophylaxis  Ordered: apixaban (Eliquis). Sequential Compression Devices Ordered.    Mobility:   Basic Mobility Inpatient Raw Score: 11  JH-HLM Goal: 4: Move to chair/commode  JH-HLM Achieved: 4: Move to chair/commode  JH-HLM Goal NOT achieved. Continue with multidisciplinary rounding and encourage appropriate mobility to improve upon JH-HLM goals.    Patient Centered Rounds: I performed bedside rounds with nursing staff today.   Discussions with Specialists or Other Care Team Provider: d/w RN and CM     Education and Discussions with Family / Patient: Updated  (son) via phone.    Current Length of Stay: 10 day(s)  Current Patient Status: Inpatient   Certification Statement: The patient will continue to require additional inpatient hospital stay due to pending placement   Discharge Plan: Anticipate discharge later today or tomorrow to rehab facility.    Code Status: Level 3 - DNAR and DNI    Subjective   Patient sitting out of bed in the chair requesting that she be repositioned because it is hurting her back.  When moved position she reports she feels better.  Per RN patient had a soft bowel movement this morning but denied any diarrhea or loose stools.  Patient denies any abdominal pain or cramping at this time.  Patient denies any difficulty with breathing or chest pain.    Objective :  Temp:  [97 °F (36.1 °C)-97.2 °F (36.2 °C)] 97.2 °F (36.2 °C)  HR:  [68-75] 68  BP: (126-132)/(50-51) 131/51  Resp:  [16-18] 18  SpO2:  [91 %-94 %] 91 %  O2 Device: Nasal cannula  Nasal Cannula O2 Flow Rate (L/min):  [3 L/min] 3 L/min    Body mass index is 19.35 kg/m².     Input and Output Summary (last 24 hours):     Intake/Output Summary (Last 24 hours) at 1/10/2025 1107  Last data filed at 1/10/2025 0817  Gross per 24 hour   Intake 420 ml   Output --   Net 420 ml       Physical Exam  Constitutional:       General: She is not in acute distress.     Appearance: She is cachectic.   Cardiovascular:      Rate and Rhythm: Normal rate  and regular rhythm.      Heart sounds: Normal heart sounds. No murmur heard.  Pulmonary:      Effort: Pulmonary effort is normal. No respiratory distress.      Breath sounds: Normal breath sounds. No wheezing or rales.      Comments: 3LNC  Abdominal:      General: Bowel sounds are normal. There is no distension.      Palpations: Abdomen is soft.      Tenderness: There is no abdominal tenderness.   Musculoskeletal:         General: No swelling or tenderness.   Skin:     General: Skin is warm and dry.      Findings: Bruising present. No erythema or rash.   Neurological:      Mental Status: She is alert and oriented to person, place, and time.      Motor: Weakness present.   Psychiatric:         Mood and Affect: Mood normal.           Lines/Drains:              Lab Results: I have reviewed the following results:   Results from last 7 days   Lab Units 01/08/25  0445   WBC Thousand/uL 16.37*   HEMOGLOBIN g/dL 11.4*   HEMATOCRIT % 34.7*   PLATELETS Thousands/uL 298     Results from last 7 days   Lab Units 01/08/25  0445 01/06/25  0514 01/05/25  0602   SODIUM mmol/L 140   < > 140   POTASSIUM mmol/L 3.7   < > 3.6   CHLORIDE mmol/L 106   < > 104   CO2 mmol/L 24   < > 25   BUN mg/dL 28*   < > 37*   CREATININE mg/dL 0.97   < > 1.27   ANION GAP mmol/L 10   < > 11   CALCIUM mg/dL 7.1*   < > 8.2*   ALBUMIN g/dL  --   --  3.1*   GLUCOSE RANDOM mg/dL 87   < > 100    < > = values in this interval not displayed.         Results from last 7 days   Lab Units 01/10/25  0800 01/09/25  2051 01/09/25  1714 01/09/25  1146 01/09/25  0807 01/08/25  2110 01/08/25  1710 01/08/25  1119 01/08/25  0749 01/07/25  2106 01/07/25  1749 01/07/25  1226   POC GLUCOSE mg/dl 102 116 100 116 101 139 91 219* 85 153* 169* 146*     Results from last 7 days   Lab Units 01/05/25  0602   HEMOGLOBIN A1C % 6.4*           Recent Cultures (last 7 days):         Imaging Results Review: I reviewed radiology reports from this admission including: CT chest, CT  abdomen/pelvis, CT head, CT C-spine, and xray(s).  Other Study Results Review: No additional pertinent studies reviewed.    Last 24 Hours Medication List:     Current Facility-Administered Medications:     acetaminophen (TYLENOL) tablet 650 mg, Q6H PRN    albuterol (PROVENTIL HFA,VENTOLIN HFA) inhaler 2 puff, Q4H PRN    apixaban (ELIQUIS) tablet 2.5 mg, BID    chlorhexidine (PERIDEX) 0.12 % oral rinse 15 mL, Q12H KATT    clotrimazole (LOTRIMIN) 1 % cream, BID    dicyclomine (BENTYL) capsule 10 mg, TID PRN    insulin lispro (HumALOG/ADMELOG) 100 units/mL subcutaneous injection 1-5 Units, TID AC **AND** Fingerstick Glucose (POCT), TID AC    insulin lispro (HumALOG/ADMELOG) 100 units/mL subcutaneous injection 1-5 Units, HS    melatonin tablet 3 mg, Once    melatonin tablet 3 mg, HS PRN    metoprolol tartrate (LOPRESSOR) tablet 25 mg, Q12H KATT    QUEtiapine (SEROquel) tablet 12.5 mg, Once    saccharomyces boulardii (FLORASTOR) capsule 250 mg, BID    simethicone (MYLICON) chewable tablet 80 mg, Q6H PRN    Administrative Statements   Today, Patient Was Seen By: KEISHA Lr      **Please Note: This note may have been constructed using a voice recognition system.**

## 2025-01-11 PROBLEM — D64.9 ANEMIA: Status: ACTIVE | Noted: 2025-01-11

## 2025-01-11 PROBLEM — R26.2 AMBULATORY DYSFUNCTION: Status: ACTIVE | Noted: 2025-01-11

## 2025-01-11 LAB
ANION GAP SERPL CALCULATED.3IONS-SCNC: 7 MMOL/L (ref 4–13)
BUN SERPL-MCNC: 20 MG/DL (ref 5–25)
CALCIUM SERPL-MCNC: 7.1 MG/DL (ref 8.4–10.2)
CHLORIDE SERPL-SCNC: 106 MMOL/L (ref 96–108)
CO2 SERPL-SCNC: 26 MMOL/L (ref 21–32)
CREAT SERPL-MCNC: 1.08 MG/DL (ref 0.6–1.3)
ERYTHROCYTE [DISTWIDTH] IN BLOOD BY AUTOMATED COUNT: 13.3 % (ref 11.6–15.1)
GFR SERPL CREATININE-BSD FRML MDRD: 43 ML/MIN/1.73SQ M
GLUCOSE SERPL-MCNC: 102 MG/DL (ref 65–140)
GLUCOSE SERPL-MCNC: 118 MG/DL (ref 65–140)
GLUCOSE SERPL-MCNC: 127 MG/DL (ref 65–140)
GLUCOSE SERPL-MCNC: 174 MG/DL (ref 65–140)
GLUCOSE SERPL-MCNC: 99 MG/DL (ref 65–140)
HCT VFR BLD AUTO: 29.3 % (ref 34.8–46.1)
HGB BLD-MCNC: 9.5 G/DL (ref 11.5–15.4)
MCH RBC QN AUTO: 30.9 PG (ref 26.8–34.3)
MCHC RBC AUTO-ENTMCNC: 32.4 G/DL (ref 31.4–37.4)
MCV RBC AUTO: 95 FL (ref 82–98)
PLATELET # BLD AUTO: 394 THOUSANDS/UL (ref 149–390)
PMV BLD AUTO: 9.6 FL (ref 8.9–12.7)
POTASSIUM SERPL-SCNC: 3.9 MMOL/L (ref 3.5–5.3)
RBC # BLD AUTO: 3.07 MILLION/UL (ref 3.81–5.12)
SODIUM SERPL-SCNC: 139 MMOL/L (ref 135–147)
WBC # BLD AUTO: 13.5 THOUSAND/UL (ref 4.31–10.16)

## 2025-01-11 PROCEDURE — 82948 REAGENT STRIP/BLOOD GLUCOSE: CPT

## 2025-01-11 PROCEDURE — 99232 SBSQ HOSP IP/OBS MODERATE 35: CPT | Performed by: PHYSICIAN ASSISTANT

## 2025-01-11 PROCEDURE — 85027 COMPLETE CBC AUTOMATED: CPT | Performed by: NURSE PRACTITIONER

## 2025-01-11 PROCEDURE — 80048 BASIC METABOLIC PNL TOTAL CA: CPT | Performed by: NURSE PRACTITIONER

## 2025-01-11 RX ORDER — ALPRAZOLAM 0.25 MG/1
0.25 TABLET ORAL ONCE
Status: COMPLETED | OUTPATIENT
Start: 2025-01-11 | End: 2025-01-11

## 2025-01-11 RX ORDER — SIMETHICONE 80 MG
80 TABLET,CHEWABLE ORAL
Status: DISCONTINUED | OUTPATIENT
Start: 2025-01-11 | End: 2025-01-15 | Stop reason: HOSPADM

## 2025-01-11 RX ORDER — DICYCLOMINE HYDROCHLORIDE 10 MG/1
10 CAPSULE ORAL
Status: DISCONTINUED | OUTPATIENT
Start: 2025-01-11 | End: 2025-01-15 | Stop reason: HOSPADM

## 2025-01-11 RX ORDER — ACETAMINOPHEN 325 MG/1
975 TABLET ORAL EVERY 8 HOURS SCHEDULED
Status: DISCONTINUED | OUTPATIENT
Start: 2025-01-11 | End: 2025-01-15 | Stop reason: HOSPADM

## 2025-01-11 RX ADMIN — Medication 250 MG: at 15:57

## 2025-01-11 RX ADMIN — CLOTRIMAZOLE: 1 CREAM TOPICAL at 09:44

## 2025-01-11 RX ADMIN — CHLORHEXIDINE GLUCONATE 0.12% ORAL RINSE 15 ML: 1.2 LIQUID ORAL at 09:44

## 2025-01-11 RX ADMIN — SIMETHICONE 80 MG: 80 TABLET, CHEWABLE ORAL at 15:57

## 2025-01-11 RX ADMIN — APIXABAN 2.5 MG: 2.5 TABLET, FILM COATED ORAL at 09:42

## 2025-01-11 RX ADMIN — MELATONIN TAB 3 MG 3 MG: 3 TAB at 02:34

## 2025-01-11 RX ADMIN — Medication 250 MG: at 09:42

## 2025-01-11 RX ADMIN — SIMETHICONE 80 MG: 80 TABLET, CHEWABLE ORAL at 21:17

## 2025-01-11 RX ADMIN — ACETAMINOPHEN 975 MG: 325 TABLET, FILM COATED ORAL at 21:16

## 2025-01-11 RX ADMIN — METOPROLOL TARTRATE 25 MG: 25 TABLET, FILM COATED ORAL at 20:38

## 2025-01-11 RX ADMIN — ALPRAZOLAM 0.25 MG: 0.25 TABLET ORAL at 19:45

## 2025-01-11 RX ADMIN — CHLORHEXIDINE GLUCONATE 0.12% ORAL RINSE 15 ML: 1.2 LIQUID ORAL at 20:38

## 2025-01-11 RX ADMIN — DICYCLOMINE HYDROCHLORIDE 10 MG: 10 CAPSULE ORAL at 15:57

## 2025-01-11 RX ADMIN — INSULIN LISPRO 1 UNITS: 100 INJECTION, SOLUTION INTRAVENOUS; SUBCUTANEOUS at 16:50

## 2025-01-11 RX ADMIN — ACETAMINOPHEN 650 MG: 325 TABLET, FILM COATED ORAL at 02:34

## 2025-01-11 RX ADMIN — METOPROLOL TARTRATE 25 MG: 25 TABLET, FILM COATED ORAL at 09:43

## 2025-01-11 RX ADMIN — CLOTRIMAZOLE: 1 CREAM TOPICAL at 16:00

## 2025-01-11 RX ADMIN — DICYCLOMINE HYDROCHLORIDE 10 MG: 10 CAPSULE ORAL at 05:35

## 2025-01-11 RX ADMIN — APIXABAN 2.5 MG: 2.5 TABLET, FILM COATED ORAL at 15:57

## 2025-01-11 RX ADMIN — SIMETHICONE 80 MG: 80 TABLET, CHEWABLE ORAL at 13:05

## 2025-01-11 RX ADMIN — DICYCLOMINE HYDROCHLORIDE 10 MG: 10 CAPSULE ORAL at 13:04

## 2025-01-11 RX ADMIN — ACETAMINOPHEN 975 MG: 325 TABLET, FILM COATED ORAL at 13:04

## 2025-01-11 NOTE — CASE MANAGEMENT
Case Management Assessment & Discharge Planning Note    Patient name Remedios Mcgrath  Location [facility] 603/[facility] 603-01 MRN [id number]  : 1929 Date 2025       Current Admission Date: 2024  Current Admission Diagnosis:Pneumonia   Patient Active Problem List    Diagnosis Date Noted Date Diagnosed    Unwitnessed fall 2025     Hyperglycemia 2025     New onset atrial fibrillation with RVR ([facility]) 2025     Sepsis ([facility]) 2025     Pneumococcal bacteremia 2025     Acute kidney injury superimposed on CKD  ([facility]) 2025     Acute hypoxic respiratory failure ([facility]) 2025     Emphysema lung ([facility]) 2025     Dysphagia 2025     Pleural effusion, right 2025     Pneumonia 2025     Moderate protein-calorie malnutrition ([facility]) 2025     Mild protein-calorie malnutrition ([facility]) 10/04/2024     Venous stasis dermatitis of both lower extremities 2024     Stage 3b chronic kidney disease ([facility]) 03/15/2022     Bruises easily 2020     Impaired fasting glucose 2012     Stenosis of left carotid artery 2012     Peripheral vascular disease ([facility]) 2012     Hyperlipidemia 2012     Hypertension 2012     Insomnia 2012       LOS (days): 11  Geometric Mean LOS (GMLOS) (days): 4.9  Days to GMLOS:-5.5     OBJECTIVE:    Risk of Unplanned Readmission Score: 18.48         Current admission status: Inpatient       Preferred Pharmacy:   [facility] [place], [place] - [address]  [address]  [place] [address]  Phone: [phone] Fax: [phone]    Primary Care Provider: Dion Tsang MD    Primary Insurance: [facility]  Secondary Insurance:     ASSESSMENT:  Active Health Care Proxies    There are no active Health Care Proxies on file.                                                      Social Determinants of Health (SDOH)      Flowsheet Row Most Recent Value   Housing Stability    In the last 12 months, was there  a time when you were not able to pay the mortgage or rent on time? N   At any time in the past 12 months, were you homeless or living in a shelter (including now)? N   Transportation Needs    In the past 12 months, has lack of transportation kept you from medical appointments or from getting medications? no   In the past 12 months, has lack of transportation kept you from meetings, work, or from getting things needed for daily living? No   Food Insecurity    Within the past 12 months, you worried that your food would run out before you got the money to buy more. Never true   Within the past 12 months, the food you bought just didn't last and you didn't have money to get more. Never true   Utilities    In the past 12 months has the electric, gas, oil, or water company threatened to shut off services in your home? No            DISCHARGE DETAILS:    Discharge planning discussed with:: Chart reviewed. Pt remains medically cleared for discharged pending placement. [facility] following pt pending auth. Auth initiated and still pending at this time. Cm will continue to follow.

## 2025-01-11 NOTE — ASSESSMENT & PLAN NOTE
Generalized weakness 2/2 above   PT/OT, rehab planning  With diffuse body aches/pains, and also reporting intermittent gas.  Will schedule tylenol, bentyl, simethicone.  Can trial low dose oxy if not effective, SE d/w family

## 2025-01-11 NOTE — ASSESSMENT & PLAN NOTE
Hgb 11.4-->9.5   D/w nursing, no overt bleeding.  Hgb was in 9s on 1/3  New start AC with Eliquis 2/2 a fib  Monitor CBC

## 2025-01-11 NOTE — ASSESSMENT & PLAN NOTE
Malnutrition Findings:   Adult Malnutrition type: Chronic illness  Adult Degree of Malnutrition: Malnutrition of moderate degree  Malnutrition Characteristics: Inadequate energy, Muscle loss                  360 Statement: Chronic/moderate malnutrition r/t inadequate PO intake, as evidenced by intake meeting <75% of estimated needs x > 1 month, and muscle mass depletion (temporal). Treatment: pending ability to advance to PO diet - liberal diet as able and trial oral nutrition supplements pending SLP recommendations    BMI Findings:           Body mass index is 18.25 kg/m².   Encourage oral intake as able

## 2025-01-11 NOTE — PROGRESS NOTES
"Progress Note - Hospitalist   Name: Remedios Mcgrath 89 y.o. female I MRN: [id number]  Unit/Bed#: [facility] 603-01 I Date of Admission: 12/31/2024   Date of Service: 1/11/2025 I Hospital Day: 11    Assessment & Plan  Pneumonia  Presented to ER after being found down on welfare check. Was noted to be hypoxic in ER (82% on RA) requiring BIPAP and then developed a-fib with RVR.  Was in ICU, transferred out on 1/2.   CT CAP: Acute right-sided pneumonia, greatest at the right lower lobe. Associated loculated parapneumonic effusion. No obvious pleural thickening on noncontrast study, though empyema has to be considered.   Was on IV CTX, transitioned to Amoxicillin BID on 1/4 thru 1/9- completed 10 days total of abx per ID recommendations  Wean o2 as able, continue nebulizers, supportive care  Updated CXR 1/5 showed, \"New left lower lobe consolidation with additional worsening of right base infiltrates.\"  DC planning to rehab once medically stable   New onset atrial fibrillation with RVR (HCC)  Noted while in the ER, cardiology was consulted, spontaneously converted to sinus rhythm currently on Lopressor 25 mg twice daily and Eliquis 2.5 mg for anticoagulation  As she will be discharged to facility, will defer Eliquis price check at this time  Cardiology signed off as of 1/4  Pneumococcal bacteremia  Due to pneumonia.  Repeat blood cultures negative.  TTE (technically difficult) showed MV thickening (likely age-related) but no vegetations.  Low clinical suspicion for endocarditis.  Status post IV ceftriaxone, transitioned to amoxicillin 500 mg PO Q12, completed 10 days total antibiotics  No indication for GARRETT from an ID perspective  Repeat blood cx are negative x2 after 5 days  Sepsis (HCC)  As evidenced by leukocytosis and tachycardia, due to bacteremia and pneumonia  ID following  Plan as above  Acute hypoxic respiratory failure (HCC)  Most likely secondary to pneumonia and severe COPD. Appears was up to 15L in ICU  Goal O2 " 88-90%  As will be going to rehab will not require O2 evaluation at this time  Continue nebulizers  S/p steroids   Pulm consult appreciated  O2 requirements have improved  Ambulatory dysfunction  Generalized weakness 2/2 above   PT/OT, rehab planning  With diffuse body aches/pains, and also reporting intermittent gas.  Will schedule tylenol, bentyl, simethicone.  Can trial low dose oxy if not effective, SE d/w family  Anemia  Hgb 11.4-->9.5   D/w nursing, no overt bleeding.  Hgb was in 9s on 1/3  New start AC with Eliquis 2/2 a fib  Monitor CBC   Hypertension  BP overall stable  On lopressor 25 mg bid   Holding PTA norvasc  Moderate protein-calorie malnutrition (HCC)  Malnutrition Findings:   Adult Malnutrition type: Chronic illness  Adult Degree of Malnutrition: Malnutrition of moderate degree  Malnutrition Characteristics: Inadequate energy, Muscle loss                  360 Statement: Chronic/moderate malnutrition r/t inadequate PO intake, as evidenced by intake meeting <75% of estimated needs x > 1 month, and muscle mass depletion (temporal). Treatment: pending ability to advance to PO diet - liberal diet as able and trial oral nutrition supplements pending SLP recommendations    BMI Findings:           Body mass index is 18.25 kg/m².   Encourage oral intake as able  Acute kidney injury superimposed on CKD  (HCC)  Lab Results   Component Value Date    EGFR 43 01/11/2025    EGFR 49 01/08/2025    EGFR 43 01/07/2025    CREATININE 1.08 01/11/2025    CREATININE 0.97 01/08/2025    CREATININE 1.08 01/07/2025     POA 2.47, likely 2/2 sepsis  resolved baseline is 1.3-1.4 and currently below baseline  Monitor BMP  Emphysema lung (HCC)  Patient was treated for COPD exacerbation with IV Solu-Medrol and was transitioned to oral prednisone 40 mg daily for 3 days  Monitor O2 sats  Pulm following as above  Dysphagia  Speech following, currently on modified diet  Pleural effusion, right  Pulm following - do not appear loculated  "per Pulm and they did not recommend draining them  Hyperglycemia  Likely 2/2 steroids, was prediabetic as of 9/2024  A1C 6.4  SSI  Unwitnessed fall  Patient was a Rapid Respone on 1/5  XRs of right knee and pelvis with no acute osseous abnormalities   PT/OT recommending rehab     VTE Pharmacologic Prophylaxis:   Moderate Risk (Score 3-4) - Pharmacological DVT Prophylaxis Ordered: apixaban (Eliquis).    Mobility:   Basic Mobility Inpatient Raw Score: 11  JH-HLM Goal: 4: Move to chair/commode  JH-HLM Achieved: 4: Move to chair/commode  JH-HLM Goal achieved. Continue to encourage appropriate mobility.    Patient Centered Rounds: I performed bedside rounds with nursing staff today.   Discussions with Specialists or Other Care Team Provider: GARY.    Education and Discussions with Family / Patient: Updated  (multiple) at bedside.    Current Length of Stay: 11 day(s)  Current Patient Status: Inpatient   Certification Statement: The patient will continue to require additional inpatient hospital stay due to safe dc planning to rehab  Discharge Plan: Anticipate discharge in 24-48 hrs to rehab facility.    Code Status: Level 3 - DNAR and DNI    Subjective   Appears somewhat delirious.  She feels generally weak.  She reports to pain \"all over\" and struggles to characterize this, but appears to be having diffuse body aches, and intermittent stomach/gas pains from d/w family which they believe is from being in bed for so long.      Objective :  Temp:  [97.2 °F (36.2 °C)] 97.2 °F (36.2 °C)  HR:  [63-85] 73  BP: (106-132)/(41-51) 130/45  Resp:  [16-17] 17  SpO2:  [87 %-91 %] 87 %  O2 Device: Nasal cannula  Nasal Cannula O2 Flow Rate (L/min):  [3 L/min] 3 L/min    Body mass index is 18.25 kg/m².     Input and Output Summary (last 24 hours):     Intake/Output Summary (Last 24 hours) at 1/11/2025 1330  Last data filed at 1/11/2025 1258  Gross per 24 hour   Intake 300 ml   Output 75 ml   Net 225 ml       Physical " Exam  Vitals reviewed.   Constitutional:       General: She is not in acute distress.     Appearance: She is not toxic-appearing.   HENT:      Head: Normocephalic and atraumatic.   Eyes:      Extraocular Movements: Extraocular movements intact.   Cardiovascular:      Rate and Rhythm: Normal rate and regular rhythm.   Pulmonary:      Effort: Pulmonary effort is normal. No respiratory distress.   Abdominal:      General: Bowel sounds are normal. There is no distension.      Palpations: Abdomen is soft.   Musculoskeletal:         General: Normal range of motion.   Neurological:      General: No focal deficit present.      Mental Status: She is alert.   Psychiatric:         Mood and Affect: Mood normal.         Behavior: Behavior normal.         Lines/Drains:              Lab Results: I have reviewed the following results:   Results from last 7 days   Lab Units 01/11/25 0449   WBC Thousand/uL 13.50*   HEMOGLOBIN g/dL 9.5*   HEMATOCRIT % 29.3*   PLATELETS Thousands/uL 394*     Results from last 7 days   Lab Units 01/11/25  0449 01/06/25  0514 01/05/25  0602   SODIUM mmol/L 139   < > 140   POTASSIUM mmol/L 3.9   < > 3.6   CHLORIDE mmol/L 106   < > 104   CO2 mmol/L 26   < > 25   BUN mg/dL 20   < > 37*   CREATININE mg/dL 1.08   < > 1.27   ANION GAP mmol/L 7   < > 11   CALCIUM mg/dL 7.1*   < > 8.2*   ALBUMIN g/dL  --   --  3.1*   GLUCOSE RANDOM mg/dL 99   < > 100    < > = values in this interval not displayed.         Results from last 7 days   Lab Units 01/11/25  1128 01/11/25  0727 01/10/25  2055 01/10/25  1700 01/10/25  1142 01/10/25  0800 01/09/25  2051 01/09/25  1714 01/09/25  1146 01/09/25  0807 01/08/25  2110 01/08/25  1710   POC GLUCOSE mg/dl 118 102 210* 108 91 102 116 100 116 101 139 91     Results from last 7 days   Lab Units 01/05/25  0602   HEMOGLOBIN A1C % 6.4*           Recent Cultures (last 7 days):         Imaging Results Review: No pertinent imaging studies reviewed.  Other Study Results Review: No  additional pertinent studies reviewed.    Last 24 Hours Medication List:     Current Facility-Administered Medications:     acetaminophen (TYLENOL) tablet 975 mg, Q8H KATT    albuterol (PROVENTIL HFA,VENTOLIN HFA) inhaler 2 puff, Q4H PRN    apixaban (ELIQUIS) tablet 2.5 mg, BID    chlorhexidine (PERIDEX) 0.12 % oral rinse 15 mL, Q12H KATT    clotrimazole (LOTRIMIN) 1 % cream, BID    dicyclomine (BENTYL) capsule 10 mg, TID AC    insulin lispro (HumALOG/ADMELOG) 100 units/mL subcutaneous injection 1-5 Units, TID AC **AND** Fingerstick Glucose (POCT), TID AC    insulin lispro (HumALOG/ADMELOG) 100 units/mL subcutaneous injection 1-5 Units, HS    melatonin tablet 3 mg, Once    melatonin tablet 3 mg, HS PRN    metoprolol tartrate (LOPRESSOR) tablet 25 mg, Q12H KATT    oxyCODONE (ROXICODONE) split tablet 2.5 mg, Q6H PRN    QUEtiapine (SEROquel) tablet 12.5 mg, Once    saccharomyces boulardii (FLORASTOR) capsule 250 mg, BID    simethicone (MYLICON) chewable tablet 80 mg, 4x Daily (with meals and at bedtime)    Administrative Statements   Today, Patient Was Seen By: Shira Hines PA-C      **Please Note: This note may have been constructed using a voice recognition system.**

## 2025-01-11 NOTE — ASSESSMENT & PLAN NOTE
Lab Results   Component Value Date    EGFR 43 01/11/2025    EGFR 49 01/08/2025    EGFR 43 01/07/2025    CREATININE 1.08 01/11/2025    CREATININE 0.97 01/08/2025    CREATININE 1.08 01/07/2025     POA 2.47, likely 2/2 sepsis  resolved baseline is 1.3-1.4 and currently below baseline  Monitor BMP

## 2025-01-11 NOTE — CASE MANAGEMENT
Per Availity portal, [facility] auth still pending at this time. CM notified: Mary Darnell      UPDATE 1/12/25 8:08 am    Per Availity portal, [facility] auth still pending at this time. CM notified: Clement Cordova

## 2025-01-11 NOTE — PLAN OF CARE
Problem: Prexisting or High Potential for Compromised Skin Integrity  Goal: Skin integrity is maintained or improved  Description: INTERVENTIONS:  - Identify patients at risk for skin breakdown  - Assess and monitor skin integrity  - Assess and monitor nutrition and hydration status  - Monitor labs   - Assess for incontinence   - Turn and reposition patient  - Assist with mobility/ambulation  - Relieve pressure over bony prominences  - Avoid friction and shearing  - Provide appropriate hygiene as needed including keeping skin clean and dry  - Evaluate need for skin moisturizer/barrier cream  - Collaborate with interdisciplinary team   - Patient/family teaching  - Consider wound care consult   Outcome: Progressing     Problem: RESPIRATORY - ADULT  Goal: Achieves optimal ventilation and oxygenation  Description: INTERVENTIONS:  - Assess for changes in respiratory status  - Assess for changes in mentation and behavior  - Position to facilitate oxygenation and minimize respiratory effort  - Oxygen administered by appropriate delivery if ordered  - Initiate smoking cessation education as indicated  - Encourage broncho-pulmonary hygiene including cough, deep breathe, Incentive Spirometry  - Assess the need for suctioning and aspirate as needed  - Assess and instruct to report SOB or any respiratory difficulty  - Respiratory Therapy support as indicated  Outcome: Progressing     Problem: Nutrition/Hydration-ADULT  Goal: Nutrient/Hydration intake appropriate for improving, restoring or maintaining nutritional needs  Description: Monitor and assess patient's nutrition/hydration status for malnutrition. Collaborate with interdisciplinary team and initiate plan and interventions as ordered.  Monitor patient's weight and dietary intake as ordered or per policy. Utilize nutrition screening tool and intervene as necessary. Determine patient's food preferences and provide high-protein, high-caloric foods as appropriate.      INTERVENTIONS:  - Monitor oral intake, urinary output, labs, and treatment plans  - Assess nutrition and hydration status and recommend course of action  - Evaluate amount of meals eaten  - Assist patient with eating if necessary   - Allow adequate time for meals  - Recommend/ encourage appropriate diets, oral nutritional supplements, and vitamin/mineral supplements  - Order, calculate, and assess calorie counts as needed  - Recommend, monitor, and adjust tube feedings and TPN/PPN based on assessed needs  - Assess need for intravenous fluids  - Provide specific nutrition/hydration education as appropriate  - Include patient/family/caregiver in decisions related to nutrition  Outcome: Progressing     Problem: PAIN - ADULT  Goal: Verbalizes/displays adequate comfort level or baseline comfort level  Description: Interventions:  - Encourage patient to monitor pain and request assistance  - Assess pain using appropriate pain scale  - Administer analgesics based on type and severity of pain and evaluate response  - Implement non-pharmacological measures as appropriate and evaluate response  - Consider cultural and social influences on pain and pain management  - Notify physician/advanced practitioner if interventions unsuccessful or patient reports new pain  Outcome: Progressing     Problem: INFECTION - ADULT  Goal: Absence or prevention of progression during hospitalization  Description: INTERVENTIONS:  - Assess and monitor for signs and symptoms of infection  - Monitor lab/diagnostic results  - Monitor all insertion sites, i.e. indwelling lines, tubes, and drains  - Monitor endotracheal if appropriate and nasal secretions for changes in amount and color  - [place] appropriate cooling/warming therapies per order  - Administer medications as ordered  - Instruct and encourage patient and family to use good hand hygiene technique  - Identify and instruct in appropriate isolation precautions for identified  infection/condition  Outcome: Progressing     Problem: SAFETY ADULT  Goal: Patient will remain free of falls  Description: INTERVENTIONS:  - Educate patient/family on patient safety including physical limitations  - Instruct patient to call for assistance with activity   - Consult OT/PT to assist with strengthening/mobility   - Keep Call bell within reach  - Keep bed low and locked with side rails adjusted as appropriate  - Keep care items and personal belongings within reach  - Initiate and maintain comfort rounds  - Make Fall Risk Sign visible to staff  - Apply yellow socks and bracelet for high fall risk patients  - Consider moving patient to room near nurses station  Outcome: Progressing  Goal: Maintain or return to baseline ADL function  Description: INTERVENTIONS:  -  Assess patient's ability to carry out ADLs; assess patient's baseline for ADL function and identify physical deficits which impact ability to perform ADLs (bathing, care of mouth/teeth, toileting, grooming, dressing, etc.)  - Assess/evaluate cause of self-care deficits   - Assess range of motion  - Assess patient's mobility; develop plan if impaired  - Assess patient's need for assistive devices and provide as appropriate  - Encourage maximum independence but intervene and supervise when necessary  - Involve family in performance of ADLs  - Assess for home care needs following discharge   - Consider OT consult to assist with ADL evaluation and planning for discharge  - Provide patient education as appropriate  Outcome: Progressing  Goal: Maintains/Returns to pre admission functional level  Description: INTERVENTIONS:  - Perform AM-PAC 6 Click Basic Mobility/ Daily Activity assessment daily.  - Set and communicate daily mobility goal to care team and patient/family/caregiver.   - Collaborate with rehabilitation services on mobility goals if consulted  - Out of bed for toileting  - Record patient progress and toleration of activity level   Outcome:  Progressing     Problem: DISCHARGE PLANNING  Goal: Discharge to home or other facility with appropriate resources  Description: INTERVENTIONS:  - Identify barriers to discharge w/patient and caregiver  - Arrange for needed discharge resources and transportation as appropriate  - Identify discharge learning needs (meds, wound care, etc.)  - Arrange for interpretive services to assist at discharge as needed  - Refer to Case Management Department for coordinating discharge planning if the patient needs post-hospital services based on physician/advanced practitioner order or complex needs related to functional status, cognitive ability, or social support system  Outcome: Progressing     Problem: Knowledge Deficit  Goal: Patient/family/caregiver demonstrates understanding of disease process, treatment plan, medications, and discharge instructions  Description: Complete learning assessment and assess knowledge base.  Interventions:  - Provide teaching at level of understanding  - Provide teaching via preferred learning methods  Outcome: Progressing

## 2025-01-12 ENCOUNTER — APPOINTMENT (INPATIENT)
Dept: RADIOLOGY | Facility: HOSPITAL | Age: OVER 89
DRG: 871 | End: 2025-01-12
Payer: COMMERCIAL

## 2025-01-12 LAB
ANION GAP SERPL CALCULATED.3IONS-SCNC: 8 MMOL/L (ref 4–13)
BASOPHILS # BLD AUTO: 0.01 THOUSANDS/ΜL (ref 0–0.1)
BASOPHILS NFR BLD AUTO: 0 % (ref 0–1)
BUN SERPL-MCNC: 18 MG/DL (ref 5–25)
CALCIUM SERPL-MCNC: 7.2 MG/DL (ref 8.4–10.2)
CHLORIDE SERPL-SCNC: 106 MMOL/L (ref 96–108)
CO2 SERPL-SCNC: 25 MMOL/L (ref 21–32)
CREAT SERPL-MCNC: 1.05 MG/DL (ref 0.6–1.3)
EOSINOPHIL # BLD AUTO: 0.09 THOUSAND/ΜL (ref 0–0.61)
EOSINOPHIL NFR BLD AUTO: 1 % (ref 0–6)
ERYTHROCYTE [DISTWIDTH] IN BLOOD BY AUTOMATED COUNT: 13.2 % (ref 11.6–15.1)
GFR SERPL CREATININE-BSD FRML MDRD: 45 ML/MIN/1.73SQ M
GLUCOSE SERPL-MCNC: 123 MG/DL (ref 65–140)
GLUCOSE SERPL-MCNC: 81 MG/DL (ref 65–140)
GLUCOSE SERPL-MCNC: 83 MG/DL (ref 65–140)
GLUCOSE SERPL-MCNC: 96 MG/DL (ref 65–140)
GLUCOSE SERPL-MCNC: 99 MG/DL (ref 65–140)
HCT VFR BLD AUTO: 30.4 % (ref 34.8–46.1)
HGB BLD-MCNC: 9.7 G/DL (ref 11.5–15.4)
IMM GRANULOCYTES # BLD AUTO: 0.07 THOUSAND/UL (ref 0–0.2)
IMM GRANULOCYTES NFR BLD AUTO: 1 % (ref 0–2)
LYMPHOCYTES # BLD AUTO: 0.82 THOUSANDS/ΜL (ref 0.6–4.47)
LYMPHOCYTES NFR BLD AUTO: 8 % (ref 14–44)
MCH RBC QN AUTO: 30.7 PG (ref 26.8–34.3)
MCHC RBC AUTO-ENTMCNC: 31.9 G/DL (ref 31.4–37.4)
MCV RBC AUTO: 96 FL (ref 82–98)
MONOCYTES # BLD AUTO: 0.97 THOUSAND/ΜL (ref 0.17–1.22)
MONOCYTES NFR BLD AUTO: 10 % (ref 4–12)
NEUTROPHILS # BLD AUTO: 8.11 THOUSANDS/ΜL (ref 1.85–7.62)
NEUTS SEG NFR BLD AUTO: 80 % (ref 43–75)
NRBC BLD AUTO-RTO: 0 /100 WBCS
PLATELET # BLD AUTO: 397 THOUSANDS/UL (ref 149–390)
PMV BLD AUTO: 9.5 FL (ref 8.9–12.7)
POTASSIUM SERPL-SCNC: 3.9 MMOL/L (ref 3.5–5.3)
RBC # BLD AUTO: 3.16 MILLION/UL (ref 3.81–5.12)
SODIUM SERPL-SCNC: 139 MMOL/L (ref 135–147)
WBC # BLD AUTO: 10.07 THOUSAND/UL (ref 4.31–10.16)

## 2025-01-12 PROCEDURE — 85025 COMPLETE CBC W/AUTO DIFF WBC: CPT | Performed by: PHYSICIAN ASSISTANT

## 2025-01-12 PROCEDURE — 82948 REAGENT STRIP/BLOOD GLUCOSE: CPT

## 2025-01-12 PROCEDURE — 80048 BASIC METABOLIC PNL TOTAL CA: CPT | Performed by: PHYSICIAN ASSISTANT

## 2025-01-12 PROCEDURE — 97535 SELF CARE MNGMENT TRAINING: CPT

## 2025-01-12 PROCEDURE — 71045 X-RAY EXAM CHEST 1 VIEW: CPT

## 2025-01-12 PROCEDURE — 99232 SBSQ HOSP IP/OBS MODERATE 35: CPT | Performed by: PHYSICIAN ASSISTANT

## 2025-01-12 RX ADMIN — DICYCLOMINE HYDROCHLORIDE 10 MG: 10 CAPSULE ORAL at 05:01

## 2025-01-12 RX ADMIN — ACETAMINOPHEN 975 MG: 325 TABLET, FILM COATED ORAL at 12:17

## 2025-01-12 RX ADMIN — DICYCLOMINE HYDROCHLORIDE 10 MG: 10 CAPSULE ORAL at 16:07

## 2025-01-12 RX ADMIN — Medication 250 MG: at 16:07

## 2025-01-12 RX ADMIN — SIMETHICONE 80 MG: 80 TABLET, CHEWABLE ORAL at 21:48

## 2025-01-12 RX ADMIN — CHLORHEXIDINE GLUCONATE 0.12% ORAL RINSE 15 ML: 1.2 LIQUID ORAL at 21:48

## 2025-01-12 RX ADMIN — CLOTRIMAZOLE: 1 CREAM TOPICAL at 16:54

## 2025-01-12 RX ADMIN — MELATONIN TAB 3 MG 3 MG: 3 TAB at 21:48

## 2025-01-12 RX ADMIN — ACETAMINOPHEN 975 MG: 325 TABLET, FILM COATED ORAL at 21:47

## 2025-01-12 RX ADMIN — SIMETHICONE 80 MG: 80 TABLET, CHEWABLE ORAL at 16:07

## 2025-01-12 RX ADMIN — APIXABAN 2.5 MG: 2.5 TABLET, FILM COATED ORAL at 16:07

## 2025-01-12 RX ADMIN — Medication 250 MG: at 08:41

## 2025-01-12 RX ADMIN — APIXABAN 2.5 MG: 2.5 TABLET, FILM COATED ORAL at 08:41

## 2025-01-12 RX ADMIN — CHLORHEXIDINE GLUCONATE 0.12% ORAL RINSE 15 ML: 1.2 LIQUID ORAL at 08:41

## 2025-01-12 RX ADMIN — ACETAMINOPHEN 975 MG: 325 TABLET, FILM COATED ORAL at 05:01

## 2025-01-12 RX ADMIN — CLOTRIMAZOLE: 1 CREAM TOPICAL at 08:42

## 2025-01-12 RX ADMIN — METOPROLOL TARTRATE 25 MG: 25 TABLET, FILM COATED ORAL at 08:41

## 2025-01-12 RX ADMIN — METOPROLOL TARTRATE 25 MG: 25 TABLET, FILM COATED ORAL at 21:48

## 2025-01-12 NOTE — PLAN OF CARE
Problem: PAIN - ADULT  Goal: Verbalizes/displays adequate comfort level or baseline comfort level  Description: Interventions:  - Encourage patient to monitor pain and request assistance  - Assess pain using appropriate pain scale  - Administer analgesics based on type and severity of pain and evaluate response  - Implement non-pharmacological measures as appropriate and evaluate response  - Consider cultural and social influences on pain and pain management  - Notify physician/advanced practitioner if interventions unsuccessful or patient reports new pain  Outcome: Progressing     Problem: Nutrition/Hydration-ADULT  Goal: Nutrient/Hydration intake appropriate for improving, restoring or maintaining nutritional needs  Description: Monitor and assess patient's nutrition/hydration status for malnutrition. Collaborate with interdisciplinary team and initiate plan and interventions as ordered.  Monitor patient's weight and dietary intake as ordered or per policy. Utilize nutrition screening tool and intervene as necessary. Determine patient's food preferences and provide high-protein, high-caloric foods as appropriate.     INTERVENTIONS:  - Monitor oral intake, urinary output, labs, and treatment plans  - Assess nutrition and hydration status and recommend course of action  - Evaluate amount of meals eaten  - Assist patient with eating if necessary   - Allow adequate time for meals  - Recommend/ encourage appropriate diets, oral nutritional supplements, and vitamin/mineral supplements  - Order, calculate, and assess calorie counts as needed  - Recommend, monitor, and adjust tube feedings and TPN/PPN based on assessed needs  - Assess need for intravenous fluids  - Provide specific nutrition/hydration education as appropriate  - Include patient/family/caregiver in decisions related to nutrition  Outcome: Progressing     Problem: RESPIRATORY - ADULT  Goal: Achieves optimal ventilation and oxygenation  Description:  INTERVENTIONS:  - Assess for changes in respiratory status  - Assess for changes in mentation and behavior  - Position to facilitate oxygenation and minimize respiratory effort  - Oxygen administered by appropriate delivery if ordered  - Initiate smoking cessation education as indicated  - Encourage broncho-pulmonary hygiene including cough, deep breathe, Incentive Spirometry  - Assess the need for suctioning and aspirate as needed  - Assess and instruct to report SOB or any respiratory difficulty  - Respiratory Therapy support as indicated  Outcome: Progressing     Problem: SAFETY ADULT  Goal: Maintains/Returns to pre admission functional level  Description: INTERVENTIONS:  - Perform AM-PAC 6 Click Basic Mobility/ Daily Activity assessment daily.  - Set and communicate daily mobility goal to care team and patient/family/caregiver.   - Collaborate with rehabilitation services on mobility goals if consulted  - Out of bed for toileting  - Record patient progress and toleration of activity level   Outcome: Progressing

## 2025-01-12 NOTE — ASSESSMENT & PLAN NOTE
Malnutrition Findings:   Adult Malnutrition type: Chronic illness  Adult Degree of Malnutrition: Malnutrition of moderate degree  Malnutrition Characteristics: Inadequate energy, Muscle loss                  360 Statement: Chronic/moderate malnutrition r/t inadequate PO intake, as evidenced by intake meeting <75% of estimated needs x > 1 month, and muscle mass depletion (temporal). Treatment: pending ability to advance to PO diet - liberal diet as able and trial oral nutrition supplements pending SLP recommendations    BMI Findings:           Body mass index is 18.38 kg/m².   Encourage oral intake as able

## 2025-01-12 NOTE — PLAN OF CARE
Problem: OCCUPATIONAL THERAPY ADULT  Goal: Performs self-care activities at highest level of function for planned discharge setting.  See evaluation for individualized goals.  Description: Treatment Interventions: ADL retraining, Functional transfer training, UE strengthening/ROM, Endurance training, Cognitive reorientation, Patient/family training, Equipment evaluation/education, Compensatory technique education, Continued evaluation, Activityengagement, Energy conservation          See flowsheet documentation for full assessment, interventions and recommendations.   Outcome: Progressing  Note: Limitation: Decreased ADL status, Decreased UE strength, Decreased Safe judgement during ADL, Decreased cognition, Decreased endurance, Decreased self-care trans, Decreased high-level ADLs  Prognosis: Good  Assessment: Pt seen for Occupational Therapy session with focus on activity tolerance, bed mob, functional transfers/stand pivot transfers and sitting and standing tolerance and balance for pt engagement in UB/LB self-care tasks . Pt cleared by RN/ Keke for pt participated in OT session. Pt presented   supine/HOB raised pt awake/alert and pt identifiers confirmed. Pt family members present and supportive of pt.. She did not report a therapy goal this session however pt was pleasant and cooperative with all therapy requests.  Pt required assist  and increased time allowed for bed mob and unsupported sitting balance 2* She report significant BLE weak however was able to tolerate siting EOB throughout session. Pt remains appropriate for  II (Moderate Resources) Pt set up to bedside chair post session, chair alarm activated and all needs within pt reach     Rehab Resource Intensity Level, OT: II (Moderate Resource Intensity)

## 2025-01-12 NOTE — ASSESSMENT & PLAN NOTE
Hgb 11.4-->9.5-->9.7   D/w nursing, no overt bleeding.  Hgb was in 9s on 1/3  New start AC with Eliquis 2/2 a fib  Monitor CBC.  Stable today

## 2025-01-12 NOTE — PLAN OF CARE
Problem: Prexisting or High Potential for Compromised Skin Integrity  Goal: Skin integrity is maintained or improved  Description: INTERVENTIONS:  - Identify patients at risk for skin breakdown  - Assess and monitor skin integrity  - Assess and monitor nutrition and hydration status  - Monitor labs   - Assess for incontinence   - Turn and reposition patient  - Assist with mobility/ambulation  - Relieve pressure over bony prominences  - Avoid friction and shearing  - Provide appropriate hygiene as needed including keeping skin clean and dry  - Evaluate need for skin moisturizer/barrier cream  - Collaborate with interdisciplinary team   - Patient/family teaching  - Consider wound care consult   Outcome: Progressing     Problem: RESPIRATORY - ADULT  Goal: Achieves optimal ventilation and oxygenation  Description: INTERVENTIONS:  - Assess for changes in respiratory status  - Assess for changes in mentation and behavior  - Position to facilitate oxygenation and minimize respiratory effort  - Oxygen administered by appropriate delivery if ordered  - Initiate smoking cessation education as indicated  - Encourage broncho-pulmonary hygiene including cough, deep breathe, Incentive Spirometry  - Assess the need for suctioning and aspirate as needed  - Assess and instruct to report SOB or any respiratory difficulty  - Respiratory Therapy support as indicated  Outcome: Progressing     Problem: Nutrition/Hydration-ADULT  Goal: Nutrient/Hydration intake appropriate for improving, restoring or maintaining nutritional needs  Description: Monitor and assess patient's nutrition/hydration status for malnutrition. Collaborate with interdisciplinary team and initiate plan and interventions as ordered.  Monitor patient's weight and dietary intake as ordered or per policy. Utilize nutrition screening tool and intervene as necessary. Determine patient's food preferences and provide high-protein, high-caloric foods as appropriate.      INTERVENTIONS:  - Monitor oral intake, urinary output, labs, and treatment plans  - Assess nutrition and hydration status and recommend course of action  - Evaluate amount of meals eaten  - Assist patient with eating if necessary   - Allow adequate time for meals  - Recommend/ encourage appropriate diets, oral nutritional supplements, and vitamin/mineral supplements  - Order, calculate, and assess calorie counts as needed  - Recommend, monitor, and adjust tube feedings and TPN/PPN based on assessed needs  - Assess need for intravenous fluids  - Provide specific nutrition/hydration education as appropriate  - Include patient/family/caregiver in decisions related to nutrition  Outcome: Progressing     Problem: PAIN - ADULT  Goal: Verbalizes/displays adequate comfort level or baseline comfort level  Description: Interventions:  - Encourage patient to monitor pain and request assistance  - Assess pain using appropriate pain scale  - Administer analgesics based on type and severity of pain and evaluate response  - Implement non-pharmacological measures as appropriate and evaluate response  - Consider cultural and social influences on pain and pain management  - Notify physician/advanced practitioner if interventions unsuccessful or patient reports new pain  Outcome: Progressing     Problem: INFECTION - ADULT  Goal: Absence or prevention of progression during hospitalization  Description: INTERVENTIONS:  - Assess and monitor for signs and symptoms of infection  - Monitor lab/diagnostic results  - Monitor all insertion sites, i.e. indwelling lines, tubes, and drains  - Monitor endotracheal if appropriate and nasal secretions for changes in amount and color  - [place] appropriate cooling/warming therapies per order  - Administer medications as ordered  - Instruct and encourage patient and family to use good hand hygiene technique  - Identify and instruct in appropriate isolation precautions for identified  infection/condition  Outcome: Progressing     Problem: SAFETY ADULT  Goal: Patient will remain free of falls  Description: INTERVENTIONS:  - Educate patient/family on patient safety including physical limitations  - Instruct patient to call for assistance with activity   - Consult OT/PT to assist with strengthening/mobility   - Keep Call bell within reach  - Keep bed low and locked with side rails adjusted as appropriate  - Keep care items and personal belongings within reach  - Initiate and maintain comfort rounds  - Make Fall Risk Sign visible to staff  - Apply yellow socks and bracelet for high fall risk patients  - Consider moving patient to room near nurses station  Outcome: Progressing  Goal: Maintain or return to baseline ADL function  Description: INTERVENTIONS:  -  Assess patient's ability to carry out ADLs; assess patient's baseline for ADL function and identify physical deficits which impact ability to perform ADLs (bathing, care of mouth/teeth, toileting, grooming, dressing, etc.)  - Assess/evaluate cause of self-care deficits   - Assess range of motion  - Assess patient's mobility; develop plan if impaired  - Assess patient's need for assistive devices and provide as appropriate  - Encourage maximum independence but intervene and supervise when necessary  - Involve family in performance of ADLs  - Assess for home care needs following discharge   - Consider OT consult to assist with ADL evaluation and planning for discharge  - Provide patient education as appropriate  Outcome: Progressing  Goal: Maintains/Returns to pre admission functional level  Description: INTERVENTIONS:  - Perform AM-PAC 6 Click Basic Mobility/ Daily Activity assessment daily.  - Set and communicate daily mobility goal to care team and patient/family/caregiver.   - Collaborate with rehabilitation services on mobility goals if consulted  - Out of bed for toileting  - Record patient progress and toleration of activity level   Outcome:  Progressing     Problem: DISCHARGE PLANNING  Goal: Discharge to home or other facility with appropriate resources  Description: INTERVENTIONS:  - Identify barriers to discharge w/patient and caregiver  - Arrange for needed discharge resources and transportation as appropriate  - Identify discharge learning needs (meds, wound care, etc.)  - Arrange for interpretive services to assist at discharge as needed  - Refer to Case Management Department for coordinating discharge planning if the patient needs post-hospital services based on physician/advanced practitioner order or complex needs related to functional status, cognitive ability, or social support system  Outcome: Progressing     Problem: Knowledge Deficit  Goal: Patient/family/caregiver demonstrates understanding of disease process, treatment plan, medications, and discharge instructions  Description: Complete learning assessment and assess knowledge base.  Interventions:  - Provide teaching at level of understanding  - Provide teaching via preferred learning methods  Outcome: Progressing

## 2025-01-12 NOTE — CASE MANAGEMENT
Case Management Discharge Planning Note    Patient name Remedios Mcgrath  Location [facility] 603/[facility] 603-01 MRN [id number]  : 1929 Date 2025       Current Admission Date: 2024  Current Admission Diagnosis:Pneumonia   Patient Active Problem List    Diagnosis Date Noted Date Diagnosed    Ambulatory dysfunction 2025     Anemia 2025     Unwitnessed fall 2025     Hyperglycemia 2025     New onset atrial fibrillation with RVR ([facility]) 2025     Sepsis ([facility]) 2025     Pneumococcal bacteremia 2025     Acute kidney injury superimposed on CKD  ([facility]) 2025     Acute hypoxic respiratory failure ([facility]) 2025     Emphysema lung ([facility]) 2025     Dysphagia 2025     Pleural effusion, right 2025     Pneumonia 2025     Moderate protein-calorie malnutrition ([facility]) 2025     Mild protein-calorie malnutrition ([facility]) 10/04/2024     Venous stasis dermatitis of both lower extremities 2024     Stage 3b chronic kidney disease ([facility]) 03/15/2022     Bruises easily 2020     Impaired fasting glucose 2012     Stenosis of left carotid artery 2012     Peripheral vascular disease ([facility]) 2012     Hyperlipidemia 2012     Hypertension 2012     Insomnia 2012       LOS (days): 12  Geometric Mean LOS (GMLOS) (days): 4.9  Days to GMLOS:-6.5     OBJECTIVE:  Risk of Unplanned Readmission Score: 20.43         Current admission status: Inpatient   Preferred Pharmacy:   [facility] [place], [place] - [address]  [id number]  [place] [address]  Phone: [phone] Fax: [phone]    Primary Care Provider: Dion Tsang MD    [facility]  Secondary Insurance:     DISCHARGE DETAILS:    Pt's auth still pending at this point.   Plan for d/c to [facility], once this is obtained.

## 2025-01-12 NOTE — ASSESSMENT & PLAN NOTE
Lab Results   Component Value Date    EGFR 45 01/12/2025    EGFR 43 01/11/2025    EGFR 49 01/08/2025    CREATININE 1.05 01/12/2025    CREATININE 1.08 01/11/2025    CREATININE 0.97 01/08/2025     POA 2.47, likely 2/2 sepsis  resolved baseline is 1.3-1.4 and currently below baseline  Monitor BMP

## 2025-01-12 NOTE — PROGRESS NOTES
"Progress Note - Hospitalist   Name: Remedios Mcgrath 89 y.o. female I MRN: [id number]  Unit/Bed#: [facility] 603-01 I Date of Admission: 12/31/2024   Date of Service: 1/12/2025 I Hospital Day: 12    Assessment & Plan  Pneumonia  Presented to ER after being found down on welfare check. Was noted to be hypoxic in ER (82% on RA) requiring BIPAP and then developed a-fib with RVR.  Was in ICU, transferred out on 1/2.   CT CAP: Acute right-sided pneumonia, greatest at the right lower lobe. Associated loculated parapneumonic effusion. No obvious pleural thickening on noncontrast study, though empyema has to be considered.   Was on IV CTX, transitioned to Amoxicillin BID on 1/4 thru 1/9- completed 10 days total of abx per ID recommendations  Wean o2 as able, continue nebulizers, supportive care  Updated CXR 1/5 showed, \"New left lower lobe consolidation with additional worsening of right base infiltrates.\"  With intermittent hypoxia, will repeat CXR on 1/12.  Encourage OOB, ISS otherwise.    DC planning to rehab once medically stable   New onset atrial fibrillation with RVR (HCC)  Noted while in the ER, cardiology was consulted, spontaneously converted to sinus rhythm currently on Lopressor 25 mg twice daily and Eliquis 2.5 mg for anticoagulation  As she will be discharged to facility, will defer Eliquis price check at this time  Cardiology signed off as of 1/4  Pneumococcal bacteremia  Due to pneumonia.  Repeat blood cultures negative.  TTE (technically difficult) showed MV thickening (likely age-related) but no vegetations.  Low clinical suspicion for endocarditis.  Status post IV ceftriaxone, transitioned to amoxicillin 500 mg PO Q12, completed 10 days total antibiotics  No indication for GARRETT from an ID perspective  Repeat blood cx are negative x2 after 5 days  Sepsis (HCC)  As evidenced by leukocytosis and tachycardia, due to bacteremia and pneumonia  ID following  Plan as above  Acute hypoxic respiratory failure (HCC)  Most " likely secondary to pneumonia and severe COPD. Appears was up to 15L in ICU  Goal O2 88-90%  As will be going to rehab will not require O2 evaluation at this time  Continue nebulizers  S/p steroids   Pulm consult appreciated  O2 requirements have improved  Ambulatory dysfunction  Generalized weakness 2/2 above   PT/OT, rehab planning  With diffuse body aches/pains, and also reporting intermittent gas.  Will schedule tylenol, bentyl, simethicone.  Can trial low dose oxy if not effective, SE d/w family  Anemia  Hgb 11.4-->9.5-->9.7   D/w nursing, no overt bleeding.  Hgb was in 9s on 1/3  New start AC with Eliquis 2/2 a fib  Monitor CBC.  Stable today   Hypertension  BP overall stable  On lopressor 25 mg bid   Holding PTA norvasc  Moderate protein-calorie malnutrition (HCC)  Malnutrition Findings:   Adult Malnutrition type: Chronic illness  Adult Degree of Malnutrition: Malnutrition of moderate degree  Malnutrition Characteristics: Inadequate energy, Muscle loss                  360 Statement: Chronic/moderate malnutrition r/t inadequate PO intake, as evidenced by intake meeting <75% of estimated needs x > 1 month, and muscle mass depletion (temporal). Treatment: pending ability to advance to PO diet - liberal diet as able and trial oral nutrition supplements pending SLP recommendations    BMI Findings:           Body mass index is 18.38 kg/m².   Encourage oral intake as able  Acute kidney injury superimposed on CKD  (HCC)  Lab Results   Component Value Date    EGFR 45 01/12/2025    EGFR 43 01/11/2025    EGFR 49 01/08/2025    CREATININE 1.05 01/12/2025    CREATININE 1.08 01/11/2025    CREATININE 0.97 01/08/2025     POA 2.47, likely 2/2 sepsis  resolved baseline is 1.3-1.4 and currently below baseline  Monitor BMP  Emphysema lung (HCC)  Patient was treated for COPD exacerbation with IV Solu-Medrol and was transitioned to oral prednisone 40 mg daily for 3 days  Monitor O2 sats  Pulm following as above  Dysphagia  Speech  following, currently on modified diet  Pleural effusion, right  Pulm following - do not appear loculated per Pulm and they did not recommend draining them  Hyperglycemia  Likely 2/2 steroids, was prediabetic as of 9/2024  A1C 6.4  SSI  Unwitnessed fall  Patient was a Rapid Respone on 1/5  XRs of right knee and pelvis with no acute osseous abnormalities   PT/OT recommending rehab     VTE Pharmacologic Prophylaxis:   Moderate Risk (Score 3-4) - Pharmacological DVT Prophylaxis Ordered: apixaban (Eliquis).    Mobility:   Basic Mobility Inpatient Raw Score: 11  JH-HLM Goal: 4: Move to chair/commode  JH-HLM Achieved: 4: Move to chair/commode  JH-HLM Goal achieved. Continue to encourage appropriate mobility.    Patient Centered Rounds: I performed bedside rounds with nursing staff today.   Discussions with Specialists or Other Care Team Provider: GARY.    Education and Discussions with Family / Patient: Updated  (son) via phone.    Current Length of Stay: 12 day(s)  Current Patient Status: Inpatient   Certification Statement: The patient will continue to require additional inpatient hospital stay due to auth/rehab bed availability, safe dc planning  Discharge Plan: Anticipate discharge in 24-48 hrs to rehab facility.    Code Status: Level 3 - DNAR and DNI    Subjective   Feels cold, which is primary complaints, and wants blankets.  Feels pain is better today.  OOB to chair.     Objective :  Temp:  [97.2 °F (36.2 °C)] 97.2 °F (36.2 °C)  HR:  [64-77] 74  BP: (112-160)/(46-83) 160/83  Resp:  [17-20] 17  SpO2:  [85 %-91 %] 89 %  O2 Device: Nasal cannula  Nasal Cannula O2 Flow Rate (L/min):  [3 L/min-4 L/min] 4 L/min    Body mass index is 18.38 kg/m².     Input and Output Summary (last 24 hours):     Intake/Output Summary (Last 24 hours) at 1/12/2025 1401  Last data filed at 1/12/2025 0814  Gross per 24 hour   Intake 180 ml   Output --   Net 180 ml       Physical Exam  Vitals reviewed.   Constitutional:        General: She is not in acute distress.     Appearance: She is not toxic-appearing.      Comments: Frail, thin    HENT:      Head: Normocephalic and atraumatic.   Eyes:      Extraocular Movements: Extraocular movements intact.   Cardiovascular:      Rate and Rhythm: Normal rate and regular rhythm.   Pulmonary:      Effort: Pulmonary effort is normal. No respiratory distress.   Abdominal:      General: Bowel sounds are normal.      Palpations: Abdomen is soft.   Musculoskeletal:         General: Normal range of motion.   Neurological:      General: No focal deficit present.      Mental Status: She is alert.   Psychiatric:         Mood and Affect: Mood normal.         Behavior: Behavior normal.         Lines/Drains:              Lab Results: I have reviewed the following results:   Results from last 7 days   Lab Units 01/12/25  0444   WBC Thousand/uL 10.07   HEMOGLOBIN g/dL 9.7*   HEMATOCRIT % 30.4*   PLATELETS Thousands/uL 397*   SEGS PCT % 80*   LYMPHO PCT % 8*   MONO PCT % 10   EOS PCT % 1     Results from last 7 days   Lab Units 01/12/25  0444   SODIUM mmol/L 139   POTASSIUM mmol/L 3.9   CHLORIDE mmol/L 106   CO2 mmol/L 25   BUN mg/dL 18   CREATININE mg/dL 1.05   ANION GAP mmol/L 8   CALCIUM mg/dL 7.2*   GLUCOSE RANDOM mg/dL 81         Results from last 7 days   Lab Units 01/12/25  1206 01/12/25  0644 01/11/25  2019 01/11/25  1614 01/11/25  1128 01/11/25  0727 01/10/25  2055 01/10/25  1700 01/10/25  1142 01/10/25  0800 01/09/25  2051 01/09/25  1714   POC GLUCOSE mg/dl 99 83 127 174* 118 102 210* 108 91 102 116 100               Recent Cultures (last 7 days):         Imaging Results Review: No pertinent imaging studies reviewed.  Other Study Results Review: No additional pertinent studies reviewed.    Last 24 Hours Medication List:     Current Facility-Administered Medications:     acetaminophen (TYLENOL) tablet 975 mg, Q8H KATT    albuterol (PROVENTIL HFA,VENTOLIN HFA) inhaler 2 puff, Q4H PRN    apixaban (ELIQUIS)  tablet 2.5 mg, BID    chlorhexidine (PERIDEX) 0.12 % oral rinse 15 mL, Q12H KATT    clotrimazole (LOTRIMIN) 1 % cream, BID    dicyclomine (BENTYL) capsule 10 mg, TID AC    insulin lispro (HumALOG/ADMELOG) 100 units/mL subcutaneous injection 1-5 Units, TID AC **AND** Fingerstick Glucose (POCT), TID AC    insulin lispro (HumALOG/ADMELOG) 100 units/mL subcutaneous injection 1-5 Units, HS    melatonin tablet 3 mg, Once    melatonin tablet 3 mg, HS PRN    metoprolol tartrate (LOPRESSOR) tablet 25 mg, Q12H KATT    oxyCODONE (ROXICODONE) split tablet 2.5 mg, Q6H PRN    QUEtiapine (SEROquel) tablet 12.5 mg, Once    saccharomyces boulardii (FLORASTOR) capsule 250 mg, BID    simethicone (MYLICON) chewable tablet 80 mg, 4x Daily (with meals and at bedtime)    Administrative Statements   Today, Patient Was Seen By: Shira Hines PA-C      **Please Note: This note may have been constructed using a voice recognition system.**

## 2025-01-12 NOTE — OCCUPATIONAL THERAPY NOTE
"  Occupational Therapy Progress Note     Patient Name: Remedios Mcgrath  Today's Date: 1/12/2025  Problem List  Principal Problem:    Pneumonia  Active Problems:    Hypertension    Moderate protein-calorie malnutrition (HCC)    New onset atrial fibrillation with RVR (HCC)    Sepsis (HCC)    Pneumococcal bacteremia    Acute kidney injury superimposed on CKD  (HCC)    Acute hypoxic respiratory failure (HCC)    Emphysema lung (HCC)    Dysphagia    Pleural effusion, right    Hyperglycemia    Unwitnessed fall    Ambulatory dysfunction    Anemia         Occupational Therapy Treatment Note     01/12/25 1157   OT Last Visit   OT Visit Date 01/12/25   Note Type   Note Type Treatment   Pain Assessment   Pain Assessment Tool FLACC   Restrictions/Precautions   Weight Bearing Precautions Per Order No   Lifestyle   Autonomy I adls and mobility - family assists with iadls   Reciprocal Relationships supportive family - 2 sons live locally   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   Cognition   Overall Cognitive Status Impaired   Arousal/Participation Arousable;Cooperative   Attention Attends with cues to redirect   Orientation Level Oriented X4   Memory Decreased recall of recent events;Decreased recall of precautions   Following Commands Follows one step commands with increased time or repetition   Activity Tolerance   Activity Tolerance Patient tolerated treatment well   Assessment   Assessment Pt seen for Occupational Therapy session with focus on activity tolerance, bed mob, functional transfers/stand pivot transfers and sitting and standing tolerance and balance for pt engagement in UB/LB self-care tasks . Pt " cleared by RN/ Keke for pt participated in OT session. Pt presented   supine/HOB raised pt awake/alert and pt identifiers confirmed. Pt family members present and supportive of pt.. She did not report a therapy goal this session however pt was pleasant and cooperative with all therapy requests.  Pt required assist  and increased time allowed for bed mob and unsupported sitting balance 2* She report significant BLE weak however was able to tolerate siting EOB throughout session. Pt remains appropriate for  II (Moderate Resources) Pt set up to bedside chair post session, chair alarm activated and all needs within pt reach   Plan   Treatment Interventions ADL retraining   Goal Expiration Date 01/16/25   OT Treatment Day 3   OT Frequency 2-3x/wk   Discharge Recommendation   Rehab Resource Intensity Level, OT II (Moderate Resource Intensity)   AM-PAC Daily Activity Inpatient   Lower Body Dressing 2   Bathing 2   Toileting 2   Upper Body Dressing 2   Grooming 2   Eating 3   Daily Activity Raw Score 13   Daily Activity Standardized Score (Calc for Raw Score >=11) 32.03   AM-PAC Applied Cognition Inpatient   Following a Speech/Presentation 3   Understanding Ordinary Conversation 4   Taking Medications 3   Remembering Where Things Are Placed or Put Away 3   Remembering List of 4-5 Errands 2   Taking Care of Complicated Tasks 2   Applied Cognition Raw Score 17   Applied Cognition Standardized Score 36.52         Jailyn OLIVAS

## 2025-01-12 NOTE — ASSESSMENT & PLAN NOTE
"Presented to ER after being found down on welfare check. Was noted to be hypoxic in ER (82% on RA) requiring BIPAP and then developed a-fib with RVR.  Was in ICU, transferred out on 1/2.   CT CAP: Acute right-sided pneumonia, greatest at the right lower lobe. Associated loculated parapneumonic effusion. No obvious pleural thickening on noncontrast study, though empyema has to be considered.   Was on IV CTX, transitioned to Amoxicillin BID on 1/4 thru 1/9- completed 10 days total of abx per ID recommendations  Wean o2 as able, continue nebulizers, supportive care  Updated CXR 1/5 showed, \"New left lower lobe consolidation with additional worsening of right base infiltrates.\"  With intermittent hypoxia, will repeat CXR on 1/12.  Encourage OOB, ISS otherwise.    DC planning to rehab once medically stable   "

## 2025-01-13 ENCOUNTER — APPOINTMENT (INPATIENT)
Dept: RADIOLOGY | Facility: HOSPITAL | Age: OVER 89
DRG: 871 | End: 2025-01-13
Payer: COMMERCIAL

## 2025-01-13 LAB
GLUCOSE SERPL-MCNC: 105 MG/DL (ref 65–140)
GLUCOSE SERPL-MCNC: 111 MG/DL (ref 65–140)
GLUCOSE SERPL-MCNC: 112 MG/DL (ref 65–140)
GLUCOSE SERPL-MCNC: 94 MG/DL (ref 65–140)

## 2025-01-13 PROCEDURE — 97116 GAIT TRAINING THERAPY: CPT

## 2025-01-13 PROCEDURE — 97530 THERAPEUTIC ACTIVITIES: CPT

## 2025-01-13 PROCEDURE — 99232 SBSQ HOSP IP/OBS MODERATE 35: CPT | Performed by: PHYSICIAN ASSISTANT

## 2025-01-13 PROCEDURE — 82948 REAGENT STRIP/BLOOD GLUCOSE: CPT

## 2025-01-13 PROCEDURE — 71045 X-RAY EXAM CHEST 1 VIEW: CPT

## 2025-01-13 RX ORDER — FUROSEMIDE 10 MG/ML
20 INJECTION INTRAMUSCULAR; INTRAVENOUS ONCE
Status: CANCELLED | OUTPATIENT
Start: 2025-01-13

## 2025-01-13 RX ADMIN — APIXABAN 2.5 MG: 2.5 TABLET, FILM COATED ORAL at 17:54

## 2025-01-13 RX ADMIN — METOPROLOL TARTRATE 25 MG: 25 TABLET, FILM COATED ORAL at 22:15

## 2025-01-13 RX ADMIN — ACETAMINOPHEN 975 MG: 325 TABLET, FILM COATED ORAL at 05:04

## 2025-01-13 RX ADMIN — METOPROLOL TARTRATE 25 MG: 25 TABLET, FILM COATED ORAL at 09:19

## 2025-01-13 RX ADMIN — SIMETHICONE 80 MG: 80 TABLET, CHEWABLE ORAL at 06:52

## 2025-01-13 RX ADMIN — ACETAMINOPHEN 975 MG: 325 TABLET, FILM COATED ORAL at 13:32

## 2025-01-13 RX ADMIN — Medication 2.5 MG: at 22:16

## 2025-01-13 RX ADMIN — DICYCLOMINE HYDROCHLORIDE 10 MG: 10 CAPSULE ORAL at 12:08

## 2025-01-13 RX ADMIN — CHLORHEXIDINE GLUCONATE 0.12% ORAL RINSE 15 ML: 1.2 LIQUID ORAL at 22:16

## 2025-01-13 RX ADMIN — SIMETHICONE 80 MG: 80 TABLET, CHEWABLE ORAL at 22:15

## 2025-01-13 RX ADMIN — ACETAMINOPHEN 975 MG: 325 TABLET, FILM COATED ORAL at 22:15

## 2025-01-13 RX ADMIN — SIMETHICONE 80 MG: 80 TABLET, CHEWABLE ORAL at 17:54

## 2025-01-13 RX ADMIN — DICYCLOMINE HYDROCHLORIDE 10 MG: 10 CAPSULE ORAL at 06:08

## 2025-01-13 RX ADMIN — Medication 2.5 MG: at 14:10

## 2025-01-13 RX ADMIN — DICYCLOMINE HYDROCHLORIDE 10 MG: 10 CAPSULE ORAL at 17:54

## 2025-01-13 RX ADMIN — MELATONIN TAB 3 MG 3 MG: 3 TAB at 22:15

## 2025-01-13 RX ADMIN — SIMETHICONE 80 MG: 80 TABLET, CHEWABLE ORAL at 12:08

## 2025-01-13 RX ADMIN — Medication 250 MG: at 17:54

## 2025-01-13 RX ADMIN — CHLORHEXIDINE GLUCONATE 0.12% ORAL RINSE 15 ML: 1.2 LIQUID ORAL at 09:19

## 2025-01-13 RX ADMIN — Medication 250 MG: at 09:19

## 2025-01-13 RX ADMIN — APIXABAN 2.5 MG: 2.5 TABLET, FILM COATED ORAL at 09:19

## 2025-01-13 NOTE — PHYSICAL THERAPY NOTE
Physical Therapy Progress Note     01/13/25 1545   PT Last Visit   PT Visit Date 01/13/25   Note Type   Note Type Treatment   Pain Assessment   Pain Assessment Tool FLACC   Pain Rating: FLACC (Rest) - Face 1   Pain Rating: FLACC (Rest) - Legs 0   Pain Rating: FLACC (Rest) - Activity 0   Pain Rating: FLACC (Rest) - Cry 1   Pain Rating: FLACC (Rest) - Consolability 0   Score: FLACC (Rest) 2   Pain Rating: FLACC (Activity) - Face 1   Pain Rating: FLACC (Activity) - Legs 1   Pain Rating: FLACC (Activity) - Activity 1   Pain Rating: FLACC (Activity) - Cry 1   Pain Rating: FLACC (Activity) - Consolability 0   Score: FLACC (Activity) 4   Restrictions/Precautions   Other Precautions Cognitive;Bed Alarm;Chair Alarm;Pain;Fall Risk;O2   Subjective   Subjective pt encountered supine in bed, agreeable to treatment, but reporting back pain as a result of being in bed.  Recently recieved oral pain meds per family, which they report also make her sleepy sometimes.  Pt remained alert & appeared better with activity, but did quickly fall asleep post activity once she was comfortable in the chair.  Pt admits to anxiousness regarding falling & general feelings of weakness, but responds well to encouragement.   Bed Mobility   Supine to Sit 3  Moderate assistance   Additional items Assist x 1   Transfers   Sit to Stand 3  Moderate assistance   Additional items Assist x 1;Increased time required;Verbal cues   Stand to Sit 3  Moderate assistance   Additional items Assist x 1;Armrests;Increased time required;Verbal cues   Stand pivot 3  Moderate assistance   Additional items Assist x 1   Ambulation/Elevation   Gait pattern Short stride;Foward flexed;Inconsistent koby;Shuffling;Decreased foot clearance;Narrow TONO;Improper Weight shift;Poor UE support   Gait Assistance 3  Moderate assist   Additional items Assist x 1  (+ chair follow)   Assistive Device Rolling walker   Distance 12', 25'   Balance   Static Sitting Fair   Static Standing  Poor +   Ambulatory Poor   Activity Tolerance   Activity Tolerance Patient tolerated treatment well;Patient limited by fatigue;Patient limited by pain   Nurse Made Aware NAKIA Kelly   Assessment   Prognosis Good   Problem List Decreased strength;Decreased endurance;Impaired balance;Decreased mobility;Decreased safety awareness;Decreased range of motion   Assessment pt demonstrated miproved functional mobiilty & endurance this session compared to last, perfomring all transfers & ambulaory tasks on levels with decreased assist by conclsuion of session s/p repeated standing & gait trials as noted above.  She remains limited in general by fatigue & impaired respiratory status, but no overt buckling or LOB resulted due to the same.  She did require considerable assist & instructiosn for seqeuncing & RW management during initial OOB pivot & gait trial, but once she attended to instructions for upright posture & improved foot clearance, she demsontrated considerably improved balance with less assist required to complete tasks as noted above.  Upon return to room, pt & family educated in importance of daily OOB mobiilty & upright sitting in recliner to improve respiratory status & promote improved strength & return to independent mobility.  PT POC & d/c recommendations remain appropriate at this time to address deficits as she continues to recover.   Goals   Patient Goals to rest   STG Expiration Date 01/14/25   PT Treatment Day 2   Plan   Treatment/Interventions Functional transfer training;LE strengthening/ROM;Therapeutic exercise;Endurance training;Patient/family training;Equipment eval/education;Bed mobility;Gait training   Progress Progressing toward goals   PT Frequency 3-5x/wk   Discharge Recommendation   Rehab Resource Intensity Level, PT II (Moderate Resource Intensity)   AM-PAC Basic Mobility Inpatient   Turning in Flat Bed Without Bedrails 2   Lying on Back to Sitting on Edge of Flat Bed Without Bedrails 2   Moving  Bed to Chair 2   Standing Up From Chair Using Arms 2   Walk in Room 2   Climb 3-5 Stairs With Railing 1   Basic Mobility Inpatient Raw Score 11   Basic Mobility Standardized Score 30.25   [facility] Highest Level Of Mobility   -HLM Goal 4: Move to chair/commode   -HLM Achieved 7: Walk 25 feet or more       Ace Damian PTA    An [facility] Basic Mobility Raw Score less than 17 suggests pt would benefit from post acute rehab.  Please also refer to the recommendation of the Physical Therapist for safe discharge planning.

## 2025-01-13 NOTE — PLAN OF CARE
Problem: Prexisting or High Potential for Compromised Skin Integrity  Goal: Skin integrity is maintained or improved  Description: INTERVENTIONS:  - Identify patients at risk for skin breakdown  - Assess and monitor skin integrity  - Assess and monitor nutrition and hydration status  - Monitor labs   - Assess for incontinence   - Turn and reposition patient  - Assist with mobility/ambulation  - Relieve pressure over bony prominences  - Avoid friction and shearing  - Provide appropriate hygiene as needed including keeping skin clean and dry  - Evaluate need for skin moisturizer/barrier cream  - Collaborate with interdisciplinary team   - Patient/family teaching  - Consider wound care consult   Outcome: Progressing     Problem: RESPIRATORY - ADULT  Goal: Achieves optimal ventilation and oxygenation  Description: INTERVENTIONS:  - Assess for changes in respiratory status  - Assess for changes in mentation and behavior  - Position to facilitate oxygenation and minimize respiratory effort  - Oxygen administered by appropriate delivery if ordered  - Initiate smoking cessation education as indicated  - Encourage broncho-pulmonary hygiene including cough, deep breathe, Incentive Spirometry  - Assess the need for suctioning and aspirate as needed  - Assess and instruct to report SOB or any respiratory difficulty  - Respiratory Therapy support as indicated  Outcome: Progressing     Problem: Nutrition/Hydration-ADULT  Goal: Nutrient/Hydration intake appropriate for improving, restoring or maintaining nutritional needs  Description: Monitor and assess patient's nutrition/hydration status for malnutrition. Collaborate with interdisciplinary team and initiate plan and interventions as ordered.  Monitor patient's weight and dietary intake as ordered or per policy. Utilize nutrition screening tool and intervene as necessary. Determine patient's food preferences and provide high-protein, high-caloric foods as appropriate.      INTERVENTIONS:  - Monitor oral intake, urinary output, labs, and treatment plans  - Assess nutrition and hydration status and recommend course of action  - Evaluate amount of meals eaten  - Assist patient with eating if necessary   - Allow adequate time for meals  - Recommend/ encourage appropriate diets, oral nutritional supplements, and vitamin/mineral supplements  - Order, calculate, and assess calorie counts as needed  - Recommend, monitor, and adjust tube feedings and TPN/PPN based on assessed needs  - Assess need for intravenous fluids  - Provide specific nutrition/hydration education as appropriate  - Include patient/family/caregiver in decisions related to nutrition  Outcome: Progressing     Problem: PAIN - ADULT  Goal: Verbalizes/displays adequate comfort level or baseline comfort level  Description: Interventions:  - Encourage patient to monitor pain and request assistance  - Assess pain using appropriate pain scale  - Administer analgesics based on type and severity of pain and evaluate response  - Implement non-pharmacological measures as appropriate and evaluate response  - Consider cultural and social influences on pain and pain management  - Notify physician/advanced practitioner if interventions unsuccessful or patient reports new pain  Outcome: Progressing     Problem: INFECTION - ADULT  Goal: Absence or prevention of progression during hospitalization  Description: INTERVENTIONS:  - Assess and monitor for signs and symptoms of infection  - Monitor lab/diagnostic results  - Monitor all insertion sites, i.e. indwelling lines, tubes, and drains  - Monitor endotracheal if appropriate and nasal secretions for changes in amount and color  - [place] appropriate cooling/warming therapies per order  - Administer medications as ordered  - Instruct and encourage patient and family to use good hand hygiene technique  - Identify and instruct in appropriate isolation precautions for identified  infection/condition  Outcome: Progressing     Problem: SAFETY ADULT  Goal: Patient will remain free of falls  Description: INTERVENTIONS:  - Educate patient/family on patient safety including physical limitations  - Instruct patient to call for assistance with activity   - Consult OT/PT to assist with strengthening/mobility   - Keep Call bell within reach  - Keep bed low and locked with side rails adjusted as appropriate  - Keep care items and personal belongings within reach  - Initiate and maintain comfort rounds  - Make Fall Risk Sign visible to staff  - Apply yellow socks and bracelet for high fall risk patients  - Consider moving patient to room near nurses station  Outcome: Progressing  Goal: Maintain or return to baseline ADL function  Description: INTERVENTIONS:  -  Assess patient's ability to carry out ADLs; assess patient's baseline for ADL function and identify physical deficits which impact ability to perform ADLs (bathing, care of mouth/teeth, toileting, grooming, dressing, etc.)  - Assess/evaluate cause of self-care deficits   - Assess range of motion  - Assess patient's mobility; develop plan if impaired  - Assess patient's need for assistive devices and provide as appropriate  - Encourage maximum independence but intervene and supervise when necessary  - Involve family in performance of ADLs  - Assess for home care needs following discharge   - Consider OT consult to assist with ADL evaluation and planning for discharge  - Provide patient education as appropriate  Outcome: Progressing  Goal: Maintains/Returns to pre admission functional level  Description: INTERVENTIONS:  - Perform AM-PAC 6 Click Basic Mobility/ Daily Activity assessment daily.  - Set and communicate daily mobility goal to care team and patient/family/caregiver.   - Collaborate with rehabilitation services on mobility goals if consulted  - Out of bed for toileting  - Record patient progress and toleration of activity level   Outcome:  Progressing     Problem: DISCHARGE PLANNING  Goal: Discharge to home or other facility with appropriate resources  Description: INTERVENTIONS:  - Identify barriers to discharge w/patient and caregiver  - Arrange for needed discharge resources and transportation as appropriate  - Identify discharge learning needs (meds, wound care, etc.)  - Arrange for interpretive services to assist at discharge as needed  - Refer to Case Management Department for coordinating discharge planning if the patient needs post-hospital services based on physician/advanced practitioner order or complex needs related to functional status, cognitive ability, or social support system  Outcome: Progressing     Problem: Knowledge Deficit  Goal: Patient/family/caregiver demonstrates understanding of disease process, treatment plan, medications, and discharge instructions  Description: Complete learning assessment and assess knowledge base.  Interventions:  - Provide teaching at level of understanding  - Provide teaching via preferred learning methods  Outcome: Progressing

## 2025-01-13 NOTE — CASE MANAGEMENT
[facility] has received APPROVED authorization.  Insurance:   [employer]  Auth obtained via Insurance Rep:  Uzma  Authorization received for: SNF  Facility: [facility]   Authorization #:[id number]   Start of Care: 1/13  Next Review Date: 1/20  Continued Stay Care Coordinator: none given  Submit next review to: [phone]     Care Manager notified: Julian Blue    Please reach out to CM for updates on any clinical information.

## 2025-01-13 NOTE — PLAN OF CARE
Problem: PAIN - ADULT  Goal: Verbalizes/displays adequate comfort level or baseline comfort level  Description: Interventions:  - Encourage patient to monitor pain and request assistance  - Assess pain using appropriate pain scale  - Administer analgesics based on type and severity of pain and evaluate response  - Implement non-pharmacological measures as appropriate and evaluate response  - Consider cultural and social influences on pain and pain management  - Notify physician/advanced practitioner if interventions unsuccessful or patient reports new pain  Outcome: Progressing     Problem: Nutrition/Hydration-ADULT  Goal: Nutrient/Hydration intake appropriate for improving, restoring or maintaining nutritional needs  Description: Monitor and assess patient's nutrition/hydration status for malnutrition. Collaborate with interdisciplinary team and initiate plan and interventions as ordered.  Monitor patient's weight and dietary intake as ordered or per policy. Utilize nutrition screening tool and intervene as necessary. Determine patient's food preferences and provide high-protein, high-caloric foods as appropriate.     INTERVENTIONS:  - Monitor oral intake, urinary output, labs, and treatment plans  - Assess nutrition and hydration status and recommend course of action  - Evaluate amount of meals eaten  - Assist patient with eating if necessary   - Allow adequate time for meals  - Recommend/ encourage appropriate diets, oral nutritional supplements, and vitamin/mineral supplements  - Order, calculate, and assess calorie counts as needed  - Recommend, monitor, and adjust tube feedings and TPN/PPN based on assessed needs  - Assess need for intravenous fluids  - Provide specific nutrition/hydration education as appropriate  - Include patient/family/caregiver in decisions related to nutrition  Outcome: Progressing     Problem: INFECTION - ADULT  Goal: Absence or prevention of progression during  hospitalization  Description: INTERVENTIONS:  - Assess and monitor for signs and symptoms of infection  - Monitor lab/diagnostic results  - Monitor all insertion sites, i.e. indwelling lines, tubes, and drains  - Monitor endotracheal if appropriate and nasal secretions for changes in amount and color  - [place] appropriate cooling/warming therapies per order  - Administer medications as ordered  - Instruct and encourage patient and family to use good hand hygiene technique  - Identify and instruct in appropriate isolation precautions for identified infection/condition  Outcome: Progressing     Problem: DISCHARGE PLANNING  Goal: Discharge to home or other facility with appropriate resources  Description: INTERVENTIONS:  - Identify barriers to discharge w/patient and caregiver  - Arrange for needed discharge resources and transportation as appropriate  - Identify discharge learning needs (meds, wound care, etc.)  - Arrange for interpretive services to assist at discharge as needed  - Refer to Case Management Department for coordinating discharge planning if the patient needs post-hospital services based on physician/advanced practitioner order or complex needs related to functional status, cognitive ability, or social support system  Outcome: Progressing     Problem: SAFETY ADULT  Goal: Maintains/Returns to pre admission functional level  Description: INTERVENTIONS:  - Perform AM-PAC 6 Click Basic Mobility/ Daily Activity assessment daily.  - Set and communicate daily mobility goal to care team and patient/family/caregiver.   - Collaborate with rehabilitation services on mobility goals if consulted  - Out of bed for toileting  - Record patient progress and toleration of activity level   Outcome: Progressing

## 2025-01-13 NOTE — PROGRESS NOTES
"Progress Note - Hospitalist   Name: Remedios Mcgrath 89 y.o. female I MRN: [id number]  Unit/Bed#: [facility] 603-01 I Date of Admission: 12/31/2024   Date of Service: 1/13/2025 I Hospital Day: 13    Assessment & Plan  Pneumonia  Presented to ER after being found down on welfare check. Was noted to be hypoxic in ER (82% on RA) requiring BIPAP and then developed a-fib with RVR.  Was in ICU, transferred out on 1/2.   CT CAP: Acute right-sided pneumonia, greatest at the right lower lobe. Associated loculated parapneumonic effusion. No obvious pleural thickening on noncontrast study, though empyema has to be considered.   Was on IV CTX, transitioned to Amoxicillin BID on 1/4 thru 1/9- completed 10 days total of abx per ID recommendations  Wean o2 as able, continue nebulizers, supportive care  Updated CXR 1/5 showed, \"New left lower lobe consolidation with additional worsening of right base infiltrates.\"  With intermittent hypoxia, will repeat CXR on 1/12 and AM 1/13 with severe emphysema, possibly mild venous congestion vs pneumonia -- overall appears improved from prior imaging on my reads, and just completed course of abx.  Noted documented hypoxia however on all of my evaluations she is 90-94% on room 4 L NC.  Encourage OOB, ISS otherwise.    DC planning to rehab.  Pending auth and bed availability at this time.   New onset atrial fibrillation with RVR (HCC)  Noted while in the ER, cardiology was consulted, spontaneously converted to sinus rhythm currently on Lopressor 25 mg twice daily and Eliquis 2.5 mg for anticoagulation  As she will be discharged to facility, will defer Eliquis price check at this time  Cardiology signed off as of 1/4  Pneumococcal bacteremia  Due to pneumonia.  Repeat blood cultures negative.  TTE (technically difficult) showed MV thickening (likely age-related) but no vegetations.  Low clinical suspicion for endocarditis.  Status post IV ceftriaxone, transitioned to amoxicillin 500 mg PO Q12, completed " 10 days total antibiotics  No indication for GARRETT from an ID perspective  Repeat blood cx are negative x2 after 5 days  Sepsis (HCC)  As evidenced by leukocytosis and tachycardia, due to bacteremia and pneumonia  ID following  Plan as above  Resolving   Acute hypoxic respiratory failure (HCC)  Most likely secondary to pneumonia and severe COPD. Appears was up to 15L in ICU  Goal O2 88-90%  As will be going to rehab will not require O2 evaluation at this time  Continue nebulizers  S/p steroids   Pulm consult appreciated  O2 requirements have improved, and on 4 L NC currently   Ambulatory dysfunction  Generalized weakness 2/2 above   PT/OT, rehab planning  With diffuse body aches/pains, and also reporting intermittent gas.  Will schedule tylenol, bentyl, simethicone.  Can trial low dose oxy if not effective, SE d/w family  Anemia  Hgb 11.4-->9.5-->9.7   D/w nursing, no overt bleeding.  Hgb was in 9s on 1/3  New start AC with Eliquis 2/2 a fib  Monitor CBC PRN.  Stable last check.    Hypertension  BP overall stable  On lopressor 25 mg bid   Holding PTA norvasc  Moderate protein-calorie malnutrition (HCC)  Malnutrition Findings:   Adult Malnutrition type: Chronic illness  Adult Degree of Malnutrition: Malnutrition of moderate degree  Malnutrition Characteristics: Inadequate energy, Muscle loss                  360 Statement: Chronic/moderate malnutrition r/t inadequate PO intake, as evidenced by intake meeting <75% of estimated needs x > 1 month, and muscle mass depletion (temporal). Treatment: pending ability to advance to PO diet - liberal diet as able and trial oral nutrition supplements pending SLP recommendations    BMI Findings:           Body mass index is 18.38 kg/m².   Encourage oral intake as able  Acute kidney injury superimposed on CKD  (HCC)  Lab Results   Component Value Date    EGFR 45 01/12/2025    EGFR 43 01/11/2025    EGFR 49 01/08/2025    CREATININE 1.05 01/12/2025    CREATININE 1.08 01/11/2025     CREATININE 0.97 01/08/2025     POA 2.47, likely 2/2 sepsis  resolved baseline is 1.3-1.4 and currently below baseline  Monitor BMP  Emphysema lung (HCC)  Patient was treated for COPD exacerbation with IV Solu-Medrol and was transitioned to oral prednisone 40 mg daily for 3 days -- completed   Monitor O2 sats  Pulm f/u outpt   Dysphagia  Speech following, currently on modified diet  Pleural effusion, right  Pulm following - do not appear loculated per Pulm and they did not recommend draining them  Hyperglycemia  Likely 2/2 steroids, was prediabetic as of 9/2024  A1C 6.4  SSI  Unwitnessed fall  Patient was a Rapid Respone on 1/5  XRs of right knee and pelvis with no acute osseous abnormalities   PT/OT recommending rehab     VTE Pharmacologic Prophylaxis:   Moderate Risk (Score 3-4) - Pharmacological DVT Prophylaxis Ordered: apixaban (Eliquis).    Mobility:   Basic Mobility Inpatient Raw Score: 11  JH-HLM Goal: 4: Move to chair/commode  JH-HLM Achieved: 4: Move to chair/commode  JH-HLM Goal achieved. Continue to encourage appropriate mobility.    Patient Centered Rounds: I performed bedside rounds with nursing staff today.   Discussions with Specialists or Other Care Team Provider:     Education and Discussions with Family / Patient: Updated  (son) via phone.    Current Length of Stay: 13 day(s)  Current Patient Status: Inpatient   Certification Statement: The patient will continue to require additional inpatient hospital stay due to safe dc rehab planning  Discharge Plan: Anticipate discharge later today or tomorrow to rehab facility.    Code Status: Level 3 - DNAR and DNI    Subjective   No complaints, just generally weak and cold.      Objective :  Temp:  [97.6 °F (36.4 °C)] 97.6 °F (36.4 °C)  HR:  [59-78] 67  BP: (110-163)/() 150/58  Resp:  [17-19] 19  SpO2:  [86 %-91 %] 88 %  O2 Device: Nasal cannula  Nasal Cannula O2 Flow Rate (L/min):  [4 L/min] 4 L/min    Body mass index is 18.38 kg/m².      Input and Output Summary (last 24 hours):     Intake/Output Summary (Last 24 hours) at 1/13/2025 1029  Last data filed at 1/13/2025 0854  Gross per 24 hour   Intake 120 ml   Output --   Net 120 ml       Physical Exam  Vitals reviewed.   Constitutional:       General: She is not in acute distress.     Appearance: She is not toxic-appearing.      Comments: Frail, elderly, thin    HENT:      Head: Normocephalic and atraumatic.   Eyes:      Extraocular Movements: Extraocular movements intact.   Cardiovascular:      Rate and Rhythm: Normal rate and regular rhythm.   Pulmonary:      Effort: Pulmonary effort is normal.      Breath sounds: Normal breath sounds.   Abdominal:      General: Bowel sounds are normal. There is no distension.      Palpations: Abdomen is soft.   Musculoskeletal:         General: Normal range of motion.      Comments: Generally weak.     Neurological:      Mental Status: She is alert and oriented to person, place, and time.      Comments: Mild L facial droop, noted on prior exams    Psychiatric:         Mood and Affect: Mood normal.         Behavior: Behavior normal.         Thought Content: Thought content normal.         Lines/Drains:              Lab Results: I have reviewed the following results:   Results from last 7 days   Lab Units 01/12/25  0444   WBC Thousand/uL 10.07   HEMOGLOBIN g/dL 9.7*   HEMATOCRIT % 30.4*   PLATELETS Thousands/uL 397*   SEGS PCT % 80*   LYMPHO PCT % 8*   MONO PCT % 10   EOS PCT % 1     Results from last 7 days   Lab Units 01/12/25  0444   SODIUM mmol/L 139   POTASSIUM mmol/L 3.9   CHLORIDE mmol/L 106   CO2 mmol/L 25   BUN mg/dL 18   CREATININE mg/dL 1.05   ANION GAP mmol/L 8   CALCIUM mg/dL 7.2*   GLUCOSE RANDOM mg/dL 81         Results from last 7 days   Lab Units 01/13/25  0703 01/12/25  2046 01/12/25  1625 01/12/25  1206 01/12/25  0644 01/11/25  2019 01/11/25  1614 01/11/25  1128 01/11/25  0727 01/10/25  2055 01/10/25  1700 01/10/25  1142   POC GLUCOSE mg/dl  105 123 96 99 83 127 174* 118 102 210* 108 91               Recent Cultures (last 7 days):         Imaging Results Review: I reviewed radiology reports from this admission including: chest xray.  Other Study Results Review: No additional pertinent studies reviewed.    Last 24 Hours Medication List:     Current Facility-Administered Medications:     acetaminophen (TYLENOL) tablet 975 mg, Q8H KATT    albuterol (PROVENTIL HFA,VENTOLIN HFA) inhaler 2 puff, Q4H PRN    apixaban (ELIQUIS) tablet 2.5 mg, BID    chlorhexidine (PERIDEX) 0.12 % oral rinse 15 mL, Q12H KATT    dicyclomine (BENTYL) capsule 10 mg, TID AC    insulin lispro (HumALOG/ADMELOG) 100 units/mL subcutaneous injection 1-5 Units, TID AC **AND** Fingerstick Glucose (POCT), TID AC    insulin lispro (HumALOG/ADMELOG) 100 units/mL subcutaneous injection 1-5 Units, HS    melatonin tablet 3 mg, Once    melatonin tablet 3 mg, HS PRN    metoprolol tartrate (LOPRESSOR) tablet 25 mg, Q12H KATT    oxyCODONE (ROXICODONE) split tablet 2.5 mg, Q6H PRN    QUEtiapine (SEROquel) tablet 12.5 mg, Once    saccharomyces boulardii (FLORASTOR) capsule 250 mg, BID    simethicone (MYLICON) chewable tablet 80 mg, 4x Daily (with meals and at bedtime)    Administrative Statements   Today, Patient Was Seen By: Shira Hines PA-C      **Please Note: This note may have been constructed using a voice recognition system.**

## 2025-01-13 NOTE — CASE MANAGEMENT
Per [employer], SNF auth still pending.     [employer] email sent to [employer] Team requesting expedite of determination.     CM notified:  Julian Blue

## 2025-01-13 NOTE — ASSESSMENT & PLAN NOTE
[facility]  [facility]OU[facility]AC[facility]     Patient Name: Santiago Liriano   : 1980  MRN:[id number]     Chief Complaint     Chief Complaint   Patient presents with    Follow-up     Lab review        History of Present Illness     44 year old AAM, presents in clinic for lab f/u. Bp improved on amlodipine 10 daily and lisinopril 20 daily. No acute complaints voiced.     PMH HTN, CKD, Anemia, Schizophrenia 2015, AR, obesity, tobacco use. Smokes 12 cigs/day or 2 cigars per day. Stopped smoking 2023 but sinc restarted. BMI 42.  Follows NP Katerin Almeida Mental health provider every 3 months. Counselor is at [place] and meets every 2-3 weeks. On Invega, mood stable at this time. Lives alone, unemployed. Sleeping well. Appetite fair. Counting calories at home, eats 3-6 small meals, and trying to exercise 4-8 miles per day by walking.  Denies chest pain, shortness of breath, cough, fever, headache, dizziness, weakness, abdominal pain, nausea, vomiting, diarrhea, constipation, dysuria, depression, anxiety.                Review of Systems     Review of Systems   Constitutional: Negative.    HENT: Negative.     Eyes: Negative.    Respiratory: Negative.     Cardiovascular: Negative.    Gastrointestinal: Negative.    Endocrine: Negative.    Genitourinary: Negative.    Musculoskeletal: Negative.    Integumentary:  Negative.   Allergic/Immunologic: Negative.    Neurological: Negative.    Hematological: Negative.    Psychiatric/Behavioral: Negative.     All other systems reviewed and are negative.       Physical Examination     Visit Vitals  /78 (BP Location: Left arm, Patient Position: Sitting, BP Method: Large (Automatic))   Pulse 83   Temp 98.4 °F (36.9 °C) (Oral)   Resp 18   Ht 6' (1.829 m)   Wt (!) 154.2 kg (340 lb)   BMI 46.11 kg/m²        BP Readings from Last 6 Encounters:   24 119/78   24 138/82   24 (!) 132/90   23 114/78   23 122/80   23 (!) 158/110  Most likely secondary to pneumonia and severe COPD. Appears was up to 15L in ICU  Goal O2 88-90%  As will be going to rehab will not require O2 evaluation at this time  Continue nebulizers  S/p steroids   Pulm consult appreciated  O2 requirements have improved, and on 4 L NC currently      ]    Wt Readings from Last 6 Encounters:   07/22/24 (!) 154.2 kg (340 lb)   03/20/24 (!) 153.8 kg (339 lb)   02/21/24 (!) 147.9 kg (326 lb)   12/18/23 (!) 143.8 kg (317 lb)   08/16/23 (!) 146.5 kg (323 lb)   07/19/23 (!) 150.1 kg (331 lb)   ]    BMI Readings from Last 3 Encounters:   03/20/24 45.98 kg/m²   02/21/24 44.21 kg/m²   12/18/23 42.99 kg/m²         Physical Exam  Vitals and nursing note reviewed.   Constitutional:       Appearance: Normal appearance.   HENT:      Head: Normocephalic.      Right Ear: Tympanic membrane, ear canal and external ear normal.      Left Ear: Tympanic membrane, ear canal and external ear normal.      Nose: Nose normal.      Mouth/Throat:      Mouth: Mucous membranes are moist.      Pharynx: Oropharynx is clear.   Eyes:      Extraocular Movements: Extraocular movements intact.      Conjunctiva/sclera: Conjunctivae normal.      Pupils: Pupils are equal, round, and reactive to light.   Cardiovascular:      Rate and Rhythm: Normal rate and regular rhythm.      Pulses: Normal pulses.      Heart sounds: Normal heart sounds.   Pulmonary:      Effort: Pulmonary effort is normal.      Breath sounds: Normal breath sounds.   Abdominal:      General: Bowel sounds are normal.      Palpations: Abdomen is soft.   Musculoskeletal:         General: Normal range of motion.      Cervical back: Normal range of motion and neck supple.   Skin:     General: Skin is warm and dry.      Capillary Refill: Capillary refill takes less than 2 seconds.   Neurological:      General: No focal deficit present.      Mental Status: He is alert and oriented to person, place, and time. Mental status is at baseline.   Psychiatric:         Mood and Affect: Mood normal.         Behavior: Behavior normal.         Thought Content: Thought content normal.         Judgment: Judgment normal.          Labs / Imaging     Chemistry:  Lab Results   Component Value Date     (L) 07/18/2024    K 4.8 07/18/2024    BUN 20.5  07/18/2024    CREATININE 1.52 (H) 07/18/2024    EGFRNORACEVR 58 07/18/2024    GLUCOSE 95 07/18/2024    CALCIUM 8.6 07/18/2024    ALKPHOS 71 07/18/2024    LABPROT 7.4 07/18/2024    ALBUMIN 3.7 07/18/2024    AST 17 07/18/2024    ALT 17 07/18/2024    [id number] 32 07/18/2024        Lab Results   Component Value Date    HGBA1C 6.0 07/18/2024        Hematology:  Lab Results   Component Value Date    WBC 4.86 07/18/2024    RBC 5.48 07/18/2024    HGB 12.6 (L) 07/18/2024    HCT 41.0 (L) 07/18/2024    MCV 74.8 (L) 07/18/2024    MCH 23.0 (L) 07/18/2024    MCHC 30.7 (L) 07/18/2024    RDW 16.2 07/18/2024     07/18/2024    MPV 12.0 (H) 07/18/2024        Lipid Panel:  Lab Results   Component Value Date    CHOL 154 07/18/2024    HDL 40 07/18/2024    LDL 96.00 07/18/2024    TRIG 88 07/18/2024    TOTALCHOLEST 4 07/18/2024        Urine:  Lab Results   Component Value Date    APPEARANCEUA Clear 07/18/2024    SGUA 1.015 07/18/2024    PROTEINUA Negative 07/18/2024    KETONESUA Negative 07/18/2024    LEUKOCYTESUR Negative 07/18/2024    RBCUA 0-5 07/18/2024    WBCUA 0-5 07/18/2024    BACTERIA None Seen 07/18/2024    SQEPUA None Seen 07/18/2024    HYALINECASTS None Seen 07/18/2024    CREATRANDUR 133.7 07/18/2024    PROTEINURINE 10.4 07/18/2024    UPROTCREA 0.1 07/18/2024          Assessment       ICD-10-CM ICD-9-CM   1. Hypertension, unspecified type  I10 401.9   2. Anemia, unspecified type  D64.9 285.9   3. Prediabetes  R73.03 790.29   4. Vitamin D deficiency  E55.9 268.9   5. Stage 3 chronic kidney disease, unspecified whether stage 3a or 3b CKD  N18.30 585.3   6. Hyperlipidemia, unspecified hyperlipidemia type  E78.5 272.4   7. Cigarette nicotine dependence without complication  F17.210 305.1   8. BMI 45.0-49.9, adult  Z68.42 V85.42        Plan     1. Hypertension, unspecified type  BP and HR stable. Med refills. DASH diet: Eat more fruits, vegetables, and low fat dairy foods.  (Less than 2 grams of sodium per day).  Maintain  healthy weight with goal BMI <30.   Exercise 30 minutes per day 5 days per week.  Home medications refilled and continued.   Home BP monitoring encouraged with BP parameters given.    - CBC Auto Differential; Future  - Comprehensive Metabolic Panel; Future  - Lipid Panel; Future  - Hemoglobin A1C; Future  - lisinopriL (PRINIVIL,ZESTRIL) 20 MG tablet; Take 1 tablet (20 mg total) by mouth once daily.  Dispense: 90 tablet; Refill: 1  - amLODIPine (NORVASC) 10 MG tablet; Take 1 tablet (10 mg total) by mouth once daily.  Dispense: 90 tablet; Refill: 1    2. Anemia, unspecified type  Lab Results   Component Value Date    HCT 41.0 (L) 07/18/2024    HGB 12.6 (L) 07/18/2024     Take iron supplements as prescribed.  Add Vitamin C to your diet.  Take iron and vitamin C on an empty stomach for better absorption if tolerated.  Add iron rich foods to diet such as liver, lean beef, eggs, dried fruit, dark green leafy vegetables.  Education provided on additional sources of iron-rich foods.  Do NOT drink milk or take antacids at the same time you take your iron supplement.  Iron supplements can cause constipation; add stool softener or fiber as needed.   - CBC Auto Differential; Future  - ferrous sulfate 324 mg (65 mg iron) TbEC; Take 1 tablet (324 mg total) by mouth every other day.  Dispense: 45 tablet; Refill: 1    3. Prediabetes  A1C 6.0  Prediabetes is reversible. Close follow up is important.  Maintain healthy weight or lose weight.   Eat fewer refined carbohydrates and fats, and more fiber.  Read nutrition labels.  Reduce portion sizes.  Eat out less often. Avoid fast foods.  Drink water and unsweetened beverages.  Spending at least one hour every day in physical activity.   - Comprehensive Metabolic Panel; Future  - Lipid Panel; Future  - Hemoglobin A1C; Future    4. Vitamin D deficiency  Vitamin D level reviewed and is currently at goal, between 30-80 ng/mL. Continue OTC Vitamin D3 2000 IU daily.   - Vitamin D;  Future    5. Stage 3 chronic kidney disease, unspecified whether stage 3a or 3b CKD  Lab Results   Component Value Date    EGFRNORACEVR 58 07/18/2024    EGFRNORACEVR >60 03/14/2024     Follow renoprotective measures including Renal Diet (reduce intake of nuts, peanut butter, milk, cheese, dried beans, peas) and Low Sodium Diet (less than 2 grams per day).  Avoid NSAIDs (Aleve, Mobic, Celebrex, Ibuprofen, Advil, Toradol and Diclofenac). May take Tylenol as needed for headache/pain.  Control DM with goal A1C <7. BP goal <130/80. LDL goal < 100.  Stay well hydrated. Avoid alcohol and soda. Limit tea and coffee.  Smoking Cessation recommended.   - Comprehensive Metabolic Panel; Future    6. Hyperlipidemia, unspecified hyperlipidemia type  Lab Results   Component Value Date    LDL 96.00 07/18/2024    CHOL 154 07/18/2024    HDL 40 07/18/2024    TRIG 88 07/18/2024       Cont RX daily; refills given. Take Omega 3 daily.   Stressed importance of dietary modifications. Follow a low cholesterol, low saturated fat diet with less that 200mg of cholesterol a day.  Avoid fried foods and high saturated fats (high saturated fats less than 7% of calories).  Add Flax Seed/Fish Oil supplements to diet. Increase dietary fiber.  Regular exercise can reduce LDL and raise HDL. Stressed importance of physical activity 5 times per week for 30 minutes per day.    - Comprehensive Metabolic Panel; Future  - Lipid Panel; Future    7. Cigarette nicotine dependence without complication  Smoking cessation advised. Instructed on smoking cessation program through [facility] and pharmacological interventions to aid in cessation.  >5 minutes allotted to educate patient on the harms of smoking, the urgency to quit, and the development of a plan for smoking cessation.     8. BMI 45.0-49.9, adult  Goal BMI <30.  Exercise 5 times a week for 30 minutes per day.  Avoid soda, simple sugars, excessive rice, potatoes or bread. Limit fast foods and fried foods.  Choose  complex carbs in moderation (example: green vegetables, beans, oatmeal). Eat plenty of fresh fruits and vegetables with lean meats daily.  Do not skip meals. Eat a balanced portion size.  Avoid fad diets. Consider permanent healthy life style changes.           Current Outpatient Medications   Medication Instructions    amLODIPine (NORVASC) 10 mg, Oral, Daily    cholecalciferol (vitamin D3) (VITAMIN D3) 2,000 Units, Oral, Daily    docusate sodium (COLACE) 100 mg, Oral, 2 times daily PRN    ferrous sulfate 324 mg, Oral, Every other day    INVEGA SUSTENNA 156 mg/mL Syrg injection Intramuscular    lisinopriL (PRINIVIL,ZESTRIL) 20 mg, Oral, Daily    nicotine (polacrilex) (NICORETTE) 2 mg, Oral, Every 2 hours PRN       Orders Placed This Encounter   Procedures    CBC Auto Differential    Comprehensive Metabolic Panel    Lipid Panel    Hemoglobin A1C    Vitamin D         Future Appointments   Date Time Provider Department Center   7/24/2024 11:45 AM Shyann Robles FNP [facility]   11/27/2024 12:20 PM [facility]         Follow up in about 4 months (around 11/22/2024) for lab review.    Labs thoroughly reviewed with patient. Medication refills addressed today.  RTC prn and 3-4 months, with labs 1 week prior to the apt.  COVID 19 precautions given to patient.  Patient voices understanding of all discharge instructions.      BOUBACAR De Leon

## 2025-01-13 NOTE — ASSESSMENT & PLAN NOTE
As evidenced by leukocytosis and tachycardia, due to bacteremia and pneumonia  ID following  Plan as above  Resolving

## 2025-01-13 NOTE — ASSESSMENT & PLAN NOTE
Patient was treated for COPD exacerbation with IV Solu-Medrol and was transitioned to oral prednisone 40 mg daily for 3 days -- completed   Monitor O2 sats  Pulm f/u outpt

## 2025-01-13 NOTE — ASSESSMENT & PLAN NOTE
Hgb 11.4-->9.5-->9.7   D/w nursing, no overt bleeding.  Hgb was in 9s on 1/3  New start AC with Eliquis 2/2 a fib  Monitor CBC PRN.  Stable last check.

## 2025-01-13 NOTE — ASSESSMENT & PLAN NOTE
"Presented to ER after being found down on welfare check. Was noted to be hypoxic in ER (82% on RA) requiring BIPAP and then developed a-fib with RVR.  Was in ICU, transferred out on 1/2.   CT CAP: Acute right-sided pneumonia, greatest at the right lower lobe. Associated loculated parapneumonic effusion. No obvious pleural thickening on noncontrast study, though empyema has to be considered.   Was on IV CTX, transitioned to Amoxicillin BID on 1/4 thru 1/9- completed 10 days total of abx per ID recommendations  Wean o2 as able, continue nebulizers, supportive care  Updated CXR 1/5 showed, \"New left lower lobe consolidation with additional worsening of right base infiltrates.\"  With intermittent hypoxia, will repeat CXR on 1/12 and AM 1/13 with severe emphysema, possibly mild venous congestion vs pneumonia -- overall appears improved from prior imaging on my reads, and just completed course of abx.  Noted documented hypoxia however on all of my evaluations she is 90-94% on room 4 L NC.  Encourage OOB, ISS otherwise.    DC planning to rehab.  Pending auth and bed availability at this time.   "

## 2025-01-13 NOTE — PLAN OF CARE
Problem: PHYSICAL THERAPY ADULT  Goal: Performs mobility at highest level of function for planned discharge setting.  See evaluation for individualized goals.  Description: Treatment/Interventions: OT, Spoke to case management, Gait training, Bed mobility, Patient/family training, Endurance training, LE strengthening/ROM, Functional transfer training  Equipment Recommended:  (TBD)       See flowsheet documentation for full assessment, interventions and recommendations.  Outcome: Progressing  Note: Prognosis: Good  Problem List: Decreased strength, Decreased endurance, Impaired balance, Decreased mobility, Decreased safety awareness, Decreased range of motion  Assessment: pt demonstrated miproved functional mobiilty & endurance this session compared to last, perfomring all transfers & ambulaory tasks on levels with decreased assist by conclsuion of session s/p repeated standing & gait trials as noted above.  She remains limited in general by fatigue & impaired respiratory status, but no overt buckling or LOB resulted due to the same.  She did require considerable assist & instructiosn for seqeuncing & RW management during initial OOB pivot & gait trial, but once she attended to instructions for upright posture & improved foot clearance, she demsontrated considerably improved balance with less assist required to complete tasks as noted above.  Upon return to room, pt & family educated in importance of daily OOB mobiilty & upright sitting in recliner to improve respiratory status & promote improved strength & return to independent mobility.  PT POC & d/c recommendations remain appropriate at this time to address deficits as she continues to recover.  Barriers to Discharge: Decreased caregiver support     Rehab Resource Intensity Level, PT: II (Moderate Resource Intensity)    See flowsheet documentation for full assessment.

## 2025-01-13 NOTE — CASE MANAGEMENT
Case Management Discharge Planning Note    Patient name Remedios Mcgrath  Location [facility] 603/[facility] 603-01 MRN [id number]  : 1929 Date 2025       Current Admission Date: 2024  Current Admission Diagnosis:Pneumonia   Patient Active Problem List    Diagnosis Date Noted Date Diagnosed    Ambulatory dysfunction 2025     Anemia 2025     Unwitnessed fall 2025     Hyperglycemia 2025     New onset atrial fibrillation with RVR ([facility]) 2025     Sepsis ([facility]) 2025     Pneumococcal bacteremia 2025     Acute kidney injury superimposed on CKD  ([facility]) 2025     Acute hypoxic respiratory failure ([facility]) 2025     Emphysema lung ([facility]) 2025     Dysphagia 2025     Pleural effusion, right 2025     Pneumonia 2025     Moderate protein-calorie malnutrition ([facility]) 2025     Mild protein-calorie malnutrition ([facility]) 10/04/2024     Venous stasis dermatitis of both lower extremities 2024     Stage 3b chronic kidney disease ([facility]) 03/15/2022     Bruises easily 2020     Impaired fasting glucose 2012     Stenosis of left carotid artery 2012     Peripheral vascular disease ([facility]) 2012     Hyperlipidemia 2012     Hypertension 2012     Insomnia 2012       LOS (days): 13  Geometric Mean LOS (GMLOS) (days): 4.9  Days to GMLOS:-7.7     OBJECTIVE:  Risk of Unplanned Readmission Score: 20.56         Current admission status: Inpatient   Preferred Pharmacy:   [facility] [place] [place] - [address]  [address]  [place] [address]  Phone: [phone] Fax: [phone]    Primary Care Provider: Dion Tsang MD    Primary Insurance: ANNELIESE JIMENEZ  Secondary Insurance:     DISCHARGE DETAILS:                                                                                                               Facility Insurance Auth Number: [id number]

## 2025-01-13 NOTE — NURSING NOTE
This RN notified by another floor RN that pt, set off alarm and slid out of side of bed. rapid report called by floor RN. This RN was with another patient at the time, other floor nurses followed protocol with rapid response, and after slim assessement, cleaned pt up and helped her back into bed with alarms turned on. Xrays pending.

## 2025-01-14 PROBLEM — J13 PNEUMONIA OF RIGHT LUNG DUE TO STREPTOCOCCUS PNEUMONIAE (HCC): Status: ACTIVE | Noted: 2025-01-02

## 2025-01-14 PROBLEM — R73.03 PREDIABETES: Status: ACTIVE | Noted: 2025-01-04

## 2025-01-14 LAB
GLUCOSE SERPL-MCNC: 100 MG/DL (ref 65–140)
GLUCOSE SERPL-MCNC: 182 MG/DL (ref 65–140)
GLUCOSE SERPL-MCNC: 88 MG/DL (ref 65–140)
GLUCOSE SERPL-MCNC: 96 MG/DL (ref 65–140)

## 2025-01-14 PROCEDURE — 82948 REAGENT STRIP/BLOOD GLUCOSE: CPT

## 2025-01-14 PROCEDURE — 97535 SELF CARE MNGMENT TRAINING: CPT

## 2025-01-14 PROCEDURE — 99232 SBSQ HOSP IP/OBS MODERATE 35: CPT | Performed by: PHYSICIAN ASSISTANT

## 2025-01-14 RX ORDER — TEMAZEPAM 15 MG/1
15 CAPSULE ORAL
Status: DISCONTINUED | OUTPATIENT
Start: 2025-01-14 | End: 2025-01-15 | Stop reason: HOSPADM

## 2025-01-14 RX ORDER — LIDOCAINE 50 MG/G
2 PATCH TOPICAL DAILY
Status: DISCONTINUED | OUTPATIENT
Start: 2025-01-15 | End: 2025-01-15 | Stop reason: HOSPADM

## 2025-01-14 RX ADMIN — DICYCLOMINE HYDROCHLORIDE 10 MG: 10 CAPSULE ORAL at 15:13

## 2025-01-14 RX ADMIN — Medication 250 MG: at 08:50

## 2025-01-14 RX ADMIN — SIMETHICONE 80 MG: 80 TABLET, CHEWABLE ORAL at 18:21

## 2025-01-14 RX ADMIN — APIXABAN 2.5 MG: 2.5 TABLET, FILM COATED ORAL at 18:22

## 2025-01-14 RX ADMIN — CHLORHEXIDINE GLUCONATE 0.12% ORAL RINSE 15 ML: 1.2 LIQUID ORAL at 21:39

## 2025-01-14 RX ADMIN — SIMETHICONE 80 MG: 80 TABLET, CHEWABLE ORAL at 11:58

## 2025-01-14 RX ADMIN — CHLORHEXIDINE GLUCONATE 0.12% ORAL RINSE 15 ML: 1.2 LIQUID ORAL at 08:51

## 2025-01-14 RX ADMIN — METOPROLOL TARTRATE 25 MG: 25 TABLET, FILM COATED ORAL at 21:40

## 2025-01-14 RX ADMIN — SIMETHICONE 80 MG: 80 TABLET, CHEWABLE ORAL at 08:50

## 2025-01-14 RX ADMIN — INSULIN LISPRO 1 UNITS: 100 INJECTION, SOLUTION INTRAVENOUS; SUBCUTANEOUS at 11:59

## 2025-01-14 RX ADMIN — DICYCLOMINE HYDROCHLORIDE 10 MG: 10 CAPSULE ORAL at 11:58

## 2025-01-14 RX ADMIN — SIMETHICONE 80 MG: 80 TABLET, CHEWABLE ORAL at 21:39

## 2025-01-14 RX ADMIN — APIXABAN 2.5 MG: 2.5 TABLET, FILM COATED ORAL at 08:50

## 2025-01-14 RX ADMIN — Medication 2.5 MG: at 15:27

## 2025-01-14 RX ADMIN — Medication 2.5 MG: at 23:27

## 2025-01-14 RX ADMIN — DICYCLOMINE HYDROCHLORIDE 10 MG: 10 CAPSULE ORAL at 06:01

## 2025-01-14 RX ADMIN — ACETAMINOPHEN 975 MG: 325 TABLET, FILM COATED ORAL at 21:39

## 2025-01-14 RX ADMIN — ACETAMINOPHEN 975 MG: 325 TABLET, FILM COATED ORAL at 15:13

## 2025-01-14 RX ADMIN — TEMAZEPAM 15 MG: 15 CAPSULE ORAL at 00:52

## 2025-01-14 RX ADMIN — ACETAMINOPHEN 975 MG: 325 TABLET, FILM COATED ORAL at 06:01

## 2025-01-14 RX ADMIN — Medication 250 MG: at 18:21

## 2025-01-14 NOTE — ASSESSMENT & PLAN NOTE
A1c 6.4%  Previously with hyperglycemia likely 2/2 steroids, now improved/resolved  4 times daily Accu-Cheks with SSI coverage

## 2025-01-14 NOTE — OCCUPATIONAL THERAPY NOTE
Occupational Therapy Progress Note     Patient Name: Remedios Mcgrath  Today's Date: 1/14/2025  Problem List  Principal Problem:    Pneumonia  Active Problems:    Hypertension    Moderate protein-calorie malnutrition (HCC)    New onset atrial fibrillation with RVR (HCC)    Sepsis (HCC)    Pneumococcal bacteremia    Acute kidney injury superimposed on CKD  (HCC)    Acute hypoxic respiratory failure (HCC)    Emphysema lung (HCC)    Dysphagia    Pleural effusion, right    Hyperglycemia    Unwitnessed fall    Ambulatory dysfunction    Anemia       Occupational Therapy Treatment Note     01/14/25 1412   OT Last Visit   OT Visit Date 01/14/25   Note Type   Note Type Treatment   Pain Assessment   Pain Assessment Tool FLACC   Pain Rating: FLACC (Rest) - Face 1   Pain Rating: FLACC (Rest) - Legs 0   Pain Rating: FLACC (Rest) - Activity 0   Pain Rating: FLACC (Rest) - Cry 0   Pain Rating: FLACC (Rest) - Consolability 0   Score: FLACC (Rest) 1   Pain Rating: FLACC (Activity) - Face 1   Pain Rating: FLACC (Activity) - Legs 0   Pain Rating: FLACC (Activity) - Activity 0   Pain Rating: FLACC (Activity) - Cry 0   Pain Rating: FLACC (Activity) - Consolability 0   Score: FLACC (Activity) 1   Restrictions/Precautions   Weight Bearing Precautions Per Order No   Other Precautions Cognitive;Bed Alarm;Fall Risk   Lifestyle   Autonomy I adls and mobility - family assists with iadls   Reciprocal Relationships supportive family - 2 sons live locally   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   Intrinsic Gratification sedentary   ADL   Where Assessed Edge of bed   Grooming Assistance 5  Supervision/Setup   Grooming Deficit Brushing hair;Increased time to complete   UB Dressing Assistance 3  Moderate Assistance   UB Dressing Deficit Thread RUE;Thread LUE;Pull around back   LB Dressing Assistance 2  Maximal Assistance   LB Dressing Deficit Thread RLE into pants;Thread LLE into pants;Pull up over hips   Bed Mobility   Supine to Sit 3   "Moderate assistance   Additional items Assist x 1;LE management;Increased time required   Sit to Supine 3  Moderate assistance   Additional items Assist x 1;LE management   Transfers   Sit to Stand 4  Minimal assistance   Additional items Assist x 2   Stand to Sit 4  Minimal assistance   Additional items Assist x 2   Subjective   Subjective pt stated \" no, I can't do that\" pt reported unable to doff/don socks and pants   Cognition   Overall Cognitive Status Impaired   Arousal/Participation Arousable;Cooperative   Attention Attends with cues to redirect   Orientation Level Oriented X4   Memory Decreased recall of recent events;Decreased recall of precautions   Following Commands Follows one step commands with increased time or repetition   Activity Tolerance   Activity Tolerance Patient tolerated treatment well   Assessment   Assessment Pt seen for Occupational Therapy session with focus on activity tolerance, bed mob, functional transfers/stand pivot transfers and sitting and standing tolerance and balance for pt engagement in UB/LB self-care tasks. Pt presented supine/HOB raised pt awake/alert and pt agreeable to participate in therapy following pt identifiers confirmed. She did not report a therapy goal this session however pt was pleasant and cooperative with all therapy requests.  In comparison to pt previous OT session pt continues to required assist for bed mob and all UB and LB self-care tasks 2* pt decreased strength, coordination and activity tolerance.  Pt remains appropriate for  II (Moderate Resources) Pt return to bed alarm activated and all needs within pt reach post session.   Plan   Treatment Interventions ADL retraining   Goal Expiration Date 01/16/25   OT Treatment Day 4   OT Frequency 2-3x/wk   Discharge Recommendation   Rehab Resource Intensity Level, OT II (Moderate Resource Intensity)   AM-PAC Daily Activity Inpatient   Lower Body Dressing 2   Bathing 2   Toileting 2   Upper Body Dressing 2 "   Grooming 2   Eating 3   Daily Activity Raw Score 13   Daily Activity Standardized Score (Calc for Raw Score >=11) 32.03   AM-PAC Applied Cognition Inpatient   Following a Speech/Presentation 3   Understanding Ordinary Conversation 4   Taking Medications 3   Remembering Where Things Are Placed or Put Away 3   Remembering List of 4-5 Errands 2   Taking Care of Complicated Tasks 2   Applied Cognition Raw Score 17   Applied Cognition Standardized Score 36.52       Jailyn LOCO/CHARAN

## 2025-01-14 NOTE — ASSESSMENT & PLAN NOTE
Presented to ER after being found down on welfare check. Was noted to be hypoxic in ER (82% on RA) requiring BIPAP and then developed a-fib with RVR.  Was in ICU, transferred out on 1/2.   CT CAP: Acute right-sided pneumonia, greatest at the right lower lobe. Associated loculated parapneumonic effusion. No obvious pleural thickening on noncontrast study, though empyema has to be considered.   Strep pneumoniae urinary antigen positive, as well as blood cultures positive for strep pneumonia  CXR 1/5 : New left lower lobe consolidation with additional worsening of right base infiltrates.  Most recent chest x-ray 1/13: Question mild pulmonary venous congestion. Pneumonia not excluded in the appropriate clinical setting. Trace effusions. Severe emphysema.   Completed 10 days total antibiotic as per ID recommendation  See plan of care regarding acute hypoxic respiratory failure outlined below  Continue to encourage OOB with ambulation, incentive spirometry  DC planning to rehab, pending auth and bed availability at this time

## 2025-01-14 NOTE — PROGRESS NOTES
Progress Note - Hospitalist   Name: Remedios Mcgrath 89 y.o. female I MRN: [id number]  Unit/Bed#: [facility] 603-01 I Date of Admission: 12/31/2024   Date of Service: 1/14/2025 I Hospital Day: 14    Assessment & Plan  Pneumonia of right lower lobe due to Streptococcus pneumoniae (HCC)  Presented to ER after being found down on welfare check. Was noted to be hypoxic in ER (82% on RA) requiring BIPAP and then developed a-fib with RVR.  Was in ICU, transferred out on 1/2.   CT CAP: Acute right-sided pneumonia, greatest at the right lower lobe. Associated loculated parapneumonic effusion. No obvious pleural thickening on noncontrast study, though empyema has to be considered.   Strep pneumoniae urinary antigen positive, as well as blood cultures positive for strep pneumonia  CXR 1/5 : New left lower lobe consolidation with additional worsening of right base infiltrates.  Most recent chest x-ray 1/13: Question mild pulmonary venous congestion. Pneumonia not excluded in the appropriate clinical setting. Trace effusions. Severe emphysema.   Completed 10 days total antibiotic as per ID recommendation  See plan of care regarding acute hypoxic respiratory failure outlined below  Continue to encourage OOB with ambulation, incentive spirometry  DC planning to rehab, pending auth and bed availability at this time  Pneumococcal bacteremia  Due to pneumonia  TTE (technically difficult) showed MV thickening (likely age-related) but no vegetations; low clinical suspicion for endocarditis  ID consult and recommendations appreciated, now signed off  Status post total 10-day course antibiotic  No indication for GARRETT from an ID perspective  Repeat blood cultures negative x 5 days   New onset atrial fibrillation with RVR (HCC)  Noted while in the ER, spontaneously converted to sinus rhythm   Cardiology consult and recommendations appreciated, now signed off  Currently rate controlled on Lopressor 25 mg twice daily and on Eliquis 2.5 mg for  anticoagulation  As she will be discharged to facility, will defer Eliquis price check at this time  Acute hypoxic respiratory failure (HCC)  Most likely secondary to pneumonia and severe COPD. Appears was up to 15L in ICU.  Monitor closely and titrate supplemental oxygen as needed to maintain SpO2 > 88%  O2 requirements have improved, on 5 L NC currently   Emphysema lung (HCC)  Emphysematous changes noted on chest x-rays  Status post course of IV Solu-Medrol and oral prednisone 40 mg daily for 3 days   Pulmonology consult appreciated, recommending outpatient follow-up  No need for ongoing steroids or maintenance inhaler at this time  Ambulatory dysfunction  Most likely due to generalized weakness in the setting of acute illness/sepsis  PT/OT recommending rehab, awaiting bed availability/insurance authorization  Primary hypertension  Blood pressure reviewed and acceptable  Continue with lopressor 25 mg twice daily  PTA Norvasc remains on hold  Anemia  Noted with downtrending hemoglobin 11.4 ->9.7 most recently  No signs or symptoms of active bleeding at this time  Monitor CBC intermittently  Prediabetes  A1c 6.4%  Previously with hyperglycemia likely 2/2 steroids, now improved/resolved  4 times daily Accu-Cheks with SSI coverage   Unwitnessed fall  Patient was a Rapid Respone on 1/5 due to unwitnessed fall  XRs of right knee and pelvis with no acute osseous abnormalities   PT/OT recommending rehab as above  Dysphagia  Speech following, currently on modified diet  Aspiration precautions  Acute kidney injury superimposed on CKD  (HCC)  Lab Results   Component Value Date    EGFR 45 01/12/2025    EGFR 43 01/11/2025    EGFR 49 01/08/2025    CREATININE 1.05 01/12/2025    CREATININE 1.08 01/11/2025    CREATININE 0.97 01/08/2025     POA with creatinine of 2.47 increased from baseline of approximately 1.3-1.4   Most likely 2/2 sepsis, status post IV fluids and IV antibiotic  JOSE ROBERTO has resolved at this time  Monitor BMP as  needed  Sepsis (HCC)  As evidenced by leukocytosis and tachycardia, due to bacteremia and pneumonia  See plan of care outlined under pneumonia/bacteremia  Completed total 10-day course antibiotic  Resolved  Moderate protein-calorie malnutrition (HCC)  Malnutrition Findings:   Adult Malnutrition type: Chronic illness  Adult Degree of Malnutrition: Malnutrition of moderate degree  Malnutrition Characteristics: Inadequate energy, Muscle loss       360 Statement: Chronic/moderate malnutrition r/t inadequate PO intake, as evidenced by intake meeting <75% of estimated needs x > 1 month, and muscle mass depletion (temporal). Treatment: pending ability to advance to PO diet - liberal diet as able and trial oral nutrition supplements pending SLP recommendations    BMI Findings:       Body mass index is 18.11 kg/m².   Encourage oral intake as able    VTE Pharmacologic Prophylaxis:   High Risk (Score >/= 5) - Pharmacological DVT Prophylaxis Ordered: apixaban (Eliquis). Sequential Compression Devices Ordered.    Mobility:   Basic Mobility Inpatient Raw Score: 11  JH-HLM Goal: 4: Move to chair/commode  JH-HLM Achieved: 2: Bed activities/Dependent transfer  JH-HLM Goal NOT achieved. Continue with multidisciplinary rounding and encourage appropriate mobility to improve upon JH-HLM goals.    Patient Centered Rounds: I performed bedside rounds with nursing staff today.   Discussions with Specialists or Other Care Team Provider: primary RN, case management     Education and Discussions with Family / Patient:  No new medical updates, case management discussed with family regarding dispo planning to rehab.     Current Length of Stay: 14 day(s)  Current Patient Status: Inpatient   Certification Statement: The patient will continue to require additional inpatient hospital stay due to dispo planning to rehab  Discharge Plan: Anticipate discharge in 24-48 hrs to rehab facility.    Code Status: Level 3 - DNAR and DNI    Subjective   Patient  complaining of neck and back pain and wondering if she can get back in bed.  I encouraged her to stay in chair until after dinner and she is in agreement.  Advised I would check if she was due to get for something for pain.    Objective :  HR:  [55-83] 64  BP: ()/(38-65) 145/45  Resp:  [17-18] 18  SpO2:  [94 %-99 %] 95 %  O2 Device: Nasal cannula  Nasal Cannula O2 Flow Rate (L/min):  [5 L/min] 5 L/min    Body mass index is 18.11 kg/m².     Input and Output Summary (last 24 hours):     Intake/Output Summary (Last 24 hours) at 1/14/2025 1606  Last data filed at 1/13/2025 2001  Gross per 24 hour   Intake 100 ml   Output --   Net 100 ml       Physical Exam  Vitals and nursing note reviewed.   Constitutional:       General: She is not in acute distress.     Appearance: She is cachectic. She is ill-appearing.   Cardiovascular:      Rate and Rhythm: Normal rate.   Pulmonary:      Effort: Pulmonary effort is normal. No respiratory distress.      Breath sounds: Rhonchi (Clears with cough) present. No wheezing or rales.      Comments: On 5 L nasal cannula with SpO2 high 90s  Skin:     Findings: Bruising present.   Neurological:      General: No focal deficit present.      Mental Status: She is alert. Mental status is at baseline.         Lines/Drains:              Lab Results: I have reviewed the following results:   Results from last 7 days   Lab Units 01/12/25  0444   WBC Thousand/uL 10.07   HEMOGLOBIN g/dL 9.7*   HEMATOCRIT % 30.4*   PLATELETS Thousands/uL 397*   SEGS PCT % 80*   LYMPHO PCT % 8*   MONO PCT % 10   EOS PCT % 1     Results from last 7 days   Lab Units 01/12/25  0444   SODIUM mmol/L 139   POTASSIUM mmol/L 3.9   CHLORIDE mmol/L 106   CO2 mmol/L 25   BUN mg/dL 18   CREATININE mg/dL 1.05   ANION GAP mmol/L 8   CALCIUM mg/dL 7.2*   GLUCOSE RANDOM mg/dL 81         Results from last 7 days   Lab Units 01/14/25  1120 01/14/25  0801 01/13/25 2026 01/13/25  1633 01/13/25  1127 01/13/25  0703 01/12/25 2046  01/12/25  1625 01/12/25  1206 01/12/25  0644 01/11/25  2019 01/11/25  1614   POC GLUCOSE mg/dl 182* 88 112 111 94 105 123 96 99 83 127 174*               Recent Cultures (last 7 days):         Imaging Results Review: No pertinent imaging studies reviewed.  Other Study Results Review: No additional pertinent studies reviewed.    Last 24 Hours Medication List:     Current Facility-Administered Medications:     acetaminophen (TYLENOL) tablet 975 mg, Q8H KATT    albuterol (PROVENTIL HFA,VENTOLIN HFA) inhaler 2 puff, Q4H PRN    apixaban (ELIQUIS) tablet 2.5 mg, BID    chlorhexidine (PERIDEX) 0.12 % oral rinse 15 mL, Q12H KATT    dicyclomine (BENTYL) capsule 10 mg, TID AC    insulin lispro (HumALOG/ADMELOG) 100 units/mL subcutaneous injection 1-5 Units, TID AC **AND** Fingerstick Glucose (POCT), TID AC    insulin lispro (HumALOG/ADMELOG) 100 units/mL subcutaneous injection 1-5 Units, HS    melatonin tablet 3 mg, Once    melatonin tablet 3 mg, HS PRN    metoprolol tartrate (LOPRESSOR) tablet 25 mg, Q12H KATT    oxyCODONE (ROXICODONE) split tablet 2.5 mg, Q6H PRN    QUEtiapine (SEROquel) tablet 12.5 mg, Once    saccharomyces boulardii (FLORASTOR) capsule 250 mg, BID    simethicone (MYLICON) chewable tablet 80 mg, 4x Daily (with meals and at bedtime)    temazepam (RESTORIL) capsule 15 mg, HS PRN    Administrative Statements   Today, Patient Was Seen By: Bere Henry PA-C  I have spent a total time of 35 minutes in caring for this patient on the day of the visit/encounter including Patient and family education, Impressions, Counseling / Coordination of care, Documenting in the medical record, Reviewing / ordering tests, medicine, procedures  , Obtaining or reviewing history  , and Communicating with other healthcare professionals .    **Please Note: This note may have been constructed using a voice recognition system.**

## 2025-01-14 NOTE — ASSESSMENT & PLAN NOTE
Emphysematous changes noted on chest x-rays  Status post course of IV Solu-Medrol and oral prednisone 40 mg daily for 3 days   Pulmonology consult appreciated, recommending outpatient follow-up  No need for ongoing steroids or maintenance inhaler at this time

## 2025-01-14 NOTE — ASSESSMENT & PLAN NOTE
Patient was a Rapid Respone on 1/5 due to unwitnessed fall  XRs of right knee and pelvis with no acute osseous abnormalities   PT/OT recommending rehab as above

## 2025-01-14 NOTE — CASE MANAGEMENT
[facility] received request for authorization from Care Manager.  Authorization request submitted for: [facility]  Facility Name:  [facility]   NPI: [id number]  Facility MD: Suzanne De Leon  NPI: [id number]  Authorization initiated by contacting insurance:  larisa   Via: [facility] Portal   Clinicals submitted via [employer]ink attachment   Pending Reference #: [id number]     Care Manager notified: steven kothari     Updates to authorization status will be noted in chart. Please reach out to CM for updates on any clinical information.

## 2025-01-14 NOTE — ASSESSMENT & PLAN NOTE
Noted with downtrending hemoglobin 11.4 ->9.7 most recently  No signs or symptoms of active bleeding at this time  Monitor CBC intermittently

## 2025-01-14 NOTE — ASSESSMENT & PLAN NOTE
Due to pneumonia  TTE (technically difficult) showed MV thickening (likely age-related) but no vegetations; low clinical suspicion for endocarditis  ID consult and recommendations appreciated, now signed off  Status post total 10-day course antibiotic  No indication for GARRETT from an ID perspective  Repeat blood cultures negative x 5 days

## 2025-01-14 NOTE — ASSESSMENT & PLAN NOTE
Noted while in the ER, spontaneously converted to sinus rhythm   Cardiology consult and recommendations appreciated, now signed off  Currently rate controlled on Lopressor 25 mg twice daily and on Eliquis 2.5 mg for anticoagulation  As she will be discharged to facility, will defer Eliquis price check at this time

## 2025-01-14 NOTE — CASE MANAGEMENT
Case Management Discharge Planning Note    Patient name Remedios Mcgrath  Location [facility] 603/[facility] 603-01 MRN [id number]  : 1929 Date 2025       Current Admission Date: 2024  Current Admission Diagnosis:Pneumonia   Patient Active Problem List    Diagnosis Date Noted Date Diagnosed    Ambulatory dysfunction 2025     Anemia 2025     Unwitnessed fall 2025     Hyperglycemia 2025     New onset atrial fibrillation with RVR ([facility]) 2025     Sepsis ([facility]) 2025     Pneumococcal bacteremia 2025     Acute kidney injury superimposed on CKD  ([facility]) 2025     Acute hypoxic respiratory failure ([facility]) 2025     Emphysema lung ([facility]) 2025     Dysphagia 2025     Pleural effusion, right 2025     Pneumonia 2025     Moderate protein-calorie malnutrition ([facility]) 2025     Mild protein-calorie malnutrition ([facility]) 10/04/2024     Venous stasis dermatitis of both lower extremities 2024     Stage 3b chronic kidney disease ([facility]) 03/15/2022     Bruises easily 2020     Impaired fasting glucose 2012     Stenosis of left carotid artery 2012     Peripheral vascular disease ([facility]) 2012     Hyperlipidemia 2012     Hypertension 2012     Insomnia 2012       LOS (days): 14  Geometric Mean LOS (GMLOS) (days): 4.9  Days to GMLOS:-8.8     OBJECTIVE:  Risk of Unplanned Readmission Score: 20.85         Current admission status: Inpatient   Preferred Pharmacy:   [facility]  [place], [place] - [address]  [address]  [place] [address]  Phone: [phone] Fax: [phone]    Primary Care Provider: Dion Tsang MD    Primary Insurance: [facility]  Secondary Insurance:     DISCHARGE DETAILS:    Discharge planning discussed with:: Grand daughter and daughter at bedside.  Freedom of Choice: Yes  Comments - Freedom of Choice: Discussed FOC  CM contacted family/caregiver?: Yes  Were Treatment Team discharge  recommendations reviewed with patient/caregiver?: Yes  Did patient/caregiver verbalize understanding of patient care needs?: N/A- going to facility  Were patient/caregiver advised of the risks associated with not following Treatment Team discharge recommendations?: Yes    Other Referral/Resources/Interventions Provided:  Interventions: SNF  Referral Comments: This CM discussed patient choice with family at bedside. [facility] currently does not have any beds. This CM informed family of possible wait time for bed and availability at [facility]. Family discussed and agreeable to accepting bed a [facility]. Per Natalia Lincoln (liaison), [facility] able to hold bed while awaiting auth. CM dc support tasked with submitting for auth.        Treatment Team Recommendation: Short Term Rehab  Discharge Destination Plan:: Short Term Rehab

## 2025-01-14 NOTE — ASSESSMENT & PLAN NOTE
Malnutrition Findings:   Adult Malnutrition type: Chronic illness  Adult Degree of Malnutrition: Malnutrition of moderate degree  Malnutrition Characteristics: Inadequate energy, Muscle loss       360 Statement: Chronic/moderate malnutrition r/t inadequate PO intake, as evidenced by intake meeting <75% of estimated needs x > 1 month, and muscle mass depletion (temporal). Treatment: pending ability to advance to PO diet - liberal diet as able and trial oral nutrition supplements pending SLP recommendations    BMI Findings:       Body mass index is 18.11 kg/m².   Encourage oral intake as able

## 2025-01-14 NOTE — ASSESSMENT & PLAN NOTE
As evidenced by leukocytosis and tachycardia, due to bacteremia and pneumonia  See plan of care outlined under pneumonia/bacteremia  Completed total 10-day course antibiotic  Resolved

## 2025-01-14 NOTE — PLAN OF CARE
Problem: OCCUPATIONAL THERAPY ADULT  Goal: Performs self-care activities at highest level of function for planned discharge setting.  See evaluation for individualized goals.  Description: Treatment Interventions: ADL retraining, Functional transfer training, UE strengthening/ROM, Endurance training, Cognitive reorientation, Patient/family training, Equipment evaluation/education, Compensatory technique education, Continued evaluation, Activityengagement, Energy conservation          See flowsheet documentation for full assessment, interventions and recommendations.   Outcome: Progressing  Note: Limitation: Decreased ADL status, Decreased UE strength, Decreased Safe judgement during ADL, Decreased cognition, Decreased endurance, Decreased self-care trans, Decreased high-level ADLs  Prognosis: Good  Assessment: Pt seen for Occupational Therapy session with focus on activity tolerance, bed mob, functional transfers/stand pivot transfers and sitting and standing tolerance and balance for pt engagement in UB/LB self-care tasks. Pt presented supine/HOB raised pt awake/alert and pt agreeable to participate in therapy following pt identifiers confirmed. She did not report a therapy goal this session however pt was pleasant and cooperative with all therapy requests.  In comparison to pt previous OT session pt continues to required assist for bed mob and all UB and LB self-care tasks 2* pt decreased strength, coordination and activity tolerance.  Pt remains appropriate for  II (Moderate Resources) Pt return to bed alarm activated and all needs within pt reach post session.     Rehab Resource Intensity Level, OT: II (Moderate Resource Intensity)

## 2025-01-14 NOTE — ASSESSMENT & PLAN NOTE
Blood pressure reviewed and acceptable  Continue with lopressor 25 mg twice daily  PTA Norvasc remains on hold

## 2025-01-14 NOTE — PLAN OF CARE
Problem: PAIN - ADULT  Goal: Verbalizes/displays adequate comfort level or baseline comfort level  Description: Interventions:  - Encourage patient to monitor pain and request assistance  - Assess pain using appropriate pain scale  - Administer analgesics based on type and severity of pain and evaluate response  - Implement non-pharmacological measures as appropriate and evaluate response  - Consider cultural and social influences on pain and pain management  - Notify physician/advanced practitioner if interventions unsuccessful or patient reports new pain  Outcome: Progressing     Problem: INFECTION - ADULT  Goal: Absence or prevention of progression during hospitalization  Description: INTERVENTIONS:  - Assess and monitor for signs and symptoms of infection  - Monitor lab/diagnostic results  - Monitor all insertion sites, i.e. indwelling lines, tubes, and drains  - Monitor endotracheal if appropriate and nasal secretions for changes in amount and color  - [place] appropriate cooling/warming therapies per order  - Administer medications as ordered  - Instruct and encourage patient and family to use good hand hygiene technique  - Identify and instruct in appropriate isolation precautions for identified infection/condition  Outcome: Progressing     Problem: SAFETY ADULT  Goal: Patient will remain free of falls  Description: INTERVENTIONS:  - Educate patient/family on patient safety including physical limitations  - Instruct patient to call for assistance with activity   - Consult OT/PT to assist with strengthening/mobility   - Keep Call bell within reach  - Keep bed low and locked with side rails adjusted as appropriate  - Keep care items and personal belongings within reach  - Initiate and maintain comfort rounds  - Make Fall Risk Sign visible to staff  - Apply yellow socks and bracelet for high fall risk patients  - Consider moving patient to room near nurses station  Outcome: Progressing     Problem: Knowledge  Deficit  Goal: Patient/family/caregiver demonstrates understanding of disease process, treatment plan, medications, and discharge instructions  Description: Complete learning assessment and assess knowledge base.  Interventions:  - Provide teaching at level of understanding  - Provide teaching via preferred learning methods  Outcome: Progressing     Problem: DISCHARGE PLANNING  Goal: Discharge to home or other facility with appropriate resources  Description: INTERVENTIONS:  - Identify barriers to discharge w/patient and caregiver  - Arrange for needed discharge resources and transportation as appropriate  - Identify discharge learning needs (meds, wound care, etc.)  - Arrange for interpretive services to assist at discharge as needed  - Refer to Case Management Department for coordinating discharge planning if the patient needs post-hospital services based on physician/advanced practitioner order or complex needs related to functional status, cognitive ability, or social support system  Outcome: Progressing

## 2025-01-14 NOTE — ASSESSMENT & PLAN NOTE
Most likely secondary to pneumonia and severe COPD. Appears was up to 15L in ICU.  Monitor closely and titrate supplemental oxygen as needed to maintain SpO2 > 88%  O2 requirements have improved, on 5 L NC currently

## 2025-01-14 NOTE — PLAN OF CARE
Problem: Prexisting or High Potential for Compromised Skin Integrity  Goal: Skin integrity is maintained or improved  Description: INTERVENTIONS:  - Identify patients at risk for skin breakdown  - Assess and monitor skin integrity  - Assess and monitor nutrition and hydration status  - Monitor labs   - Assess for incontinence   - Turn and reposition patient  - Assist with mobility/ambulation  - Relieve pressure over bony prominences  - Avoid friction and shearing  - Provide appropriate hygiene as needed including keeping skin clean and dry  - Evaluate need for skin moisturizer/barrier cream  - Collaborate with interdisciplinary team   - Patient/family teaching  - Consider wound care consult   Outcome: Progressing     Problem: RESPIRATORY - ADULT  Goal: Achieves optimal ventilation and oxygenation  Description: INTERVENTIONS:  - Assess for changes in respiratory status  - Assess for changes in mentation and behavior  - Position to facilitate oxygenation and minimize respiratory effort  - Oxygen administered by appropriate delivery if ordered  - Initiate smoking cessation education as indicated  - Encourage broncho-pulmonary hygiene including cough, deep breathe, Incentive Spirometry  - Assess the need for suctioning and aspirate as needed  - Assess and instruct to report SOB or any respiratory difficulty  - Respiratory Therapy support as indicated  Outcome: Progressing     Problem: Nutrition/Hydration-ADULT  Goal: Nutrient/Hydration intake appropriate for improving, restoring or maintaining nutritional needs  Description: Monitor and assess patient's nutrition/hydration status for malnutrition. Collaborate with interdisciplinary team and initiate plan and interventions as ordered.  Monitor patient's weight and dietary intake as ordered or per policy. Utilize nutrition screening tool and intervene as necessary. Determine patient's food preferences and provide high-protein, high-caloric foods as appropriate.      INTERVENTIONS:  - Monitor oral intake, urinary output, labs, and treatment plans  - Assess nutrition and hydration status and recommend course of action  - Evaluate amount of meals eaten  - Assist patient with eating if necessary   - Allow adequate time for meals  - Recommend/ encourage appropriate diets, oral nutritional supplements, and vitamin/mineral supplements  - Order, calculate, and assess calorie counts as needed  - Recommend, monitor, and adjust tube feedings and TPN/PPN based on assessed needs  - Assess need for intravenous fluids  - Provide specific nutrition/hydration education as appropriate  - Include patient/family/caregiver in decisions related to nutrition  Outcome: Progressing     Problem: PAIN - ADULT  Goal: Verbalizes/displays adequate comfort level or baseline comfort level  Description: Interventions:  - Encourage patient to monitor pain and request assistance  - Assess pain using appropriate pain scale  - Administer analgesics based on type and severity of pain and evaluate response  - Implement non-pharmacological measures as appropriate and evaluate response  - Consider cultural and social influences on pain and pain management  - Notify physician/advanced practitioner if interventions unsuccessful or patient reports new pain  Outcome: Progressing     Problem: INFECTION - ADULT  Goal: Absence or prevention of progression during hospitalization  Description: INTERVENTIONS:  - Assess and monitor for signs and symptoms of infection  - Monitor lab/diagnostic results  - Monitor all insertion sites, i.e. indwelling lines, tubes, and drains  - Monitor endotracheal if appropriate and nasal secretions for changes in amount and color  - [place] appropriate cooling/warming therapies per order  - Administer medications as ordered  - Instruct and encourage patient and family to use good hand hygiene technique  - Identify and instruct in appropriate isolation precautions for identified  infection/condition  Outcome: Progressing     Problem: SAFETY ADULT  Goal: Patient will remain free of falls  Description: INTERVENTIONS:  - Educate patient/family on patient safety including physical limitations  - Instruct patient to call for assistance with activity   - Consult OT/PT to assist with strengthening/mobility   - Keep Call bell within reach  - Keep bed low and locked with side rails adjusted as appropriate  - Keep care items and personal belongings within reach  - Initiate and maintain comfort rounds  - Make Fall Risk Sign visible to staff  - Apply yellow socks and bracelet for high fall risk patients  - Consider moving patient to room near nurses station  Outcome: Progressing  Goal: Maintain or return to baseline ADL function  Description: INTERVENTIONS:  -  Assess patient's ability to carry out ADLs; assess patient's baseline for ADL function and identify physical deficits which impact ability to perform ADLs (bathing, care of mouth/teeth, toileting, grooming, dressing, etc.)  - Assess/evaluate cause of self-care deficits   - Assess range of motion  - Assess patient's mobility; develop plan if impaired  - Assess patient's need for assistive devices and provide as appropriate  - Encourage maximum independence but intervene and supervise when necessary  - Involve family in performance of ADLs  - Assess for home care needs following discharge   - Consider OT consult to assist with ADL evaluation and planning for discharge  - Provide patient education as appropriate  Outcome: Progressing  Goal: Maintains/Returns to pre admission functional level  Description: INTERVENTIONS:  - Perform AM-PAC 6 Click Basic Mobility/ Daily Activity assessment daily.  - Set and communicate daily mobility goal to care team and patient/family/caregiver.   - Collaborate with rehabilitation services on mobility goals if consulted  - Out of bed for toileting  - Record patient progress and toleration of activity level   Outcome:  Progressing     Problem: DISCHARGE PLANNING  Goal: Discharge to home or other facility with appropriate resources  Description: INTERVENTIONS:  - Identify barriers to discharge w/patient and caregiver  - Arrange for needed discharge resources and transportation as appropriate  - Identify discharge learning needs (meds, wound care, etc.)  - Arrange for interpretive services to assist at discharge as needed  - Refer to Case Management Department for coordinating discharge planning if the patient needs post-hospital services based on physician/advanced practitioner order or complex needs related to functional status, cognitive ability, or social support system  Outcome: Progressing     Problem: Knowledge Deficit  Goal: Patient/family/caregiver demonstrates understanding of disease process, treatment plan, medications, and discharge instructions  Description: Complete learning assessment and assess knowledge base.  Interventions:  - Provide teaching at level of understanding  - Provide teaching via preferred learning methods  Outcome: Progressing

## 2025-01-14 NOTE — ASSESSMENT & PLAN NOTE
Lab Results   Component Value Date    EGFR 45 01/12/2025    EGFR 43 01/11/2025    EGFR 49 01/08/2025    CREATININE 1.05 01/12/2025    CREATININE 1.08 01/11/2025    CREATININE 0.97 01/08/2025     POA with creatinine of 2.47 increased from baseline of approximately 1.3-1.4   Most likely 2/2 sepsis, status post IV fluids and IV antibiotic  JOSE ROBERTO has resolved at this time  Monitor BMP as needed

## 2025-01-15 ENCOUNTER — TRANSITIONAL CARE MANAGEMENT (OUTPATIENT)
Dept: FAMILY MEDICINE CLINIC | Facility: CLINIC | Age: OVER 89
End: 2025-01-15

## 2025-01-15 VITALS
TEMPERATURE: 97.4 F | SYSTOLIC BLOOD PRESSURE: 134 MMHG | OXYGEN SATURATION: 94 % | WEIGHT: 87.4 LBS | RESPIRATION RATE: 15 BRPM | BODY MASS INDEX: 18.34 KG/M2 | HEART RATE: 80 BPM | HEIGHT: 58 IN | DIASTOLIC BLOOD PRESSURE: 65 MMHG

## 2025-01-15 PROBLEM — J13 PNEUMONIA OF RIGHT LOWER LOBE DUE TO STREPTOCOCCUS PNEUMONIAE (HCC): Status: RESOLVED | Noted: 2025-01-02 | Resolved: 2025-01-15

## 2025-01-15 PROBLEM — B95.3 PNEUMOCOCCAL BACTEREMIA: Status: RESOLVED | Noted: 2025-01-03 | Resolved: 2025-01-15

## 2025-01-15 PROBLEM — N18.9 ACUTE KIDNEY INJURY SUPERIMPOSED ON CKD  (HCC): Status: RESOLVED | Noted: 2025-01-03 | Resolved: 2025-01-15

## 2025-01-15 PROBLEM — R91.1 PULMONARY NODULE: Status: ACTIVE | Noted: 2025-01-15

## 2025-01-15 PROBLEM — N18.9 CKD (CHRONIC KIDNEY DISEASE): Status: ACTIVE | Noted: 2025-01-15

## 2025-01-15 PROBLEM — I48.91 NEW ONSET ATRIAL FIBRILLATION (HCC): Status: RESOLVED | Noted: 2025-01-03 | Resolved: 2025-01-15

## 2025-01-15 PROBLEM — E27.9 ADRENAL NODULE (HCC): Status: ACTIVE | Noted: 2025-01-15

## 2025-01-15 PROBLEM — I48.91 ATRIAL FIBRILLATION (HCC): Status: ACTIVE | Noted: 2025-01-15

## 2025-01-15 PROBLEM — R78.81 PNEUMOCOCCAL BACTEREMIA: Status: RESOLVED | Noted: 2025-01-03 | Resolved: 2025-01-15

## 2025-01-15 PROBLEM — A41.9 SEPSIS (HCC): Status: RESOLVED | Noted: 2025-01-03 | Resolved: 2025-01-15

## 2025-01-15 PROBLEM — N17.9 ACUTE KIDNEY INJURY SUPERIMPOSED ON CKD  (HCC): Status: RESOLVED | Noted: 2025-01-03 | Resolved: 2025-01-15

## 2025-01-15 LAB
GLUCOSE SERPL-MCNC: 107 MG/DL (ref 65–140)
GLUCOSE SERPL-MCNC: 91 MG/DL (ref 65–140)

## 2025-01-15 PROCEDURE — 82948 REAGENT STRIP/BLOOD GLUCOSE: CPT

## 2025-01-15 PROCEDURE — 99239 HOSP IP/OBS DSCHRG MGMT >30: CPT | Performed by: PHYSICIAN ASSISTANT

## 2025-01-15 RX ORDER — LIDOCAINE 50 MG/G
2 PATCH TOPICAL DAILY
Start: 2025-01-16

## 2025-01-15 RX ORDER — OXYCODONE HYDROCHLORIDE 5 MG/1
2.5 TABLET ORAL EVERY 6 HOURS PRN
Qty: 20 TABLET | Refills: 0 | Status: SHIPPED | OUTPATIENT
Start: 2025-01-15 | End: 2025-01-25

## 2025-01-15 RX ORDER — ALBUTEROL SULFATE 90 UG/1
2 INHALANT RESPIRATORY (INHALATION) EVERY 4 HOURS PRN
Start: 2025-01-15

## 2025-01-15 RX ORDER — OXYCODONE HYDROCHLORIDE 5 MG/1
2.5 TABLET ORAL EVERY 6 HOURS PRN
Qty: 20 TABLET | Refills: 0 | Status: SHIPPED | OUTPATIENT
Start: 2025-01-15 | End: 2025-01-15

## 2025-01-15 RX ORDER — QUETIAPINE FUMARATE 25 MG/1
12.5 TABLET, FILM COATED ORAL ONCE
Start: 2025-01-15 | End: 2025-01-15

## 2025-01-15 RX ORDER — CHLORHEXIDINE GLUCONATE ORAL RINSE 1.2 MG/ML
15 SOLUTION DENTAL EVERY 12 HOURS SCHEDULED
Start: 2025-01-15

## 2025-01-15 RX ORDER — SIMETHICONE 80 MG
80 TABLET,CHEWABLE ORAL
Start: 2025-01-15

## 2025-01-15 RX ORDER — ACETAMINOPHEN 325 MG/1
975 TABLET ORAL EVERY 8 HOURS SCHEDULED
Start: 2025-01-15

## 2025-01-15 RX ORDER — DICYCLOMINE HYDROCHLORIDE 10 MG/1
10 CAPSULE ORAL
Start: 2025-01-15

## 2025-01-15 RX ADMIN — SIMETHICONE 80 MG: 80 TABLET, CHEWABLE ORAL at 09:34

## 2025-01-15 RX ADMIN — ACETAMINOPHEN 975 MG: 325 TABLET, FILM COATED ORAL at 15:37

## 2025-01-15 RX ADMIN — Medication 2.5 MG: at 09:34

## 2025-01-15 RX ADMIN — Medication 250 MG: at 09:34

## 2025-01-15 RX ADMIN — METOPROLOL TARTRATE 25 MG: 25 TABLET, FILM COATED ORAL at 09:37

## 2025-01-15 RX ADMIN — SIMETHICONE 80 MG: 80 TABLET, CHEWABLE ORAL at 12:34

## 2025-01-15 RX ADMIN — SIMETHICONE 80 MG: 80 TABLET, CHEWABLE ORAL at 15:37

## 2025-01-15 RX ADMIN — DICYCLOMINE HYDROCHLORIDE 10 MG: 10 CAPSULE ORAL at 09:34

## 2025-01-15 RX ADMIN — DICYCLOMINE HYDROCHLORIDE 10 MG: 10 CAPSULE ORAL at 15:37

## 2025-01-15 RX ADMIN — DICYCLOMINE HYDROCHLORIDE 10 MG: 10 CAPSULE ORAL at 12:34

## 2025-01-15 RX ADMIN — APIXABAN 2.5 MG: 2.5 TABLET, FILM COATED ORAL at 09:34

## 2025-01-15 RX ADMIN — ACETAMINOPHEN 975 MG: 325 TABLET, FILM COATED ORAL at 05:00

## 2025-01-15 NOTE — ASSESSMENT & PLAN NOTE
Malnutrition Findings:   Adult Malnutrition type: Chronic illness  Adult Degree of Malnutrition: Malnutrition of moderate degree  Malnutrition Characteristics: Inadequate energy, Muscle loss       360 Statement: Chronic/moderate malnutrition r/t inadequate PO intake, as evidenced by intake meeting <75% of estimated needs x > 1 month, and muscle mass depletion (temporal). Treatment: pending ability to advance to PO diet - liberal diet as able and trial oral nutrition supplements pending SLP recommendations    BMI Findings:       Body mass index is 18.27 kg/m².   Encourage oral intake as able

## 2025-01-15 NOTE — CASE MANAGEMENT
Case Management Discharge Planning Note    Patient name Remedios Mcgrath  Location [facility] 603/[facility] 603-01 MRN [id number]  : 1929 Date 1/15/2025       Current Admission Date: 2024  Current Admission Diagnosis:Pneumonia of right lower lobe due to Streptococcus pneumoniae (HCC)   Patient Active Problem List    Diagnosis Date Noted Date Diagnosed    Ambulatory dysfunction 2025     Anemia 2025     Unwitnessed fall 2025     Prediabetes 2025     New onset atrial fibrillation with RVR ([facility]) 2025     Sepsis ([facility]) 2025     Pneumococcal bacteremia 2025     Acute kidney injury superimposed on CKD  ([facility]) 2025     Acute hypoxic respiratory failure ([facility]) 2025     Emphysema lung ([facility]) 2025     Dysphagia 2025     Pneumonia of right lower lobe due to Streptococcus pneumoniae ([facility]) 2025     Moderate protein-calorie malnutrition ([facility]) 2025     Mild protein-calorie malnutrition ([facility]) 10/04/2024     Venous stasis dermatitis of both lower extremities 2024     Stage 3b chronic kidney disease ([facility]) 03/15/2022     Bruises easily 2020     Impaired fasting glucose 2012     Stenosis of left carotid artery 2012     Peripheral vascular disease ([facility]) 2012     Hyperlipidemia 2012     Primary hypertension 2012     Insomnia 2012       LOS (days): 15  Geometric Mean LOS (GMLOS) (days): 4.9  Days to GMLOS:-9.6     OBJECTIVE:  Risk of Unplanned Readmission Score: 17.05         Current admission status: Inpatient   Preferred Pharmacy:   [facility]  [place], [place] - [address]  [address]  [place] [address]  Phone: [phone] Fax: [phone]    Primary Care Provider: Dion Tsang MD    Primary Insurance: [employer]IM[employer]  Secondary Insurance:     DISCHARGE DETAILS:                                                                                                               Facility Insurance Auth  Number: [id number]

## 2025-01-15 NOTE — WOUND OSTOMY CARE
Progress Note - Wound   Remedios Mcgrath 89 y.o. female MRN: [id number]  Unit/Bed#: [facility] 603-01 Encounter: [id number]        Seen today for weekly follow up assessment.Admitted to [facility]  due to sepsis,Pneumonia of right lower lobe,moderate protein calorie malnutrition,emphysema dysphagia, prediabetes ,unwitnessed fall, anemia PVD.Venous stasis dermatitis of both lower extremities.  Supine in bed, on low air loss mattress. Needs assist to turn and reposition . Incontinent of large liquid stool  bowel and bladder. Pt on ATR turning system , repositioned on side with foam wedges     Assessment:   1)Bilateral heels dry and intact   2)LLE full thickness skin loss, mod serosangenous drainage when dressings removed  Wound bed 50% tan adhered slough and 50%red tissue.   3)Sacrum and bilateral buttocks dry and intact without redness     Skin Care Plan:  1-Left Shin Wound: Cleanse with NSS and pat dry. Apply dermagran to wound beds, cover with ABDS, and secure with tae wrap. Change every other day or PRN.   2-Turn/reposition q2h or when medically stable for pressure re-distribution on skin.  3-Elevate heels to offload pressure.  4-Moisturize skin daily with skin nourishing cream  5-Ehob cushion in chair when out of bed.  6-Cleanse B/L Heels and Sacro-Buttocks with soap and water. Pat dry. Apply Silicone Border Foam (Mepilex) to areas. Wong with P for Prevention and change every 3 days or PRN soilage/displacement. Peel back and inspect Q-shift  7-Place Patient on ATR Turning and repositioning system (on)  8-Low Air Loss Mattress (on)       Wound 01/01/25 Skin Tear Pretibial Left (Active)   Wound Image   01/15/25 0929   Wound Description Beefy red;Tan;Slough 01/15/25 0929   Essence-wound Assessment Intact;Dry 01/15/25 0929   Wound Length (cm) 6 cm 01/15/25 0929   Wound Width (cm) 1 cm 01/15/25 0929   Wound Depth (cm) 0.2 cm 01/15/25 0929   Wound Surface Area (cm^2) 6 cm^2 01/15/25 0929   Wound Volume (cm^3) 1.2 cm^3 01/15/25 0929    Calculated Wound Volume (cm^3) 1.2 cm^3 01/15/25 0929   Change in Wound Size % 68.75 01/15/25 0929   Drainage Amount Scant 01/15/25 0929   Drainage Description Serosanguineous 01/15/25 0929   Non-staged Wound Description Full thickness 01/15/25 0929   Treatments Site care;Cleansed;Elevated 01/15/25 0929   Dressing Dermagran gauze;Dry dressing;ABD 01/15/25 0929   Dressing Changed Changed 01/15/25 0929   Patient Tolerance Tolerated well 01/15/25 0929   Dressing Status Clean;Dry;Intact 01/15/25 0929       Call or Secure Chat  with any questions  Wound Care will continue to follow weekly    Cris Conner BSN RN CWON

## 2025-01-15 NOTE — CASE MANAGEMENT
[facility] has received APPROVED authorization.  Insurance:   [employer]   Auth obtained via Insurance Rep: aggie   Authorization received for: [facility]  Facility: [place]   Authorization #:[id number]   Start of Care:1/15  Next Review Date:1/22  Continued Stay Care Coordinator:  n/a  Submit next review to: fax [phone]     Care Manager notified: steven kothari     Please reach out to CM for updates on any clinical information.

## 2025-01-15 NOTE — ASSESSMENT & PLAN NOTE
Emphysematous changes noted on chest x-rays  Status post course of IV Solu-Medrol and oral prednisone 40 mg daily for 3 days   Pulmonology consult appreciated, recommending outpatient follow-up  No need for ongoing steroids or maintenance inhaler at this time  Continue with PRN albuterol

## 2025-01-15 NOTE — ASSESSMENT & PLAN NOTE
Patient was a Rapid Respone on 1/5 due to unwitnessed fall  XRs of right knee and pelvis with no acute osseous abnormalities   CTH and CT C-spine without acute traumatic injury   PT/OT recommending rehab as above

## 2025-01-15 NOTE — CASE MANAGEMENT
Per Availity, SNF auth still pending.     Escalation email sent to [employer] Escalation Team requesting expedite of determination.     CM notified: steven kothari

## 2025-01-15 NOTE — ASSESSMENT & PLAN NOTE
8 mm (mean axis) solid pulmonary nodule in the left lower lobe incidentally noted on CT CAP  No plan for further workup as per pulmonary given patient's age   Incidence Of No Shows: more than 10 Detail Level: Zone

## 2025-01-15 NOTE — PROGRESS NOTES
Patient:  KE JOYA    MRN:  [id number]    Aidin Request ID:  [id number]    Level of care reserved:  Skilled Nursing Facility    Partner Reserved:  [facility], [place] [id number] [phone]    Clinical needs requested:    Geography searched:  10 miles around [id number]    Start of Service:    Request sent:  3:12pm EST on 1/3/2025 by Julian Blue    Partner reserved:  1:16pm EST on 1/14/2025 by Julian Blue    Choice list shared:  3:18pm EST on 1/9/2025 by Julian Blue

## 2025-01-15 NOTE — CASE MANAGEMENT
Case Management Discharge Planning Note    Patient name Remedios Mcgrath  Location [facility] 603/[facility] 603-01 MRN [id number]  : 1929 Date 1/15/2025       Current Admission Date: 2024  Current Admission Diagnosis:Pneumonia of right lower lobe due to Streptococcus pneumoniae (HCC)   Patient Active Problem List    Diagnosis Date Noted Date Diagnosed    Ambulatory dysfunction 2025     Anemia 2025     Unwitnessed fall 2025     Prediabetes 2025     New onset atrial fibrillation with RVR ([facility]) 2025     Sepsis ([facility]) 2025     Pneumococcal bacteremia 2025     Acute kidney injury superimposed on CKD  ([facility]) 2025     Acute hypoxic respiratory failure ([facility]) 2025     Emphysema lung ([facility]) 2025     Dysphagia 2025     Pneumonia of right lower lobe due to Streptococcus pneumoniae ([facility]) 2025     Moderate protein-calorie malnutrition ([facility]) 2025     Mild protein-calorie malnutrition ([facility]) 10/04/2024     Venous stasis dermatitis of both lower extremities 2024     Stage 3b chronic kidney disease ([facility]) 03/15/2022     Bruises easily 2020     Impaired fasting glucose 2012     Stenosis of left carotid artery 2012     Peripheral vascular disease ([facility]) 2012     Hyperlipidemia 2012     Primary hypertension 2012     Insomnia 2012       LOS (days): 15  Geometric Mean LOS (GMLOS) (days): 4.9  Days to GMLOS:-9.7     OBJECTIVE:  Risk of Unplanned Readmission Score: 17.09   Current admission status: Inpatient   Preferred Pharmacy:   [facility]  [place], [place] - [address]  [address]  [place] [address]  Phone: [phone] Fax: [phone]    Primary Care Provider: Dion Tsang MD    Primary Insurance: ANNELIESE  REP  Secondary Insurance:     DISCHARGE DETAILS:    Discharge planning discussed with:: Patient and Srini ( son) at bedside  Freedom of Choice: Yes  Comments - Freedom of Choice: Discussed  FOC  CM contacted family/caregiver?: Yes  Were Treatment Team discharge recommendations reviewed with patient/caregiver?: Yes  Did patient/caregiver verbalize understanding of patient care needs?: N/A- going to facility  Were patient/caregiver advised of the risks associated with not following Treatment Team discharge recommendations?: Yes    Other Referral/Resources/Interventions Provided:  Interventions: SNF  Referral Comments: [facility] able to accept today. Pt and Son in agreement with dcp.    Treatment Team Recommendation: Short Term Rehab  Discharge Destination Plan:: Short Term Rehab  Transport at Discharge : [facility]     Number/Name of Dispatcher: [facility]  Transported by (Company and Unit #): [employer]  ETA of Transport (Date): 01/15/25  ETA of Transport (Time): 1635    IMM Given (Date):: 01/15/25  IMM Given to:: Family  Family notified:: Srini Mcgrath III son  IMM reviewed with Srini Ramila RIVER son, Srini Mcgrath III son agrees with discharge determination.     Accepting Facility Name, City & State : [facility]  Receiving Facility/Agency Phone Number: [phone]  Facility/Agency Fax Number: [phone]

## 2025-01-15 NOTE — PLAN OF CARE
Problem: PAIN - ADULT  Goal: Verbalizes/displays adequate comfort level or baseline comfort level  Description: Interventions:  - Encourage patient to monitor pain and request assistance  - Assess pain using appropriate pain scale  - Administer analgesics based on type and severity of pain and evaluate response  - Implement non-pharmacological measures as appropriate and evaluate response  - Consider cultural and social influences on pain and pain management  - Notify physician/advanced practitioner if interventions unsuccessful or patient reports new pain  Outcome: Progressing     Problem: INFECTION - ADULT  Goal: Absence or prevention of progression during hospitalization  Description: INTERVENTIONS:  - Assess and monitor for signs and symptoms of infection  - Monitor lab/diagnostic results  - Monitor all insertion sites, i.e. indwelling lines, tubes, and drains  - Monitor endotracheal if appropriate and nasal secretions for changes in amount and color  - [place] appropriate cooling/warming therapies per order  - Administer medications as ordered  - Instruct and encourage patient and family to use good hand hygiene technique  - Identify and instruct in appropriate isolation precautions for identified infection/condition  Outcome: Progressing     Problem: SAFETY ADULT  Goal: Patient will remain free of falls  Description: INTERVENTIONS:  - Educate patient/family on patient safety including physical limitations  - Instruct patient to call for assistance with activity   - Consult OT/PT to assist with strengthening/mobility   - Keep Call bell within reach  - Keep bed low and locked with side rails adjusted as appropriate  - Keep care items and personal belongings within reach  - Initiate and maintain comfort rounds  - Make Fall Risk Sign visible to staff  - Apply yellow socks and bracelet for high fall risk patients  - Consider moving patient to room near nurses station  Outcome: Progressing     Problem: Knowledge  Deficit  Goal: Patient/family/caregiver demonstrates understanding of disease process, treatment plan, medications, and discharge instructions  Description: Complete learning assessment and assess knowledge base.  Interventions:  - Provide teaching at level of understanding  - Provide teaching via preferred learning methods  Outcome: Progressing     Problem: DISCHARGE PLANNING  Goal: Discharge to home or other facility with appropriate resources  Description: INTERVENTIONS:  - Identify barriers to discharge w/patient and caregiver  - Arrange for needed discharge resources and transportation as appropriate  - Identify discharge learning needs (meds, wound care, etc.)  - Arrange for interpretive services to assist at discharge as needed  - Refer to Case Management Department for coordinating discharge planning if the patient needs post-hospital services based on physician/advanced practitioner order or complex needs related to functional status, cognitive ability, or social support system  Outcome: Progressing

## 2025-01-15 NOTE — ASSESSMENT & PLAN NOTE
Noted with downtrending hemoglobin 11.4 ->9.7 most recently  No signs or symptoms of active bleeding at this time

## 2025-01-15 NOTE — ASSESSMENT & PLAN NOTE
Most likely secondary to pneumonia and severe COPD. Appears was up to 15L in ICU.  Monitor closely and titrate supplemental oxygen as needed to maintain SpO2 > 88%  O2 requirements have improved, on 4 L NC currently

## 2025-01-15 NOTE — ASSESSMENT & PLAN NOTE
Blood pressure reviewed and acceptable  Continue with lopressor 25 mg twice daily  PTA Norvasc discontinued

## 2025-01-15 NOTE — PROGRESS NOTES
Patient:  KE JOYA    MRN:  [id number]    Aidin Request ID:  [id number]    Level of care reserved:  Skilled Nursing Facility    Partner Reserved:  [facility], [place] [address] [phone]    Clinical needs requested:    Geography searched:  10 miles around [id number]    Start of Service:    Request sent:  3:12pm EST on 1/3/2025 by Julian Blue    Partner reserved:  1:16pm EST on 1/14/2025 by Julian Blue    Choice list shared:  3:18pm EST on 1/9/2025 by Julian Blue

## 2025-01-15 NOTE — PLAN OF CARE
Problem: Prexisting or High Potential for Compromised Skin Integrity  Goal: Skin integrity is maintained or improved  Description: INTERVENTIONS:  - Identify patients at risk for skin breakdown  - Assess and monitor skin integrity  - Assess and monitor nutrition and hydration status  - Monitor labs   - Assess for incontinence   - Turn and reposition patient  - Assist with mobility/ambulation  - Relieve pressure over bony prominences  - Avoid friction and shearing  - Provide appropriate hygiene as needed including keeping skin clean and dry  - Evaluate need for skin moisturizer/barrier cream  - Collaborate with interdisciplinary team   - Patient/family teaching  - Consider wound care consult   Outcome: Progressing     Problem: RESPIRATORY - ADULT  Goal: Achieves optimal ventilation and oxygenation  Description: INTERVENTIONS:  - Assess for changes in respiratory status  - Assess for changes in mentation and behavior  - Position to facilitate oxygenation and minimize respiratory effort  - Oxygen administered by appropriate delivery if ordered  - Initiate smoking cessation education as indicated  - Encourage broncho-pulmonary hygiene including cough, deep breathe, Incentive Spirometry  - Assess the need for suctioning and aspirate as needed  - Assess and instruct to report SOB or any respiratory difficulty  - Respiratory Therapy support as indicated  Outcome: Progressing     Problem: PAIN - ADULT  Goal: Verbalizes/displays adequate comfort level or baseline comfort level  Description: Interventions:  - Encourage patient to monitor pain and request assistance  - Assess pain using appropriate pain scale  - Administer analgesics based on type and severity of pain and evaluate response  - Implement non-pharmacological measures as appropriate and evaluate response  - Consider cultural and social influences on pain and pain management  - Notify physician/advanced practitioner if interventions unsuccessful or patient  reports new pain  Outcome: Progressing     Problem: Knowledge Deficit  Goal: Patient/family/caregiver demonstrates understanding of disease process, treatment plan, medications, and discharge instructions  Description: Complete learning assessment and assess knowledge base.  Interventions:  - Provide teaching at level of understanding  - Provide teaching via preferred learning methods  Outcome: Progressing

## 2025-01-15 NOTE — DISCHARGE SUMMARY
Discharge Summary - Hospitalist   Name: Remedios Mcgrath 89 y.o. female I MRN: [id number]  Unit/Bed#: [facility] 603-01 I Date of Admission: 12/31/2024   Date of Service: 1/15/2025 I Hospital Day: 15     Assessment & Plan  Pneumonia of right lower lobe due to Streptococcus pneumoniae (HCC) (Resolved: 1/15/2025)  Presented to ER after being found down on welfare check. Was noted to be hypoxic in ER (82% on RA) requiring BIPAP and then developed a-fib with RVR.  Was in ICU, transferred out on 1/2.   CT CAP: Acute right-sided pneumonia, greatest at the right lower lobe. Associated loculated parapneumonic effusion. No obvious pleural thickening on noncontrast study, though empyema has to be considered.   Strep pneumoniae urinary antigen positive, as well as blood cultures positive for strep pneumonia  CXR 1/5 : New left lower lobe consolidation with additional worsening of right base infiltrates.  Most recent chest x-ray 1/13: Question mild pulmonary venous congestion. Pneumonia not excluded in the appropriate clinical setting. Trace effusions. Severe emphysema.   Completed 10 days total antibiotic as per ID recommendation  See plan of care regarding acute hypoxic respiratory failure outlined below  Continue to encourage OOB with ambulation, incentive spirometry  DC planning to rehab, pending auth and bed availability at this time  Pneumococcal bacteremia (Resolved: 1/15/2025)  Due to pneumonia  TTE (technically difficult) showed MV thickening (likely age-related) but no vegetations; low clinical suspicion for endocarditis  ID consult and recommendations appreciated, now signed off  Status post total 10-day course antibiotic  No indication for GARRETT from an ID perspective  Repeat blood cultures negative x 5 days   New onset atrial fibrillation with RVR (HCC) (Resolved: 1/15/2025)  Noted while in the ER, spontaneously converted to sinus rhythm   Cardiology consult and recommendations appreciated, now signed off  Currently rate  controlled on Lopressor 25 mg twice daily and on Eliquis 2.5 mg for anticoagulation  As she will be discharged to facility, will defer Eliquis price check at this time  Acute hypoxic respiratory failure (HCC)  Most likely secondary to pneumonia and severe COPD. Appears was up to 15L in ICU.  Monitor closely and titrate supplemental oxygen as needed to maintain SpO2 > 88%  O2 requirements have improved, on 4 L NC currently   Emphysema lung (HCC)  Emphysematous changes noted on chest x-rays  Status post course of IV Solu-Medrol and oral prednisone 40 mg daily for 3 days   Pulmonology consult appreciated, recommending outpatient follow-up  No need for ongoing steroids or maintenance inhaler at this time  Continue with PRN albuterol   Ambulatory dysfunction  Most likely due to generalized weakness in the setting of acute illness/sepsis  PT/OT recommending rehab  Primary hypertension  Blood pressure reviewed and acceptable  Continue with lopressor 25 mg twice daily  PTA Norvasc discontinued   Anemia  Noted with downtrending hemoglobin 11.4 ->9.7 most recently  No signs or symptoms of active bleeding at this time  Prediabetes  A1c 6.4%  Previously with hyperglycemia likely 2/2 steroids, now improved/resolved  No need for insulin/medical management on discharge given patient's age   Unwitnessed fall  Patient was a Rapid Respone on 1/5 due to unwitnessed fall  XRs of right knee and pelvis with no acute osseous abnormalities   CTH and CT C-spine without acute traumatic injury   PT/OT recommending rehab as above  Dysphagia  Speech following, currently on modified diet  Aspiration precautions  Acute kidney injury superimposed on CKD  (HCC) (Resolved: 1/15/2025)  Lab Results   Component Value Date    EGFR 45 01/12/2025    EGFR 43 01/11/2025    EGFR 49 01/08/2025    CREATININE 1.05 01/12/2025    CREATININE 1.08 01/11/2025    CREATININE 0.97 01/08/2025     POA with creatinine of 2.47 increased from baseline of approximately  1.3-1.4   Most likely 2/2 sepsis, status post IV fluids and IV antibiotic  JOSE ROBERTO has resolved at this time  Monitor BMP as needed  Sepsis (HCC) (Resolved: 1/15/2025)  As evidenced by leukocytosis and tachycardia, due to bacteremia and pneumonia  See plan of care outlined under pneumonia/bacteremia  Completed total 10-day course antibiotic  Resolved  Moderate protein-calorie malnutrition (HCC)  Malnutrition Findings:   Adult Malnutrition type: Chronic illness  Adult Degree of Malnutrition: Malnutrition of moderate degree  Malnutrition Characteristics: Inadequate energy, Muscle loss       360 Statement: Chronic/moderate malnutrition r/t inadequate PO intake, as evidenced by intake meeting <75% of estimated needs x > 1 month, and muscle mass depletion (temporal). Treatment: pending ability to advance to PO diet - liberal diet as able and trial oral nutrition supplements pending SLP recommendations    BMI Findings:       Body mass index is 18.27 kg/m².   Encourage oral intake as able  Pulmonary nodule  8 mm (mean axis) solid pulmonary nodule in the left lower lobe incidentally noted on CT CAP  No plan for further workup as per pulmonary given patient's age  Adrenal nodule (HCC)  New intermediate density 12 mm left adrenal nodule noted incidentally on CT CAP  No plan for additional workup given patient's age     Medical Problems       Resolved Problems  Date Reviewed: 1/10/2025   None       Discharging Physician / Practitioner: Bere Henry PA-C  PCP: Dion Tsang MD  Admission Date:   Admission Orders (From admission, onward)       Ordered        12/31/24 2252  INPATIENT ADMISSION  Once                          Discharge Date: 01/15/25    Consultations During Hospital Stay:  Cardiology  Infectious disease  Pulmonology    Procedures Performed:   None     Significant Findings / Test Results:   Outlined above     Incidental Findings:   Pulmonary and adrenal nodules as above    Test Results Pending at Discharge (will require  follow up):   None      Outpatient Tests Requested:  Follow-up with Pulmonary for possible PFTs     Complications: Unwitnessed fall however no acute traumatic injuries    Reason for Admission: Septic shock    Hospital Course:   Remedios Mcgrath is a 89 y.o. female patient who originally presented to the hospital on 12/31/2024 after being found down at home during welfare check.  On arrival patient noted to be in septic shock requiring ICU admission.  Also noted to have new onset A-fib with RVR as well as acute hypoxic respiratory failure requiring BiPAP on arrival.  CT CAP noted right lower lobe pneumonia, patient subsequently noted to have pneumococcal bacteremia.  She was seen in consultation by infectious disease and completed total 10-day course IV antibiotic.  She was evaluated by pulmonary due to acute hypoxic respiratory failure with emphysematous changes noted on chest imaging.  She completed course of IV and p.o. steroids and they are recommending outpatient follow-up for PFTs.  No need for maintenance inhaler on discharge and can continue to use as needed albuterol.  Patient continues to require 2 to 5 L nasal cannula at time of discharge.    In regards to new onset atrial fibrillation, patient is rate controlled on her home dose Lopressor 25 mg twice daily.  She has been initiated on low-dose Eliquis 2.5 mg twice daily for anticoagulation and PTA aspirin as well as amlodipine has been discontinued.  She can follow-up with cardiology as needed in the outpatient setting.    Please see above list of diagnoses and related plan for additional information.     Condition at Discharge: fair    Discharge Day Visit / Exam:   Subjective: Patient offers no new complaints today, aware of plan for discharge to rehab.  Vitals: Blood Pressure: 134/65 (01/15/25 0937)  Pulse: 80 (01/15/25 0937)  Temperature: (!) 97.4 °F (36.3 °C) (01/15/25 0719)  Temp Source: Oral (01/14/25 7084)  Respirations: 15 (01/15/25 0719)  Height:  "4' 10\" (147.3 cm) (01/01/25 0930)  Weight - Scale: 39.6 kg (87 lb 6.4 oz) (01/15/25 0600)  SpO2: 94 % (01/15/25 0937)  Physical Exam  Vitals reviewed.   Constitutional:       General: She is not in acute distress.  Cardiovascular:      Rate and Rhythm: Normal rate and regular rhythm.   Pulmonary:      Effort: Pulmonary effort is normal. No respiratory distress.      Breath sounds: Rhonchi present.      Comments: On 4-5 L NC with SpO2 high 90s at rest   Neurological:      General: No focal deficit present.      Mental Status: She is alert and oriented to person, place, and time. Mental status is at baseline.          Discussion with Family: Patient declined call to .  Patient states her son is aware of plan for discharge to rehab.     Discharge instructions/Information to patient and family:   See after visit summary for information provided to patient and family.      Provisions for Follow-Up Care:  See after visit summary for information related to follow-up care and any pertinent home health orders.      Mobility at time of Discharge:   Basic Mobility Inpatient Raw Score: 11  JH-HLM Goal: 4: Move to chair/commode  JH-HLM Achieved: 4: Move to chair/commode  HLM Goal achieved. Continue to encourage appropriate mobility.     Disposition:   Other Skilled Nursing Facility at [place]    Planned Readmission: no    Discharge Medications:  See after visit summary for reconciled discharge medications provided to patient and/or family.      Administrative Statements   Discharge Statement:  I have spent a total time of 45 minutes in caring for this patient on the day of the visit/encounter. >30 minutes of time was spent on: Patient and family education, Impressions, Counseling / Coordination of care, Documenting in the medical record, Reviewing / ordering tests, medicine, procedures  , and Communicating with other healthcare professionals .    **Please Note: This note may have been constructed using a voice " recognition system**

## 2025-01-15 NOTE — ASSESSMENT & PLAN NOTE
A1c 6.4%  Previously with hyperglycemia likely 2/2 steroids, now improved/resolved  No need for insulin/medical management on discharge given patient's age

## 2025-01-15 NOTE — ASSESSMENT & PLAN NOTE
New intermediate density 12 mm left adrenal nodule noted incidentally on CT CAP  No plan for additional workup given patient's age

## 2025-01-16 ENCOUNTER — PATIENT OUTREACH (OUTPATIENT)
Dept: CASE MANAGEMENT | Facility: OTHER | Age: OVER 89
End: 2025-01-16

## 2025-01-16 NOTE — PROGRESS NOTES
Outpatient Care Management JOSE ROBERTO/SNF Pathway. Discharged 1/15/25 to [facility]. Email sent to facility to inform them the patient is on the JOSE ROBERTO Pathway and I will be following them during their skilled stay.  This Admin Coordinator will continue to monitor via chart review.

## 2025-01-16 NOTE — UTILIZATION REVIEW
NOTIFICATION OF ADMISSION DISCHARGE   This is a Notification of Discharge from [facility]. Please be advised that this patient has been discharge from our facility. Below you will find the admission and discharge date and time including the patient’s disposition.   UTILIZATION REVIEW CONTACT:  Emeli Deleon  Utilization   Network Utilization Review Department  Phone: [phone] x carefully listen to the prompts. All voicemails are confidential.  Email: [email or web address]     ADMISSION INFORMATION  PRESENTATION DATE: 12/31/2024  7:24 PM  OBERVATION ADMISSION DATE: N/A  INPATIENT ADMISSION DATE: 12/31/24 10:58 PM   DISCHARGE DATE: 1/15/2025  4:43 PM   DISPOSITION:Non [facility] SNF/TCU/SNU    Network Utilization Review Department  ATTENTION: Please call with any questions or concerns to [phone] and carefully listen to the prompts so that you are directed to the right person. All voicemails are confidential.   For Discharge needs, contact Care Management DC Support Team at [phone] opt. 2  Send all requests for admission clinical reviews, approved or denied determinations and any other requests to dedicated fax number below belonging to the campus where the patient is receiving treatment. List of dedicated fax numbers for the Facilities:  FACILITY NAME UR FAX NUMBER   ADMISSION DENIALS (Administrative/Medical Necessity) [phone]   DISCHARGE SUPPORT TEAM ([facility]) [phone]   PARENT CHILD HEALTH (Maternity/NICU/Pediatrics) [phone]   [facility] [phone]   [facility] [phone]   [facility] [phone]   [facility] [phone]   [facility] [phone]   [facility] [phone]   [facility] [phone]   [facility]  [phone]   [facility] [phone]   [facility] [phone]   [facility] [phone]   [facility] [phone]

## 2025-01-22 ENCOUNTER — PATIENT OUTREACH (OUTPATIENT)
Dept: CASE MANAGEMENT | Facility: OTHER | Age: OVER 89
End: 2025-01-22

## 2025-01-29 ENCOUNTER — PATIENT OUTREACH (OUTPATIENT)
Dept: CASE MANAGEMENT | Facility: OTHER | Age: OVER 89
End: 2025-01-29

## 2025-02-05 ENCOUNTER — PATIENT OUTREACH (OUTPATIENT)
Dept: CASE MANAGEMENT | Facility: OTHER | Age: OVER 89
End: 2025-02-05

## 2025-02-12 ENCOUNTER — PATIENT OUTREACH (OUTPATIENT)
Dept: CASE MANAGEMENT | Facility: OTHER | Age: OVER 89
End: 2025-02-12

## 2025-02-13 ENCOUNTER — PATIENT OUTREACH (OUTPATIENT)
Dept: CASE MANAGEMENT | Facility: OTHER | Age: OVER 89
End: 2025-02-13

## 2025-02-13 NOTE — PROGRESS NOTES
Chart review complete Update obtained from [facility] ([facility]). The patient is currently admitted to the SNF and is receiving skilled therapies No LCD at this time. They are not on the [facility] discharge list. This Admin Coordinator will continue to monitor via chart review.

## 2025-03-28 ENCOUNTER — RA CDI HCC (OUTPATIENT)
Dept: OTHER | Facility: HOSPITAL | Age: OVER 89
End: 2025-03-28

## 2025-04-27 ENCOUNTER — HOSPITAL ENCOUNTER (EMERGENCY)
Facility: HOSPITAL | Age: 89
Discharge: HOME | End: 2025-04-27
Attending: SURGERY | Admitting: SURGERY
Payer: MEDICARE

## 2025-04-27 ENCOUNTER — APPOINTMENT (OUTPATIENT)
Dept: RADIOLOGY | Facility: HOSPITAL | Age: 89
End: 2025-04-27
Payer: MEDICARE

## 2025-04-27 ENCOUNTER — APPOINTMENT (EMERGENCY)
Dept: RADIOLOGY | Facility: HOSPITAL | Age: 89
End: 2025-04-27
Payer: MEDICARE

## 2025-04-27 VITALS
HEART RATE: 65 BPM | DIASTOLIC BLOOD PRESSURE: 65 MMHG | RESPIRATION RATE: 16 BRPM | OXYGEN SATURATION: 98 % | WEIGHT: 90.17 LBS | TEMPERATURE: 97.7 F | SYSTOLIC BLOOD PRESSURE: 143 MMHG
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
## 2025-04-27 DIAGNOSIS — T14.8XXA ABRASION: ICD-10-CM

## 2025-04-27 DIAGNOSIS — S09.90XA CLOSED HEAD INJURY, INITIAL ENCOUNTER: ICD-10-CM

## 2025-04-27 LAB
ALBUMIN SERPL BCG-MCNC: 3 G/DL (ref 3.5–5)
ALP SERPL-CCNC: 64 U/L (ref 34–104)
ALT SERPL W P-5'-P-CCNC: 12 U/L (ref 7–52)
ANION GAP SERPL CALCULATED.3IONS-SCNC: 8 MMOL/L (ref 4–13)
AST SERPL W P-5'-P-CCNC: 29 U/L (ref 13–39)
BASE EXCESS BLDA CALC-SCNC: 0 MMOL/L (ref -2–3)
BASOPHILS # BLD AUTO: 0.05 THOUSANDS/ÂΜL (ref 0–0.1)
BASOPHILS NFR BLD AUTO: 1 % (ref 0–1)
BILIRUB SERPL-MCNC: 0.29 MG/DL (ref 0.2–1)
BUN SERPL-MCNC: 17 MG/DL (ref 5–25)
CA-I BLD-SCNC: 1.05 MMOL/L (ref 1.12–1.32)
CALCIUM ALBUM COR SERPL-MCNC: 9.4 MG/DL (ref 8.3–10.1)
CALCIUM SERPL-MCNC: 8.6 MG/DL (ref 8.4–10.2)
CHLORIDE SERPL-SCNC: 100 MMOL/L (ref 96–108)
CO2 SERPL-SCNC: 24 MMOL/L (ref 21–32)
CREAT SERPL-MCNC: 1.1 MG/DL (ref 0.6–1.3)
EOSINOPHIL # BLD AUTO: 0.13 THOUSAND/ÂΜL (ref 0–0.61)
EOSINOPHIL NFR BLD AUTO: 1 % (ref 0–6)
ERYTHROCYTE [DISTWIDTH] IN BLOOD BY AUTOMATED COUNT: 14.6 % (ref 11.6–15.1)
GFR SERPL CREATININE-BSD FRML MDRD: 42 ML/MIN/1.73SQ M
GLUCOSE SERPL-MCNC: 135 MG/DL (ref 65–140)
GLUCOSE SERPL-MCNC: 138 MG/DL (ref 65–140)
HCO3 BLDA-SCNC: 24 MMOL/L (ref 24–30)
HCT VFR BLD AUTO: 31.3 % (ref 34.8–46.1)
HCT VFR BLD CALC: 30 % (ref 34.8–46.1)
HGB BLD-MCNC: 9.7 G/DL (ref 11.5–15.4)
HGB BLDA-MCNC: 10.2 G/DL (ref 11.5–15.4)
IMM GRANULOCYTES # BLD AUTO: 0.17 THOUSAND/UL (ref 0–0.2)
IMM GRANULOCYTES NFR BLD AUTO: 2 % (ref 0–2)
LYMPHOCYTES # BLD AUTO: 0.76 THOUSANDS/ÂΜL (ref 0.6–4.47)
LYMPHOCYTES NFR BLD AUTO: 8 % (ref 14–44)
MCH RBC QN AUTO: 31.4 PG (ref 26.8–34.3)
MCHC RBC AUTO-ENTMCNC: 31 G/DL (ref 31.4–37.4)
MCV RBC AUTO: 101 FL (ref 82–98)
MONOCYTES # BLD AUTO: 0.8 THOUSAND/ÂΜL (ref 0.17–1.22)
MONOCYTES NFR BLD AUTO: 8 % (ref 4–12)
NEUTROPHILS # BLD AUTO: 7.92 THOUSANDS/ÂΜL (ref 1.85–7.62)
NEUTS SEG NFR BLD AUTO: 80 % (ref 43–75)
NRBC BLD AUTO-RTO: 0 /100 WBCS
PCO2 BLD: 25 MMOL/L (ref 21–32)
PCO2 BLD: 33.7 MM HG (ref 42–50)
PH BLD: 7.46 [PH] (ref 7.3–7.4)
PLATELET # BLD AUTO: 424 THOUSANDS/UL (ref 149–390)
PMV BLD AUTO: 8.5 FL (ref 8.9–12.7)
PO2 BLD: 22 MM HG (ref 35–45)
POTASSIUM BLD-SCNC: 4.2 MMOL/L (ref 3.5–5.3)
POTASSIUM SERPL-SCNC: 4.4 MMOL/L (ref 3.5–5.3)
PROT SERPL-MCNC: 7 G/DL (ref 6.4–8.4)
RBC # BLD AUTO: 3.09 MILLION/UL (ref 3.81–5.12)
SAO2 % BLD FROM PO2: 41 % (ref 60–85)
SODIUM BLD-SCNC: 134 MMOL/L (ref 136–145)
SODIUM SERPL-SCNC: 132 MMOL/L (ref 135–147)
SPECIMEN SOURCE: ABNORMAL
WBC # BLD AUTO: 9.83 THOUSAND/UL (ref 4.31–10.16)

## 2025-04-27 PROCEDURE — 82947 ASSAY GLUCOSE BLOOD QUANT: CPT

## 2025-04-27 PROCEDURE — 80053 COMPREHEN METABOLIC PANEL: CPT | Performed by: SURGERY

## 2025-04-27 PROCEDURE — 82803 BLOOD GASES ANY COMBINATION: CPT

## 2025-04-27 PROCEDURE — EDAIR PR ED AIR: Performed by: EMERGENCY MEDICINE

## 2025-04-27 PROCEDURE — 70450 CT HEAD/BRAIN W/O DYE: CPT

## 2025-04-27 PROCEDURE — 84295 ASSAY OF SERUM SODIUM: CPT

## 2025-04-27 PROCEDURE — 72125 CT NECK SPINE W/O DYE: CPT

## 2025-04-27 PROCEDURE — 99285 EMERGENCY DEPT VISIT HI MDM: CPT | Performed by: SURGERY

## 2025-04-27 PROCEDURE — 84132 ASSAY OF SERUM POTASSIUM: CPT

## 2025-04-27 PROCEDURE — 85014 HEMATOCRIT: CPT

## 2025-04-27 PROCEDURE — 71045 X-RAY EXAM CHEST 1 VIEW: CPT

## 2025-04-27 PROCEDURE — 82330 ASSAY OF CALCIUM: CPT

## 2025-04-27 PROCEDURE — 85025 COMPLETE CBC W/AUTO DIFF WBC: CPT | Performed by: SURGERY

## 2025-04-27 PROCEDURE — 99284 EMERGENCY DEPT VISIT MOD MDM: CPT

## 2025-04-27 PROCEDURE — 36415 COLL VENOUS BLD VENIPUNCTURE: CPT | Performed by: SURGERY

## 2025-04-27 NOTE — DISCHARGE INSTRUCTIONS
Prevention of Falls and Fractures:  Safety first to prevent falls: At any age, people can change their environments to reduce their risk of falling and breaking a bone.     Outdoor safety tips:  In nasty weather, use a walker or cane for added stability.  Wear warm boots with rubber soles for added traction.  Look carefully at floor surfaces in public buildings. Many floors are made of highly polished marble or tile that can be very slippery. If floors have plastic or carpet runners in place, stay on them whenever possible.  Identify community services that can provide assistance, such as 24-hour pharmacies and grocery stores that take orders over the phone and deliver. It is especially important to use these services in bad weather.  Use a shoulder bag, gurvinder pack, or backpack to leave hands free.  Stop at curbs and check their height before stepping up or down. Be cautious at curbs that have been cut away to allow access for bikes or wheelchairs. The incline up or down may lead to a fall.     Indoor safety tips:  Keep all rooms free from clutter, especially the floors.  Keep floor surfaces smooth but not slippery. When entering rooms, be aware of differences in floor levels and thresholds.  Wear supportive, low-heeled shoes, even at home. Avoid walking around in socks, stockings, or floppy, backless slippers.  Check that all carpets and area rugs have skid-proof backing or are tacked to the floor, including carpeting on stairs.  Keep electrical and telephone cords and wires out of walkways.  Be sure that all stairwells are adequately lit and that stairs have handrails on both sides. Consider placing fluorescent tape on the edges of the top and bottom steps.  For optimal safety, install grab bars on bathroom walls beside tubs, showers, and toilets. If you are unstable on your feet, consider using a plastic chair with a back and nonskid leg tips in the shower.  Use a rubber bath mat in the shower or tub.  Keep a  flashlight with fresh batteries beside your bed.  Add ceiling fixtures to rooms lit by lamps only, or install lamps that can be turned on by a switch near the entry point into the room. Another option is to install voice- or sound-activated lamps.  Use bright light bulbs in your home.  If you must use a step-stool for hard-to-reach areas, use a sturdy one with a handrail and wide steps. A better option is to reorganize work and storage areas to minimize the need for stooping or excessive reaching.  Consider purchasing a portable phone that you can take with you from room to room. It provides security because you can answer the phone without rushing for it and you can call for help should an accident occur.  Don’t let prescriptions run low. Always keep at least 1 week’s worth of medications on hand at home. Check prescriptions with your doctor and pharmacist to see if they may be increasing your risk of falling. If you take multiple medications, check with your doctor and pharmacist about possible interactions between the different medications.  Arrange with a family member or friend for daily contact. Try to have at least one person who knows where you are.  If you live alone, you may wish to contract with a monitoring company that will respond to your call 24 hours a day.  Watch yourself in a mirror. Does your body lean or sway back and forth or side to side? People with decreased ability to balance often have a high degree of body sway and are more likely to fall. (http://www.niams.nih.gov/Health_Info/Bone/Osteoporosis/Fracture/prevent_falls.asp#d)     Trauma Discharge Instructions:    Please follow-up as instructed. If you need a follow-up appointment, please call the office when you leave to schedule an appointment.    Activity:  - PT and OT evaluation and treatment as indicated.  - You may resume activity as tolerated.  - Walking and normal light activities are encouraged.  - Normal daily activities including  climbing steps are okay.  - No driving until no longer using pain medications.    Return to work:    - You may return to work once cleared by the Trauma Service.    Diet:    - You may resume your normal diet.    Medications:  - You should continue your current medication regimen after discharge unless otherwise instructed. Please refer to your discharge medication list for further details.  - Please take the pain medications as directed.  - You are encouraged to use non-narcotic pain medications first and whenever possible. Reserve the use of narcotic pain medication for moderate to severe pain not controlled by non-narcotic medications.  - No driving while taking narcotic pain medications.  - You may become constipated, especially if taking pain medications. You may take any over the counter stool softeners or laxatives as needed. Examples: Milk of Magnesia, Colace, Senna.    Additional Instructions:  - May shower daily.  - If you have any questions or concerns after discharge please call the office.  - Call office or return to ER if fever greater than 101, chills, persistent nausea/vomiting, worsening/uncontrollable pain, develop productive cough, increasing shortness of breath, difficulty breathing, and/or increasing redness or purulent/foul smelling drainage from incision(s).

## 2025-04-27 NOTE — ASSESSMENT & PLAN NOTE
- Mechanical fall when getting out of bed at facility  - Head strike on eliquis, no LOC  - GCS 15, non focal exam  - No complaints of pain  - CT head, C-spine

## 2025-04-27 NOTE — PROGRESS NOTES
Pastoral Care Progress Note          Chaplaincy Interventions Utilized:      04/27/25 1000   Clinical Encounter Type   Visited With Patient   Crisis Visit Trauma     Trauma B -   Provided support to the medical team. Patient fell in the nursing home and she is on blood thinners according to paramedics. Patient has no pain due to the fall. Will go through [facility]. No family members are present.

## 2025-04-27 NOTE — H&P
H&P - Trauma   Name: Yessi Craft 89 y.o. female I MRN: [id number]  Unit/Bed#: ED 05 I Date of Admission: 4/27/2025   Date of Service: 4/27/2025 I Hospital Day: 0     Assessment & Plan  Fall  - Mechanical fall when getting out of bed at facility  - Head strike on eliquis, no LOC  - GCS 15, non focal exam  - No complaints of pain  - CT head, C-spine    Trauma Alert: Level B   Model of Arrival: Ambulance    Trauma Team: Attending Ana, Residents Phylicia, Fellow Emeterio, and AP Fabrizio  Consultants:     None     History of Present Illness   Chief Complaint: Fall  Mechanism:Fall     Yessi Craft is a 89 y.o. female who presents after a mechanical fall out of bed. She tripped and fell, hitting her head, no LOC, takes eliquis daily. She has multiple aged healing ecchymosis, denies any pain.    Review of Systems   Constitutional:  Negative for chills and fever.   HENT:  Negative for facial swelling.    Eyes:  Negative for photophobia.   Respiratory:  Negative for shortness of breath.    Cardiovascular:  Negative for chest pain.   Gastrointestinal:  Negative for abdominal pain and nausea.   Musculoskeletal:  Negative for back pain and neck pain.        Chronic back pain   Skin:  Negative for wound.   Neurological:  Negative for dizziness, syncope and headaches.   Psychiatric/Behavioral:  Negative for confusion.      Medical History Review: I have reviewed the patient's PMH, PSH, Social History, Family History, Meds, and Allergies     There is no immunization history on file for this patient.  Last Tetanus: never done    (ISAR) Identification of Seniors at Risk  Before the illness or injury that brought you to the Emergency, did you need someone to help you on a regular basis?: 1  In the last 24 hours, have you needed more help than usual?: 0  Have you been hospitalized for one or more nights during the past 6 months?: 0  In general, do you see well?: 0  In general, do you have serious problems with your memory?:  0  Do you take more than three different medications every day?: 1  ISAR Score: 2    ISAR Score: Did you order a geriatric consult if the score was 2 or greater?: n/a       Objective :  Temp:  [97.7 °F (36.5 °C)] 97.7 °F (36.5 °C)  HR:  [64-68] 65  BP: ()/(49-85) 143/65  Resp:  [16-18] 16  SpO2:  [95 %-99 %] 98 %  O2 Device: Nasal cannula    Initial Vitals:   Temperature: 97.7 °F (36.5 °C) (04/27/25 1017)  Pulse: 67 (04/27/25 1017)  Respirations: 18 (04/27/25 1017)  Blood Pressure: 106/85 (04/27/25 1017)    Primary Survey:   Airway:        Status: patent;        Pre-hospital Interventions: none        Hospital Interventions: none  Breathing:        Pre-hospital Interventions: none       Effort: normal       Right breath sounds: normal       Left breath sounds: normal  Circulation:        Rhythm: regular       Rate: regular   Right Pulses Left Pulses    R radial: 2+  R femoral: 2+  R pedal: 2+     L radial: 2+  L femoral: 2+  L pedal: 2+       Disability:        GCS: Eye: 4; Verbal: 5 Motor: 6 Total: 15       Right Pupil: round;  reactive         Left Pupil:  round;  reactive      R Motor Strength L Motor Strength    R : 5/5  R dorsiflex: 5/5  R plantarflex: 5/5 L : 5/5  L dorsiflex: 5/5  L plantarflex: 5/5        Sensory:  No sensory deficit  Exposure:       Completed: Yes      Secondary Survey:  Physical Exam  Vitals reviewed.   Constitutional:       General: She is not in acute distress.     Appearance: Normal appearance.   HENT:      Head: Normocephalic.      Comments: Abrasion to L brow     Right Ear: External ear normal.      Left Ear: External ear normal.      Nose: Nose normal.      Mouth/Throat:      Mouth: Mucous membranes are dry.      Pharynx: Oropharynx is clear.   Eyes:      Extraocular Movements: Extraocular movements intact.      Conjunctiva/sclera: Conjunctivae normal.      Pupils: Pupils are equal, round, and reactive to light.   Cardiovascular:      Rate and Rhythm: Normal rate and  regular rhythm.      Pulses: Normal pulses.      Heart sounds: Normal heart sounds.   Pulmonary:      Effort: Pulmonary effort is normal. No respiratory distress.      Breath sounds: Normal breath sounds.   Chest:      Chest wall: No tenderness.   Abdominal:      General: Abdomen is flat. Bowel sounds are normal. There is no distension.      Palpations: Abdomen is soft.      Tenderness: There is no abdominal tenderness. There is no guarding.   Musculoskeletal:         General: No swelling, tenderness, deformity or signs of injury. Normal range of motion.      Cervical back: No tenderness.   Skin:     General: Skin is warm and dry.      Capillary Refill: Capillary refill takes less than 2 seconds.      Findings: Bruising and lesion (Small abrasion left eyebrow, hemostatic) present.      Comments: Multiple areas of bruising at various ages of healing   Neurological:      General: No focal deficit present.      Mental Status: She is alert and oriented to person, place, and time. Mental status is at baseline.      Sensory: No sensory deficit.      Motor: No weakness.   Psychiatric:         Mood and Affect: Mood normal.         Behavior: Behavior normal.             Lab Results: I have reviewed the following results:  Recent Labs     04/27/25  1024 04/27/25  1027   WBC 9.83  --    HGB 9.7* 10.2*   HCT 31.3* 30*   *  --    SODIUM 132*  --    K 4.4  --      --    CO2 24 25   BUN 17  --    CREATININE 1.10  --    GLUC 135  --    CAIONIZED  --  1.05*   AST 29  --    ALT 12  --    ALB 3.0*  --    TBILI 0.29  --    ALKPHOS 64  --        Imaging Results: I have personally reviewed pertinent images saved in PACS. CT scan findings (and other pertinent positive findings on images) were discussed with radiology. My interpretation of the images/reports are as follows:  Chest Xray(s): negative for acute findings   FAST exam(s): negative for acute findings   CT Scan(s): negative for acute findings   Additional Xray(s): N/A      Other Studies: Other Study Results Review: No additional pertinent studies reviewed.

## 2025-04-27 NOTE — ED PROVIDER NOTES
Emergency Department Airway Evaluation and Management Form    History  History obtained from EMS    89 y.o. female presents as trauma level B for mechanical fall on Eliqius.     PMH:   has a past medical history of Stroke (HCC).    PSH:   has no past surgical history on file.    Social:  Social History     Substance and Sexual Activity   Alcohol Use None     Social History     Tobacco Use   Smoking Status Not on file   Smokeless Tobacco Not on file     Social History     Substance and Sexual Activity   Drug Use Not on file     PE:  Vitals:    04/27/25 1017 04/27/25 1024   BP: 106/85 132/58   Pulse: 67 64   Resp: 18 18   Temp: 97.7 °F (36.5 °C)    TempSrc: Tympanic    SpO2: 96% 97%   Weight: 40.9 kg (90 lb 2.7 oz)        Primary Survey:  (A) Airway: Intact  (B) Breathing: Symmetric breath sounds bilaterally  (C) Circulation: Pulses:   pedal  1/4 and radial  1/4  (D) Disabliity:  GCS Total:  15  (E) Expose:  Completed          ED Provider  Electronically Signed by Maren Livingston MD  04/27/25 6622